# Patient Record
Sex: FEMALE | Race: WHITE | NOT HISPANIC OR LATINO | Employment: OTHER | URBAN - METROPOLITAN AREA
[De-identification: names, ages, dates, MRNs, and addresses within clinical notes are randomized per-mention and may not be internally consistent; named-entity substitution may affect disease eponyms.]

---

## 2017-01-06 ENCOUNTER — OFFICE VISIT (OUTPATIENT)
Dept: OTOLARYNGOLOGY | Facility: CLINIC | Age: 82
End: 2017-01-06
Payer: MEDICARE

## 2017-01-06 VITALS
HEART RATE: 65 BPM | TEMPERATURE: 96 F | WEIGHT: 128.75 LBS | DIASTOLIC BLOOD PRESSURE: 55 MMHG | BODY MASS INDEX: 21.42 KG/M2 | SYSTOLIC BLOOD PRESSURE: 136 MMHG

## 2017-01-06 DIAGNOSIS — C77.0 SECONDARY MALIGNANT NEOPLASM OF LYMPH NODES OF HEAD, FACE, OR NECK: ICD-10-CM

## 2017-01-06 DIAGNOSIS — E03.9 HYPOTHYROIDISM (ACQUIRED): ICD-10-CM

## 2017-01-06 DIAGNOSIS — H61.21 IMPACTED CERUMEN OF RIGHT EAR: ICD-10-CM

## 2017-01-06 DIAGNOSIS — I89.0 LYMPHEDEMA: ICD-10-CM

## 2017-01-06 DIAGNOSIS — I13.0 HYPERTENSIVE HEART AND KIDNEY DISEASE WITH HF AND WITH CKD STAGE I: ICD-10-CM

## 2017-01-06 DIAGNOSIS — C44.42 SQUAMOUS CELL CARCINOMA OF SCALP: Primary | ICD-10-CM

## 2017-01-06 DIAGNOSIS — G63 POLYNEUROPATHY IN OTHER DISEASES CLASSIFIED ELSEWHERE: ICD-10-CM

## 2017-01-06 DIAGNOSIS — N18.1 HYPERTENSIVE HEART AND KIDNEY DISEASE WITH HF AND WITH CKD STAGE I: ICD-10-CM

## 2017-01-06 PROCEDURE — 99499 UNLISTED E&M SERVICE: CPT | Mod: S$GLB,,, | Performed by: OTOLARYNGOLOGY

## 2017-01-06 PROCEDURE — 99213 OFFICE O/P EST LOW 20 MIN: CPT | Mod: S$GLB,,, | Performed by: OTOLARYNGOLOGY

## 2017-01-06 PROCEDURE — 1157F ADVNC CARE PLAN IN RCRD: CPT | Mod: S$GLB,,, | Performed by: OTOLARYNGOLOGY

## 2017-01-06 PROCEDURE — 1126F AMNT PAIN NOTED NONE PRSNT: CPT | Mod: S$GLB,,, | Performed by: OTOLARYNGOLOGY

## 2017-01-06 PROCEDURE — 99999 PR PBB SHADOW E&M-EST. PATIENT-LVL III: CPT | Mod: PBBFAC,,, | Performed by: OTOLARYNGOLOGY

## 2017-01-06 PROCEDURE — 1159F MED LIST DOCD IN RCRD: CPT | Mod: S$GLB,,, | Performed by: OTOLARYNGOLOGY

## 2017-01-06 PROCEDURE — 1160F RVW MEDS BY RX/DR IN RCRD: CPT | Mod: S$GLB,,, | Performed by: OTOLARYNGOLOGY

## 2017-01-06 NOTE — MR AVS SNAPSHOT
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      [facility] On Call     [facility] On Call Nurse Care Line -  Assistance  Registered nurses in the [facility] On Call Center provide clinical advisement, health education, appointment booking, and other advisory services.  Call for this free service at [phone].             Medications           Message regarding Medications     Verify the changes and/or additions to your medication regime listed below are the same as discussed with your clinician today.  If any of these changes or additions are incorrect, please notify your healthcare provider.             Verify that the below list of medications is an accurate representation of the medications you are currently taking.  If none reported, the list may be blank. If incorrect, please contact your healthcare provider. Carry this list with you in case of emergency.           Current Medications     acetaminophen (TYLENOL ARTHRITIS) 650 MG TbSR Take 650 mg by mouth once daily. 1-2 tablet once a day.    amlodipine (NORVASC) 10 MG tablet TAKE 1 TABLET ONE TIME DAILY    aspirin (ECOTRIN) 81 MG  EC tablet Take 81 mg by mouth every other day. Hold for 4 weeks    atorvastatin (LIPITOR) 20 MG tablet TAKE 1 TABLET ONE TIME DAILY    chlorpheniramine (CHLOR-TRIMETON) 2 mg/5 mL syrup Take 2 mg by mouth every 4 (four) hours as needed for Allergies.    dextromethorphan (DELSYM) 30 mg/5 mL liquid Take 60 mg by mouth 2 (two) times daily.    FLAXSEED-OMEGA3,6,9-FATTY ACID ORAL Take 1 capsule by mouth once daily.      labetalol (NORMODYNE) 200 MG tablet TAKE 2 TABLETS TWICE DAILY    levothyroxine (SYNTHROID) 75 MCG tablet Take 1 tablet (75 mcg total) by mouth once daily.    losartan-hydrochlorothiazide 100-25 mg (HYZAAR) 100-25 mg per tablet TAKE 1 TABLET EVERY DAY    magnesium 250 mg Tab Take 1 tablet by mouth once daily.      metronidazole 0.75% (METROCREAM) 0.75 % Crea AAA face bid    multivitamin capsule Take 1 capsule by mouth once daily.    omeprazole (PRILOSEC) 20 MG capsule Take 1 capsule (20 mg total) by mouth once daily.    vitamin D 1000 units Tab Take 1,000 Units by mouth once daily.            Clinical Reference Information           Vital Signs - Last Recorded  Most recent update: 1/6/2017 11:04 AM by Catalina Hackett MA    BP Pulse Temp Wt LMP BMI    (!) 136/55 65 (!) 95.6 °F (35.3 °C) 58.4 kg (128 lb 12 oz) (LMP Unknown) 21.42 kg/m2      Blood Pressure          Most Recent Value    BP  (!)  136/55      Allergies as of 1/6/2017     Codeine    Darvocet A500 [Propoxyphene N-acetaminophen]    Sulfa (Sulfonamide Antibiotics)      Immunizations Administered on Date of Encounter - 1/6/2017     None

## 2017-01-06 NOTE — PROGRESS NOTES
Subjective:   HEAD AND NECK SURGICAL ONCOLOGY CLINIC     Patient ID: Kristin Quintanilla is a 86 y.o. female.    Chief Complaint:   Chief Complaint   Patient presents with    Follow-up     cutaneous SCC with regional, delayed metastases     HPI     TREATMENT HISTORY:  1. Mohs excision of scalp SCC, including pericranium; 5/13/2014  2. Scalp rotation flap, 5/14/2014  3. Excisional biopsy of right neck, 10/27/2014  4. IMRT to right posterior neck and primary site, (70gy), 1/23/2015    HISTORY OF PRESENT ILLNESS:    Kristin Quintanilla returns for follow-up of a suspicious lesion on the scalp that proved to be SCC with perineural invasion. She then manifest a posterior cervical lymph node that was actually metastatic cutaneous SCC. She declined a neck dissection, opting instead for RT, which she completed in January of 2015. She had an incidental pelvic mass that ultimately proved to be benign, but that was detected on a PET-CT. She has no axillary or inguinal adenopathy and denies fevers, chills, nightsweats, fatigue, and weight loss.     She is breathing comfortably and eating a regular diet. She denies dysphagia, odynophagia, throat pain, and otalgia. Her voice is normal. There is no hemoptysis or hematemesis. Today, she complains of dry mouth and alopecia in the treatment field. She denies paresthesias and formication of the neck. She has not noted any new lumps, bumps, or masses. She continues to follow with Dr. Mohamud.    She returns today complaining of a dry mouth, but notes that her taste and swallowing are better. She notes some central neck swelling, which she thinks is her thyroid. This comes and goes and was consistent on prior exams with lymphedema. She had a URI last week, and she is still a little hoarse. She has not noted any new masses or lesions.     There have been no changes in her health in the interim.    Past Medical History   Diagnosis Date    Allergy      Seasonal    Aortic regurgitation      Aortic valve disorders     Arthritis     Back pain     Basal cell carcinoma 10/7/2013     Right nasal columellar, right lower eyelid, left medial eyelid    Basal cell carcinoma 2/2015     mid back    Coronary artery disease     Heart murmur     Hyperlipidemia     Hypertension     Hypothyroidism (acquired) 9/27/2016    Joint pain     Lymphedema of Neck 3/17/2016    Mass of appendix 8/14/2015    Occlusion and stenosis of carotid artery without mention of cerebral infarction     Pelvic mass in female 7/21/2015    Radiation     Secondary and unspecified malignant neoplasm of lymph nodes of head, face, and neck 11/6/2014    Squamous cell carcinoma of scalp 5/2014     scalp vertex    Thyroid disease     Ulcer        Past Surgical History   Procedure Laterality Date    Skin cancer excision      Cataract extraction       ou dr morales    Thyroidectomy  1960's     hyperthyroidism    Mohs excision of scalp scc N/A 5/13/2014    Scalp flap N/A 5/14/204     posterior advancement-rotation    Lymph node biopsy Right 10/27/2014     posterior triangle    Hysterectomy  1970      NIC/BSO. took HRT immediatiely after wards.     Appendectomy  8.14.2015     mucinous cystadenoma with low grade dysplasia.          Current Outpatient Prescriptions:     acetaminophen (TYLENOL ARTHRITIS) 650 MG TbSR, Take 650 mg by mouth once daily. 1-2 tablet once a day., Disp: , Rfl:     amlodipine (NORVASC) 10 MG tablet, TAKE 1 TABLET ONE TIME DAILY, Disp: 90 tablet, Rfl: 3    aspirin (ECOTRIN) 81 MG EC tablet, Take 81 mg by mouth every other day. Hold for 4 weeks, Disp: , Rfl:     atorvastatin (LIPITOR) 20 MG tablet, TAKE 1 TABLET ONE TIME DAILY, Disp: 90 tablet, Rfl: 3    chlorpheniramine (CHLOR-TRIMETON) 2 mg/5 mL syrup, Take 2 mg by mouth every 4 (four) hours as needed for Allergies., Disp: , Rfl:     dextromethorphan (DELSYM) 30 mg/5 mL liquid, Take 60 mg by mouth 2 (two) times daily., Disp: , Rfl:      FLAXSEED-OMEGA3,6,9-FATTY ACID ORAL, Take 1 capsule by mouth once daily.  , Disp: , Rfl:     labetalol (NORMODYNE) 200 MG tablet, TAKE 2 TABLETS TWICE DAILY (Patient taking differently: TAKE 1 TABLEt TWICE DAILY), Disp: 360 tablet, Rfl: 3    levothyroxine (SYNTHROID) 75 MCG tablet, Take 1 tablet (75 mcg total) by mouth once daily., Disp: 90 tablet, Rfl: 3    losartan-hydrochlorothiazide 100-25 mg (HYZAAR) 100-25 mg per tablet, TAKE 1 TABLET EVERY DAY, Disp: 90 tablet, Rfl: 3    magnesium 250 mg Tab, Take 1 tablet by mouth once daily.  , Disp: , Rfl:     metronidazole 0.75% (METROCREAM) 0.75 % Crea, AAA face bid (Patient taking differently: AAA face bid. Pt using prn.), Disp: 60 g, Rfl: 3    multivitamin capsule, Take 1 capsule by mouth once daily., Disp: , Rfl:     omeprazole (PRILOSEC) 20 MG capsule, Take 1 capsule (20 mg total) by mouth once daily., Disp: 90 capsule, Rfl: 3    vitamin D 1000 units Tab, Take 1,000 Units by mouth once daily. , Disp: , Rfl:     Her synthroid dose was increased to 75mcg recently.    Review of patient's allergies indicates:   Allergen Reactions    Codeine     Darvocet a500 [propoxyphene n-acetaminophen] Nausea Only    Sulfa (sulfonamide antibiotics) Rash       Social History     Social History    Marital status:      Spouse name: N/A    Number of children: N/A    Years of education: N/A     Occupational History    Homemaker      Social History Main Topics    Smoking status: Former Smoker    Smokeless tobacco: Never Used      Comment: Quit 35 years ago    Alcohol use No    Drug use: No    Sexual activity: Not on file     Other Topics Concern    Are You Pregnant Or Think You May Be? No    Breast-Feeding No     Social History Narrative    She is active and independent.        Family History   Problem Relation Age of Onset    Heart attack Mother     Heart disease Mother     Hypertension Mother     Eczema Mother     Heart failure Mother     Clotting  disorder Father     Hypertension Father     Heart failure Father     Thyroid disease Sister     Cancer Sister     No Known Problems Brother     No Known Problems Maternal Aunt     No Known Problems Maternal Uncle     No Known Problems Paternal Aunt     No Known Problems Paternal Uncle     No Known Problems Maternal Grandmother     No Known Problems Maternal Grandfather     No Known Problems Paternal Grandmother     No Known Problems Paternal Grandfather     Amblyopia Neg Hx     Blindness Neg Hx     Cataracts Neg Hx     Glaucoma Neg Hx     Macular degeneration Neg Hx     Retinal detachment Neg Hx     Strabismus Neg Hx     Stroke Neg Hx     Melanoma Neg Hx     Psoriasis Neg Hx     Lupus Neg Hx     Acne Neg Hx     Ovarian cancer Neg Hx     Uterine cancer Neg Hx     Breast cancer Neg Hx     Colon cancer Neg Hx     Diabetes Neg Hx     Hyperlipidemia Neg Hx      Review of Systems   Constitutional: Negative for fatigue, fever and unexpected weight change.   HENT: Positive for congestion and rhinorrhea. Negative for hearing loss, mouth sores, nosebleeds, postnasal drip, sore throat, tinnitus, trouble swallowing and voice change.    Eyes: Negative for visual disturbance.   Respiratory: Negative for cough, choking and shortness of breath.    Cardiovascular: Negative for chest pain and palpitations.   Gastrointestinal: Negative for abdominal pain, constipation and diarrhea.   Genitourinary: Negative for difficulty urinating and dysuria.   Musculoskeletal: Negative for arthralgias, back pain and neck pain.   Skin: Negative for rash and wound.        No issues with reconstructed scalp. There is a 3mm nodular fullness that she notes is occasionally tender located at the extreme posterior reaches of the incision     Neurological: Positive for numbness (in arms and hands when she wakes up in the morning - it goes away). Negative for syncope and headaches.   Hematological: Positive for adenopathy.  Bruises/bleeds easily (on ASA 81).   Psychiatric/Behavioral: Negative for dysphoric mood. The patient is not nervous/anxious.        Objective:      Physical Exam   Constitutional: She is oriented to person, place, and time. She appears well-developed and well-nourished. She is cooperative.   HENT:   Head: Normocephalic and atraumatic.       Right Ear: Hearing, tympanic membrane, external ear and ear canal normal.   Left Ear: Hearing, tympanic membrane, external ear and ear canal normal.   Ears:    Nose: No rhinorrhea. Right sinus exhibits no maxillary sinus tenderness and no frontal sinus tenderness. Left sinus exhibits no maxillary sinus tenderness and no frontal sinus tenderness.              Eyes: Pupils are equal, round, and reactive to light. Right eye exhibits no discharge. Left eye exhibits no discharge. No scleral icterus.   Neck: No thyroid mass and no thyromegaly present.       Cardiovascular: Normal rate.    Pulmonary/Chest: Effort normal. No respiratory distress.   Lymphadenopathy:        Head (right side): No submental, no submandibular, no tonsillar, no preauricular and no posterior auricular adenopathy present.        Head (left side): No submental, no submandibular, no tonsillar, no preauricular and no posterior auricular adenopathy present.     She has no cervical adenopathy.        Right cervical: No deep cervical and no posterior cervical adenopathy present.       Left cervical: No deep cervical and no posterior cervical adenopathy present.   Neurological: She is alert and oriented to person, place, and time. No cranial nerve deficit.   Skin: Skin is warm and dry. No rash noted. No erythema.   Psychiatric: She has a normal mood and affect. Her behavior is normal.   Vitals reviewed.        PET- CT, 9/9/2015     Impression     No evidence of recurrent or residual malignancy.       Assessment:       1. Squamous cell carcinoma of scalp     2. 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, face,  or neck     3. Polyneuropathy in other diseases classified elsewhere     4. Lymphedema of Neck     5. Hypothyroidism (acquired)     6. 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 She has no clinical evidence of recurrent disease - Dr. Mohamud continues to monitor her closely as well. She has a right cerumen impaction but did not tolerate binocular microscopy with removal - it is a hard ball of wax that may benefit from softening with OTC agents. She will do that until she returns to see me in 3 months.     I will see her back in about 3 months, sooner if something changes.     Distress Screening Results: Psychosocial Distress screening score of 2 noted and reviewed. No intervention indicated.

## 2017-03-02 ENCOUNTER — OFFICE VISIT (OUTPATIENT)
Dept: OPTOMETRY | Facility: CLINIC | Age: 82
End: 2017-03-02
Payer: MEDICARE

## 2017-03-02 DIAGNOSIS — H52.203 MYOPIA WITH ASTIGMATISM AND PRESBYOPIA, BILATERAL: ICD-10-CM

## 2017-03-02 DIAGNOSIS — H10.503 BLEPHAROCONJUNCTIVITIS OF BOTH EYES, UNSPECIFIED BLEPHAROCONJUNCTIVITIS TYPE: Primary | ICD-10-CM

## 2017-03-02 DIAGNOSIS — H26.493 POSTERIOR CAPSULAR OPACIFICATION, BILATERAL: ICD-10-CM

## 2017-03-02 DIAGNOSIS — H52.13 MYOPIA WITH ASTIGMATISM AND PRESBYOPIA, BILATERAL: ICD-10-CM

## 2017-03-02 DIAGNOSIS — H52.4 MYOPIA WITH ASTIGMATISM AND PRESBYOPIA, BILATERAL: ICD-10-CM

## 2017-03-02 PROCEDURE — 92014 COMPRE OPH EXAM EST PT 1/>: CPT | Mod: S$GLB,,, | Performed by: OPTOMETRIST

## 2017-03-02 PROCEDURE — 92015 DETERMINE REFRACTIVE STATE: CPT | Mod: S$GLB,,, | Performed by: OPTOMETRIST

## 2017-03-02 PROCEDURE — 99999 PR PBB SHADOW E&M-EST. PATIENT-LVL I: CPT | Mod: PBBFAC,,, | Performed by: OPTOMETRIST

## 2017-03-02 NOTE — PROGRESS NOTES
HPI     Dls; 3/30/16 Dr. Naqvi     Pt here for hypertensive eye exam.   Pt c/o blurry vision at near ou. Pt wears pal's. Pt c/o dry eyes ou no   itching no burning no pain no ha's  Floaters ou off/on.     Eye meds:   systane balance ou   Lid scrubs ou       Family OHx  (--) glaucoma  (--) AMD    S/p bilateral mohs with Dr. Gonzalez       Last edited by Dorcas Monsivais, OD on 3/2/2017 11:55 AM.     ROS     Positive for: Eyes    Negative for: Constitutional, Gastrointestinal, Neurological, Skin,   Genitourinary, Musculoskeletal, HENT, Endocrine, Cardiovascular,   Respiratory, Psychiatric, Allergic/Imm, Heme/Lymph    Last edited by Dorcas Monsivais, OD on 3/2/2017 11:13 AM. (History)        Assessment /Plan     For exam results, see Encounter Report.    Blepharoconjunctivitis of both eyes, unspecified blepharoconjunctivitis type  - continue lid scrubs and AT    Posterior capsular opacification, bilateral  - Not visually significant. Monitor    Myopia with astigmatism and presbyopia, bilateral  - Educated patient on refractive error and discussed lens options. Gave pt Rx for specs. RTC in 1 year.    Eyeglass Final Rx     Eyeglass Final Rx      Sphere Cylinder Axis Add   Right -1.00 +1.75 015 +2.50   Left -1.25 +1.50 005 +2.50       Type:  PAL    Expiration Date:  3/3/2018                rtc  1 yr

## 2017-03-11 ENCOUNTER — OFFICE VISIT (OUTPATIENT)
Dept: FAMILY MEDICINE | Facility: CLINIC | Age: 82
End: 2017-03-11
Payer: MEDICARE

## 2017-03-11 VITALS
HEIGHT: 64 IN | HEART RATE: 60 BPM | SYSTOLIC BLOOD PRESSURE: 126 MMHG | WEIGHT: 125.69 LBS | BODY MASS INDEX: 21.46 KG/M2 | RESPIRATION RATE: 16 BRPM | OXYGEN SATURATION: 95 % | TEMPERATURE: 98 F | DIASTOLIC BLOOD PRESSURE: 54 MMHG

## 2017-03-11 DIAGNOSIS — J40 BRONCHITIS: Primary | ICD-10-CM

## 2017-03-11 PROCEDURE — 99213 OFFICE O/P EST LOW 20 MIN: CPT | Mod: S$GLB,,, | Performed by: NURSE PRACTITIONER

## 2017-03-11 PROCEDURE — 1125F AMNT PAIN NOTED PAIN PRSNT: CPT | Mod: S$GLB,,, | Performed by: NURSE PRACTITIONER

## 2017-03-11 PROCEDURE — 99999 PR PBB SHADOW E&M-EST. PATIENT-LVL V: CPT | Mod: PBBFAC,,, | Performed by: NURSE PRACTITIONER

## 2017-03-11 PROCEDURE — 1160F RVW MEDS BY RX/DR IN RCRD: CPT | Mod: S$GLB,,, | Performed by: NURSE PRACTITIONER

## 2017-03-11 PROCEDURE — 1159F MED LIST DOCD IN RCRD: CPT | Mod: S$GLB,,, | Performed by: NURSE PRACTITIONER

## 2017-03-11 PROCEDURE — 1157F ADVNC CARE PLAN IN RCRD: CPT | Mod: S$GLB,,, | Performed by: NURSE PRACTITIONER

## 2017-03-11 RX ORDER — BENZONATATE 200 MG/1
200 CAPSULE ORAL 3 TIMES DAILY PRN
Qty: 30 CAPSULE | Refills: 0 | Status: SHIPPED | OUTPATIENT
Start: 2017-03-11 | End: 2017-03-21

## 2017-03-11 RX ORDER — LEVOCETIRIZINE DIHYDROCHLORIDE 5 MG/1
5 TABLET, FILM COATED ORAL NIGHTLY
Qty: 30 TABLET | Refills: 11 | Status: SHIPPED | OUTPATIENT
Start: 2017-03-11 | End: 2017-06-27 | Stop reason: ALTCHOICE

## 2017-03-11 RX ORDER — AZITHROMYCIN 250 MG/1
TABLET, FILM COATED ORAL
Qty: 6 TABLET | Refills: 0 | Status: SHIPPED | OUTPATIENT
Start: 2017-03-11 | End: 2017-03-20

## 2017-03-11 NOTE — PATIENT INSTRUCTIONS
Bronchitis, Antibiotics (Child)    Bronchitis is inflammation and swelling of the lining of the lungs. This is often caused by an infection. Symptoms include a dry, hacking cough that is worse at night. The cough may bring up yellow-green mucus. Your child may also breathe fast, seem short of breath, or wheeze. He or she may have a fever. Other symptoms may include tiredness, chest discomfort, and chills.  Your childs bronchitis is caused by a bacterial infection of the upper respiratory tract. Bronchitis that is caused by bacteria is treated with antibiotics. Medicines may also be given to help relieve symptoms. Symptoms can last up to 2 weeks, although the cough may last much longer.  Home care  Follow these guidelines when caring for your child at home:  · Your childs healthcare provider may prescribe medicine for cough, pain, or fever. You may be told to use saline nose drops to help with breathing. Use these before your child eats or sleeps. Your child may be prescribed bronchodilator medicine. This is to help with breathing. It may come as a spray, inhaler, or pill to take by mouth. Make sure your child uses the medicine exactly at the times advised. Follow all instructions for giving these medicines to your child.  · Your childs healthcare provider has prescribed an oral antibiotic for your child. This is to help stop the infection. Follow all instructions for giving this medicine to your child. Make sure your child takes the medicine every day until it is gone. You should not have any left over.  · You may use over-the-counter medication as directed based on age and weight for fever or discomfort. (Note: If your child has chronic liver or kidney disease or has ever had a stomach ulcer or gastrointestinal bleeding, talk with your healthcare provider before using these medicines.) Aspirin should never be given to anyone younger than 18 years of age who is ill with a viral infection or fever. It may cause  severe liver or brain damage. Dont give your child any other medicine without first asking the provider.  · Dont give a child under age 6 cough or cold medicine unless the provider tells you to do so. These have been shown to not help young children, and may cause serious side effects.  · Wash your hands well with soap and warm water before and after caring for your child. This is to help prevent spreading infection.  · Give your child plenty of time to rest. Trouble sleeping is common with this illness. Have your child sleep in a slightly upright position. This is to help make breathing easier. If possible, raise the head of the bed a few inches. Or prop your childs body up with pillows.  · Make sure your older child blows his or her nose effectively. Your childs healthcare provider may recommend saline nose drops to help thin and remove nasal secretions. Saline nose drops are available without a prescription. You can also use 1/4 teaspoon of table salt mixed well in 1 cup of water. You may put 2 to 3 drops of saline drops in each nostril before having your child blow his or her nose. Always wash your hands after touching used tissues.  · For younger children, suction mucus from the nose with saline nose drops and a small bulb syringe. Talk with your childs healthcare provider or pharmacist if you dont know how to use a bulb syringe. Always wash your hands after using a bulb syringe or touching used tissues.  · To prevent dehydration and help loosen lung secretions in toddlers and older children, make sure your child drinks plenty of liquids. Children may prefer cold drinks, frozen desserts, or ice pops. They may also like warm soup or drinks with lemon and honey. Dont give honey to a child younger than 1 year old.  · To prevent dehydration and help loosen lung secretions in infants under 1 year old, make sure your child drinks plenty of liquids. Use a medicine dropper, if needed, to give small amounts of  breast milk, formula, or oral rehydration solution to your baby. Give 1 to 2 teaspoons every 10 to 15 minutes. A baby may only be able to feed for short amounts of time. If you are breastfeeding, pump and store milk for later use. Give your child oral rehydration solution between feedings. This is available from grocery stores and drugstores without a prescription.  · To make breathing easier during sleep, use a cool-mist humidifier in your childs bedroom. Clean and dry the humidifier daily to prevent bacteria and mold growth. Dont use a hot water vaporizer. It can cause burns. Your child may also feel more comfortable sitting in a steamy bathroom for up to 10 minutes.  · Dont expose your child to cigarette smoke. Tobacco smoke can make your childs symptoms worse.  Follow-up care  Follow up with your childs healthcare provider, or as advised.  When to seek medical advice  For a usually healthy child, call your child's healthcare provider right away if any of these occur:  · Your child is 3 months old or younger and has a fever of 100.4°F (38°C) or higher. Get medical care right away. Fever in a young baby can be a sign of a dangerous infection.  · Your child is of any age and has repeated fevers above 104°F (40°C).  · Your child is younger than 2 years of age and a fever of 100.4°F (38°C) continues for more than 1 day.  · Your child is 2 years old or older and a fever of 100.4°F (38°C) continues for more than 3 days.  · Symptoms dont get better in 1 to 2 weeks, or get worse.  · Breathing difficulty doesnt get better in several days.  · Your child loses his or her appetite or feeds poorly.  · Your child shows signs of dehydration, such as dry mouth, crying with no tears, or urinating less than normal.  Call 911, or get immediate medical care  Contact emergency services if any of these occur:  · Increasing trouble breathing or increasing wheezing  · Extreme drowsiness or trouble  awakening  · Confusion  · Fainting or loss of consciousness  Date Last Reviewed: 9/13/2015  © 2713-2880 [employer] [address] [place] [address], [place] [address]. All rights reserved. This information is not intended as a substitute for professional medical care. Always follow your healthcare professional's instructions.

## 2017-03-11 NOTE — PROGRESS NOTES
Subjective:       Patient ID: Kristin Quintanilla is a 86 y.o. female who presents to urgent care with complaints of prod cough for several days, says she is bringing up sputum that is tan colored, no blood, denies fever but cough is persistent all day and worse at night she lies down.  Has been taking otc meds and kyara does help with the cough, very reluctant to take meds, afraid it will interact with what she is already taking, here with son and daughter in law, family also telling me she is not  Eating, normally eats toast for breakfast, soup for lunch and does not eat dinner, recently began to drink boost 2=3/week     Chief Complaint: Cough; Generalized Body Aches; and Headache    HPI  Review of Systems   Constitutional: Positive for activity change, appetite change and fatigue. Negative for chills, diaphoresis and fever.   HENT: Positive for congestion, postnasal drip and sore throat. Negative for dental problem, drooling, ear discharge, ear pain, facial swelling, hearing loss, mouth sores, nosebleeds, rhinorrhea, sinus pressure, sneezing, tinnitus, trouble swallowing and voice change.    Respiratory: Positive for cough. Negative for apnea, choking, chest tightness, shortness of breath, wheezing and stridor.    Cardiovascular: Negative for chest pain and leg swelling.   Gastrointestinal: Negative for abdominal distention, abdominal pain, anal bleeding, blood in stool, constipation, diarrhea, nausea, rectal pain and vomiting.   Neurological: Negative for dizziness, facial asymmetry, light-headedness and headaches.   Psychiatric/Behavioral: Negative for agitation, behavioral problems, confusion and decreased concentration.       Objective:      Physical Exam   Constitutional: She is oriented to person, place, and time. She appears well-developed. No distress.   Frail, thin   Neck: Normal range of motion. Neck supple.   Cardiovascular: Normal rate, regular rhythm and normal heart sounds.    Pulmonary/Chest: Effort  normal and breath sounds normal. No respiratory distress. She has no wheezes. She has no rales. She exhibits no tenderness.   Abdominal: Soft. Bowel sounds are normal. She exhibits no distension. There is no tenderness.   Lymphadenopathy:     She has no cervical adenopathy.   Neurological: She is alert and oriented to person, place, and time.   Skin: Skin is warm and dry. No rash noted.   Psychiatric: She has a normal mood and affect. Her behavior is normal. Judgment and thought content normal.   Nursing note and vitals reviewed.      Assessment:       1. Bronchitis        Plan:       Kristin was seen today for cough, generalized body aches and headache.    Diagnoses and all orders for this visit:    Bronchitis    Other orders  -     azithromycin (Z-BALTAZAR) 250 MG tablet; 2 tablets on day 1 then 1 tab on days 2-5  -     benzonatate (TESSALON) 200 MG capsule; Take 1 capsule (200 mg total) by mouth 3 (three) times daily as needed.  -     levocetirizine (XYZAL) 5 MG tablet; Take 1 tablet (5 mg total) by mouth every evening.      Importance of proper nutrition stressed, 6 small freq meals, ensure or boost 2-3/day not 2=3 a week,   Education  Pt has been given instructions populated from [employer] database and those entered into patient instructions field and has verbalized understanding of the after visit summary and information contained wherein.    Follow Up  If no better 2-3 days fu with  pcp.    In Case of Emergency   May go to ER for acute shortness of breath, lightheadedness, fever, or any other emergent complaints or changes in condition.

## 2017-03-11 NOTE — MR AVS SNAPSHOT
[facility]  [address] [place] [address]  Phone: [phone]  Fax: [phone]                  Kristin Quintanilla   3/11/2017 2:00 PM   Office Visit    Description:  Female : 3/12/1930   Provider:  JOSEPH DOUGLAS IN   Department:  [facility] Medicine           Reason for Visit     Cough     Generalized Body Aches     Headache           Diagnoses this Visit        Comments    Bronchitis    -  Primary            To Do List           Future Appointments        Provider Department Dept Phone    3/11/2017 2:00 PM JOSEPH DOUGLAS IN [facility] [phone]    3/20/2017 9:40 AM Jairo Schmidt MD [facility] [phone]    2017 11:30 AM Olvin Cevallos MD [facility] [phone]      Goals (5 Years of Data)     None       These Medications        Disp Refills Start End    azithromycin (Z-BALTAZAR) 250 MG tablet 6 tablet 0 3/11/2017     2 tablets on day 1 then 1 tab on days 2-5    Pharmacy: [facility]/pharmacy [address] [place] [address] [address] [facility] Ph #: [phone]       Notes to Pharmacy: Dispense as z-pack    benzonatate (TESSALON) 200 MG capsule 30 capsule 0 3/11/2017 3/21/2017    Take 1 capsule (200 mg total) by mouth 3 (three) times daily as needed. - Oral    Pharmacy: [facility]/pharmacy [address] [place] [address] [address] [facility] Ph #: [phone]       levocetirizine (XYZAL) 5 MG tablet 30 tablet 11 3/11/2017 3/11/2018    Take 1 tablet (5 mg total) by mouth every evening. - Oral    Pharmacy: [facility]/pharmacy [address] [place] [address] [address] [facility] Ph #: [phone]         [facility]     [facility] On Call Nurse Care Line -  Assistance  Registered nurses in the [facility] provide clinical advisement, health education, appointment booking, and other advisory services.  Call for this free service at [phone].             Medications           Message regarding Medications     Verify the  changes and/or additions to your medication regime listed below are the same as discussed with your clinician today.  If any of these changes or additions are incorrect, please notify your healthcare provider.        START taking these NEW medications        Refills    azithromycin (Z-BALTAZAR) 250 MG tablet 0    Si tablets on day 1 then 1 tab on days 2-5    Class: Normal    benzonatate (TESSALON) 200 MG capsule 0    Sig: Take 1 capsule (200 mg total) by mouth 3 (three) times daily as needed.    Class: Normal    Route: Oral    levocetirizine (XYZAL) 5 MG tablet 11    Sig: Take 1 tablet (5 mg total) by mouth every evening.    Class: Normal    Route: Oral           Verify that the below list of medications is an accurate representation of the medications you are currently taking.  If none reported, the list may be blank. If incorrect, please contact your healthcare provider. Carry this list with you in case of emergency.           Current Medications     acetaminophen (TYLENOL ARTHRITIS) 650 MG TbSR Take 650 mg by mouth once daily. 1-2 tablet once a day.    amlodipine (NORVASC) 10 MG tablet TAKE 1 TABLET ONE TIME DAILY    aspirin (ECOTRIN) 81 MG EC tablet Take 81 mg by mouth every other day. Hold for 4 weeks    atorvastatin (LIPITOR) 20 MG tablet TAKE 1 TABLET ONE TIME DAILY    chlorpheniramine (CHLOR-TRIMETON) 2 mg/5 mL syrup Take 2 mg by mouth every 4 (four) hours as needed for Allergies.    dextromethorphan (DELSYM) 30 mg/5 mL liquid Take 60 mg by mouth 2 (two) times daily.    FLAXSEED-OMEGA3,6,9-FATTY ACID ORAL Take 1 capsule by mouth once daily.      labetalol (NORMODYNE) 200 MG tablet TAKE 2 TABLETS TWICE DAILY    levothyroxine (SYNTHROID) 75 MCG tablet Take 1 tablet (75 mcg total) by mouth once daily.    losartan-hydrochlorothiazide 100-25 mg (HYZAAR) 100-25 mg per tablet TAKE 1 TABLET EVERY DAY    magnesium 250 mg Tab Take 1 tablet by mouth once daily.      metronidazole 0.75% (METROCREAM) 0.75 % Crea AAA face  "bid    multivitamin capsule Take 1 capsule by mouth once daily.    omeprazole (PRILOSEC) 20 MG capsule Take 1 capsule (20 mg total) by mouth once daily.    vitamin D 1000 units Tab Take 1,000 Units by mouth once daily.     azithromycin (Z-BALTAZAR) 250 MG tablet 2 tablets on day 1 then 1 tab on days 2-5    benzonatate (TESSALON) 200 MG capsule Take 1 capsule (200 mg total) by mouth 3 (three) times daily as needed.    levocetirizine (XYZAL) 5 MG tablet Take 1 tablet (5 mg total) by mouth every evening.           Clinical Reference Information           Your Vitals Were     BP Pulse Temp Resp Height Weight    126/54 (BP Location: Left arm, Patient Position: Sitting, BP Method: Manual) 60 97.8 °F (36.6 °C) (Oral) 16 5' 4" (1.626 m) 57 kg (125 lb 10.6 oz)    Last Period SpO2 BMI          (LMP Unknown) 95% 21.57 kg/m2        Blood Pressure          Most Recent Value    BP  (!)  126/54      Allergies as of 3/11/2017     Codeine    Darvocet A500 [Propoxyphene N-acetaminophen]    Sulfa (Sulfonamide Antibiotics)      Immunizations Administered on Date of Encounter - 3/11/2017     None      Instructions      Bronchitis, Antibiotics (Child)    Bronchitis is inflammation and swelling of the lining of the lungs. This is often caused by an infection. Symptoms include a dry, hacking cough that is worse at night. The cough may bring up yellow-green mucus. Your child may also breathe fast, seem short of breath, or wheeze. He or she may have a fever. Other symptoms may include tiredness, chest discomfort, and chills.  Your childs bronchitis is caused by a bacterial infection of the upper respiratory tract. Bronchitis that is caused by bacteria is treated with antibiotics. Medicines may also be given to help relieve symptoms. Symptoms can last up to 2 weeks, although the cough may last much longer.  Home care  Follow these guidelines when caring for your child at home:  · Your childs healthcare provider may prescribe medicine for cough, " pain, or fever. You may be told to use saline nose drops to help with breathing. Use these before your child eats or sleeps. Your child may be prescribed bronchodilator medicine. This is to help with breathing. It may come as a spray, inhaler, or pill to take by mouth. Make sure your child uses the medicine exactly at the times advised. Follow all instructions for giving these medicines to your child.  · Your childs healthcare provider has prescribed an oral antibiotic for your child. This is to help stop the infection. Follow all instructions for giving this medicine to your child. Make sure your child takes the medicine every day until it is gone. You should not have any left over.  · You may use over-the-counter medication as directed based on age and weight for fever or discomfort. (Note: If your child has chronic liver or kidney disease or has ever had a stomach ulcer or gastrointestinal bleeding, talk with your healthcare provider before using these medicines.) Aspirin should never be given to anyone younger than 18 years of age who is ill with a viral infection or fever. It may cause severe liver or brain damage. Dont give your child any other medicine without first asking the provider.  · Dont give a child under age 6 cough or cold medicine unless the provider tells you to do so. These have been shown to not help young children, and may cause serious side effects.  · Wash your hands well with soap and warm water before and after caring for your child. This is to help prevent spreading infection.  · Give your child plenty of time to rest. Trouble sleeping is common with this illness. Have your child sleep in a slightly upright position. This is to help make breathing easier. If possible, raise the head of the bed a few inches. Or prop your childs body up with pillows.  · Make sure your older child blows his or her nose effectively. Your childs healthcare provider may recommend saline nose drops to help  thin and remove nasal secretions. Saline nose drops are available without a prescription. You can also use 1/4 teaspoon of table salt mixed well in 1 cup of water. You may put 2 to 3 drops of saline drops in each nostril before having your child blow his or her nose. Always wash your hands after touching used tissues.  · For younger children, suction mucus from the nose with saline nose drops and a small bulb syringe. Talk with your childs healthcare provider or pharmacist if you dont know how to use a bulb syringe. Always wash your hands after using a bulb syringe or touching used tissues.  · To prevent dehydration and help loosen lung secretions in toddlers and older children, make sure your child drinks plenty of liquids. Children may prefer cold drinks, frozen desserts, or ice pops. They may also like warm soup or drinks with lemon and honey. Dont give honey to a child younger than 1 year old.  · To prevent dehydration and help loosen lung secretions in infants under 1 year old, make sure your child drinks plenty of liquids. Use a medicine dropper, if needed, to give small amounts of breast milk, formula, or oral rehydration solution to your baby. Give 1 to 2 teaspoons every 10 to 15 minutes. A baby may only be able to feed for short amounts of time. If you are breastfeeding, pump and store milk for later use. Give your child oral rehydration solution between feedings. This is available from grocery stores and drugstores without a prescription.  · To make breathing easier during sleep, use a cool-mist humidifier in your childs bedroom. Clean and dry the humidifier daily to prevent bacteria and mold growth. Dont use a hot water vaporizer. It can cause burns. Your child may also feel more comfortable sitting in a steamy bathroom for up to 10 minutes.  · Dont expose your child to cigarette smoke. Tobacco smoke can make your childs symptoms worse.  Follow-up care  Follow up with your childs healthcare  provider, or as advised.  When to seek medical advice  For a usually healthy child, call your child's healthcare provider right away if any of these occur:  · Your child is 3 months old or younger and has a fever of 100.4°F (38°C) or higher. Get medical care right away. Fever in a young baby can be a sign of a dangerous infection.  · Your child is of any age and has repeated fevers above 104°F (40°C).  · Your child is younger than 2 years of age and a fever of 100.4°F (38°C) continues for more than 1 day.  · Your child is 2 years old or older and a fever of 100.4°F (38°C) continues for more than 3 days.  · Symptoms dont get better in 1 to 2 weeks, or get worse.  · Breathing difficulty doesnt get better in several days.  · Your child loses his or her appetite or feeds poorly.  · Your child shows signs of dehydration, such as dry mouth, crying with no tears, or urinating less than normal.  Call 911, or get immediate medical care  Contact emergency services if any of these occur:  · Increasing trouble breathing or increasing wheezing  · Extreme drowsiness or trouble awakening  · Confusion  · Fainting or loss of consciousness  Date Last Reviewed: 9/13/2015  © 3610-8747 [employer]. [address] [place] [address], [place] [address]. All rights reserved. This information is not intended as a substitute for professional medical care. Always follow your healthcare professional's instructions.             Language Assistance Services     ATTENTION: Language assistance services are available, free of charge. Please call [phone].      ATENCIÓN: Si habla español, tiene a sanon disposición servicios gratuitos de asistencia lingüística. Llame al [phone].     CHÚ Ý: N?u b?n nói Ti?ng Vi?t, có các d?ch v? h? tr? ngôn ng? mi?n phí dành cho b?n. G?i s? [phone].         [facility] complies with applicable Federal civil rights laws and does not discriminate on the basis of race, color, national  origin, age, disability, or sex.

## 2017-03-20 ENCOUNTER — OFFICE VISIT (OUTPATIENT)
Dept: FAMILY MEDICINE | Facility: CLINIC | Age: 82
End: 2017-03-20
Payer: MEDICARE

## 2017-03-20 ENCOUNTER — LAB VISIT (OUTPATIENT)
Dept: LAB | Facility: HOSPITAL | Age: 82
End: 2017-03-20
Attending: INTERNAL MEDICINE
Payer: MEDICARE

## 2017-03-20 VITALS
HEIGHT: 64 IN | TEMPERATURE: 98 F | BODY MASS INDEX: 21.27 KG/M2 | SYSTOLIC BLOOD PRESSURE: 100 MMHG | OXYGEN SATURATION: 97 % | DIASTOLIC BLOOD PRESSURE: 50 MMHG | WEIGHT: 124.56 LBS | HEART RATE: 62 BPM

## 2017-03-20 DIAGNOSIS — D64.9 ANEMIA, UNSPECIFIED TYPE: ICD-10-CM

## 2017-03-20 DIAGNOSIS — E55.9 VITAMIN D DEFICIENCY DISEASE: ICD-10-CM

## 2017-03-20 DIAGNOSIS — I95.9 HYPOTENSION, UNSPECIFIED HYPOTENSION TYPE: Primary | ICD-10-CM

## 2017-03-20 DIAGNOSIS — H61.21 IMPACTED CERUMEN OF RIGHT EAR: ICD-10-CM

## 2017-03-20 DIAGNOSIS — R53.1 WEAKNESS: ICD-10-CM

## 2017-03-20 DIAGNOSIS — I95.9 HYPOTENSION, UNSPECIFIED HYPOTENSION TYPE: ICD-10-CM

## 2017-03-20 LAB
25(OH)D3+25(OH)D2 SERPL-MCNC: 34 NG/ML
ALBUMIN SERPL BCP-MCNC: 3.3 G/DL
ALP SERPL-CCNC: 58 U/L
ALT SERPL W/O P-5'-P-CCNC: 14 U/L
ANION GAP SERPL CALC-SCNC: 10 MMOL/L
AST SERPL-CCNC: 21 U/L
BASOPHILS # BLD AUTO: 0.03 K/UL
BASOPHILS NFR BLD: 0.5 %
BILIRUB SERPL-MCNC: 0.6 MG/DL
BUN SERPL-MCNC: 27 MG/DL
CALCIUM SERPL-MCNC: 9.9 MG/DL
CHLORIDE SERPL-SCNC: 102 MMOL/L
CO2 SERPL-SCNC: 24 MMOL/L
CREAT SERPL-MCNC: 0.9 MG/DL
DIFFERENTIAL METHOD: ABNORMAL
EOSINOPHIL # BLD AUTO: 0.1 K/UL
EOSINOPHIL NFR BLD: 1.4 %
ERYTHROCYTE [DISTWIDTH] IN BLOOD BY AUTOMATED COUNT: 14.2 %
EST. GFR  (AFRICAN AMERICAN): >60 ML/MIN/1.73 M^2
EST. GFR  (NON AFRICAN AMERICAN): 57.7 ML/MIN/1.73 M^2
FERRITIN SERPL-MCNC: 77 NG/ML
GLUCOSE SERPL-MCNC: 109 MG/DL
HCT VFR BLD AUTO: 34.1 %
HGB BLD-MCNC: 10.9 G/DL
IRON SERPL-MCNC: 65 UG/DL
LYMPHOCYTES # BLD AUTO: 0.8 K/UL
LYMPHOCYTES NFR BLD: 11.9 %
MCH RBC QN AUTO: 29.7 PG
MCHC RBC AUTO-ENTMCNC: 32 %
MCV RBC AUTO: 93 FL
MONOCYTES # BLD AUTO: 0.7 K/UL
MONOCYTES NFR BLD: 10.7 %
NEUTROPHILS # BLD AUTO: 5 K/UL
NEUTROPHILS NFR BLD: 75.3 %
PLATELET # BLD AUTO: 329 K/UL
PMV BLD AUTO: 10.4 FL
POTASSIUM SERPL-SCNC: 4.8 MMOL/L
PROT SERPL-MCNC: 6.3 G/DL
RBC # BLD AUTO: 3.67 M/UL
SATURATED IRON: 18 %
SODIUM SERPL-SCNC: 136 MMOL/L
TOTAL IRON BINDING CAPACITY: 352 UG/DL
TRANSFERRIN SERPL-MCNC: 238 MG/DL
WBC # BLD AUTO: 6.56 K/UL

## 2017-03-20 PROCEDURE — 69210 REMOVE IMPACTED EAR WAX UNI: CPT | Mod: RT,S$GLB,, | Performed by: INTERNAL MEDICINE

## 2017-03-20 PROCEDURE — 1157F ADVNC CARE PLAN IN RCRD: CPT | Mod: S$GLB,,, | Performed by: INTERNAL MEDICINE

## 2017-03-20 PROCEDURE — 1126F AMNT PAIN NOTED NONE PRSNT: CPT | Mod: S$GLB,,, | Performed by: INTERNAL MEDICINE

## 2017-03-20 PROCEDURE — 99999 PR PBB SHADOW E&M-EST. PATIENT-LVL III: CPT | Mod: PBBFAC,,, | Performed by: INTERNAL MEDICINE

## 2017-03-20 PROCEDURE — 82306 VITAMIN D 25 HYDROXY: CPT

## 2017-03-20 PROCEDURE — 1159F MED LIST DOCD IN RCRD: CPT | Mod: S$GLB,,, | Performed by: INTERNAL MEDICINE

## 2017-03-20 PROCEDURE — 85025 COMPLETE CBC W/AUTO DIFF WBC: CPT

## 2017-03-20 PROCEDURE — 36415 COLL VENOUS BLD VENIPUNCTURE: CPT | Mod: PO

## 2017-03-20 PROCEDURE — 99215 OFFICE O/P EST HI 40 MIN: CPT | Mod: 25,S$GLB,, | Performed by: INTERNAL MEDICINE

## 2017-03-20 PROCEDURE — 82728 ASSAY OF FERRITIN: CPT

## 2017-03-20 PROCEDURE — 83540 ASSAY OF IRON: CPT

## 2017-03-20 PROCEDURE — 1160F RVW MEDS BY RX/DR IN RCRD: CPT | Mod: S$GLB,,, | Performed by: INTERNAL MEDICINE

## 2017-03-20 PROCEDURE — 80053 COMPREHEN METABOLIC PANEL: CPT

## 2017-03-20 RX ORDER — LOSARTAN POTASSIUM 100 MG/1
100 TABLET ORAL DAILY
Qty: 30 TABLET | Refills: 3 | Status: SHIPPED | OUTPATIENT
Start: 2017-03-20 | End: 2017-03-20 | Stop reason: SDUPTHER

## 2017-03-20 RX ORDER — LABETALOL 200 MG/1
200 TABLET, FILM COATED ORAL 2 TIMES DAILY
Qty: 360 TABLET | Refills: 3 | Status: SHIPPED | OUTPATIENT
Start: 2017-03-20 | End: 2018-05-21 | Stop reason: SDUPTHER

## 2017-03-20 RX ORDER — LOSARTAN POTASSIUM 100 MG/1
100 TABLET ORAL DAILY
Qty: 90 TABLET | Refills: 3 | Status: SHIPPED | OUTPATIENT
Start: 2017-03-20 | End: 2018-01-02 | Stop reason: SDUPTHER

## 2017-03-20 NOTE — PROGRESS NOTES
"Chief complaint: Establish care    87-year-old female whose PCP retired.  Her physical was in June.here with her son who is a patient of mine.  About 18 days ago she was seen for cough.  Her chest was clear but she was treated with a Z-Amador.  She still never really has produced any coffee she feels the mucus is in her upper throat.  She does have rhinitis.  This is a bad time of year from an ALLERGY standpoint.  The antihistamine given did cause some slight sedation and lightheadedness so she has switched back to Claritin.  For the past month she has felt more drained with reduced energy.  She feels more unsteady and so has begun using a cane.  She is on hydrochlorothiazide and her blood pressure today is 100/50.  She says at home it typically runs 120.  We discussed eliminating the HCTZ and monitoring.  We will also check lab work today.  She also has a moderate amount of reduced hearing in the right.  She did have a cerumen impaction.  She has had ENT remove this wax in the past.  Physician himself partially removed it with an ear pick today and then the residual was irrigated and removed the patient did have significant improvement in her hearing      ROS:   CONST: weight stable. EYES: no vision change. ENT: no sore throat. CV: no chest pain w/ exertion. RESP: no shortness of breath. GI: no nausea, vomiting, diarrhea. No dysphagia. : no urinary issues. MUSCULOSKELETAL: no new myalgias or arthralgias. SKIN: no new changes. NEURO: no focal deficits. PSYCH: no new issues. ENDOCRINE: no polyuria. HEME: no lymph nodes. ALLERGY: no general pruritis.      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      Arthritis     ASCVD (arteriosclerotic cardiovascular disease) 7/8/2014    Atherosclerosis of aorta 1/7/2016    "There is atherosclerosis of the thoracic aorta" - Xray Chest " 7-     Back pain     Basal cell carcinoma 10/7/2013    Right nasal columellar, right lower eyelid, left medial eyelid    Basal cell carcinoma 2/2015    mid back    Chronic diastolic heart failure 11/11/2014    Coronary artery disease     Heart murmur     Hyperlipidemia     Hypertension     Hypothyroidism (acquired) 9/27/2016    Joint pain     Lymphedema of Neck 3/17/2016    Occlusion and stenosis of carotid artery without mention of cerebral infarction     Pelvic mass in female 7/21/2015    Polyneuropathy in other diseases classified elsewhere 1/7/2016 6-     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     CATARACT EXTRACTION      ou dr morales    HYSTERECTOMY  1970     NIC/BSO. took HRT immediatiely after wards.     LYMPH NODE BIOPSY Right 10/27/2014    posterior triangle    Mohs excision of scalp SCC N/A 5/13/2014    Scalp flap N/A 5/14/204    posterior advancement-rotation    SKIN CANCER EXCISION      THYROIDECTOMY  1960's    hyperthyroidism     Social History     Social History    Marital status: ---lives alone and still drives     Spouse name: N/A    Number of children: N/A    Years of education: N/A     Occupational History    Homemaker      Social History Main Topics    Smoking status: Former Smoker    Smokeless tobacco: Never Used      Comment: Quit 35 years ago    Alcohol use No    Drug use: No    Sexual activity: No     Other Topics Concern    Are You Pregnant Or Think You May Be? No    Breast-Feeding No     Social History Narrative    She is active and independent.      family history includes Cancer in her sister; Clotting disorder in her father; Eczema in her mother; Heart attack in her mother; Heart disease in her mother; Heart failure in her father and mother; Hypertension in her father  and mother; No Known Problems in her brother, maternal aunt, maternal grandfather, maternal grandmother, maternal uncle, paternal aunt, paternal grandfather, paternal grandmother, and paternal uncle; Thyroid disease in her sister. There is no history of Amblyopia, Blindness, Cataracts, Glaucoma, Macular degeneration, Retinal detachment, Strabismus, Stroke, Melanoma, Psoriasis, Lupus, Acne, Ovarian cancer, Uterine cancer, Breast cancer, Colon cancer, Diabetes, or Hyperlipidemia.       Gen: no distress  EYES: conjunctiva clear, non-icteric, PERRL  ENT: nose clear, nasal mucosa normal, oropharynx clear and moist, teeth good  NECK:supple, thyroid non-palpable  RESP: effort is good, lungs clear  CV: heart RRR w/o murmur, gallops or rubs; no carotid bruits, no edema  GI: abdomen soft, non-distended, non-tender, no hepatosplenomegaly  MS: gait normal, no clubbing or cyanosis of the digits  SKIN: no rashes, warm to touch  Cerumen impaction procedure note: after verbal informed consent obtained, warm water and peroxide was used to irrigate the ear with removal of wax and debris.physician removed the earwax with and ear pick    Kristin was seen today for establish care, cough, dizziness and ear fullness.    Diagnoses and all orders for this visit:    Hypotension, unspecified hypotension type, Patient does have 1 month ofgeneral malaise and decreased energy which all could be secondary to the diuretic at her age. We will eliminate this,and assess response.  She will monitor blood pressure at home.  All these infections were also given to the son who reiterated them. Patienthad all of her medical history, problem list and so forth reviewed with her and myself and updates were made.  Clarification as to her medications were made. She was counseled regarding all these issues in the assessment and plan.Total time over 45 minutes with over 50% counseling.  -     Comprehensive metabolic panel; Future    Anemia, unspecified  "type,reassess for worsening but does appear to be somewhat chronic  -     Iron and TIBC; Future  -     Ferritin; Future  -     CBC auto differential; Future    Weakness    Vitamin D deficiency disease, patient concerned about her long-term PPI use but I encouraged her to stay on the PPI since she has a history of bleeding ulcersand continues on aspirin.  We can manage any associated vitamin D deficiency  -     Vitamin D; Future    Impacted cerumen of right ear, symptomatic, earwax removed with improvement    Other orders  -     Pneumococcal Polysaccharide Vaccine (23 Valent) (SQ/IM)  -     labetalol (NORMODYNE) 200 MG tablet; Take 1 tablet (200 mg total) by mouth 2 (two) times daily.  -     Discontinue: losartan (COZAAR) 100 MG tablet; Take 1 tablet (100 mg total) by mouth once daily.  -     losartan (COZAAR) 100 MG tablet; Take 1 tablet (100 mg total) by mouth once daily.     clinical note will be sensitive since patient herself it is not the one with direct access.  Other family members not present may take things out of context"This note will not be shared with the patient."    "

## 2017-04-05 DIAGNOSIS — K21.9 GASTROESOPHAGEAL REFLUX DISEASE WITHOUT ESOPHAGITIS: ICD-10-CM

## 2017-04-05 DIAGNOSIS — E03.9 ACQUIRED HYPOTHYROIDISM: ICD-10-CM

## 2017-04-05 RX ORDER — LEVOTHYROXINE SODIUM 75 UG/1
75 TABLET ORAL DAILY
Qty: 90 TABLET | Refills: 3 | Status: SHIPPED | OUTPATIENT
Start: 2017-04-05 | End: 2018-01-15 | Stop reason: SDUPTHER

## 2017-04-05 RX ORDER — OMEPRAZOLE 20 MG/1
20 CAPSULE, DELAYED RELEASE ORAL DAILY
Qty: 90 CAPSULE | Refills: 3 | Status: SHIPPED | OUTPATIENT
Start: 2017-04-05 | End: 2018-01-15 | Stop reason: SDUPTHER

## 2017-04-06 ENCOUNTER — OFFICE VISIT (OUTPATIENT)
Dept: OTOLARYNGOLOGY | Facility: CLINIC | Age: 82
End: 2017-04-06
Payer: MEDICARE

## 2017-04-06 VITALS
BODY MASS INDEX: 21.34 KG/M2 | SYSTOLIC BLOOD PRESSURE: 170 MMHG | TEMPERATURE: 96 F | HEART RATE: 67 BPM | DIASTOLIC BLOOD PRESSURE: 61 MMHG | WEIGHT: 124.31 LBS

## 2017-04-06 DIAGNOSIS — I89.0 LYMPHEDEMA: ICD-10-CM

## 2017-04-06 DIAGNOSIS — C77.0 SECONDARY MALIGNANT NEOPLASM OF LYMPH NODES OF HEAD, FACE, OR NECK: Primary | ICD-10-CM

## 2017-04-06 PROBLEM — H61.21 IMPACTED CERUMEN OF RIGHT EAR: Status: RESOLVED | Noted: 2017-01-06 | Resolved: 2017-04-06

## 2017-04-06 PROCEDURE — 1160F RVW MEDS BY RX/DR IN RCRD: CPT | Mod: S$GLB,,, | Performed by: OTOLARYNGOLOGY

## 2017-04-06 PROCEDURE — 1159F MED LIST DOCD IN RCRD: CPT | Mod: S$GLB,,, | Performed by: OTOLARYNGOLOGY

## 2017-04-06 PROCEDURE — 99999 PR PBB SHADOW E&M-EST. PATIENT-LVL III: CPT | Mod: PBBFAC,,, | Performed by: OTOLARYNGOLOGY

## 2017-04-06 PROCEDURE — 1157F ADVNC CARE PLAN IN RCRD: CPT | Mod: S$GLB,,, | Performed by: OTOLARYNGOLOGY

## 2017-04-06 PROCEDURE — 99213 OFFICE O/P EST LOW 20 MIN: CPT | Mod: S$GLB,,, | Performed by: OTOLARYNGOLOGY

## 2017-04-06 PROCEDURE — 1126F AMNT PAIN NOTED NONE PRSNT: CPT | Mod: S$GLB,,, | Performed by: OTOLARYNGOLOGY

## 2017-04-06 NOTE — MR AVS SNAPSHOT
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, face, or neck    -  Primary     Lymphedema                To Do List           Future Appointments        Provider Department Dept Phone    2017 7:45 AM NUCLEAR [place], [facility] - Nuclear Camera [phone]    2017 1:00 PM VASCULAR, CARDIOLOGY [facility] - Vascular Cardiology [phone]    7/3/2017 9:00 AM LAB, APPOINTMENT [place] [facility] [phone]    2017 1:30 PM Lenka Triana MD [place] - Cardiology [phone]      Goals (5 Years of Data)     None      Follow-Up and Disposition     Return in about 4 months (around 2017).      [facility]     [facility] Care [facility] -  Assistance  Unless otherwise directed by your provider, please contact [facility], our nurse care line that is available for  assistance.     Registered nurses in the [facility] provide: appointment scheduling, clinical advisement, health education, and other advisory services.  Call: [phone] (toll free)               Medications           Message regarding Medications     Verify the changes and/or additions to your medication regime listed below are the same as discussed with your clinician today.  If any of these changes or additions are incorrect, please notify your healthcare provider.             Verify that the below list of medications is an accurate representation of the medications you are currently taking.  If none reported, the list may be blank. If incorrect, please contact your  healthcare provider. Carry this list with you in case of emergency.           Current Medications     acetaminophen (TYLENOL ARTHRITIS) 650 MG TbSR Take 650 mg by mouth once daily. 1-2 tablet once a day.    amlodipine (NORVASC) 10 MG tablet TAKE 1 TABLET ONE TIME DAILY    aspirin (ECOTRIN) 81 MG EC tablet Take 81 mg by mouth every other day. Hold for 4 weeks    atorvastatin (LIPITOR) 20 MG tablet TAKE 1 TABLET ONE TIME DAILY    chlorpheniramine (CHLOR-TRIMETON) 2 mg/5 mL syrup Take 2 mg by mouth every 4 (four) hours as needed for Allergies.    dextromethorphan (DELSYM) 30 mg/5 mL liquid Take 60 mg by mouth 2 (two) times daily.    FLAXSEED-OMEGA3,6,9-FATTY ACID ORAL Take 1 capsule by mouth once daily.      labetalol (NORMODYNE) 200 MG tablet Take 1 tablet (200 mg total) by mouth 2 (two) times daily.    levocetirizine (XYZAL) 5 MG tablet Take 1 tablet (5 mg total) by mouth every evening.    levothyroxine (SYNTHROID) 75 MCG tablet Take 1 tablet (75 mcg total) by mouth once daily.    losartan (COZAAR) 100 MG tablet Take 1 tablet (100 mg total) by mouth once daily.    magnesium 250 mg Tab Take 1 tablet by mouth once daily.      metronidazole 0.75% (METROCREAM) 0.75 % Crea AAA face bid    multivitamin capsule Take 1 capsule by mouth once daily.    omeprazole (PRILOSEC) 20 MG capsule Take 1 capsule (20 mg total) by mouth once daily.    vitamin D 1000 units Tab Take 1,000 Units by mouth once daily.            Clinical Reference Information           Your Vitals Were     BP Pulse Temp Weight Last Period BMI    170/61 67 96.4 °F (35.8 °C) 56.4 kg (124 lb 5.4 oz) (LMP Unknown) 21.34 kg/m2      Blood Pressure          Most Recent Value    BP  (!)  170/61      Allergies as of 4/6/2017     Codeine    Darvocet A500 [Propoxyphene N-acetaminophen]    Sulfa (Sulfonamide Antibiotics)      Immunizations Administered on Date of Encounter - 4/6/2017     None      Instructions    Please be aware of the symptoms of head and neck cancer -   including, but not limited to, swelling in the neck, changes in voice and swallowing, and pain -- contact me if any of these symptoms appear and persist for more than 3-4 weeks.          Language Assistance Services     ATTENTION: Language assistance services are available, free of charge. Please call [phone].      ATENCIÓN: Si chase trevino, tiene a sanon disposición servicios gratuitos de asistencia lingüística. Llame al [phone].     CHÚ Ý: N?u b?n nói Ti?ng Vi?t, có các d?ch v? h? tr? ngôn ng? mi?n phí dành cho b?n. G?i s? [phone].         [facility] complies with applicable Federal civil rights laws and does not discriminate on the basis of race, color, national origin, age, disability, or sex.

## 2017-04-06 NOTE — PROGRESS NOTES
Subjective:   HEAD AND NECK SURGICAL ONCOLOGY CLINIC     Patient ID: Kristin Quintanilla is a 87 y.o. female.    Chief Complaint:   Chief Complaint   Patient presents with    Follow-up     Squamous cell carcinoma of scalp     HPI     TREATMENT HISTORY:  1. Mohs excision of scalp SCC, including pericranium; 5/13/2014  2. Scalp rotation flap, 5/14/2014  3. Excisional biopsy of right neck, 10/27/2014  4. IMRT to right posterior neck and primary site, (70gy), 1/23/2015    HISTORY OF PRESENT ILLNESS:    Kristin Quintanilla returns for follow-up of a suspicious lesion on the scalp that proved to be SCC with perineural invasion. She then manifest a posterior cervical lymph node that was actually metastatic cutaneous SCC. She declined a neck dissection, opting instead for RT, which she completed in January of 2015. She had an incidental pelvic mass that ultimately proved to be benign, but that was detected on a PET-CT. She has no axillary or inguinal adenopathy and denies fevers, chills, nightsweats, fatigue, and weight loss.     She is breathing comfortably and eating a regular diet. She denies dysphagia, odynophagia, throat pain, and otalgia. Her voice is normal. There is no hemoptysis or hematemesis. Today, she complains of dry mouth and alopecia in the treatment field. She denies paresthesias and formication of the neck. She has not noted any new lumps, bumps, or masses. She continues to follow with Dr. Mohamud, and she sees her again in May.    She returns today complaining of a dry mouth, but notes that her taste and swallowing are better. She has not noted any new masses or lesions.     There have been no changes in her health in the interim. She suffered a bad URI earlier this year, but she has recovered.    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      Arthritis      "ASCVD (arteriosclerotic cardiovascular disease) 7/8/2014    Atherosclerosis of aorta 1/7/2016    "There is atherosclerosis of the thoracic aorta" - Xray Chest 7-     Back pain     Basal cell carcinoma 10/7/2013    Right nasal columellar, right lower eyelid, left medial eyelid    Basal cell carcinoma 2/2015    mid back    Chronic diastolic heart failure 11/11/2014    Coronary artery disease     Heart murmur     Hyperlipidemia     Hypertension     Hypothyroidism (acquired) 9/27/2016    Joint pain     Lymphedema of Neck 3/17/2016    Occlusion and stenosis of carotid artery without mention of cerebral infarction     Pelvic mass in female 7/21/2015    Polyneuropathy in other diseases classified elsewhere 1/7/2016 6-     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     CATARACT EXTRACTION      ou dr morales    HYSTERECTOMY  1970     NIC/BSO. took HRT immediatiely after wards.     LYMPH NODE BIOPSY Right 10/27/2014    posterior triangle    Mohs excision of scalp SCC N/A 5/13/2014    Scalp flap N/A 5/14/204    posterior advancement-rotation    SKIN CANCER EXCISION      THYROIDECTOMY  1960's    hyperthyroidism         Current Outpatient Prescriptions:     acetaminophen (TYLENOL ARTHRITIS) 650 MG TbSR, Take 650 mg by mouth once daily. 1-2 tablet once a day., Disp: , Rfl:     amlodipine (NORVASC) 10 MG tablet, TAKE 1 TABLET ONE TIME DAILY, Disp: 90 tablet, Rfl: 3    aspirin (ECOTRIN) 81 MG EC tablet, Take 81 mg by mouth every other day. Hold for 4 weeks, Disp: , Rfl:     atorvastatin (LIPITOR) 20 MG tablet, TAKE 1 TABLET ONE TIME DAILY, Disp: 90 tablet, Rfl: 3    chlorpheniramine (CHLOR-TRIMETON) 2 mg/5 mL syrup, Take 2 mg by mouth every 4 (four) hours as needed for Allergies., Disp: , Rfl:     dextromethorphan (DELSYM) 30 " mg/5 mL liquid, Take 60 mg by mouth 2 (two) times daily., Disp: , Rfl:     FLAXSEED-OMEGA3,6,9-FATTY ACID ORAL, Take 1 capsule by mouth once daily.  , Disp: , Rfl:     labetalol (NORMODYNE) 200 MG tablet, Take 1 tablet (200 mg total) by mouth 2 (two) times daily., Disp: 360 tablet, Rfl: 3    levocetirizine (XYZAL) 5 MG tablet, Take 1 tablet (5 mg total) by mouth every evening., Disp: 30 tablet, Rfl: 11    levothyroxine (SYNTHROID) 75 MCG tablet, Take 1 tablet (75 mcg total) by mouth once daily., Disp: 90 tablet, Rfl: 3    losartan (COZAAR) 100 MG tablet, Take 1 tablet (100 mg total) by mouth once daily., Disp: 90 tablet, Rfl: 3    magnesium 250 mg Tab, Take 1 tablet by mouth once daily.  , Disp: , Rfl:     metronidazole 0.75% (METROCREAM) 0.75 % Crea, AAA face bid (Patient taking differently: AAA face bid. Pt using prn.), Disp: 60 g, Rfl: 3    multivitamin capsule, Take 1 capsule by mouth once daily., Disp: , Rfl:     omeprazole (PRILOSEC) 20 MG capsule, Take 1 capsule (20 mg total) by mouth once daily., Disp: 90 capsule, Rfl: 3    vitamin D 1000 units Tab, Take 1,000 Units by mouth once daily. , Disp: , Rfl:     Her synthroid dose was increased to 75mcg recently.    Review of patient's allergies indicates:   Allergen Reactions    Codeine     Darvocet a500 [propoxyphene n-acetaminophen] Nausea Only    Sulfa (sulfonamide antibiotics) Rash       Social History     Social History    Marital status:      Spouse name: N/A    Number of children: N/A    Years of education: N/A     Occupational History    Homemaker      Social History Main Topics    Smoking status: Former Smoker    Smokeless tobacco: Never Used      Comment: Quit 35 years ago    Alcohol use No    Drug use: No    Sexual activity: No     Other Topics Concern    Are You Pregnant Or Think You May Be? No    Breast-Feeding No     Social History Narrative    She is active and independent.        Family History   Problem Relation Age  of Onset    Heart attack Mother     Heart disease Mother     Hypertension Mother     Eczema Mother     Heart failure Mother     Clotting disorder Father     Hypertension Father     Heart failure Father     Thyroid disease Sister     Cancer Sister     No Known Problems Brother     No Known Problems Maternal Aunt     No Known Problems Maternal Uncle     No Known Problems Paternal Aunt     No Known Problems Paternal Uncle     No Known Problems Maternal Grandmother     No Known Problems Maternal Grandfather     No Known Problems Paternal Grandmother     No Known Problems Paternal Grandfather     Amblyopia Neg Hx     Blindness Neg Hx     Cataracts Neg Hx     Glaucoma Neg Hx     Macular degeneration Neg Hx     Retinal detachment Neg Hx     Strabismus Neg Hx     Stroke Neg Hx     Melanoma Neg Hx     Psoriasis Neg Hx     Lupus Neg Hx     Acne Neg Hx     Ovarian cancer Neg Hx     Uterine cancer Neg Hx     Breast cancer Neg Hx     Colon cancer Neg Hx     Diabetes Neg Hx     Hyperlipidemia Neg Hx      Review of Systems   Constitutional: Negative for fatigue, fever and unexpected weight change.   HENT: Positive for congestion and rhinorrhea. Negative for hearing loss, mouth sores, nosebleeds, postnasal drip, sore throat, tinnitus, trouble swallowing and voice change.    Eyes: Negative for visual disturbance.   Respiratory: Negative for cough, choking and shortness of breath.    Cardiovascular: Negative for chest pain and palpitations.   Gastrointestinal: Negative for abdominal pain, constipation and diarrhea.   Genitourinary: Negative for difficulty urinating and dysuria.   Musculoskeletal: Negative for arthralgias, back pain and neck pain.   Skin: Negative for rash and wound.        No issues with reconstructed scalp. There is a 3mm nodular fullness that she notes is occasionally tender located at the extreme posterior reaches of the incision     Neurological: Positive for numbness (in arms  and hands when she wakes up in the morning - it goes away). Negative for syncope and headaches.   Hematological: Positive for adenopathy. Bruises/bleeds easily (on ASA 81).   Psychiatric/Behavioral: Negative for dysphoric mood. The patient is not nervous/anxious.        Objective:      Physical Exam   Constitutional: She is oriented to person, place, and time. She appears well-developed and well-nourished. She is cooperative.   HENT:   Head: Normocephalic and atraumatic.       Right Ear: Hearing, tympanic membrane, external ear and ear canal normal.   Left Ear: Hearing, tympanic membrane, external ear and ear canal normal.   Ears:    Nose: No rhinorrhea. Right sinus exhibits no maxillary sinus tenderness and no frontal sinus tenderness. Left sinus exhibits no maxillary sinus tenderness and no frontal sinus tenderness.              Eyes: Pupils are equal, round, and reactive to light. Right eye exhibits no discharge. Left eye exhibits no discharge. No scleral icterus.   Neck: No thyroid mass and no thyromegaly present.       Cardiovascular: Normal rate.    Pulmonary/Chest: Effort normal. No respiratory distress.   Lymphadenopathy:        Head (right side): No submental, no submandibular, no tonsillar, no preauricular and no posterior auricular adenopathy present.        Head (left side): No submental, no submandibular, no tonsillar, no preauricular and no posterior auricular adenopathy present.     She has no cervical adenopathy.        Right cervical: No deep cervical and no posterior cervical adenopathy present.       Left cervical: No deep cervical and no posterior cervical adenopathy present.   Neurological: She is alert and oriented to person, place, and time. No cranial nerve deficit.   Skin: Skin is warm and dry. No rash noted. No erythema.   Psychiatric: She has a normal mood and affect. Her behavior is normal.   Vitals reviewed.        Assessment:       1. 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,  face, or neck     2. Lymphedema of Neck         Plan:       She is doing well and has had a CR to radiation for metastatic SCC of scalp. She has no clinical evidence of recurrent disease - Dr. Mohamud continues to monitor her closely as well.      I will see her back in about 4 months, sooner if something changes.     Distress Screening Results: Psychosocial Distress screening score of 2 noted and reviewed. No intervention indicated.

## 2017-04-07 ENCOUNTER — TELEPHONE (OUTPATIENT)
Dept: CARDIOLOGY | Facility: CLINIC | Age: 82
End: 2017-04-07

## 2017-04-07 NOTE — TELEPHONE ENCOUNTER
Pt notified, pt verbalized understanding. Pt stated that she feels comfortable cancelling the nuclear test and discussing having the test w/Dr. Triana when she comes in for her appointment. Carotid and labs changed to different locations per pt's request and appointment reminders mailed to pt.

## 2017-04-07 NOTE — TELEPHONE ENCOUNTER
----- Message from Ely Casey sent at 4/7/2017 10:36 AM CDT -----  Contact: pt  Pls call pt at [phone].  She wants to speak with you about the test that were scheduled for her.    Thanks

## 2017-04-07 NOTE — TELEPHONE ENCOUNTER
Pt called stating that she would like to come in and talk to you regarding the Nuclear test you ordered. I advised pt that you don't have any appointments open. Pt stated that if she can't come in to see you before her scheduled appointment in June she would like for you to call her. Pt stated that she is 87yrs old and that she does not understand why she should do this test. Please advise.

## 2017-05-30 ENCOUNTER — OFFICE VISIT (OUTPATIENT)
Dept: DERMATOLOGY | Facility: CLINIC | Age: 82
End: 2017-05-30
Payer: MEDICARE

## 2017-05-30 DIAGNOSIS — L30.8 ASTEATOTIC ECZEMA: Primary | ICD-10-CM

## 2017-05-30 DIAGNOSIS — L73.8 SEBACEOUS GLAND HYPERPLASIA: ICD-10-CM

## 2017-05-30 DIAGNOSIS — Z85.828 PERSONAL HISTORY OF OTHER MALIGNANT NEOPLASM OF SKIN: ICD-10-CM

## 2017-05-30 DIAGNOSIS — D18.00 ANGIOMA: ICD-10-CM

## 2017-05-30 DIAGNOSIS — L82.1 SK (SEBORRHEIC KERATOSIS): ICD-10-CM

## 2017-05-30 PROCEDURE — 99213 OFFICE O/P EST LOW 20 MIN: CPT | Mod: S$GLB,,, | Performed by: DERMATOLOGY

## 2017-05-30 PROCEDURE — 99999 PR PBB SHADOW E&M-EST. PATIENT-LVL II: CPT | Mod: PBBFAC,,, | Performed by: DERMATOLOGY

## 2017-05-30 PROCEDURE — 1159F MED LIST DOCD IN RCRD: CPT | Mod: S$GLB,,, | Performed by: DERMATOLOGY

## 2017-05-30 PROCEDURE — 1157F ADVNC CARE PLAN IN RCRD: CPT | Mod: S$GLB,,, | Performed by: DERMATOLOGY

## 2017-05-30 RX ORDER — TRIAMCINOLONE ACETONIDE 1 MG/G
CREAM TOPICAL
Qty: 454 G | Refills: 3 | Status: SHIPPED | OUTPATIENT
Start: 2017-05-30 | End: 2017-06-27 | Stop reason: ALTCHOICE

## 2017-05-30 NOTE — PROGRESS NOTES
Subjective:       Patient ID:  Kristin Quintanilla is a 87 y.o. female who presents for   Chief Complaint   Patient presents with    Skin Check     UBSE     Pt here for UBSE  H/o NMSC - s/p excision by BENNETT and closure by dr. Cevallos and radiation (metastatic SCC) and h/o bcc's  10/14: FINAL PATHOLOGIC DIAGNOSIS  Right level V lymph node, excisional biopsy:  Lymph node, extensively replaced by metastatic squamous cell carcinoma with extranodal extension.  This is a high risk patient here to check for the development of new lesions.    Last seen by me 10/24/16 for shave of sk from nose        Review of Systems   Constitutional: Negative for fever, chills, weight loss, fatigue and night sweats.   Skin: Positive for daily sunscreen use and activity-related sunscreen use. Negative for recent sunburn.   Hematologic/Lymphatic: Bruises/bleeds easily.        Objective:    Physical Exam   Constitutional: She appears well-developed and well-nourished. No distress.   Neurological: She is alert and oriented to person, place, and time. She is not disoriented.   Psychiatric: She has a normal mood and affect.   Skin:   Areas Examined (abnormalities noted in diagram):   Head / Face Inspection Performed  Neck Inspection Performed  Chest / Axilla Inspection Performed  Back Inspection Performed  RUE Inspected  LUE Inspection Performed  RLE Inspected  LLE Inspection Performed                   Diagram Legend     Erythematous scaling macule/papule c/w actinic keratosis       Vascular papule c/w angioma      Pigmented verrucoid papule/plaque c/w seborrheic keratosis      Yellow umbilicated papule c/w sebaceous hyperplasia      Irregularly shaped tan macule c/w lentigo     1-2 mm smooth white papules consistent with Milia      Movable subcutaneous cyst with punctum c/w epidermal inclusion cyst      Subcutaneous movable cyst c/w pilar cyst      Firm pink to brown papule c/w dermatofibroma      Pedunculated fleshy papule(s) c/w skin tag(s)       Evenly pigmented macule c/w junctional nevus     Mildly variegated pigmented, slightly irregular-bordered macule c/w mildly atypical nevus      Flesh colored to evenly pigmented papule c/w intradermal nevus       Pink pearly papule/plaque c/w basal cell carcinoma      Erythematous hyperkeratotic cursted plaque c/w SCC      Surgical scar with no sign of skin cancer recurrence      Open and closed comedones      Inflammatory papules and pustules      Verrucoid papule consistent consistent with wart     Erythematous eczematous patches and plaques     Dystrophic onycholytic nail with subungual debris c/w onychomycosis     Umbilicated papule    Erythematous-base heme-crusted tan verrucoid plaque consistent with inflamed seborrheic keratosis     Erythematous Silvery Scaling Plaque c/w Psoriasis     See annotation      Assessment / Plan:        Asteatotic eczema - lower legs  -     triamcinolone acetonide 0.1% (KENALOG) 0.1 % cream; AAA lower legs bid  Dispense: 454 g; Refill: 3    Sebaceous gland hyperplasia  This is a common condition representing benign enlargement of the sebaceous lobule. It typically occurs in adulthood. Reassurance given to patient.       Angiomas   - stable and chronic      SK (seborrheic keratosis)   - stable and chronic      Personal history of other malignant neoplasm of skin  Area(s) of previous NMSC evaluated with no signs of recurrence.    Upper body skin examination performed today including at least 6 points as noted in physical examination. No lesions suspicious for malignancy noted.               Return if symptoms worsen or fail to improve.

## 2017-05-30 NOTE — PATIENT INSTRUCTIONS
XEROSIS (DRY SKIN)        1. Definition    Xerosis is the term for dry skin.  We all have a natural oil coating over our skin produced by the skin oil glands.  If this oil is removed, the skin becomes dry which can lead to cracking, which can lead to inflammation.  Xerosis is usually a long-term problem that recurs often, especially in the winter.    2. Cause     Long hot baths or showers can remove our natural oil and lead to xerosis.  One should never take more than one bath or shower a day and for no longer than ten minutes.   Use of harsh soaps such as Zest, Dial, and Ivory can worsen and cause xerosis.   Cold winter weather worsens xerosis because the amount of moisture contained in cold air is much less than the amount of moisture in warm air.    3. Treatment     Treatment is intended to restore the natural oil to your skin.  Keep the skin lubricated.     Do not take more than one bath or shower a day.  Use lukewarm water, not hot.  Hot water dries out the skin.     Use a gentle moisturizing soap such as Cetaphil soap, Oil of Olay, Dove, Basis, Ivory moisture care, Restoraderm cleanser.     When toweling dry, dont rub.  Blot the skin so there is still some water left on the skin.  You should apply a moisturizing cream to all of the skin such as Cerave cream, Cetaphil cream, Restoraderm or Eucerin Original Formula cream.   Alpha hydroxyacid lotions, i.e., AmLactin, also work very well for preventing dry skin, but may burn when used on inflamed or reddened skin.     If you like to swim during the winter months, you should not use soap when getting out of the pool.  When you have finished swimming, rinse off the chlorine with cool to warm water.  If this will be the only shower of the day, then you may use Cetaphil or another mild soap to cleanse your skin.  After the shower, apply a moisturizing cream to all of the skin as above.        [address] [place] [address]/ [phone]  [phone] FAX/ [email or web address]

## 2017-06-09 ENCOUNTER — LAB VISIT (OUTPATIENT)
Dept: LAB | Facility: HOSPITAL | Age: 82
End: 2017-06-09
Payer: MEDICARE

## 2017-06-09 ENCOUNTER — OFFICE VISIT (OUTPATIENT)
Dept: FAMILY MEDICINE | Facility: CLINIC | Age: 82
End: 2017-06-09
Payer: MEDICARE

## 2017-06-09 VITALS
WEIGHT: 125.69 LBS | HEIGHT: 64 IN | OXYGEN SATURATION: 96 % | TEMPERATURE: 97 F | DIASTOLIC BLOOD PRESSURE: 58 MMHG | SYSTOLIC BLOOD PRESSURE: 160 MMHG | HEART RATE: 75 BPM | BODY MASS INDEX: 21.46 KG/M2

## 2017-06-09 DIAGNOSIS — M54.9 BACK PAIN, UNSPECIFIED BACK LOCATION, UNSPECIFIED BACK PAIN LATERALITY, UNSPECIFIED CHRONICITY: Primary | ICD-10-CM

## 2017-06-09 DIAGNOSIS — R10.9 FLANK PAIN: ICD-10-CM

## 2017-06-09 LAB
BACTERIA #/AREA URNS AUTO: ABNORMAL /HPF
BILIRUB UR QL STRIP: NEGATIVE
CLARITY UR REFRACT.AUTO: CLEAR
COLOR UR AUTO: YELLOW
GLUCOSE UR QL STRIP: NEGATIVE
HGB UR QL STRIP: NEGATIVE
HYALINE CASTS UR QL AUTO: 1 /LPF
KETONES UR QL STRIP: NEGATIVE
LEUKOCYTE ESTERASE UR QL STRIP: NEGATIVE
MICROSCOPIC COMMENT: ABNORMAL
NITRITE UR QL STRIP: NEGATIVE
NON-SQ EPI CELLS #/AREA URNS AUTO: 1 /HPF
PH UR STRIP: 6 [PH] (ref 5–8)
PROT UR QL STRIP: ABNORMAL
RBC #/AREA URNS AUTO: 0 /HPF (ref 0–4)
SP GR UR STRIP: 1.01 (ref 1–1.03)
SQUAMOUS #/AREA URNS AUTO: 1 /HPF
URN SPEC COLLECT METH UR: ABNORMAL
UROBILINOGEN UR STRIP-ACNC: NEGATIVE EU/DL
WBC #/AREA URNS AUTO: 1 /HPF (ref 0–5)

## 2017-06-09 PROCEDURE — 1159F MED LIST DOCD IN RCRD: CPT | Mod: S$GLB,,, | Performed by: NURSE PRACTITIONER

## 2017-06-09 PROCEDURE — 99999 PR PBB SHADOW E&M-EST. PATIENT-LVL IV: CPT | Mod: PBBFAC,,, | Performed by: NURSE PRACTITIONER

## 2017-06-09 PROCEDURE — 99214 OFFICE O/P EST MOD 30 MIN: CPT | Mod: S$GLB,,, | Performed by: NURSE PRACTITIONER

## 2017-06-09 PROCEDURE — 1157F ADVNC CARE PLAN IN RCRD: CPT | Mod: S$GLB,,, | Performed by: NURSE PRACTITIONER

## 2017-06-09 PROCEDURE — 81001 URINALYSIS AUTO W/SCOPE: CPT

## 2017-06-09 RX ORDER — TIZANIDINE 2 MG/1
2 TABLET ORAL EVERY 6 HOURS PRN
Qty: 20 TABLET | Refills: 0 | Status: SHIPPED | OUTPATIENT
Start: 2017-06-09 | End: 2017-06-27 | Stop reason: ALTCHOICE

## 2017-06-09 NOTE — PROGRESS NOTES
This dictation has been generated using Dragon Dictation some phonetic errors may occur.     Kristin was seen today for back pain.    Diagnoses and all orders for this visit:    Back pain, unspecified back location, unspecified back pain laterality, unspecified chronicity    Flank pain  -     Urinalysis; Future    Other orders  -     tizanidine (ZANAFLEX) 2 MG tablet; Take 1 tablet (2 mg total) by mouth every 6 (six) hours as needed.      Back pain suspect musculoskeletal etiology given positional aspect however with pain overlaying the right kidney check urinalysis to rule out any kidney involvement.  No history of kidney stones.  I will review results and address accordingly.  We can release results to her when available.    Return if symptoms worsen or fail to improve.      ________________________________________________________________  ________________________________________________________________        Chief Complaint   Patient presents with    Back Pain     History of present illness  This 87 y.o. presents today for complaint of right sided back pain.  Patient notes pain at the lower rib on the right.  She has chronic back pain but this is different.  Patient describes a sharp stabbing quality to the pain.  Symptom has been present for about a week.  Laying down and movement exacerbates it.  She indicates that she awoke last night after moving and experiencing back pain.  She hasn't taken any medication for her symptoms.  Review of systems  No fever or chills  Patient denies any urinary urgency frequency dysuria or hematuria  Patient denies lumbar radiculopathy.  Denies rash    Past medical and social history reviewed.  Patient is new to me.  Follows with one of my partners.    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      Arthritis     ASCVD  "(arteriosclerotic cardiovascular disease) 7/8/2014    Atherosclerosis of aorta 1/7/2016    "There is atherosclerosis of the thoracic aorta" - Xray Chest 7-     Back pain     Basal cell carcinoma 10/7/2013    Right nasal columellar, right lower eyelid, left medial eyelid    Basal cell carcinoma 2/2015    mid back    Chronic diastolic heart failure 11/11/2014    Coronary artery disease     Heart murmur     Hyperlipidemia     Hypertension     Hypothyroidism (acquired) 9/27/2016    Joint pain     Lymphedema of Neck 3/17/2016    Occlusion and stenosis of carotid artery without mention of cerebral infarction     Pelvic mass in female 7/21/2015    Polyneuropathy in other diseases classified elsewhere 1/7/2016 6-     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     CATARACT EXTRACTION      ou dr morales    HYSTERECTOMY  1970     NIC/BSO. took HRT immediatiely after wards.     LYMPH NODE BIOPSY Right 10/27/2014    posterior triangle    Mohs excision of scalp SCC N/A 5/13/2014    Scalp flap N/A 5/14/204    posterior advancement-rotation    SKIN CANCER EXCISION      THYROIDECTOMY  1960's    hyperthyroidism       Family History   Problem Relation Age of Onset    Heart attack Mother     Heart disease Mother     Hypertension Mother     Eczema Mother     Heart failure Mother     Clotting disorder Father     Hypertension Father     Heart failure Father     Thyroid disease Sister     Cancer Sister     No Known Problems Brother     No Known Problems Maternal Aunt     No Known Problems Maternal Uncle     No Known Problems Paternal Aunt     No Known Problems Paternal Uncle     No Known Problems Maternal Grandmother     No Known Problems Maternal Grandfather     No Known Problems Paternal Grandmother     No Known Problems " Paternal Grandfather     Amblyopia Neg Hx     Blindness Neg Hx     Cataracts Neg Hx     Glaucoma Neg Hx     Macular degeneration Neg Hx     Retinal detachment Neg Hx     Strabismus Neg Hx     Stroke Neg Hx     Melanoma Neg Hx     Psoriasis Neg Hx     Lupus Neg Hx     Acne Neg Hx     Ovarian cancer Neg Hx     Uterine cancer Neg Hx     Breast cancer Neg Hx     Colon cancer Neg Hx     Diabetes Neg Hx     Hyperlipidemia Neg Hx        Social History     Social History    Marital status:      Spouse name: N/A    Number of children: N/A    Years of education: N/A     Occupational History    Homemaker      Social History Main Topics    Smoking status: Former Smoker    Smokeless tobacco: Never Used      Comment: Quit 35 years ago    Alcohol use No    Drug use: No    Sexual activity: No     Other Topics Concern    Are You Pregnant Or Think You May Be? No    Breast-Feeding No     Social History Narrative    She is active and independent.        Current Outpatient Prescriptions   Medication Sig Dispense Refill    acetaminophen (TYLENOL ARTHRITIS) 650 MG TbSR Take 650 mg by mouth once daily. 1-2 tablet once a day.      amlodipine (NORVASC) 10 MG tablet TAKE 1 TABLET ONE TIME DAILY 90 tablet 3    aspirin (ECOTRIN) 81 MG EC tablet Take 81 mg by mouth every other day. Hold for 4 weeks      atorvastatin (LIPITOR) 20 MG tablet TAKE 1 TABLET ONE TIME DAILY 90 tablet 3    chlorpheniramine (CHLOR-TRIMETON) 2 mg/5 mL syrup Take 2 mg by mouth every 4 (four) hours as needed for Allergies.      dextromethorphan (DELSYM) 30 mg/5 mL liquid Take 60 mg by mouth 2 (two) times daily.      FLAXSEED-OMEGA3,6,9-FATTY ACID ORAL Take 1 capsule by mouth once daily.        labetalol (NORMODYNE) 200 MG tablet Take 1 tablet (200 mg total) by mouth 2 (two) times daily. 360 tablet 3    levocetirizine (XYZAL) 5 MG tablet Take 1 tablet (5 mg total) by mouth every evening. 30 tablet 11    levothyroxine (SYNTHROID)  75 MCG tablet Take 1 tablet (75 mcg total) by mouth once daily. 90 tablet 3    losartan (COZAAR) 100 MG tablet Take 1 tablet (100 mg total) by mouth once daily. 90 tablet 3    magnesium 250 mg Tab Take 1 tablet by mouth once daily.        metronidazole 0.75% (METROCREAM) 0.75 % Crea AAA face bid (Patient taking differently: AAA face bid. Pt using prn.) 60 g 3    multivitamin capsule Take 1 capsule by mouth once daily.      omeprazole (PRILOSEC) 20 MG capsule Take 1 capsule (20 mg total) by mouth once daily. 90 capsule 3    triamcinolone acetonide 0.1% (KENALOG) 0.1 % cream AAA lower legs bid 454 g 3    vitamin D 1000 units Tab Take 1,000 Units by mouth once daily.       tizanidine (ZANAFLEX) 2 MG tablet Take 1 tablet (2 mg total) by mouth every 6 (six) hours as needed. 20 tablet 0     No current facility-administered medications for this visit.        Review of patient's allergies indicates:   Allergen Reactions    Codeine     Darvocet a500 [propoxyphene n-acetaminophen] Nausea Only    Sulfa (sulfonamide antibiotics) Rash       Physical examination  Vitals Reviewed  Gen. Well-dressed well-nourished   Skin warm dry and intact.  No rashes noted.  Neck is supple without adenopathy  Chest.  Respirations are even unlabored.  Lungs are clear to auscultation.  Cardiac regular rate and rhythm.  No chest wall adenopathy noted.  Neuro. Awake alert oriented x4.  Normal judgment and cognition noted.  Extremities no clubbing cyanosis or edema noted.  Palpable spasms paraspinal muscles lumbar spine.  Abdomen is soft and nondistended.  No CVA tenderness to percussion.    Call or return to clinic prn if these symptoms worsen or fail to improve as anticipated.

## 2017-06-15 ENCOUNTER — PATIENT MESSAGE (OUTPATIENT)
Dept: FAMILY MEDICINE | Facility: CLINIC | Age: 82
End: 2017-06-15

## 2017-06-27 ENCOUNTER — OFFICE VISIT (OUTPATIENT)
Dept: FAMILY MEDICINE | Facility: CLINIC | Age: 82
End: 2017-06-27
Payer: MEDICARE

## 2017-06-27 VITALS
DIASTOLIC BLOOD PRESSURE: 50 MMHG | SYSTOLIC BLOOD PRESSURE: 146 MMHG | BODY MASS INDEX: 21.76 KG/M2 | WEIGHT: 127.44 LBS | HEIGHT: 64 IN | OXYGEN SATURATION: 98 % | HEART RATE: 72 BPM | TEMPERATURE: 98 F

## 2017-06-27 VITALS
DIASTOLIC BLOOD PRESSURE: 78 MMHG | SYSTOLIC BLOOD PRESSURE: 126 MMHG | TEMPERATURE: 98 F | HEIGHT: 64 IN | WEIGHT: 125.25 LBS | OXYGEN SATURATION: 97 % | BODY MASS INDEX: 21.38 KG/M2 | HEART RATE: 76 BPM

## 2017-06-27 DIAGNOSIS — I70.0 ATHEROSCLEROSIS OF AORTA: Chronic | ICD-10-CM

## 2017-06-27 DIAGNOSIS — I95.9 HYPOTENSION, UNSPECIFIED HYPOTENSION TYPE: ICD-10-CM

## 2017-06-27 DIAGNOSIS — I77.9 CAROTID DISEASE, BILATERAL: ICD-10-CM

## 2017-06-27 DIAGNOSIS — M89.9 DISORDER OF BONE AND CARTILAGE: ICD-10-CM

## 2017-06-27 DIAGNOSIS — I70.0 ATHEROSCLEROSIS OF AORTA: ICD-10-CM

## 2017-06-27 DIAGNOSIS — N18.1 HYPERTENSIVE HEART AND KIDNEY DISEASE WITH HF AND WITH CKD STAGE I: ICD-10-CM

## 2017-06-27 DIAGNOSIS — M48.061 SPINAL STENOSIS OF LUMBAR REGION: ICD-10-CM

## 2017-06-27 DIAGNOSIS — M47.816 LUMBAR ARTHROPATHY: ICD-10-CM

## 2017-06-27 DIAGNOSIS — R09.89 BILATERAL CAROTID BRUITS: ICD-10-CM

## 2017-06-27 DIAGNOSIS — Z00.00 ROUTINE MEDICAL EXAM: Primary | ICD-10-CM

## 2017-06-27 DIAGNOSIS — C77.0 SECONDARY MALIGNANT NEOPLASM OF LYMPH NODES OF HEAD, FACE, OR NECK: ICD-10-CM

## 2017-06-27 DIAGNOSIS — I13.0 HYPERTENSIVE HEART AND KIDNEY DISEASE WITH HF AND WITH CKD STAGE I: ICD-10-CM

## 2017-06-27 DIAGNOSIS — Z00.00 ENCOUNTER FOR PREVENTIVE HEALTH EXAMINATION: Primary | ICD-10-CM

## 2017-06-27 DIAGNOSIS — E03.9 HYPOTHYROIDISM (ACQUIRED): ICD-10-CM

## 2017-06-27 DIAGNOSIS — G56.03 BILATERAL CARPAL TUNNEL SYNDROME: ICD-10-CM

## 2017-06-27 DIAGNOSIS — M85.80 OSTEOPENIA, UNSPECIFIED LOCATION: ICD-10-CM

## 2017-06-27 DIAGNOSIS — I50.32 CHRONIC DIASTOLIC HEART FAILURE: ICD-10-CM

## 2017-06-27 DIAGNOSIS — M94.9 DISORDER OF BONE AND CARTILAGE: ICD-10-CM

## 2017-06-27 DIAGNOSIS — G63 POLYNEUROPATHY IN OTHER DISEASES CLASSIFIED ELSEWHERE: ICD-10-CM

## 2017-06-27 PROCEDURE — G0439 PPPS, SUBSEQ VISIT: HCPCS | Mod: S$GLB,,, | Performed by: NURSE PRACTITIONER

## 2017-06-27 PROCEDURE — 99397 PER PM REEVAL EST PAT 65+ YR: CPT | Mod: S$GLB,,, | Performed by: INTERNAL MEDICINE

## 2017-06-27 PROCEDURE — 1157F ADVNC CARE PLAN IN RCRD: CPT | Mod: S$GLB,,, | Performed by: INTERNAL MEDICINE

## 2017-06-27 PROCEDURE — 99499 UNLISTED E&M SERVICE: CPT | Mod: S$GLB,,, | Performed by: INTERNAL MEDICINE

## 2017-06-27 PROCEDURE — 1159F MED LIST DOCD IN RCRD: CPT | Mod: S$GLB,,, | Performed by: INTERNAL MEDICINE

## 2017-06-27 PROCEDURE — 1126F AMNT PAIN NOTED NONE PRSNT: CPT | Mod: S$GLB,,, | Performed by: INTERNAL MEDICINE

## 2017-06-27 PROCEDURE — 99999 PR PBB SHADOW E&M-EST. PATIENT-LVL III: CPT | Mod: PBBFAC,,, | Performed by: INTERNAL MEDICINE

## 2017-06-27 PROCEDURE — 99999 PR PBB SHADOW E&M-EST. PATIENT-LVL V: CPT | Mod: PBBFAC,,, | Performed by: NURSE PRACTITIONER

## 2017-06-27 PROCEDURE — 99499 UNLISTED E&M SERVICE: CPT | Mod: S$GLB,,, | Performed by: NURSE PRACTITIONER

## 2017-06-27 PROCEDURE — 99214 OFFICE O/P EST MOD 30 MIN: CPT | Mod: 25,S$GLB,, | Performed by: INTERNAL MEDICINE

## 2017-06-27 NOTE — PATIENT INSTRUCTIONS
Counseling and Referral of Other Preventative  (Italic type indicates deductible and co-insurance are waived)    Patient Name: Kristin Quintanilla  Today's Date: 6/27/2017      SERVICE LIMITATIONS RECOMMENDATION    Vaccines    · Pneumococcal (once after 65)    · Influenza (annually)    · Hepatitis B (if medium/high risk)    · Prevnar 13      Hepatitis B medium/high risk factors:       - End-stage renal disease       - Hemophiliacs who received Factor VII or         IX concentrates       - Clients of institutions for the mentally             retarded       - Persons who live in the same house as          a HepB carrier       - Homosexual men       - Illicit injectable drug abusers     Pneumococcal: Done, no repeat necessary     Influenza: Done, repeat in one year     Hepatitis B: N/A     Prevnar 13: N/A    Mammogram (biennial age 50-74)  Annually (age 40 or over)  Not recommended based on age guidelines    Pap (up to age 70 and after 70 if unknown history or abnormal study last 10 years)    N/A     The USPSTF recommends against screening for cervical cancer in women older than age 65 years who have had adequate prior screening and are not otherwise at high risk for cervical cancer.      Colorectal cancer screening (to age 75)    · Fecal occult blood test (annual)  · Flexible sigmoidoscopy (5y)  · Screening colonoscopy (10y)  · Barium enema   Not recommended based on age guidelines    Diabetes self-management training (no USPSTF recommendations)  Requires referral by treating physician for patient with diabetes or renal disease. 10 hours of initial DSMT sessions of no less than 30 minutes each in a continuous 12-month period. 2 hours of follow-up DSMT in subsequent years.  N/A    Bone mass measurements (age 65 & older, biennial)  Requires diagnosis related to osteoporosis or estrogen deficiency. Biennial benefit unless patient has history of long-term glucocorticoid  Ordered    Glaucoma screening (no USPSTF  recommendation)  Diabetes mellitus, family history   , age 50 or over    American, age 65 or over  Annual eye exams    Medical nutrition therapy for diabetes or renal disease (no recommended schedule)  Requires referral by treating physician for patient with diabetes or renal disease or kidney transplant within the past 3 years.  Can be provided in same year as diabetes self-management training (DSMT), and CMS recommends medical nutrition therapy take place after DSMT. Up to 3 hours for initial year and 2 hours in subsequent years.  N/A    Cardiovascular screening blood tests (every 5 years)  · Fasting lipid panel  Order as a panel if possible  Last done 11/22/2016, recommend to repeat every 5  years    Diabetes screening tests (at least every 3 years, Medicare covers annually or at 6-month intervals for prediabetic patients)  · Fasting blood sugar (FBS) or glucose tolerance test (GTT)  Patient must be diagnosed with one of the following:       - Hypertension       - Dyslipidemia       - Obesity (BMI 30kg/m2)       - Previous elevated impaired FBS or GTT       ... or any two of the following:       - Overweight (BMI 25 but <30)       - Family history of diabetes       - Age 65 or older       - History of gestational diabetes or birth of baby weighing more than 9 pounds  Last done 03/20/2017, recommend to repeat every 1  years    Abdominal aortic aneurysm screening (once)  · Sonogram   Limited to patients who meet one of the following criteria:       - Men who are 65-75 years old and have smoked more than 100 cigarette in their lifetime       - Anyone with a family history of abdominal aortic aneurysm       - Anyone recommended for screening by the USPSTF  N/A    HIV screening (annually for increased risk patients)  · HIV-1 and HIV-2 by EIA, or ROMAN, rapid antibody test or oral mucosa transudate  Patients must be at increased risk for HIV infection per USPSTF guidelines or pregnant. Tests  covered annually for patient at increased risk or as requested by the patient. Pregnant patients may receive up to 3 tests during pregnancy.  Risks discussed, screening is not recommended    Smoking cessation counseling (up to 8 sessions per year)  Patients must be asymptomatic of tobacco-related conditions to receive as a preventative service.  Non-smoker    Subsequent annual wellness visit  At least 12 months since last AWV  Return in one year     The following information is provided to all patients.  This information is to help you find resources for any of the problems found today that may be affecting your health:                Living healthy guide: www.[facility].[place].[facility]      Understanding Diabetes: www.diabetes.org      Eating healthy: www.cdc.gov/healthyweight      CDC home safety checklist: www.cdc.gov/steadi/patient.html      Agency on Aging: www.goea.[place].gov      Alcoholics anonymous (AA): www.aa.org      Physical Activity: www.vishnu.nih.gov/bw3msgb      Tobacco use: www.quitwithusla.org

## 2017-06-27 NOTE — PROGRESS NOTES
"Kristin Quintanilla presented for a  Medicare AWV and comprehensive Health Risk Assessment today. The following components were reviewed and updated:    · Medical history  · Family History  · Social history  · Allergies and Current Medications  · Health Risk Assessment  · Health Maintenance  · Care Team     ** See Completed Assessments for Annual Wellness Visit within the encounter summary.**       The following assessments were completed:  · Living Situation  · CAGE  · Depression Screening  · Timed Get Up and Go  · Whisper Test  · Cognitive Function Screening  · Nutrition Screening  · ADL Screening  · PAQ Screening    Vitals:    06/27/17 1105   BP: (!) 146/50   BP Location: Right arm   Patient Position: Sitting   BP Method: Manual   Pulse: 72   Temp: 98.1 °F (36.7 °C)   TempSrc: Oral   SpO2: 98%   Weight: 57.8 kg (127 lb 6.8 oz)   Height: 5' 4" (1.626 m)     Body mass index is 21.87 kg/m².  Physical Exam   Constitutional: She is oriented to person, place, and time. She appears well-developed and well-nourished.   Cardiovascular: Normal rate, regular rhythm and normal heart sounds.    Pulmonary/Chest: Effort normal and breath sounds normal. No respiratory distress. She has no decreased breath sounds.   Neurological: She is alert and oriented to person, place, and time.   Skin: She is not diaphoretic. No pallor.   Psychiatric: She has a normal mood and affect. Her speech is normal and behavior is normal.         Diagnoses and health risks identified today and associated recommendations/orders:    1. Encounter for preventive health examination    2. Carotid disease, bilateral    3. Polyneuropathy in other diseases classified elsewhere    4. Chronic diastolic heart failure    5. Atherosclerosis of aorta    6. Lumbar arthropathy    7. 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    Current Assessment & Plan     Stable  Followed by Cardiology           Chronic diastolic heart failure    Current Assessment & Plan     Followed by Cardiology         Carotid disease, bilateral    Current Assessment & Plan     Stable           Atherosclerosis of aorta (Chronic)    Overview     "There is atherosclerosis of the thoracic aorta" - Xray Chest 7-         Current Assessment & Plan     Stable             Other Visit Diagnoses     Encounter for preventive health examination    -  Primary            Provided Kristin with a 5-10 year written screening schedule and personal prevention plan. Recommendations were developed using the USPSTF age appropriate recommendations. Education, counseling, and referrals were provided as needed. After Visit Summary printed and given to patient which includes a list of additional screenings\tests needed.    Return in about 1 year (around 6/27/2018).    Carito Mendez, SALMAP-C  "

## 2017-06-27 NOTE — PROGRESS NOTES
"Chief complaint: Physical exam, checking in 6 minutes late,     87-year-old female whose PCP retired.  Her physical was in June 2016. here with her son who is a patient of mine.    She feels more unsteady and so has begun using a cane.  Regarding health maintenance, looks like her last mammogram was 2014 colonoscopy not indicated any further based upon her age.  We discussed whether or not she wishes to pursue continued mammograms and she has chosen not to.    ROS:   CONST: weight stable. EYES: no vision change. ENT: no sore throat. CV: no chest pain w/ exertion. RESP: no shortness of breath. GI: no nausea, vomiting, diarrhea. No dysphagia. : no urinary issues. MUSCULOSKELETAL: no new myalgias or arthralgias. SKIN: no new changes. NEURO: no focal deficits. PSYCH: no new issues. ENDOCRINE: no polyuria. HEME: no lymph nodes. ALLERGY: no general pruritis.      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      Arthritis     ASCVD (arteriosclerotic cardiovascular disease) 7/8/2014    Atherosclerosis of aorta 1/7/2016    "There is atherosclerosis of the thoracic aorta" - Xray Chest 7-     Back pain     Basal cell carcinoma 10/7/2013    Right nasal columellar, right lower eyelid, left medial eyelid    Basal cell carcinoma 2/2015    mid back    Chronic diastolic heart failure 11/11/2014    Coronary artery disease     Heart murmur     Hyperlipidemia     Hypertension     Hypothyroidism (acquired) 9/27/2016    Joint pain     Lymphedema of Neck 3/17/2016    Occlusion and stenosis of carotid artery without mention of cerebral infarction     Pelvic mass in female 7/21/2015    Polyneuropathy in other diseases classified elsewhere 1/7/2016 6-     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     CATARACT EXTRACTION      ou dr morales    HYSTERECTOMY  1970     NIC/BSO. took HRT immediatiely after wards.     LYMPH NODE BIOPSY Right 10/27/2014    posterior triangle    Mohs excision of scalp SCC N/A 5/13/2014    Scalp flap N/A 5/14/204    posterior advancement-rotation    SKIN CANCER EXCISION      THYROIDECTOMY  1960's    hyperthyroidism     Social History     Social History    Marital status: ---lives alone and still drives     Spouse name: N/A    Number of children: N/A    Years of education: N/A     Occupational History    Homemaker      Social History Main Topics    Smoking status: Former Smoker    Smokeless tobacco: Never Used      Comment: Quit 35 years ago    Alcohol use No    Drug use: No    Sexual activity: No     Other Topics Concern    Are You Pregnant Or Think You May Be? No    Breast-Feeding No     Social History Narrative    She is active and independent.      family history includes thyroid Cancer in her sister; Clotting disorder in her father; Eczema in her mother; Heart attack in her mother; Heart disease in her mother; Heart failure in her father and mother; Hypertension in her father and mother; No Known Problems in her brother, maternal aunt, maternal grandfather, maternal grandmother, maternal uncle, paternal aunt, paternal grandfather, paternal grandmother, and paternal uncle; Thyroid disease in her sister.       Gen: no distress  EYES: conjunctiva clear, non-icteric, PERRL  ENT: nose clear, nasal mucosa normal, oropharynx clear and moist, teeth good  NECK:supple, thyroid non-palpable  RESP: effort is good, lungs clear  CV: heart RRR w/o murmur, gallops or rubs; bilateral carotid bruits, no edema  GI: abdomen soft, non-distended, non-tender, no hepatosplenomegaly  MS: gait normal, no clubbing or cyanosis of the digits  SKIN: no rashes, warm to touch    Kristin david  seen today for annual exam.    Diagnoses and all orders for this visit:    Routine medical exam, declines mammogram and colonoscopies are not appropriate, she has blood work scheduled              Additional evaluation and management issues:     additional evaluation and management issues: By review of her last bone density 2 years ago, she had osteopenia.  We discussed that even though she has advanced age, it is important to assess worsening osteopenia and treat accordingly to prevent and reduce risk of fracture.  Also at last visit her blood pressure systolic was around 100 and we discontinue the HCTZ.  She has hypothyroidism which was euthyroid in November 2016.  We reviewed her recent labs from a few months ago which were unremarkable.  Atherosclerosis of the aorta is noted in the system.  Patient does have carotid bruits.  She has lab set for her cardiologist as well as an updating on the carotid ultrasound which showed minimal disease prior.  She awakes with numbness in the right hand.  She's had carpal tunnel syndrome and injections in the past.  She would like to defer surgery.  We discussed using a brace and assessing if that works and otherwise we can refer to orthopedics.  She has neuropathy in the feet which is slightly progressive over the years.  She does have spinal stenosis and has had injections in her back.  The numbness is not bad enough to warrant any therapy which we discussed such medications as gabapentin and I think it might increase her fall risk to much.  She does have a constant low back pain with standing.  I encouraged her to use a cane and a rolling walker her grocery cart when she goes to the store.  I canceled her pneumonia vaccine since she did receive a Pneumovax in the past and is likely up-to-date.  All these issues reviewed and patient counseled and her evaluation and management of all the separate issues we based upon time counseling.Total time over 25 minutes with over 50%  "counseling.            Assessment and plan:    Osteopenia, unspecified location, reassess and discuss treatment    Hypotension, unspecified hypotension type, appears to have improved    Hypothyroidism (acquired) TSH is upcoming    Disorder of bone and cartilage    Atherosclerosis of aorta, noted in the x-ray reports    Lumbar arthropathy, neuropathy likely secondary to spinal stenosis, fairly stable at this point    Spinal stenosis of lumbar region    Bilateral carpal tunnel syndrome, discussed using bracing    Carotid disease, agree with updating ultrasound    Other orders  -     DXA Bone Density Spine And Hip; Future  -     Pneumococcal Polysaccharide Vaccine (23 Valent) (SQ/IM)           clinical note will be sensitive since patient herself it is not the one with direct access.Also need to document late arrival for future purposes"This note will not be shared with the patient."    "

## 2017-06-29 ENCOUNTER — CLINICAL SUPPORT (OUTPATIENT)
Dept: CARDIOLOGY | Facility: CLINIC | Age: 82
End: 2017-06-29
Payer: MEDICARE

## 2017-06-29 DIAGNOSIS — I25.10 ASCVD (ARTERIOSCLEROTIC CARDIOVASCULAR DISEASE): Chronic | ICD-10-CM

## 2017-06-29 DIAGNOSIS — N18.1 HYPERTENSIVE HEART AND KIDNEY DISEASE WITH HF AND WITH CKD STAGE I: ICD-10-CM

## 2017-06-29 DIAGNOSIS — I13.0 HYPERTENSIVE HEART AND KIDNEY DISEASE WITH HF AND WITH CKD STAGE I: ICD-10-CM

## 2017-06-29 DIAGNOSIS — E78.2 MIXED HYPERLIPIDEMIA: Chronic | ICD-10-CM

## 2017-06-29 DIAGNOSIS — E03.9 HYPOTHYROIDISM (ACQUIRED): ICD-10-CM

## 2017-06-29 DIAGNOSIS — I70.0 ATHEROSCLEROSIS OF AORTA: ICD-10-CM

## 2017-06-29 LAB — INTERNAL CAROTID STENOSIS: NORMAL

## 2017-06-29 PROCEDURE — 93880 EXTRACRANIAL BILAT STUDY: CPT | Mod: S$GLB,,, | Performed by: INTERNAL MEDICINE

## 2017-06-30 ENCOUNTER — TELEPHONE (OUTPATIENT)
Dept: CARDIOLOGY | Facility: CLINIC | Age: 82
End: 2017-06-30

## 2017-06-30 NOTE — TELEPHONE ENCOUNTER
----- Message from Lenka Triana MD sent at 6/30/2017  9:39 AM CDT -----  Mrs. Quintanilla,  Please review results of your carotid duplex. It shows mild blockages. We will review it in detail during your visit with me.

## 2017-07-03 ENCOUNTER — HOSPITAL ENCOUNTER (OUTPATIENT)
Dept: RADIOLOGY | Facility: CLINIC | Age: 82
Discharge: HOME | End: 2017-07-03
Attending: INTERNAL MEDICINE
Payer: MEDICARE

## 2017-07-03 DIAGNOSIS — M89.9 DISORDER OF BONE AND CARTILAGE: ICD-10-CM

## 2017-07-03 DIAGNOSIS — M85.80 OSTEOPENIA, UNSPECIFIED LOCATION: ICD-10-CM

## 2017-07-03 DIAGNOSIS — M94.9 DISORDER OF BONE AND CARTILAGE: ICD-10-CM

## 2017-07-03 PROCEDURE — 77080 DXA BONE DENSITY AXIAL: CPT | Mod: TC,PO

## 2017-07-03 PROCEDURE — 77080 DXA BONE DENSITY AXIAL: CPT | Mod: 26,,, | Performed by: INTERNAL MEDICINE

## 2017-07-05 ENCOUNTER — TELEPHONE (OUTPATIENT)
Dept: CARDIOLOGY | Facility: CLINIC | Age: 82
End: 2017-07-05

## 2017-07-05 NOTE — TELEPHONE ENCOUNTER
----- Message from Lenka Triana MD sent at 7/5/2017  9:26 AM CDT -----  Please review.  We will discuss the results during your upcoming visit with me. I think, already reviewed this

## 2017-07-06 ENCOUNTER — OFFICE VISIT (OUTPATIENT)
Dept: CARDIOLOGY | Facility: CLINIC | Age: 82
End: 2017-07-06
Payer: MEDICARE

## 2017-07-06 VITALS
DIASTOLIC BLOOD PRESSURE: 54 MMHG | WEIGHT: 121.56 LBS | BODY MASS INDEX: 20.75 KG/M2 | HEIGHT: 64 IN | SYSTOLIC BLOOD PRESSURE: 180 MMHG | HEART RATE: 64 BPM

## 2017-07-06 DIAGNOSIS — I50.32 CHRONIC DIASTOLIC HEART FAILURE: ICD-10-CM

## 2017-07-06 DIAGNOSIS — I13.0 HYPERTENSIVE HEART AND KIDNEY DISEASE WITH HF AND WITH CKD STAGE I: Primary | ICD-10-CM

## 2017-07-06 DIAGNOSIS — I65.23 BILATERAL CAROTID ARTERY STENOSIS: Chronic | ICD-10-CM

## 2017-07-06 DIAGNOSIS — I31.39 PERICARDIAL EFFUSION: Chronic | ICD-10-CM

## 2017-07-06 DIAGNOSIS — G63 POLYNEUROPATHY IN OTHER DISEASES CLASSIFIED ELSEWHERE: ICD-10-CM

## 2017-07-06 DIAGNOSIS — N18.1 HYPERTENSIVE HEART AND KIDNEY DISEASE WITH HF AND WITH CKD STAGE I: Primary | ICD-10-CM

## 2017-07-06 DIAGNOSIS — I35.1 NONRHEUMATIC AORTIC VALVE INSUFFICIENCY: Chronic | ICD-10-CM

## 2017-07-06 DIAGNOSIS — E78.2 MIXED HYPERLIPIDEMIA: Chronic | ICD-10-CM

## 2017-07-06 DIAGNOSIS — E03.9 HYPOTHYROIDISM (ACQUIRED): ICD-10-CM

## 2017-07-06 PROCEDURE — 99214 OFFICE O/P EST MOD 30 MIN: CPT | Mod: S$GLB,,, | Performed by: INTERNAL MEDICINE

## 2017-07-06 PROCEDURE — 1159F MED LIST DOCD IN RCRD: CPT | Mod: S$GLB,,, | Performed by: INTERNAL MEDICINE

## 2017-07-06 PROCEDURE — 1125F AMNT PAIN NOTED PAIN PRSNT: CPT | Mod: S$GLB,,, | Performed by: INTERNAL MEDICINE

## 2017-07-06 PROCEDURE — 1157F ADVNC CARE PLAN IN RCRD: CPT | Mod: S$GLB,,, | Performed by: INTERNAL MEDICINE

## 2017-07-06 PROCEDURE — 99999 PR PBB SHADOW E&M-EST. PATIENT-LVL III: CPT | Mod: PBBFAC,,, | Performed by: INTERNAL MEDICINE

## 2017-07-06 RX ORDER — HYDROCHLOROTHIAZIDE 12.5 MG/1
12.5 TABLET ORAL DAILY
COMMUNITY
End: 2017-07-06 | Stop reason: SDUPTHER

## 2017-07-06 RX ORDER — HYDROCHLOROTHIAZIDE 12.5 MG/1
12.5 TABLET ORAL DAILY
Qty: 90 TABLET | Refills: 3 | Status: SHIPPED | OUTPATIENT
Start: 2017-07-06 | End: 2018-04-18 | Stop reason: SDUPTHER

## 2017-07-06 NOTE — PROGRESS NOTES
Subjective:   Patient ID:  Kristin Quintanilla is a 87 y.o. female who presents for follow-up of Hypertension      HPI: Following last visit and complaints of exertional chest heaviness we have scheduled patient for a nuclear stress test. Patient cancelled and is questioning reasons for it.  We had a long discussion with patient and her son regarding reasons and evaluation for CAD.  She states she will not have any invasive procedure anyway, so doing the test is not going to change anything. SHe has infrequent episodes of chest heaviness while going to the mail box.  They resolve with rest.  The do not radiate and are not associated with any other symptoms.    Today and on two other occasions her blood pressure was elevated.  In the past she was on thiazide diuretics.    Lab Results   Component Value Date     07/03/2017    K 4.3 07/03/2017     07/03/2017    CO2 24 07/03/2017    BUN 25 (H) 07/03/2017    CREATININE 0.9 07/03/2017    GLU 91 07/03/2017    HGBA1C 6.2 07/16/2013    MG 2.4 08/20/2009    AST 43 (H) 07/03/2017    ALT 28 07/03/2017    ALBUMIN 3.1 (L) 07/03/2017    PROT 5.7 (L) 07/03/2017    BILITOT 0.4 07/03/2017    WBC 6.56 03/20/2017    HGB 10.9 (L) 03/20/2017    HCT 34.1 (L) 03/20/2017    MCV 93 03/20/2017     03/20/2017    INR 1.0 04/25/2011    TSH 1.032 07/03/2017    CHOL 133 07/03/2017    HDL 45 07/03/2017    LDLCALC 74.4 07/03/2017    TRIG 68 07/03/2017       Review of Systems   Constitution: Positive for weakness.   HENT: Negative.    Eyes: Negative.    Cardiovascular: Positive for chest pain and dyspnea on exertion. Negative for claudication, irregular heartbeat, leg swelling, near-syncope, palpitations and syncope.   Respiratory: Negative.  Negative for cough, shortness of breath, snoring and wheezing.    Endocrine: Negative.  Negative for cold intolerance, heat intolerance, polydipsia, polyphagia and polyuria.   Skin: Negative.    Musculoskeletal: Positive for arthritis, back pain,  "muscle weakness and myalgias.   Gastrointestinal: Negative.    Genitourinary: Negative.    Psychiatric/Behavioral: Negative.        Objective:   Physical Exam   Constitutional: She is oriented to person, place, and time. She appears well-developed and well-nourished.   BP (!) 180/54   Pulse 64   Ht 5' 4" (1.626 m)   Wt 55.2 kg (121 lb 9.3 oz)   LMP  (LMP Unknown)   BMI 20.87 kg/m²      HENT:   Head: Normocephalic.   Eyes: Pupils are equal, round, and reactive to light.   Neck: Normal range of motion. Neck supple. No thyromegaly present.   Cardiovascular: Normal rate, regular rhythm and intact distal pulses.  Exam reveals no gallop and no friction rub.    Murmur heard.   Early systolic murmur is present with a grade of 2/6  at the upper right sternal border radiating to the neck   Blowing decrescendo early diastolic murmur is present with a grade of 1/6  at the upper right sternal border  Pulses:       Carotid pulses are 2+ on the right side, and 2+ on the left side.       Radial pulses are 2+ on the right side, and 2+ on the left side.        Femoral pulses are 2+ on the right side, and 2+ on the left side.       Popliteal pulses are 2+ on the right side, and 2+ on the left side.        Dorsalis pedis pulses are 2+ on the right side, and 2+ on the left side.        Posterior tibial pulses are 2+ on the right side, and 2+ on the left side.   Pulmonary/Chest: Effort normal and breath sounds normal. No respiratory distress. She has no wheezes. She has no rales. She exhibits no tenderness.   Abdominal: Soft. Bowel sounds are normal.   Musculoskeletal: Normal range of motion. She exhibits no edema.   Neurological: She is alert and oriented to person, place, and time.   Skin: Skin is warm and dry.   Psychiatric: She has a normal mood and affect.   Nursing note and vitals reviewed.        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         Patient's Medications   New Prescriptions    No medications on file   Previous Medications    ACETAMINOPHEN (TYLENOL ARTHRITIS) 650 MG TBSR    Take 650 mg by mouth once daily. 1-2 tablet once a day.    AMLODIPINE (NORVASC) 10 MG TABLET    TAKE 1 TABLET ONE TIME DAILY    ASPIRIN (ECOTRIN) 81 MG EC TABLET    Take 81 mg by mouth every other day. Hold for 4 weeks    ATORVASTATIN (LIPITOR) 20 MG TABLET    TAKE 1 TABLET ONE TIME DAILY    CHLORPHENIRAMINE (CHLOR-TRIMETON) 2 MG/5 ML SYRUP    Take 2 mg by mouth every 4 (four) hours as needed for Allergies.    FLAXSEED-OMEGA3,6,9-FATTY ACID ORAL    Take 1 capsule by mouth once daily.      LABETALOL (NORMODYNE) 200 MG TABLET    Take 1 tablet (200 mg total) by mouth 2 (two) times daily.    LEVOTHYROXINE (SYNTHROID) 75 MCG TABLET    Take 1 tablet (75 mcg total) by mouth once daily.    LOSARTAN (COZAAR) 100 MG TABLET    Take 1 tablet (100 mg total) by mouth once daily.    MAGNESIUM 250 MG TAB    Take 1 tablet by mouth once daily.      MULTIVITAMIN CAPSULE    Take 1 capsule by mouth once daily.    OMEPRAZOLE (PRILOSEC) 20 MG CAPSULE    Take 1 capsule (20 mg total) by mouth once daily.    VITAMIN D 1000 UNITS TAB    Take 1,000 Units by mouth once daily.    Modified Medications    Modified Medication Previous Medication    HYDROCHLOROTHIAZIDE (HYDRODIURIL) 12.5 MG TAB hydrochlorothiazide (HYDRODIURIL) 12.5 MG Tab       Take 1 tablet (12.5 mg total) by mouth once daily.    Take 12.5 mg by mouth once daily.   Discontinued Medications    No medications on file     Add HCTZ 12.5mg M-W-F and monitor BP.  May need to adjust  further.  Blood work is abnormal and shows low Albumin, protein and slightly elevated ALT.    I suggested that Dr. Schmidt repeat blood work in 1-2 months and in the meantime patient takes less Tylenol for her DJD and possibly substitute or alternate with Tramadol.  Defer to PCP.  Return in about 6 months (around 1/6/2018).

## 2017-07-11 ENCOUNTER — TELEPHONE (OUTPATIENT)
Dept: CARDIOLOGY | Facility: CLINIC | Age: 82
End: 2017-07-11

## 2017-07-11 ENCOUNTER — PATIENT MESSAGE (OUTPATIENT)
Dept: FAMILY MEDICINE | Facility: CLINIC | Age: 82
End: 2017-07-11

## 2017-07-11 NOTE — TELEPHONE ENCOUNTER
Pt stated that she decided that she does not want to do the Nuclear Stress Test and that she spoke w/Dr. Allen about it at her last appointment.

## 2017-07-12 ENCOUNTER — PATIENT MESSAGE (OUTPATIENT)
Dept: FAMILY MEDICINE | Facility: CLINIC | Age: 82
End: 2017-07-12

## 2017-08-10 ENCOUNTER — OFFICE VISIT (OUTPATIENT)
Dept: OTOLARYNGOLOGY | Facility: CLINIC | Age: 82
End: 2017-08-10
Payer: MEDICARE

## 2017-08-10 VITALS
BODY MASS INDEX: 21.04 KG/M2 | SYSTOLIC BLOOD PRESSURE: 140 MMHG | HEART RATE: 65 BPM | WEIGHT: 122.56 LBS | TEMPERATURE: 97 F | DIASTOLIC BLOOD PRESSURE: 56 MMHG

## 2017-08-10 DIAGNOSIS — C44.42 SQUAMOUS CELL CARCINOMA OF SCALP: ICD-10-CM

## 2017-08-10 DIAGNOSIS — C77.0 SECONDARY MALIGNANT NEOPLASM OF LYMPH NODES OF HEAD, FACE, OR NECK: Primary | ICD-10-CM

## 2017-08-10 DIAGNOSIS — I89.0 LYMPHEDEMA: ICD-10-CM

## 2017-08-10 PROCEDURE — 1157F ADVNC CARE PLAN IN RCRD: CPT | Mod: S$GLB,,, | Performed by: OTOLARYNGOLOGY

## 2017-08-10 PROCEDURE — 1126F AMNT PAIN NOTED NONE PRSNT: CPT | Mod: S$GLB,,, | Performed by: OTOLARYNGOLOGY

## 2017-08-10 PROCEDURE — 99999 PR PBB SHADOW E&M-EST. PATIENT-LVL III: CPT | Mod: PBBFAC,,, | Performed by: OTOLARYNGOLOGY

## 2017-08-10 PROCEDURE — 3008F BODY MASS INDEX DOCD: CPT | Mod: S$GLB,,, | Performed by: OTOLARYNGOLOGY

## 2017-08-10 PROCEDURE — 1159F MED LIST DOCD IN RCRD: CPT | Mod: S$GLB,,, | Performed by: OTOLARYNGOLOGY

## 2017-08-10 PROCEDURE — 99213 OFFICE O/P EST LOW 20 MIN: CPT | Mod: S$GLB,,, | Performed by: OTOLARYNGOLOGY

## 2017-08-10 NOTE — PROGRESS NOTES
[facility]    Subjective:      Patient ID: Kristin Quintanilla is a 87 y.o. female.    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 Mohs excision of scalp SCC, including pericranium; 5/13/2014  2. Scalp rotation flap, 5/14/2014  3. Excisional biopsy of right neck, 10/27/2014  4. IMRT to right posterior neck and primary site, (70gy), 1/23/2015    HISTORY OF PRESENT ILLNESS:    Kristin Quintanilla returns for follow-up of a suspicious lesion on the scalp that proved to be SCC with perineural invasion. She then manifest a posterior cervical lymph node that was actually metastatic cutaneous SCC. She declined a neck dissection, opting instead for RT, which she completed in January of 2015. She had an incidental pelvic mass that ultimately proved to be benign, but that was detected on a PET-CT. She has no axillary or inguinal adenopathy and denies fevers, chills, nightsweats, fatigue, and weight loss. She has no new complaints today, as the back pain and dry mouth are constant, as is point tenderness in her surgical field. She has not noted any new masses or lesions.     She is breathing comfortably and eating a regular diet - although she has to chew her food for a long time. She denies dysphagia, odynophagia, throat pain, and otalgia. Her voice is normal. There is no hemoptysis or hematemesis. She denies paresthesias and formication of the neck. She has not noted any new lumps, bumps, or masses. She continues to follow with Dr. Mohamud, and she sees her again in November.    There have been no changes in her health in the interim.     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      Arthritis     ASCVD (arteriosclerotic  "cardiovascular disease) 7/8/2014    Atherosclerosis of aorta 1/7/2016    "There is atherosclerosis of the thoracic aorta" - Xray Chest 7-     Back pain     Basal cell carcinoma 10/7/2013    Right nasal columellar, right lower eyelid, left medial eyelid    Basal cell carcinoma 2/2015    mid back    Chronic diastolic heart failure 11/11/2014    Coronary artery disease     Gastric ulcer     Fosamax related.    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     CATARACT EXTRACTION      ou dr morales    HYSTERECTOMY  1970     NIC/BSO. took HRT immediatiely after wards.     LYMPH NODE BIOPSY Right 10/27/2014    posterior triangle    Mohs excision of scalp SCC N/A 5/13/2014    Scalp flap N/A 5/14/204    posterior advancement-rotation    SKIN CANCER EXCISION      THYROIDECTOMY  1960's    hyperthyroidism         Current Outpatient Prescriptions:     acetaminophen (TYLENOL ARTHRITIS) 650 MG TbSR, Take 650 mg by mouth once daily. 1-2 tablet once a day., Disp: , Rfl:     amlodipine (NORVASC) 10 MG tablet, TAKE 1 TABLET ONE TIME DAILY, Disp: 90 tablet, Rfl: 3    aspirin (ECOTRIN) 81 MG EC tablet, Take 81 mg by mouth every other day. Hold for 4 weeks, Disp: , Rfl:     atorvastatin (LIPITOR) 20 MG tablet, TAKE 1 TABLET ONE TIME DAILY, Disp: 90 tablet, Rfl: 3    chlorpheniramine (CHLOR-TRIMETON) 2 mg/5 mL syrup, Take 2 mg by mouth every 4 (four) hours as needed for Allergies., Disp: , Rfl:     " FLAXSEED-OMEGA3,6,9-FATTY ACID ORAL, Take 1 capsule by mouth once daily.  , Disp: , Rfl:     hydrochlorothiazide (HYDRODIURIL) 12.5 MG Tab, Take 1 tablet (12.5 mg total) by mouth once daily., Disp: 90 tablet, Rfl: 3    labetalol (NORMODYNE) 200 MG tablet, Take 1 tablet (200 mg total) by mouth 2 (two) times daily., Disp: 360 tablet, Rfl: 3    levothyroxine (SYNTHROID) 75 MCG tablet, Take 1 tablet (75 mcg total) by mouth once daily., Disp: 90 tablet, Rfl: 3    losartan (COZAAR) 100 MG tablet, Take 1 tablet (100 mg total) by mouth once daily., Disp: 90 tablet, Rfl: 3    magnesium 250 mg Tab, Take 1 tablet by mouth once daily.  , Disp: , Rfl:     multivitamin capsule, Take 1 capsule by mouth once daily., Disp: , Rfl:     omeprazole (PRILOSEC) 20 MG capsule, Take 1 capsule (20 mg total) by mouth once daily., Disp: 90 capsule, Rfl: 3    vitamin D 1000 units Tab, Take 1,000 Units by mouth once daily. , Disp: , Rfl:     Her synthroid dose was increased to 75mcg recently.    Review of patient's allergies indicates:   Allergen Reactions    Codeine     Darvocet a500 [propoxyphene n-acetaminophen] Nausea Only    Sulfa (sulfonamide antibiotics) Rash       Social History     Social History    Marital status:      Spouse name: N/A    Number of children: N/A    Years of education: N/A     Occupational History    Homemaker      Social History Main Topics    Smoking status: Former Smoker    Smokeless tobacco: Never Used      Comment: Quit 35 years ago    Alcohol use No    Drug use: No    Sexual activity: No     Other Topics Concern    Are You Pregnant Or Think You May Be? No    Breast-Feeding No     Social History Narrative    She is active and independent.        Family History   Problem Relation Age of Onset    Heart attack Mother     Heart disease Mother     Hypertension Mother     Eczema Mother     Heart failure Mother     Clotting disorder Father     Hypertension Father     Heart failure  Father     Thyroid disease Sister     Cancer Sister     No Known Problems Brother     No Known Problems Maternal Aunt     No Known Problems Maternal Uncle     No Known Problems Paternal Aunt     No Known Problems Paternal Uncle     No Known Problems Maternal Grandmother     No Known Problems Maternal Grandfather     No Known Problems Paternal Grandmother     No Known Problems Paternal Grandfather     Amblyopia Neg Hx     Blindness Neg Hx     Cataracts Neg Hx     Glaucoma Neg Hx     Macular degeneration Neg Hx     Retinal detachment Neg Hx     Strabismus Neg Hx     Stroke Neg Hx     Melanoma Neg Hx     Psoriasis Neg Hx     Lupus Neg Hx     Acne Neg Hx     Ovarian cancer Neg Hx     Uterine cancer Neg Hx     Breast cancer Neg Hx     Colon cancer Neg Hx     Diabetes Neg Hx     Hyperlipidemia Neg Hx      Review of Systems   Constitutional: Negative for fatigue, fever and unexpected weight change.   HENT: Negative for congestion, hearing loss, mouth sores, nosebleeds, postnasal drip, rhinorrhea (improved), sore throat, tinnitus, trouble swallowing and voice change.    Eyes: Negative for visual disturbance.   Respiratory: Negative for cough, choking and shortness of breath.    Cardiovascular: Negative for chest pain and palpitations.   Gastrointestinal: Negative for abdominal pain, constipation and diarrhea.   Genitourinary: Negative for difficulty urinating and dysuria.   Musculoskeletal: Negative for arthralgias, back pain and neck pain.   Skin: Negative for rash and wound.        No issues with reconstructed scalp. There is a 3mm nodular fullness that she notes is occasionally tender located at the extreme posterior reaches of the incision     Neurological: Positive for numbness (in arms and hands when she wakes up in the morning - it goes away). Negative for syncope and headaches.   Hematological: Negative for adenopathy. Bruises/bleeds easily (on ASA 81).   Psychiatric/Behavioral: Negative  for dysphoric mood. The patient is not nervous/anxious.        Objective:      Physical Exam   Constitutional: She is oriented to person, place, and time. She appears well-developed and well-nourished. She is cooperative.   HENT:   Head: Normocephalic and atraumatic.       Right Ear: Hearing, tympanic membrane, external ear and ear canal normal.   Left Ear: Hearing, tympanic membrane, external ear and ear canal normal.   Nose: No rhinorrhea. Right sinus exhibits no maxillary sinus tenderness and no frontal sinus tenderness. Left sinus exhibits no maxillary sinus tenderness and no frontal sinus tenderness.              Eyes: Pupils are equal, round, and reactive to light. Right eye exhibits no discharge. Left eye exhibits no discharge. No scleral icterus.   Neck: No thyroid mass and no thyromegaly present.       Cardiovascular: Normal rate.    Pulmonary/Chest: Effort normal. No respiratory distress.   Lymphadenopathy:        Head (right side): No submental, no submandibular, no tonsillar, no preauricular and no posterior auricular adenopathy present.        Head (left side): No submental, no submandibular, no tonsillar, no preauricular and no posterior auricular adenopathy present.     She has no cervical adenopathy.        Right cervical: No deep cervical and no posterior cervical adenopathy present.       Left cervical: No deep cervical and no posterior cervical adenopathy present.   Neurological: She is alert and oriented to person, place, and time. No cranial nerve deficit.   Skin: Skin is warm and dry. No rash noted. No erythema.   Psychiatric: She has a normal mood and affect. Her behavior is normal.   Vitals reviewed.      Assessment:       1. 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 Squamous cell carcinoma of scalp     3. Lymphedema of Neck       Plan:       She is doing well and has had a CR to radiation for metastatic SCC of scalp. She has no clinical evidence of recurrent  disease - Dr. Mohamud continues to monitor her closely as well.      I will see her back in about 4 months, sooner if something changes.     Distress Screening Results: Psychosocial Distress screening score of 2 noted and reviewed. No intervention indicated.

## 2017-08-18 ENCOUNTER — TELEPHONE (OUTPATIENT)
Dept: FAMILY MEDICINE | Facility: CLINIC | Age: 82
End: 2017-08-18

## 2017-08-18 DIAGNOSIS — R79.89 ABNORMAL LIVER FUNCTION TESTS: Primary | ICD-10-CM

## 2017-08-18 NOTE — TELEPHONE ENCOUNTER
----- Message from Kaden Chun sent at 8/18/2017 10:31 AM CDT -----  Contact: Pt  X_ 1st Request  _ 2nd Request  _ 3rd Request    Who: DANILO PEARL [[id number]]    Why: Patient would like to speak with staff in regards to rechecking blood work for her liver function    What Number to Call Back: [phone]    When to Expect a call back: (Before the end of the day)  -- if call after 3:00 call back will be tomorrow.

## 2017-08-21 ENCOUNTER — LAB VISIT (OUTPATIENT)
Dept: LAB | Facility: HOSPITAL | Age: 82
End: 2017-08-21
Attending: INTERNAL MEDICINE
Payer: MEDICARE

## 2017-08-21 DIAGNOSIS — R79.89 ABNORMAL LIVER FUNCTION TESTS: ICD-10-CM

## 2017-08-21 LAB
ALBUMIN SERPL BCP-MCNC: 3.1 G/DL
ALP SERPL-CCNC: 60 U/L
ALT SERPL W/O P-5'-P-CCNC: 24 U/L
ANION GAP SERPL CALC-SCNC: 4 MMOL/L
AST SERPL-CCNC: 27 U/L
BILIRUB SERPL-MCNC: 0.5 MG/DL
BUN SERPL-MCNC: 30 MG/DL
CALCIUM SERPL-MCNC: 9.7 MG/DL
CHLORIDE SERPL-SCNC: 106 MMOL/L
CO2 SERPL-SCNC: 27 MMOL/L
CREAT SERPL-MCNC: 0.9 MG/DL
EST. GFR  (AFRICAN AMERICAN): >60 ML/MIN/1.73 M^2
EST. GFR  (NON AFRICAN AMERICAN): 57.7 ML/MIN/1.73 M^2
GLUCOSE SERPL-MCNC: 79 MG/DL
POTASSIUM SERPL-SCNC: 4.7 MMOL/L
PROT SERPL-MCNC: 5.9 G/DL
SODIUM SERPL-SCNC: 137 MMOL/L

## 2017-08-21 PROCEDURE — 36415 COLL VENOUS BLD VENIPUNCTURE: CPT | Mod: PO

## 2017-08-21 PROCEDURE — 80053 COMPREHEN METABOLIC PANEL: CPT

## 2017-08-23 ENCOUNTER — PATIENT MESSAGE (OUTPATIENT)
Dept: FAMILY MEDICINE | Facility: CLINIC | Age: 82
End: 2017-08-23

## 2017-09-06 DIAGNOSIS — I10 ESSENTIAL HYPERTENSION: ICD-10-CM

## 2017-09-06 DIAGNOSIS — E78.5 OTHER AND UNSPECIFIED HYPERLIPIDEMIA: ICD-10-CM

## 2017-09-07 RX ORDER — ATORVASTATIN CALCIUM 20 MG/1
TABLET, FILM COATED ORAL
Qty: 90 TABLET | Refills: 3 | Status: SHIPPED | OUTPATIENT
Start: 2017-09-07 | End: 2018-06-04 | Stop reason: SDUPTHER

## 2017-09-07 RX ORDER — AMLODIPINE BESYLATE 10 MG/1
TABLET ORAL
Qty: 90 TABLET | Refills: 3 | Status: SHIPPED | OUTPATIENT
Start: 2017-09-07 | End: 2018-06-04 | Stop reason: SDUPTHER

## 2017-10-02 ENCOUNTER — CLINICAL SUPPORT (OUTPATIENT)
Dept: FAMILY MEDICINE | Facility: CLINIC | Age: 82
End: 2017-10-02
Payer: MEDICARE

## 2017-10-02 DIAGNOSIS — Z23 NEED FOR INFLUENZA VACCINATION: Primary | ICD-10-CM

## 2017-10-02 PROCEDURE — G0008 ADMIN INFLUENZA VIRUS VAC: HCPCS | Mod: S$GLB,,, | Performed by: INTERNAL MEDICINE

## 2017-10-02 PROCEDURE — 90662 IIV NO PRSV INCREASED AG IM: CPT | Mod: S$GLB,,, | Performed by: INTERNAL MEDICINE

## 2017-10-02 PROCEDURE — 99499 UNLISTED E&M SERVICE: CPT | Mod: S$GLB,,, | Performed by: INTERNAL MEDICINE

## 2017-11-02 ENCOUNTER — TELEPHONE (OUTPATIENT)
Dept: DERMATOLOGY | Facility: CLINIC | Age: 82
End: 2017-11-02

## 2017-11-02 NOTE — TELEPHONE ENCOUNTER
----- Message from Albina Coles sent at 11/2/2017  1:12 PM CDT -----  Contact: patient  Above patient said she need a follow up for November I did not have waiting on a call from the nurse

## 2017-11-28 ENCOUNTER — OFFICE VISIT (OUTPATIENT)
Dept: DERMATOLOGY | Facility: CLINIC | Age: 82
End: 2017-11-28
Payer: MEDICARE

## 2017-11-28 DIAGNOSIS — L73.8 SEBACEOUS GLAND HYPERPLASIA: ICD-10-CM

## 2017-11-28 DIAGNOSIS — D18.00 ANGIOMA: ICD-10-CM

## 2017-11-28 DIAGNOSIS — Z85.828 HISTORY OF NONMELANOMA SKIN CANCER: ICD-10-CM

## 2017-11-28 DIAGNOSIS — L30.0 NUMMULAR ECZEMA: ICD-10-CM

## 2017-11-28 DIAGNOSIS — L82.1 SK (SEBORRHEIC KERATOSIS): Primary | ICD-10-CM

## 2017-11-28 PROCEDURE — 99999 PR PBB SHADOW E&M-EST. PATIENT-LVL II: CPT | Mod: PBBFAC,,, | Performed by: DERMATOLOGY

## 2017-11-28 PROCEDURE — 99213 OFFICE O/P EST LOW 20 MIN: CPT | Mod: S$GLB,,, | Performed by: DERMATOLOGY

## 2017-11-28 NOTE — PATIENT INSTRUCTIONS
XEROSIS (DRY SKIN)        1. Definition    Xerosis is the term for dry skin.  We all have a natural oil coating over our skin produced by the skin oil glands.  If this oil is removed, the skin becomes dry which can lead to cracking, which can lead to inflammation.  Xerosis is usually a long-term problem that recurs often, especially in the winter.    2. Cause     Long hot baths or showers can remove our natural oil and lead to xerosis.  One should never take more than one bath or shower a day and for no longer than ten minutes.   Use of harsh soaps such as Zest, Dial, and Ivory can worsen and cause xerosis.   Cold winter weather worsens xerosis because the amount of moisture contained in cold air is much less than the amount of moisture in warm air.    3. Treatment     Treatment is intended to restore the natural oil to your skin.  Keep the skin lubricated.     Do not take more than one bath or shower a day.  Use lukewarm water, not hot.  Hot water dries out the skin.     Use a gentle moisturizing soap such as Cetaphil soap, Oil of Olay, Dove, Basis, Ivory moisture care, Restoraderm cleanser.     When toweling dry, dont rub.  Blot the skin so there is still some water left on the skin.  You should apply a moisturizing cream to all of the skin such as Cerave cream, Cetaphil cream, Restoraderm or Eucerin Original Formula cream.   Alpha hydroxyacid lotions, i.e., AmLactin, also work very well for preventing dry skin, but may burn when used on inflamed or reddened skin.     If you like to swim during the winter months, you should not use soap when getting out of the pool.  When you have finished swimming, rinse off the chlorine with cool to warm water.  If this will be the only shower of the day, then you may use Cetaphil or another mild soap to cleanse your skin.  After the shower, apply a moisturizing cream to all of the skin as above.        [address] [facility] [place] [address]/ [phone]  [phone] FAX/ [email or web address]

## 2017-11-28 NOTE — PROGRESS NOTES
Subjective:       Patient ID:  Kristin Quintanilla is a 87 y.o. female who presents for   Chief Complaint   Patient presents with    Skin Check     UBSE     Pt here for UBSE  This is a high risk patient here to check for the development of new lesions.    H/o NMSC - s/p excision by BENNETT and closure by dr. Cevallos and radiation (metastatic SCC from scalp) and h/o bcc's  10/14: FINAL PATHOLOGIC DIAGNOSIS  Right level V lymph node, excisional biopsy:  Lymph node, extensively replaced by metastatic squamous cell carcinoma with extranodal extension.  This is a high risk patient here to check for the development of new lesions.    Last seen by me 5/2017 for skin check        Review of Systems   Constitutional: Negative for fever, chills, weight loss, fatigue and night sweats.   Skin: Positive for daily sunscreen use and activity-related sunscreen use. Negative for recent sunburn.   Hematologic/Lymphatic: Negative for adenopathy. Bruises/bleeds easily.        Objective:    Physical Exam   Constitutional: She appears well-developed and well-nourished. No distress.   Neurological: She is alert and oriented to person, place, and time. She is not disoriented.   Psychiatric: She has a normal mood and affect.   Skin:   Areas Examined (abnormalities noted in diagram):   Scalp / Hair Palpated and Inspected  Head / Face Inspection Performed  Neck Inspection Performed  Chest / Axilla Inspection Performed  Back Inspection Performed  RUE Inspected  LUE Inspection Performed  LLE Inspection Performed                   Diagram Legend     Erythematous scaling macule/papule c/w actinic keratosis       Vascular papule c/w angioma      Pigmented verrucoid papule/plaque c/w seborrheic keratosis      Yellow umbilicated papule c/w sebaceous hyperplasia      Irregularly shaped tan macule c/w lentigo     1-2 mm smooth white papules consistent with Milia      Movable subcutaneous cyst with punctum c/w epidermal inclusion cyst      Subcutaneous  movable cyst c/w pilar cyst      Firm pink to brown papule c/w dermatofibroma      Pedunculated fleshy papule(s) c/w skin tag(s)      Evenly pigmented macule c/w junctional nevus     Mildly variegated pigmented, slightly irregular-bordered macule c/w mildly atypical nevus      Flesh colored to evenly pigmented papule c/w intradermal nevus       Pink pearly papule/plaque c/w basal cell carcinoma      Erythematous hyperkeratotic cursted plaque c/w SCC      Surgical scar with no sign of skin cancer recurrence      Open and closed comedones      Inflammatory papules and pustules      Verrucoid papule consistent consistent with wart     Erythematous eczematous patches and plaques     Dystrophic onycholytic nail with subungual debris c/w onychomycosis     Umbilicated papule    Erythematous-base heme-crusted tan verrucoid plaque consistent with inflamed seborrheic keratosis     Erythematous Silvery Scaling Plaque c/w Psoriasis     See annotation      Assessment / Plan:        SK (seborrheic keratosis)   - stable and chronic      Sebaceous gland hyperplasia   - stable and chronic      Nummular eczema - left thigh - x years ++ itchy. No prev treatment  Ok to use TAC cream bid     History of nonmelanoma skin cancer  Area(s) of previous NMSC evaluated with no signs of recurrence.    Upper body skin examination performed today including at least 6 points as noted in physical examination. No lesions suspicious for malignancy noted.      Angiomas  This is a benign vascular lesion. Reassurance given. No treatment required.                Return in about 1 year (around 11/28/2018).

## 2017-12-12 ENCOUNTER — OFFICE VISIT (OUTPATIENT)
Dept: OTOLARYNGOLOGY | Facility: CLINIC | Age: 82
End: 2017-12-12
Payer: MEDICARE

## 2017-12-12 VITALS
BODY MASS INDEX: 21.99 KG/M2 | DIASTOLIC BLOOD PRESSURE: 51 MMHG | TEMPERATURE: 98 F | SYSTOLIC BLOOD PRESSURE: 116 MMHG | WEIGHT: 128.06 LBS | HEART RATE: 51 BPM

## 2017-12-12 DIAGNOSIS — C44.42 SQUAMOUS CELL CARCINOMA OF SCALP: Primary | ICD-10-CM

## 2017-12-12 DIAGNOSIS — I89.0 LYMPHEDEMA: ICD-10-CM

## 2017-12-12 DIAGNOSIS — C77.0 SECONDARY MALIGNANT NEOPLASM OF LYMPH NODES OF HEAD, FACE, OR NECK: ICD-10-CM

## 2017-12-12 PROCEDURE — 99213 OFFICE O/P EST LOW 20 MIN: CPT | Mod: S$GLB,,, | Performed by: OTOLARYNGOLOGY

## 2017-12-12 PROCEDURE — 99999 PR PBB SHADOW E&M-EST. PATIENT-LVL III: CPT | Mod: PBBFAC,,, | Performed by: OTOLARYNGOLOGY

## 2017-12-12 NOTE — PROGRESS NOTES
[facility]    Subjective:      Patient ID: Kristin Quintanilla is a 87 y.o. female.    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 Mohs excision of scalp SCC, including pericranium; 5/13/2014  2. Scalp rotation flap, 5/14/2014  3. Excisional biopsy of right neck, 10/27/2014  4. IMRT to right posterior neck and primary site, (70gy), 1/23/2015    HISTORY OF PRESENT ILLNESS:    Kristin Quintanilla returns for follow-up of a suspicious lesion on the scalp that proved to be SCC with perineural invasion. She then manifest a posterior cervical lymph node that was actually metastatic cutaneous SCC. She declined a neck dissection, opting instead for RT, which she completed in January of 2015. She had an incidental pelvic mass that ultimately proved to be benign, but that was detected on a PET-CT. She has no axillary or inguinal adenopathy and denies fevers, chills, nightsweats, fatigue, and weight loss. She has no new complaints today, as the back pain and dry mouth are constant, as is point tenderness in her surgical field. She has not noted any new masses or lesions.     She is breathing comfortably and eating a regular diet - although she has to chew her food for a long time. She denies dysphagia, odynophagia, throat pain, and otalgia. Her voice is normal. There is no hemoptysis or hematemesis. She denies paresthesias and formication of the neck. She has not noted any new lumps, bumps, or masses. She continues to follow with Dr. Mohamud, and she saw her in November with a good report.    There have been no changes in her health in the interim. She has more arthritis pain that makes it harder to move around. She continues to live independently, drive, and basically do her thing.    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      Arthritis     ASCVD (arteriosclerotic cardiovascular disease) 7/8/2014    Atherosclerosis of aorta 1/7/2016    "There is atherosclerosis of the thoracic aorta" - Xray Chest 7-     Back pain     Basal cell carcinoma 10/7/2013    Right nasal columellar, right lower eyelid, left medial eyelid    Basal cell carcinoma 2/2015    mid back    Chronic diastolic heart failure 11/11/2014    Coronary artery disease     Gastric ulcer     Fosamax related.     Heart murmur     Hyperlipidemia     Hypertension     Hypothyroidism (acquired) 9/27/2016    Joint pain     Lymphedema of Neck 3/17/2016    Occlusion and stenosis of carotid artery without mention of cerebral infarction     Pelvic mass in female 7/21/2015    Polyneuropathy in other diseases classified elsewhere 1/7/2016 6-     Squamous cell carcinoma of scalp 5/2014    scalp vertex with + LN met 10/14 s/p radiation    Ulcer        Past Surgical History:   Procedure Laterality Date    APPENDECTOMY  8.14.2015    mucinous cystadenoma with low grade dysplasia.     CATARACT EXTRACTION      ou dr morales    HYSTERECTOMY  1970     NIC/BSO. took HRT immediatiely after wards.     LYMPH NODE BIOPSY Right 10/27/2014    posterior triangle    Mohs excision of scalp SCC N/A 5/13/2014    Scalp flap N/A 5/14/204    posterior advancement-rotation    SKIN CANCER EXCISION      THYROIDECTOMY  1960's    hyperthyroidism         Current Outpatient Prescriptions:     acetaminophen (TYLENOL ARTHRITIS) 650 MG TbSR, Take 650 mg by mouth once daily. 1-2 tablet once a day., Disp: , Rfl:     amlodipine (NORVASC) 10 MG tablet, TAKE 1 TABLET ONE TIME DAILY, Disp: 90 tablet, Rfl: 3    aspirin (ECOTRIN) 81 MG EC tablet, Take 81 mg by mouth every other day. Hold for 4 weeks, Disp: , Rfl:     atorvastatin (LIPITOR) 20 MG tablet, TAKE 1 TABLET " ONE TIME DAILY, Disp: 90 tablet, Rfl: 3    chlorpheniramine (CHLOR-TRIMETON) 2 mg/5 mL syrup, Take 2 mg by mouth every 4 (four) hours as needed for Allergies., Disp: , Rfl:     FLAXSEED-OMEGA3,6,9-FATTY ACID ORAL, Take 1 capsule by mouth once daily.  , Disp: , Rfl:     hydrochlorothiazide (HYDRODIURIL) 12.5 MG Tab, Take 1 tablet (12.5 mg total) by mouth once daily., Disp: 90 tablet, Rfl: 3    labetalol (NORMODYNE) 200 MG tablet, Take 1 tablet (200 mg total) by mouth 2 (two) times daily., Disp: 360 tablet, Rfl: 3    levothyroxine (SYNTHROID) 75 MCG tablet, Take 1 tablet (75 mcg total) by mouth once daily., Disp: 90 tablet, Rfl: 3    losartan (COZAAR) 100 MG tablet, Take 1 tablet (100 mg total) by mouth once daily., Disp: 90 tablet, Rfl: 3    magnesium 250 mg Tab, Take 1 tablet by mouth once daily.  , Disp: , Rfl:     multivitamin capsule, Take 1 capsule by mouth once daily., Disp: , Rfl:     omeprazole (PRILOSEC) 20 MG capsule, Take 1 capsule (20 mg total) by mouth once daily., Disp: 90 capsule, Rfl: 3    vitamin D 1000 units Tab, Take 1,000 Units by mouth once daily. , Disp: , Rfl:     Her synthroid dose was increased to 75mcg recently.    Review of patient's allergies indicates:   Allergen Reactions    Codeine     Darvocet a500 [propoxyphene n-acetaminophen] Nausea Only    Sulfa (sulfonamide antibiotics) Rash       Social History     Social History    Marital status:      Spouse name: N/A    Number of children: N/A    Years of education: N/A     Occupational History    Homemaker      Social History Main Topics    Smoking status: Former Smoker    Smokeless tobacco: Never Used      Comment: Quit 35 years ago    Alcohol use No    Drug use: No    Sexual activity: No     Other Topics Concern    Are You Pregnant Or Think You May Be? No    Breast-Feeding No     Social History Narrative    She is active and independent.        Family History   Problem Relation Age of Onset    Heart attack  Mother     Heart disease Mother     Hypertension Mother     Eczema Mother     Heart failure Mother     Clotting disorder Father     Hypertension Father     Heart failure Father     Thyroid disease Sister     Cancer Sister     No Known Problems Brother     No Known Problems Maternal Aunt     No Known Problems Maternal Uncle     No Known Problems Paternal Aunt     No Known Problems Paternal Uncle     No Known Problems Maternal Grandmother     No Known Problems Maternal Grandfather     No Known Problems Paternal Grandmother     No Known Problems Paternal Grandfather     Amblyopia Neg Hx     Blindness Neg Hx     Cataracts Neg Hx     Glaucoma Neg Hx     Macular degeneration Neg Hx     Retinal detachment Neg Hx     Strabismus Neg Hx     Stroke Neg Hx     Melanoma Neg Hx     Psoriasis Neg Hx     Lupus Neg Hx     Acne Neg Hx     Ovarian cancer Neg Hx     Uterine cancer Neg Hx     Breast cancer Neg Hx     Colon cancer Neg Hx     Diabetes Neg Hx     Hyperlipidemia Neg Hx      Review of Systems   Constitutional: Negative for fatigue, fever and unexpected weight change.   HENT: Negative for congestion, hearing loss, mouth sores, nosebleeds, postnasal drip, rhinorrhea (improved), sore throat, tinnitus, trouble swallowing and voice change.    Eyes: Negative for visual disturbance.   Respiratory: Negative for cough, choking and shortness of breath.    Cardiovascular: Negative for chest pain and palpitations.   Gastrointestinal: Negative for abdominal pain, constipation and diarrhea.   Genitourinary: Negative for difficulty urinating and dysuria.   Musculoskeletal: Negative for arthralgias, back pain and neck pain.   Skin: Negative for rash and wound.        No issues with reconstructed scalp. There is a 3mm nodular fullness that she notes is occasionally tender located at the extreme posterior reaches of the incision     Neurological: Positive for numbness (in arms and hands when she wakes up in  the morning - it goes away). Negative for syncope and headaches.   Hematological: Negative for adenopathy. Bruises/bleeds easily (on ASA 81).   Psychiatric/Behavioral: Negative for dysphoric mood. The patient is not nervous/anxious.        Objective:      Physical Exam   Constitutional: She is oriented to person, place, and time. She appears well-developed and well-nourished. She is cooperative.   HENT:   Head: Normocephalic and atraumatic.       Right Ear: Hearing, tympanic membrane, external ear and ear canal normal.   Left Ear: Hearing, tympanic membrane, external ear and ear canal normal.   Nose: No rhinorrhea. Right sinus exhibits no maxillary sinus tenderness and no frontal sinus tenderness. Left sinus exhibits no maxillary sinus tenderness and no frontal sinus tenderness.              Eyes: Pupils are equal, round, and reactive to light. Right eye exhibits no discharge. Left eye exhibits no discharge. No scleral icterus.   Neck: No thyroid mass and no thyromegaly present.       Cardiovascular: Normal rate.    Pulmonary/Chest: Effort normal. No respiratory distress.   Lymphadenopathy:        Head (right side): No submental, no submandibular, no tonsillar, no preauricular and no posterior auricular adenopathy present.        Head (left side): No submental, no submandibular, no tonsillar, no preauricular and no posterior auricular adenopathy present.     She has no cervical adenopathy.        Right cervical: No deep cervical and no posterior cervical adenopathy present.       Left cervical: No deep cervical and no posterior cervical adenopathy present.   Neurological: She is alert and oriented to person, place, and time. No cranial nerve deficit.   Skin: Skin is warm and dry. No rash noted. No erythema.   Psychiatric: She has a normal mood and affect. Her behavior is normal.   Vitals reviewed.      Assessment & Plan:       Problem List Items Addressed This Visit     Squamous cell carcinoma of scalp - Primary      She is doing well with no evidence of recurrent disease. She will continue to see Dr. Mohamud and me.         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 We are now basically 3 years out from her regional recurrence, so it is reasonable to increase her follow-up interval to 6 months.          Lymphedema of Neck

## 2017-12-12 NOTE — ASSESSMENT & PLAN NOTE
She is doing well with no evidence of recurrent disease. She will continue to see Dr. Mohamud and me.

## 2017-12-12 NOTE — ASSESSMENT & PLAN NOTE
She has no clinical evidence of recurrent disease and is doing well overall. We are now basically 3 years out from her regional recurrence, so it is reasonable to increase her follow-up interval to 6 months.

## 2018-01-02 RX ORDER — LOSARTAN POTASSIUM 100 MG/1
TABLET ORAL
Qty: 90 TABLET | Refills: 1 | Status: SHIPPED | OUTPATIENT
Start: 2018-01-02 | End: 2018-05-21 | Stop reason: SDUPTHER

## 2018-01-15 DIAGNOSIS — E03.9 ACQUIRED HYPOTHYROIDISM: ICD-10-CM

## 2018-01-15 DIAGNOSIS — K21.9 GASTROESOPHAGEAL REFLUX DISEASE WITHOUT ESOPHAGITIS: ICD-10-CM

## 2018-01-16 RX ORDER — LEVOTHYROXINE SODIUM 75 UG/1
TABLET ORAL
Qty: 90 TABLET | Refills: 3 | Status: SHIPPED | OUTPATIENT
Start: 2018-01-16 | End: 2019-03-19 | Stop reason: SDUPTHER

## 2018-01-16 RX ORDER — OMEPRAZOLE 20 MG/1
CAPSULE, DELAYED RELEASE ORAL
Qty: 90 CAPSULE | Refills: 3 | Status: SHIPPED | OUTPATIENT
Start: 2018-01-16 | End: 2018-09-10

## 2018-01-31 ENCOUNTER — TELEPHONE (OUTPATIENT)
Dept: CARDIOLOGY | Facility: CLINIC | Age: 83
End: 2018-01-31

## 2018-01-31 ENCOUNTER — LAB VISIT (OUTPATIENT)
Dept: LAB | Facility: HOSPITAL | Age: 83
End: 2018-01-31
Attending: INTERNAL MEDICINE
Payer: MEDICARE

## 2018-01-31 DIAGNOSIS — N18.1 HYPERTENSIVE HEART AND KIDNEY DISEASE WITH HF AND WITH CKD STAGE I: ICD-10-CM

## 2018-01-31 DIAGNOSIS — I13.0 HYPERTENSIVE HEART AND KIDNEY DISEASE WITH HF AND WITH CKD STAGE I: ICD-10-CM

## 2018-01-31 DIAGNOSIS — E78.2 MIXED HYPERLIPIDEMIA: Primary | ICD-10-CM

## 2018-01-31 DIAGNOSIS — E78.2 MIXED HYPERLIPIDEMIA: ICD-10-CM

## 2018-01-31 LAB
ALBUMIN SERPL BCP-MCNC: 3.3 G/DL
ALP SERPL-CCNC: 55 U/L
ALT SERPL W/O P-5'-P-CCNC: 25 U/L
ANION GAP SERPL CALC-SCNC: 6 MMOL/L
AST SERPL-CCNC: 28 U/L
BILIRUB SERPL-MCNC: 0.6 MG/DL
BUN SERPL-MCNC: 32 MG/DL
CALCIUM SERPL-MCNC: 10.2 MG/DL
CHLORIDE SERPL-SCNC: 105 MMOL/L
CHOLEST SERPL-MCNC: 159 MG/DL
CHOLEST/HDLC SERPL: 2.5 {RATIO}
CO2 SERPL-SCNC: 27 MMOL/L
CREAT SERPL-MCNC: 1 MG/DL
EST. GFR  (AFRICAN AMERICAN): 58.5 ML/MIN/1.73 M^2
EST. GFR  (NON AFRICAN AMERICAN): 50.8 ML/MIN/1.73 M^2
GLUCOSE SERPL-MCNC: 86 MG/DL
HDLC SERPL-MCNC: 63 MG/DL
HDLC SERPL: 39.6 %
LDLC SERPL CALC-MCNC: 83 MG/DL
NONHDLC SERPL-MCNC: 96 MG/DL
POTASSIUM SERPL-SCNC: 4.5 MMOL/L
PROT SERPL-MCNC: 6 G/DL
SODIUM SERPL-SCNC: 138 MMOL/L
TRIGL SERPL-MCNC: 65 MG/DL
TSH SERPL DL<=0.005 MIU/L-ACNC: 0.82 UIU/ML

## 2018-01-31 PROCEDURE — 36415 COLL VENOUS BLD VENIPUNCTURE: CPT | Mod: PO

## 2018-01-31 PROCEDURE — 80061 LIPID PANEL: CPT

## 2018-01-31 PROCEDURE — 84443 ASSAY THYROID STIM HORMONE: CPT

## 2018-01-31 PROCEDURE — 80053 COMPREHEN METABOLIC PANEL: CPT

## 2018-01-31 NOTE — TELEPHONE ENCOUNTER
----- Message from Lenka Triana MD sent at 1/31/2018  3:11 PM CST -----  Please review.  We will discuss the results during your upcoming visit with me. It looks good.

## 2018-02-07 ENCOUNTER — TELEPHONE (OUTPATIENT)
Dept: CARDIOLOGY | Facility: CLINIC | Age: 83
End: 2018-02-07

## 2018-02-07 ENCOUNTER — OFFICE VISIT (OUTPATIENT)
Dept: CARDIOLOGY | Facility: CLINIC | Age: 83
End: 2018-02-07
Payer: MEDICARE

## 2018-02-07 VITALS
WEIGHT: 120.94 LBS | DIASTOLIC BLOOD PRESSURE: 81 MMHG | SYSTOLIC BLOOD PRESSURE: 193 MMHG | BODY MASS INDEX: 20.65 KG/M2 | HEIGHT: 64 IN | HEART RATE: 63 BPM

## 2018-02-07 DIAGNOSIS — I31.39 PERICARDIAL EFFUSION: Chronic | ICD-10-CM

## 2018-02-07 DIAGNOSIS — E78.2 MIXED HYPERLIPIDEMIA: Chronic | ICD-10-CM

## 2018-02-07 DIAGNOSIS — I13.0 HYPERTENSIVE HEART AND KIDNEY DISEASE WITH HF AND WITH CKD STAGE I: Primary | ICD-10-CM

## 2018-02-07 DIAGNOSIS — I50.32 CHRONIC DIASTOLIC HEART FAILURE: ICD-10-CM

## 2018-02-07 DIAGNOSIS — E03.9 HYPOTHYROIDISM (ACQUIRED): ICD-10-CM

## 2018-02-07 DIAGNOSIS — I65.23 BILATERAL CAROTID ARTERY STENOSIS: Chronic | ICD-10-CM

## 2018-02-07 DIAGNOSIS — I70.0 ATHEROSCLEROSIS OF AORTA: Chronic | ICD-10-CM

## 2018-02-07 DIAGNOSIS — N18.1 HYPERTENSIVE HEART AND KIDNEY DISEASE WITH HF AND WITH CKD STAGE I: Primary | ICD-10-CM

## 2018-02-07 DIAGNOSIS — I25.10 ASCVD (ARTERIOSCLEROTIC CARDIOVASCULAR DISEASE): Chronic | ICD-10-CM

## 2018-02-07 DIAGNOSIS — I35.1 NONRHEUMATIC AORTIC VALVE INSUFFICIENCY: Chronic | ICD-10-CM

## 2018-02-07 PROCEDURE — 99999 PR PBB SHADOW E&M-EST. PATIENT-LVL III: CPT | Mod: PBBFAC,,, | Performed by: INTERNAL MEDICINE

## 2018-02-07 PROCEDURE — 3008F BODY MASS INDEX DOCD: CPT | Mod: S$GLB,,, | Performed by: INTERNAL MEDICINE

## 2018-02-07 PROCEDURE — 99214 OFFICE O/P EST MOD 30 MIN: CPT | Mod: S$GLB,,, | Performed by: INTERNAL MEDICINE

## 2018-02-07 PROCEDURE — 99499 UNLISTED E&M SERVICE: CPT | Mod: S$GLB,,, | Performed by: INTERNAL MEDICINE

## 2018-02-07 PROCEDURE — 1125F AMNT PAIN NOTED PAIN PRSNT: CPT | Mod: S$GLB,,, | Performed by: INTERNAL MEDICINE

## 2018-02-07 PROCEDURE — 1159F MED LIST DOCD IN RCRD: CPT | Mod: S$GLB,,, | Performed by: INTERNAL MEDICINE

## 2018-02-07 RX ORDER — AMOXICILLIN 500 MG/1
500 CAPSULE ORAL
COMMUNITY
End: 2018-02-24

## 2018-02-07 NOTE — TELEPHONE ENCOUNTER
----- Message from Ivon Rodriguez sent at 2/7/2018  3:07 PM CST -----  Contact: patient  Please call pt at [phone]. BP was 150/58 (right) and 145/57 (left)     Thank you

## 2018-02-07 NOTE — PROGRESS NOTES
Subjective:   Patient ID:  Kristin Quintanilla is a 87 y.o. female who presents for follow-up of Hypertension      HPI: Patient has no symptoms, except for chronic fatigue.  She drove herself to the clinic today. She took her meds about an hour ago and upon arrival BP was elevated to 190-200 systolic.  After a while it was 160 mmHg.  She states she stays off salt and at home BP is usually within normal range.    Lab Results   Component Value Date     01/31/2018    K 4.5 01/31/2018     01/31/2018    CO2 27 01/31/2018    BUN 32 (H) 01/31/2018    CREATININE 1.0 01/31/2018    GLU 86 01/31/2018    HGBA1C 6.2 07/16/2013    MG 2.4 08/20/2009    AST 28 01/31/2018    ALT 25 01/31/2018    ALBUMIN 3.3 (L) 01/31/2018    PROT 6.0 01/31/2018    BILITOT 0.6 01/31/2018    WBC 6.56 03/20/2017    HGB 10.9 (L) 03/20/2017    HCT 34.1 (L) 03/20/2017    MCV 93 03/20/2017     03/20/2017    INR 1.0 04/25/2011    TSH 0.821 01/31/2018    CHOL 159 01/31/2018    HDL 63 01/31/2018    LDLCALC 83.0 01/31/2018    TRIG 65 01/31/2018       Review of Systems   Constitution: Positive for malaise/fatigue and weight loss.   HENT: Negative.    Eyes: Negative.    Cardiovascular: Positive for dyspnea on exertion. Negative for chest pain, claudication, irregular heartbeat, leg swelling, near-syncope, palpitations and syncope.   Respiratory: Negative.  Negative for cough, shortness of breath, snoring and wheezing.    Endocrine: Negative.  Negative for cold intolerance, heat intolerance, polydipsia, polyphagia and polyuria.   Skin: Negative.    Musculoskeletal: Positive for arthritis, back pain, muscle cramps and muscle weakness.   Gastrointestinal: Negative.    Genitourinary: Negative.    Neurological: Positive for loss of balance and paresthesias.   Psychiatric/Behavioral: Negative.        Objective:   Physical Exam   Constitutional: She is oriented to person, place, and time. She appears well-developed and well-nourished.   BP (!) 193/81    "Pulse 63   Ht 5' 4" (1.626 m)   Wt 54.9 kg (120 lb 14.8 oz)   LMP  (LMP Unknown)   BMI 20.76 kg/m²      HENT:   Head: Normocephalic.   Eyes: Pupils are equal, round, and reactive to light.   Neck: Normal range of motion. Neck supple. No thyromegaly present.   Cardiovascular: Normal rate, regular rhythm and intact distal pulses.  Exam reveals no gallop and no friction rub.    Murmur heard.  High-pitched blowing decrescendo early diastolic murmur is present with a grade of 2/6  at the upper right sternal border radiating to the apex  Pulses:       Carotid pulses are on the right side with bruit, and 2+ on the left side with bruit.       Radial pulses are 2+ on the right side, and 2+ on the left side.        Femoral pulses are 2+ on the right side, and 2+ on the left side.       Popliteal pulses are 2+ on the right side, and 2+ on the left side.        Dorsalis pedis pulses are 2+ on the right side, and 2+ on the left side.        Posterior tibial pulses are 2+ on the right side, and 2+ on the left side.   Pulmonary/Chest: Effort normal and breath sounds normal. No respiratory distress. She has no wheezes. She has no rales. She exhibits no tenderness.   Abdominal: Soft. Bowel sounds are normal.   Musculoskeletal: Normal range of motion. She exhibits no edema.   Neurological: She is alert and oriented to person, place, and time.   Skin: Skin is warm and dry.   Psychiatric: She has a normal mood and affect.   Nursing note and vitals reviewed.        Assessment and Plan:     Problem List Items Addressed This Visit        Cardiology Problems    Hyperlipidemia (Chronic)    Relevant Orders    2D echo with color flow doppler    CAR Ultrasound doppler carotid bliateral    Comprehensive metabolic panel    Lipid panel    TSH    Aortic valve insufficiency (Chronic)    Relevant Orders    2D echo with color flow doppler    CAR Ultrasound doppler carotid bliateral    Comprehensive metabolic panel    Lipid panel    Pericardial " effusion (Chronic)    Relevant Orders    2D echo with color flow doppler    CAR Ultrasound doppler carotid bliateral    Comprehensive metabolic panel    Lipid panel    Bilateral carotid artery stenosis (Chronic)    Relevant Orders    2D echo with color flow doppler    CAR Ultrasound doppler carotid bliateral    Comprehensive metabolic panel    Lipid panel    ASCVD (arteriosclerotic cardiovascular disease) (Chronic)    Relevant Orders    2D echo with color flow doppler    CAR Ultrasound doppler carotid bliateral    Comprehensive metabolic panel    Lipid panel    Atherosclerosis of aorta (Chronic)    Relevant Orders    2D echo with color flow doppler    CAR Ultrasound doppler carotid bliateral    Comprehensive metabolic panel    Lipid panel    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    Relevant Orders    2D echo with color flow doppler    CAR Ultrasound doppler carotid bliateral    Comprehensive metabolic panel    Lipid panel    Chronic diastolic heart failure    Relevant Orders    2D echo with color flow doppler    CAR Ultrasound doppler carotid bliateral    Comprehensive metabolic panel    Lipid panel       Other    Hypothyroidism (acquired)    Relevant Orders    2D echo with color flow doppler    CAR Ultrasound doppler carotid bliateral    Comprehensive metabolic panel    Lipid panel    TSH          Patient's Medications   New Prescriptions    No medications on file   Previous Medications    ACETAMINOPHEN (TYLENOL ARTHRITIS) 650 MG TBSR    Take 650 mg by mouth once daily. 1-2 tablet once a day.    AMLODIPINE (NORVASC) 10 MG TABLET    TAKE 1 TABLET ONE TIME DAILY    AMOXICILLIN (AMOXIL) 500 MG CAPSULE    Take 500 mg by mouth. Pt taking 4 pills one hour before dentist appointment.    ASPIRIN (ECOTRIN) 81 MG EC TABLET    Take 81 mg by mouth every other day. Pt taking one pill every other day.    ATORVASTATIN (LIPITOR) 20 MG TABLET    TAKE 1 TABLET ONE TIME DAILY     FLAXSEED-OMEGA3,6,9-FATTY ACID ORAL    Take 1 capsule by mouth once daily.      HYDROCHLOROTHIAZIDE (HYDRODIURIL) 12.5 MG TAB    Take 1 tablet (12.5 mg total) by mouth once daily.    LABETALOL (NORMODYNE) 200 MG TABLET    Take 1 tablet (200 mg total) by mouth 2 (two) times daily.    LEVOTHYROXINE (SYNTHROID) 75 MCG TABLET    TAKE 1 TABLET ONE TIME DAILY    LOSARTAN (COZAAR) 100 MG TABLET    TAKE 1 TABLET EVERY DAY    MAGNESIUM 250 MG TAB    Take 1 tablet by mouth once daily.      MULTIVITAMIN CAPSULE    Take 1 capsule by mouth once daily.    OMEPRAZOLE (PRILOSEC) 20 MG CAPSULE    TAKE 1 CAPSULE ONE TIME DAILY    VITAMIN D 1000 UNITS TAB    Take 1,000 Units by mouth once daily.    Modified Medications    No medications on file   Discontinued Medications    CHLORPHENIRAMINE (CHLOR-TRIMETON) 2 MG/5 ML SYRUP    Take 2 mg by mouth every 4 (four) hours as needed for Allergies.   Increase HCTZ to daily until after BP records normalized.  Review results of the ordered tests and advise.  Low salt, but better balance diet with more proteins advised.  Full physical exam as per PCP.  Follow-up in about 6 months (around 8/7/2018).

## 2018-02-21 ENCOUNTER — TELEPHONE (OUTPATIENT)
Dept: CARDIOLOGY | Facility: CLINIC | Age: 83
End: 2018-02-21

## 2018-02-21 NOTE — TELEPHONE ENCOUNTER
Pt stated that she does not have a smart phone so she can't do the digital blood pressure program. Please advise.

## 2018-02-21 NOTE — TELEPHONE ENCOUNTER
Pt notified, verbalized understanding. I spoke w/Joni Dale and he stated that pt qualifies for the digital b/p program. He stated that pt needs to have a smart phone to be enrolled in the program. I tried to call pt to ask her what type of phone she has and she didn't answer the phone. I will try to call pt back later today.

## 2018-02-21 NOTE — TELEPHONE ENCOUNTER
Pt stated that all of her b/p readings are in the mornings before taking medications. Pt stated that she has been taking HCTZ everyday as advised. Please review b/p readings and advise.     2/8/18 10:30AM  162/64 P59 LEFT ARM  164/67 P58 RIGHT ARM    2/9/18 10:00AM  155/64 P61 LEFT ARM  161/65 P59 RIGHT ARM    2/10/18 9:00AM  149/55 P57 LEFT ARM  159/62 P57 RIGHT ARM    2/11/18 6:30AM  155/63 P59 LEFT ARM  163/69 P58 RIGHT ARM    2/12/18 10:30AM  159/72 P59 LEFT ARM  159/55 P55 RIGHT ARM    2/13/18 8:55AM  148/51 P63 LEFT ARM  154/57 P62 RIGHT ARM    2/14/18 10:00AM  150/64 P61 LEFT ARM  152/56 P59 RIGHT ARM    2/15/18 10:15AM  146/58 P65 LEFT ARM  143/84 P63 RIGHT ARM    2/16/18 10:00AM  152/64 P57 LEFT ARM  162/51 P57 RIGHT ARM    2/17/18 9:55AM  146/62 P65 LEFT ARM  147/57 P63 RIGHT ARM    2/18/18 7:50AM  152/61 P61 LEFT ARM  155/56 P60 RIGHT ARM    2/19/18 10:15AM  139/49 LEFT ARM  146/51 P56 RIGHT ARM    2/20/18 10:20AM  146/62 P58 LEFT ARM  151/51 P58 RIGHT ARM    2/21/18 8:50AM  148/56 P60 LEFT ARM  168/57 P59 RIGHT ARM(pt stated that the phone was ringing when she was taking the b/p on her right arm and she thinks that it made her b/p high.)

## 2018-02-21 NOTE — TELEPHONE ENCOUNTER
----- Message from Victoria Stover MA sent at 2/7/2018  3:52 PM CST -----  Did pt call back w/b/p readings?

## 2018-02-23 ENCOUNTER — NURSE TRIAGE (OUTPATIENT)
Dept: ADMINISTRATIVE | Facility: CLINIC | Age: 83
End: 2018-02-23

## 2018-02-24 ENCOUNTER — HOSPITAL ENCOUNTER (EMERGENCY)
Facility: HOSPITAL | Age: 83
Discharge: HOME | End: 2018-02-24
Attending: EMERGENCY MEDICINE
Payer: MEDICARE

## 2018-02-24 VITALS
BODY MASS INDEX: 20.33 KG/M2 | SYSTOLIC BLOOD PRESSURE: 173 MMHG | HEART RATE: 63 BPM | WEIGHT: 122 LBS | TEMPERATURE: 98 F | RESPIRATION RATE: 18 BRPM | OXYGEN SATURATION: 96 % | HEIGHT: 65 IN | DIASTOLIC BLOOD PRESSURE: 78 MMHG

## 2018-02-24 DIAGNOSIS — M54.30 SCIATICA, UNSPECIFIED LATERALITY: Primary | ICD-10-CM

## 2018-02-24 PROCEDURE — 63600175 PHARM REV CODE 636 W HCPCS: Performed by: ANESTHESIOLOGY

## 2018-02-24 PROCEDURE — 99283 EMERGENCY DEPT VISIT LOW MDM: CPT | Mod: 25

## 2018-02-24 PROCEDURE — 96374 THER/PROPH/DIAG INJ IV PUSH: CPT

## 2018-02-24 PROCEDURE — 25000003 PHARM REV CODE 250: Performed by: EMERGENCY MEDICINE

## 2018-02-24 RX ORDER — ACETAMINOPHEN 500 MG
1000 TABLET ORAL
Status: COMPLETED | OUTPATIENT
Start: 2018-02-24 | End: 2018-02-24

## 2018-02-24 RX ORDER — DEXAMETHASONE SODIUM PHOSPHATE 4 MG/ML
4 INJECTION, SOLUTION INTRA-ARTICULAR; INTRALESIONAL; INTRAMUSCULAR; INTRAVENOUS; SOFT TISSUE
Status: COMPLETED | OUTPATIENT
Start: 2018-02-24 | End: 2018-02-24

## 2018-02-24 RX ORDER — PREDNISONE 20 MG/1
20 TABLET ORAL DAILY
Qty: 5 TABLET | Refills: 0 | Status: SHIPPED | OUTPATIENT
Start: 2018-02-24 | End: 2018-03-01

## 2018-02-24 RX ADMIN — ACETAMINOPHEN 1000 MG: 500 TABLET ORAL at 11:02

## 2018-02-24 RX ADMIN — DEXAMETHASONE SODIUM PHOSPHATE 4 MG: 4 INJECTION, SOLUTION INTRAMUSCULAR; INTRAVENOUS at 09:02

## 2018-02-24 NOTE — ED NOTES
GENERAL: Pt appears well developed and nourished, in NAD, AAOX4    HEENT:   Head normocephalic and atraumatic. Extraocular muscles intact.  Pupil ER to                  light and accomadations. Nares normal. Mouth hydrated and without lesions.                   MMM. Posterior pharynx clear of exudate and lesions.    NECK:    Supple. No carotid bruits. No lymphodenopathy or thyromegaly..    LUNGS:  Clear to auscultation    HEART:  Regular rate and rhythm without murmur.    ABDOMEN:  SRNt. Non distended. Positive BS throughout. No hepatoslenomegaly                      Noted.    EXTREMITIES: Without cyanosis, clubbing, rash, lesions or edema.    NEURO:  Cranial nerves II through XII grossly intact.    PSYCHIATRIC: Denies SI/HI    SKIN:  No ulcerations or induration present.

## 2018-02-24 NOTE — TELEPHONE ENCOUNTER
"    Reason for Disposition   [1] MODERATE pain (e.g., interferes with normal activities, limping) AND [2] present > 3 days     Pt having to walk with a cane for a few days due to the hip/leg pain    Additional Information   Negative: Back pain radiating (shooting) into leg(s)     Back always hurts, but doesn't feel like it originates in the back   Negative: Numbness in a leg or foot (i.e., loss of sensation)     Always has numbness in hands and feet    Answer Assessment - Initial Assessment Questions  1. ONSET: "When did the pain start?"       Started wednesday  2. LOCATION: "Where is the pain located?"       Right side, starting in hip and running down into the ankle area, like an electric shock, intermittent  3. PAIN: "How bad is the pain?"    (Scale 1-10; or mild, moderate, severe)    -  MILD (1-3): doesn't interfere with normal activities     -  MODERATE (4-7): interferes with normal activities (e.g., work or school) or awakens from sleep, limping     -  SEVERE (8-10): excruciating pain, unable to do any normal activities, unable to walk      8/10 when it happens, only last a few seconds, and increasing frequency  4. WORK OR EXERCISE: "Has there been any recent work or exercise that involved this part of the body?"       Nothing that she can associate it with  5. CAUSE: "What do you think is causing the leg pain?"      Maybe sciatica    6. OTHER SYMPTOMS: "Do you have any other symptoms?" (e.g., chest pain, back pain, breathing difficulty, swelling, rash, fever, numbness, weakness)      nothing  7. PREGNANCY: "Is there any chance you are pregnant?" "When was your last menstrual period?"      no    Protocols used: ST LEG PAIN-A-      "

## 2018-02-24 NOTE — ED NOTES
Patient identifiers verified and correct for Ms Quintanilla  C/C: Right buttock pain radiating down back of leg to ankle.  APPEARANCE: awake and alert in NAD.  SKIN: warm, dry and intact. No breakdown or bruising.  MUSCULOSKELETAL: Patient moving all extremities spontaneously, no obvious swelling or deformities noted. Ambulates independently.  RESPIRATORY: Denies shortness of breath.Respirations unlabored.   CARDIAC: Denies CP, 2+ distal pulses; no peripheral edema  ABDOMEN: S/ND/NT, Denies nausea  : voids spontaneously, denies difficulty  Neurologic: AAO x 4; follows commands equal strength in all extremities; denies numbness/tingling. Denies dizziness Denies weakness, H/O neuropathy in both feet. Right buttock pain radiating back of leg to ankle. No shortening or rotation.

## 2018-02-24 NOTE — ED PROVIDER NOTES
"Encounter Date: 2/24/2018       SCRIBE #1 NOTE: I, Angie Sears, am scribing for, and in the presence of,  Dr. Huang. I have scribed the following portions of the note - the Resident attestation.       History     Chief Complaint   Patient presents with    Hip Pain     r hip pain and going down to ankle, denies injury     HPI     87yr female with PMHx Aortic regurg (follows with cards), HLD, hypothyroidism, SCC s/p radiation and scalp ecision with Dr Cevallos 2014 and lumbar spinal stenosis who presents to the ED complaining of a 2 day history of pain shooting down her back and posterior leg to mid calf. She denies any inciting events; denies a fall or any trauma. She denies bladder or bowel incontinence. She rates the pain as an 8-10/10, states it comes and goes, and cannot identify any relieving or aggravating factors.    She is normally independently ambulatory but has been using a cane for the last 2 days with the pain.    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      Arthritis     ASCVD (arteriosclerotic cardiovascular disease) 7/8/2014    Atherosclerosis of aorta 1/7/2016    "There is atherosclerosis of the thoracic aorta" - Xray Chest 7-     Back pain     Basal cell carcinoma 10/7/2013    Right nasal columellar, right lower eyelid, left medial eyelid    Basal cell carcinoma 2/2015    mid back    Chronic diastolic heart failure 11/11/2014    Coronary artery disease     Gastric ulcer     Fosamax related.    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     CATARACT EXTRACTION      ou dr morales    HYSTERECTOMY  1970     NIC/BSO. took HRT immediatiely after wards.     LYMPH NODE BIOPSY Right 10/27/2014    posterior triangle    Mohs excision of scalp SCC N/A 5/13/2014    Scalp flap N/A 5/14/204    posterior advancement-rotation    SKIN CANCER EXCISION      THYROIDECTOMY  1960's    hyperthyroidism     Family History   Problem Relation Age of Onset    Heart attack Mother     Heart disease Mother     Hypertension Mother     Eczema Mother     Heart failure Mother     Clotting disorder Father     Hypertension Father     Heart failure Father     Thyroid disease Sister     Cancer Sister     No Known Problems Brother     No Known Problems Maternal Aunt     No Known Problems Maternal Uncle     No Known Problems Paternal Aunt     No Known Problems Paternal Uncle     No Known Problems Maternal Grandmother     No Known Problems Maternal Grandfather     No Known Problems Paternal Grandmother     No Known Problems Paternal Grandfather     Amblyopia Neg Hx     Blindness Neg Hx     Cataracts Neg Hx     Glaucoma Neg Hx     Macular degeneration Neg Hx     Retinal detachment Neg Hx     Strabismus Neg Hx     Stroke Neg Hx     Melanoma Neg Hx     Psoriasis Neg Hx     Lupus Neg Hx     Acne Neg Hx     Ovarian cancer Neg Hx     Uterine cancer Neg Hx     Breast cancer Neg Hx     Colon cancer Neg Hx     Diabetes Neg Hx     Hyperlipidemia Neg Hx      Social History   Substance Use  Topics    Smoking status: Former Smoker    Smokeless tobacco: Never Used      Comment: Quit 35 years ago    Alcohol use No     Review of Systems   Constitutional: Denies weight loss, chills, or weakness.  Eyes: denies vision changes, loss or blurry vision.   ENT: Denies dysphagia, nasal discharge, ear pain or discharge.  Cardiovascular: Denies chest pain, palpitations, orthopnea, or claudication.  Respiratory: Denies cough, hemoptysis and wheezing.  GI: Denies nausea/vomiting, hematochezia, melena, abd pain, or changes in appetite.  : Denies dysuria, incontinence, or hematuria.  Musculoskeletal: per HPI  Skin/breast: Denies rashes, lumps, lesions, or discharge.  Neurologic: Denies headache, dizziness, vertigo, or paresthesias.  Psychiatric: denies suicidal ideation and changes in mood. Denies depression   Endocrine: Denies polyuria, polydipsia, heat/cold intolerance.  Hematologic/Lymph: Denies lymphadenopathy, easy bruising or easy bleeding.  Extremities: denies claudication; denies edema   Allergic/Immunologic: Denies rash, rhinitis.      Physical Exam     Initial Vitals [02/24/18 0848]   BP Pulse Resp Temp SpO2   (!) 178/74 60 18 97.7 °F (36.5 °C) 98 %      MAP       108.67         Physical Exam     Physical Exam:  General: alert, oriented and appears stated age  HENT: NC/AT.Conjunctivae/corneas clear. PERRL, EOMI. Nares normal. Septum midline. Mucosa normal. No drainage or sinus tenderness.  Neck: no adenopathy, no carotid bruit, no JVD, supple, symmetrical, trachea midline and thyroid not enlarged, symmetric, no tenderness/mass/nodules  Back: symmetric, no curvature. ROM normal. No CVA tenderness.  Lungs: clear to auscultation bilaterally, respiratory effort normal  CV: RRR, S1 and S2 Normal. No chest wall tenderness  Abdomen: S, NT, ND. Bowel sounds normal   Extremities: WWP, intact distal pulses. no cyanosis or edema  5+ strength equally BLLE, Sensation equal bilaterally, proprioception intact. + pain  with RLE raising   Skin: Skin color, texture, turgor normal. No rashes or lesions  Lymph nodes: Cervical, supraclavicular, and axillary nodes normal.  Neurologic: Grossly normal      ED Course   Procedures  Labs Reviewed - No data to display     Imaging Results    None         X-Rays:   Independently Interpreted Readings:   Other Readings:  Lumbar Films   There is a mild thoracolumbar dextroscoliosis.  There is no significant katia-or retrolisthesis identified on the lateral image. Lumbar vertebral body heights are satisfactorily maintained.  There is moderate to severe disc space narrowing at every lumbar level.  Moderate compression deformity of T11 is probably unchanged when compared to the chest radiograph of 9/27/16.  There are atherosclerotic calcifications of the aorta.  There are calcified phleboliths overlying the pelvis.    Medical Decision Making:   History:   Old Medical Records: I decided to obtain old medical records.  Initial Assessment:   87 yr old woman with lower back pain with 2d radiation of pain down RLE to mid calf  Differential Diagnosis:   Sciatica   Disc herniation   Clinical Tests:   Radiological Study: Ordered and Reviewed  ED Management:  X rays ordered  Will give decadron IV x 1  Dispo pending Xrays but likely dc home with neurosx follow up       APC / Resident Notes:   Xrays notable for degenerative disease  Will discharge with prednisone course and neurosx follow up  Patient instructed to take tylenol for pain control and return for worsening symptoms        Scribe Attestation:   Scribe #1: I performed the above scribed service and the documentation accurately describes the services I performed. I attest to the accuracy of the note.    Attending Attestation:   Physician Attestation Statement for Resident:  As the supervising MD   Physician Attestation Statement: I have personally seen and examined this patient.   I agree with the above history. -: Available for consultation, discussed  the case and participated in the care of the patient, and disposition     As the supervising MD I agree with the above PE.    As the supervising MD I agree with the above treatment, course, plan, and disposition.                       Clinical Impression:   The encounter diagnosis was Sciatica, unspecified laterality.    Disposition:   Disposition: Discharged                        Milton Huang MD  02/24/18 1958

## 2018-02-25 ENCOUNTER — PATIENT MESSAGE (OUTPATIENT)
Dept: FAMILY MEDICINE | Facility: CLINIC | Age: 83
End: 2018-02-25

## 2018-02-26 ENCOUNTER — OFFICE VISIT (OUTPATIENT)
Dept: FAMILY MEDICINE | Facility: CLINIC | Age: 83
End: 2018-02-26
Payer: MEDICARE

## 2018-02-26 VITALS
HEIGHT: 64 IN | SYSTOLIC BLOOD PRESSURE: 140 MMHG | WEIGHT: 126.13 LBS | DIASTOLIC BLOOD PRESSURE: 50 MMHG | HEART RATE: 64 BPM | OXYGEN SATURATION: 97 % | BODY MASS INDEX: 21.53 KG/M2 | TEMPERATURE: 98 F

## 2018-02-26 DIAGNOSIS — M54.31 SCIATICA OF RIGHT SIDE: Primary | ICD-10-CM

## 2018-02-26 PROCEDURE — 1159F MED LIST DOCD IN RCRD: CPT | Mod: S$GLB,,, | Performed by: NURSE PRACTITIONER

## 2018-02-26 PROCEDURE — 99214 OFFICE O/P EST MOD 30 MIN: CPT | Mod: S$GLB,,, | Performed by: NURSE PRACTITIONER

## 2018-02-26 PROCEDURE — 3008F BODY MASS INDEX DOCD: CPT | Mod: S$GLB,,, | Performed by: NURSE PRACTITIONER

## 2018-02-26 PROCEDURE — 99999 PR PBB SHADOW E&M-EST. PATIENT-LVL IV: CPT | Mod: PBBFAC,,, | Performed by: NURSE PRACTITIONER

## 2018-02-26 RX ORDER — TRAMADOL HYDROCHLORIDE 50 MG/1
50 TABLET ORAL EVERY 6 HOURS PRN
Qty: 30 TABLET | Refills: 2 | Status: SHIPPED | OUTPATIENT
Start: 2018-02-26 | End: 2018-03-08

## 2018-02-26 RX ORDER — GABAPENTIN 100 MG/1
100 CAPSULE ORAL NIGHTLY
Qty: 30 CAPSULE | Refills: 11 | Status: SHIPPED | OUTPATIENT
Start: 2018-02-26 | End: 2018-04-03 | Stop reason: SDUPTHER

## 2018-02-26 NOTE — PATIENT INSTRUCTIONS
Careful with position transfers.  Tylenol 1 take 3 times a day  Take the pain med with tylenol  Gabapentin 100 mg at night. Start Thursday night.

## 2018-02-26 NOTE — PROGRESS NOTES
"This dictation has been generated using Dragon Dictation some phonetic errors may occur.     Kristin was seen today for hospital f/u.    Diagnoses and all orders for this visit:    Sciatica of right side    Other orders  -     traMADol (ULTRAM) 50 mg tablet; Take 1 tablet (50 mg total) by mouth every 6 (six) hours as needed for Pain.  -     gabapentin (NEURONTIN) 100 MG capsule; Take 1 capsule (100 mg total) by mouth every evening.      Sciatica on the right side.  Discussed meds with primary care physician.  Agreeable with gabapentin 100 mg at night.  Discussed risk of dizziness, somnolence, and off balance.  Patient will take care with position transfers and son indicates they will spend the night with her as she starts the medicine.  Discussed side effects of tramadol which she has tolerated in the past.  I've asked that she decrease her Tylenol use to one tablet 3 or 4 times a day.  In excessive of 25 minutes spent with patient with greater than 50% of time dedicated to education on symptoms, diagnosis, treatments, and coordination of care.  Side effects of medicine discussed at length.  Patient will set on the side of the bed before going to the bathroom at night.  "This note will not be shared with the patient."  I do not want to increase the patient's anxieties.  Follow-up in about 2 weeks (around 3/12/2018).    Patient Instructions   Careful with position transfers.  Tylenol 1 take 3 times a day  Take the pain med with tylenol  Gabapentin 100 mg at night. Start Thursday night.      ________________________________________________________________  ________________________________________________________________        Chief Complaint   Patient presents with    hospital f/u     History of present illness  This 87 y.o. presents today for complaint of sciatica right-sided.  Patient went to the emergency department recently.  I reviewed their note.  I reviewed the x-ray images with the patient's son.  She has " "loss of disc spaces.  She's not interested in a surgical referral.  I discussed with her that she should continue the prednisone and might see some improvement.  If at the last day prednisone has not been helpful in the past that she start gabapentin and discussed side effects.  No masses she decrease her Tylenol use and try the tramadol which she has tolerated in the past.  Patient describes pain in right leg as electrical shocks.  She had a steroid shot at the emergency room in 5 days of prednisone.  She states she was advised to take Tylenol 1000 mg 3 times a day for 3 days.  She is somewhat anxious about taking the medicine gabapentin.  Review of systems  Review of systems  No fever or chills  No chest pain or shortness of breath  No dizziness.  No balance issues.  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      Arthritis     ASCVD (arteriosclerotic cardiovascular disease) 7/8/2014    Atherosclerosis of aorta 1/7/2016    "There is atherosclerosis of the thoracic aorta" - Xray Chest 7-     Back pain     Basal cell carcinoma 10/7/2013    Right nasal columellar, right lower eyelid, left medial eyelid    Basal cell carcinoma 2/2015    mid back    Chronic diastolic heart failure 11/11/2014    Coronary artery disease     Gastric ulcer     Fosamax related.    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     CATARACT EXTRACTION      ou dr morales    HYSTERECTOMY  1970     NIC/BSO. took HRT immediatiely after wards.     LYMPH NODE BIOPSY Right 10/27/2014    posterior triangle    Mohs excision of scalp SCC N/A 5/13/2014    Scalp flap N/A 5/14/204    posterior advancement-rotation    SKIN CANCER EXCISION      THYROIDECTOMY  1960's    hyperthyroidism       Family History   Problem Relation Age of Onset    Heart attack Mother     Heart disease Mother     Hypertension Mother     Eczema Mother     Heart failure Mother     Clotting disorder Father     Hypertension Father     Heart failure Father     Thyroid disease Sister     Cancer Sister     No Known Problems Brother     No Known Problems Maternal Aunt     No Known Problems Maternal Uncle     No Known Problems Paternal Aunt     No Known Problems Paternal Uncle     No Known Problems Maternal Grandmother     No Known Problems Maternal Grandfather     No Known Problems Paternal Grandmother     No Known Problems Paternal Grandfather     Amblyopia Neg Hx     Blindness Neg Hx     Cataracts Neg Hx     Glaucoma Neg Hx     Macular degeneration Neg Hx     Retinal detachment Neg Hx     Strabismus Neg Hx     Stroke Neg Hx     Melanoma Neg Hx     Psoriasis Neg Hx     Lupus Neg Hx     Acne Neg Hx     Ovarian cancer Neg Hx     Uterine cancer Neg Hx     Breast cancer Neg Hx     Colon cancer Neg Hx     Diabetes Neg Hx     Hyperlipidemia Neg Hx        Social History     Social History    Marital status:      Spouse name: N/A    Number of children: N/A    Years of education: N/A     Occupational History    Homemaker      Social History Main Topics    Smoking status: Former Smoker    Smokeless tobacco: Never Used      Comment: Quit 35 years ago    Alcohol use No    Drug use: No    Sexual activity:  No     Other Topics Concern    Are You Pregnant Or Think You May Be? No    Breast-Feeding No     Social History Narrative    She is active and independent.        Current Outpatient Prescriptions   Medication Sig Dispense Refill    acetaminophen (TYLENOL ARTHRITIS) 650 MG TbSR Take 650 mg by mouth once daily. 1-2 tablet once a day.      amlodipine (NORVASC) 10 MG tablet TAKE 1 TABLET ONE TIME DAILY 90 tablet 3    aspirin (ECOTRIN) 81 MG EC tablet Take 81 mg by mouth every other day. Pt taking one pill every other day.      atorvastatin (LIPITOR) 20 MG tablet TAKE 1 TABLET ONE TIME DAILY 90 tablet 3    FLAXSEED-OMEGA3,6,9-FATTY ACID ORAL Take 1 capsule by mouth once daily.        hydrochlorothiazide (HYDRODIURIL) 12.5 MG Tab Take 1 tablet (12.5 mg total) by mouth once daily. (Patient taking differently: Take 12.5 mg by mouth once daily. Pt to take one pill once a day.) 90 tablet 3    labetalol (NORMODYNE) 200 MG tablet Take 1 tablet (200 mg total) by mouth 2 (two) times daily. 360 tablet 3    levothyroxine (SYNTHROID) 75 MCG tablet TAKE 1 TABLET ONE TIME DAILY 90 tablet 3    losartan (COZAAR) 100 MG tablet TAKE 1 TABLET EVERY DAY 90 tablet 1    magnesium 250 mg Tab Take 1 tablet by mouth once daily.        multivitamin capsule Take 1 capsule by mouth once daily.      omeprazole (PRILOSEC) 20 MG capsule TAKE 1 CAPSULE ONE TIME DAILY 90 capsule 3    predniSONE (DELTASONE) 20 MG tablet Take 1 tablet (20 mg total) by mouth once daily. 5 tablet 0    vitamin D 1000 units Tab Take 1,000 Units by mouth once daily.       gabapentin (NEURONTIN) 100 MG capsule Take 1 capsule (100 mg total) by mouth every evening. 30 capsule 11    traMADol (ULTRAM) 50 mg tablet Take 1 tablet (50 mg total) by mouth every 6 (six) hours as needed for Pain. 30 tablet 2     No current facility-administered medications for this visit.        Review of patient's allergies indicates:   Allergen Reactions    Codeine     Darvocet a500  [propoxyphene n-acetaminophen] Nausea Only    Sulfa (sulfonamide antibiotics) Rash    Fosamax [alendronate]      Gastric ulcer       Physical examination  Vitals Reviewed  Gen. Well-dressed well-nourished no apparent distress  Skin warm dry and intact.  No rashes noted.  Neuro. Awake alert oriented x4.  Normal judgment and cognition noted.  Extremities no clubbing cyanosis or edema noted.     Call or return to clinic prn if these symptoms worsen or fail to improve as anticipated.

## 2018-03-07 ENCOUNTER — PATIENT MESSAGE (OUTPATIENT)
Dept: FAMILY MEDICINE | Facility: CLINIC | Age: 83
End: 2018-03-07

## 2018-03-26 ENCOUNTER — PES CALL (OUTPATIENT)
Dept: ADMINISTRATIVE | Facility: CLINIC | Age: 83
End: 2018-03-26

## 2018-03-27 ENCOUNTER — PATIENT MESSAGE (OUTPATIENT)
Dept: FAMILY MEDICINE | Facility: CLINIC | Age: 83
End: 2018-03-27

## 2018-04-03 RX ORDER — GABAPENTIN 100 MG/1
CAPSULE ORAL
Qty: 180 CAPSULE | Refills: 3 | Status: SHIPPED | OUTPATIENT
Start: 2018-04-03 | End: 2018-09-10

## 2018-04-03 NOTE — TELEPHONE ENCOUNTER
----- Message from Andrea Looney sent at 4/2/2018  2:10 PM CDT -----  Contact: [phone]/PT  Calling TO speak with doc regarding a medication change that's needed

## 2018-04-18 RX ORDER — HYDROCHLOROTHIAZIDE 12.5 MG/1
TABLET ORAL
Qty: 90 TABLET | Refills: 3 | Status: SHIPPED | OUTPATIENT
Start: 2018-04-18 | End: 2018-11-08 | Stop reason: DRUGHIGH

## 2018-04-23 ENCOUNTER — CLINICAL SUPPORT (OUTPATIENT)
Dept: CARDIOLOGY | Facility: CLINIC | Age: 83
End: 2018-04-23
Attending: INTERNAL MEDICINE
Payer: MEDICARE

## 2018-04-23 ENCOUNTER — TELEPHONE (OUTPATIENT)
Dept: CARDIOLOGY | Facility: CLINIC | Age: 83
End: 2018-04-23

## 2018-04-23 DIAGNOSIS — E78.2 MIXED HYPERLIPIDEMIA: Chronic | ICD-10-CM

## 2018-04-23 DIAGNOSIS — I50.32 CHRONIC DIASTOLIC HEART FAILURE: ICD-10-CM

## 2018-04-23 DIAGNOSIS — E03.9 HYPOTHYROIDISM (ACQUIRED): ICD-10-CM

## 2018-04-23 DIAGNOSIS — I31.39 PERICARDIAL EFFUSION: Chronic | ICD-10-CM

## 2018-04-23 DIAGNOSIS — I35.1 NONRHEUMATIC AORTIC VALVE INSUFFICIENCY: Chronic | ICD-10-CM

## 2018-04-23 DIAGNOSIS — N18.1 HYPERTENSIVE HEART AND KIDNEY DISEASE WITH HF AND WITH CKD STAGE I: ICD-10-CM

## 2018-04-23 DIAGNOSIS — I25.10 ASCVD (ARTERIOSCLEROTIC CARDIOVASCULAR DISEASE): Chronic | ICD-10-CM

## 2018-04-23 DIAGNOSIS — I65.23 BILATERAL CAROTID ARTERY STENOSIS: Chronic | ICD-10-CM

## 2018-04-23 DIAGNOSIS — I70.0 ATHEROSCLEROSIS OF AORTA: Chronic | ICD-10-CM

## 2018-04-23 DIAGNOSIS — I13.0 HYPERTENSIVE HEART AND KIDNEY DISEASE WITH HF AND WITH CKD STAGE I: ICD-10-CM

## 2018-04-23 LAB
AORTIC VALVE REGURGITATION: ABNORMAL
DIASTOLIC DYSFUNCTION: YES
GLOBAL PERICARDIAL EFFUSION: ABNORMAL
INTERNAL CAROTID STENOSIS: ABNORMAL
RETIRED EF AND QEF - SEE NOTES: 63 (ref 55–65)

## 2018-04-23 PROCEDURE — 93880 EXTRACRANIAL BILAT STUDY: CPT | Mod: S$GLB,,, | Performed by: INTERNAL MEDICINE

## 2018-04-23 PROCEDURE — 93306 TTE W/DOPPLER COMPLETE: CPT | Mod: S$GLB,,, | Performed by: INTERNAL MEDICINE

## 2018-04-23 NOTE — TELEPHONE ENCOUNTER
----- Message from Lenka Triana MD sent at 4/23/2018  2:45 PM CDT -----  ..And your heart echo is essentially unchanged.  The changes described there are due to high blood pressure.  It should be better with increased fluid pill.

## 2018-04-23 NOTE — TELEPHONE ENCOUNTER
----- Message from Lenka Triana MD sent at 4/23/2018  2:43 PM CDT -----  Ms. Quintanilla,  Please review results of your carotid duplex.  It showed mild bilateral blockages.  You are on appropriate medical therapy. We will repeat it in 6-12 months.

## 2018-04-23 NOTE — TELEPHONE ENCOUNTER
Pt notified, verbalized understanding. Pt stated that she has been taking Gabapentin for sciatic pain and wonders if it can elevate her b/p. Pt stated that she has not been keeping a good record of her b/p. Please review b/p readings below and advise.     4/15/18 AT 8:02AM  159/57 P62 LEFT ARM BEFORE MEDS  170/64 P59 RIGHT ARM BEFORE MEDS    4/16/18 AT 11AM BEFORE MEDS  159/57 P59 LEFT ARM  163/63 P59 RIGHT ARM    4/16/18 AT 5PM  131/48 P61 LEFT ARM  149/53 P62 RIGHT ARM    4/17/18 AT 11AM BEFORE MEDS  148/61 P61 LEFT ARM  166/65 P62 RIGHT ARM    4/18/18 AT 11:30AM BEFORE MEDS  154/63 P56 LEFT ARM  169/64 P58 RIGHT ARM    4/18/18 AT 3:30PM  142/53 P56 LEFT ARM  149/50 P60 RIGHT ARM

## 2018-05-01 ENCOUNTER — TELEPHONE (OUTPATIENT)
Dept: CARDIOLOGY | Facility: CLINIC | Age: 83
End: 2018-05-01

## 2018-05-01 NOTE — TELEPHONE ENCOUNTER
----- Message from Victoria Stover MA sent at 4/23/2018  4:03 PM CDT -----  Did pt call w/b/p readings?

## 2018-05-01 NOTE — TELEPHONE ENCOUNTER
I called pt for b/p readings. Pt stated that she has not been checking her b/p like she should because she has a lot going on at her house. Pt stated that she does not rest before checking her b/p and that she tries to use salt sparingly. Pt stated that she has been taking HCTZ 25mg since 4/23/18 as advised. Please advise.    4/24/18 8:40am Before Meds  158/56 P63 LEFT ARM  170/69 P69 RIGHT ARM    4/25/18 7:50AM BEFORE MEDS  149/60 P61 LFET ARM  154/62 P64 RIGHT ARM    4/26/18 10:45AM BEFORE MEDS  155/63 P53 LEFT ARM  162/65 P53 RIGHT ARM    4/28/18 10:15AM BEFORE MEDS  164/61 P57 LEFT ARM  173/70 P59 RIGHT ARM    4/28/18 7:30PM AFTER MEDS  157/62 P60 LEFT ARM  173/67 P61 RIGHT ARM    4/29/18 2:30PM AFTER MEDS  158/64 P57 LEFT ARM  151/59 P60 RIGHT ARM    4/30/18 10PM AFTER MEDS  138/50 P61 LEFT ARM  157/58 P63 RIGHT ARM

## 2018-05-21 RX ORDER — LOSARTAN POTASSIUM 100 MG/1
TABLET ORAL
Qty: 90 TABLET | Refills: 1 | Status: SHIPPED | OUTPATIENT
Start: 2018-05-21 | End: 2018-10-03 | Stop reason: SDUPTHER

## 2018-05-22 RX ORDER — LABETALOL 200 MG/1
TABLET, FILM COATED ORAL
Qty: 180 TABLET | Refills: 3 | Status: ON HOLD | OUTPATIENT
Start: 2018-05-22 | End: 2019-01-14 | Stop reason: SDUPTHER

## 2018-06-04 DIAGNOSIS — I10 ESSENTIAL HYPERTENSION: ICD-10-CM

## 2018-06-04 RX ORDER — ATORVASTATIN CALCIUM 20 MG/1
TABLET, FILM COATED ORAL
Qty: 90 TABLET | Refills: 3 | Status: SHIPPED | OUTPATIENT
Start: 2018-06-04 | End: 2019-03-25 | Stop reason: SDUPTHER

## 2018-06-04 RX ORDER — AMLODIPINE BESYLATE 10 MG/1
TABLET ORAL
Qty: 90 TABLET | Refills: 3 | Status: SHIPPED | OUTPATIENT
Start: 2018-06-04 | End: 2019-03-25 | Stop reason: SDUPTHER

## 2018-06-06 ENCOUNTER — OFFICE VISIT (OUTPATIENT)
Dept: FAMILY MEDICINE | Facility: CLINIC | Age: 83
End: 2018-06-06
Payer: MEDICARE

## 2018-06-06 VITALS
DIASTOLIC BLOOD PRESSURE: 72 MMHG | OXYGEN SATURATION: 97 % | BODY MASS INDEX: 21.39 KG/M2 | HEART RATE: 62 BPM | WEIGHT: 125.31 LBS | HEIGHT: 64 IN | SYSTOLIC BLOOD PRESSURE: 148 MMHG

## 2018-06-06 DIAGNOSIS — C77.0 SECONDARY MALIGNANT NEOPLASM OF LYMPH NODES OF HEAD, FACE, OR NECK: ICD-10-CM

## 2018-06-06 DIAGNOSIS — E78.2 MIXED HYPERLIPIDEMIA: Chronic | ICD-10-CM

## 2018-06-06 DIAGNOSIS — G63 POLYNEUROPATHY IN OTHER DISEASES CLASSIFIED ELSEWHERE: ICD-10-CM

## 2018-06-06 DIAGNOSIS — I50.32 CHRONIC DIASTOLIC HEART FAILURE: ICD-10-CM

## 2018-06-06 DIAGNOSIS — I70.0 ATHEROSCLEROSIS OF AORTA: Chronic | ICD-10-CM

## 2018-06-06 DIAGNOSIS — M47.816 LUMBAR ARTHROPATHY: ICD-10-CM

## 2018-06-06 DIAGNOSIS — N18.1 HYPERTENSIVE HEART AND KIDNEY DISEASE WITH HF AND WITH CKD STAGE I: ICD-10-CM

## 2018-06-06 DIAGNOSIS — Z00.00 ENCOUNTER FOR PREVENTIVE HEALTH EXAMINATION: Primary | ICD-10-CM

## 2018-06-06 DIAGNOSIS — I13.0 HYPERTENSIVE HEART AND KIDNEY DISEASE WITH HF AND WITH CKD STAGE I: ICD-10-CM

## 2018-06-06 DIAGNOSIS — Z23 NEED FOR VACCINATION: ICD-10-CM

## 2018-06-06 DIAGNOSIS — E03.9 HYPOTHYROIDISM (ACQUIRED): ICD-10-CM

## 2018-06-06 PROCEDURE — G0439 PPPS, SUBSEQ VISIT: HCPCS | Mod: S$GLB,,, | Performed by: NURSE PRACTITIONER

## 2018-06-06 PROCEDURE — 99999 PR PBB SHADOW E&M-EST. PATIENT-LVL IV: CPT | Mod: PBBFAC,,, | Performed by: NURSE PRACTITIONER

## 2018-06-06 PROCEDURE — 99499 UNLISTED E&M SERVICE: CPT | Mod: S$PBB,,, | Performed by: NURSE PRACTITIONER

## 2018-06-06 NOTE — PATIENT INSTRUCTIONS
Counseling and Referral of Other Preventative  (Italic type indicates deductible and co-insurance are waived)    Patient Name: Kristin Quintanilla  Today's Date: 6/6/2018    Health Maintenance       Date Due Completion Date    TETANUS VACCINE 03/12/1948 Patient aware of recommendation for tetanus vaccine.     Influenza Vaccine 08/01/2018 10/2/2017    Lipid Panel 01/31/2023 1/31/2018        No orders of the defined types were placed in this encounter.    The following information is provided to all patients.  This information is to help you find resources for any of the problems found today that may be affecting your health:                Living healthy guide: www.[facility].[place].[facility]      Understanding Diabetes: www.diabetes.org      Eating healthy: www.cdc.gov/healthyweight      CDC home safety checklist: www.cdc.gov/steadi/patient.html      Agency on Aging: www.goea.[place].[facility]      Alcoholics anonymous (AA): www.aa.org      Physical Activity: www.vishnu.nih.gov/zm1rbaa      Tobacco use: www.quitwithusla.org

## 2018-06-06 NOTE — PROGRESS NOTES
"Kristin Quintanilla presented for a  Medicare AWV and comprehensive Health Risk Assessment today. The following components were reviewed and updated:    · Medical history  · Family History  · Social history  · Allergies and Current Medications  · Health Risk Assessment  · Health Maintenance  · Care Team     ** See Completed Assessments for Annual Wellness Visit within the encounter summary.**       The following assessments were completed:  · Living Situation  · CAGE  · Depression Screening  · Timed Get Up and Go  · Whisper Test  · Cognitive Function Screening  · Nutrition Screening  · ADL Screening  · PAQ Screening    Vitals:    06/06/18 0911   BP: (!) 148/72   BP Location: Left arm   Patient Position: Sitting   BP Method: Medium (Manual)   Pulse: 62   SpO2: 97%   Weight: 56.8 kg (125 lb 5.3 oz)   Height: 5' 4" (1.626 m)     Body mass index is 21.51 kg/m².  Physical Exam   Constitutional: She is oriented to person, place, and time. She appears well-developed and well-nourished.   Cardiovascular: Normal rate, regular rhythm and normal heart sounds.    Pulmonary/Chest: Effort normal and breath sounds normal.   Neurological: She is alert and oriented to person, place, and time.   Skin: Skin is warm. Capillary refill takes less than 2 seconds.   Psychiatric: She has a normal mood and affect. Her behavior is normal.   Vitals reviewed.        Diagnoses and health risks identified today and associated recommendations/orders:    1. Encounter for preventive health examination  Education provided about preventive health examinations and procedures; addressed and discussed patient's health concerns. Additionally, reviewed medical record for risk factors and documented the results during this encounter.    2. Lumbar arthropathy  Reminded patient of [employer]'s Silver Sneakers benefits with access to fitness centers and classes.   We discussed aquatics, low impact exercising for stability, stamina, and strength.     3. Polyneuropathy " in other diseases classified elsewhere  This is a chronic medical condition that is stable under the current regimen.    4. Atherosclerosis of aorta  Stable, asymptomatic; monitor.     5. 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    6. 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     7. Chronic diastolic heart failure  Stable, patient evaluated/monitored by [facility] cardiology dept; continue as advised.     8. Mixed hyperlipidemia  Stable, taking atorvastatin as directed.     9. Hypothyroidism (acquired)  Clinically euthyroid TSH 0.821 (January 2018).     10.  Need for vaccination  Patient aware of recommendation for tetanus vaccine.     Provided Kristin with a 5-10 year written screening schedule and personal prevention plan. Recommendations were developed using the USPSTF age appropriate recommendations. Education, counseling, and referrals were provided as needed. After Visit Summary printed and given to patient which includes a list of additional screenings\tests needed.    Follow-up in about 1 year (around 6/6/2019) for assessment .    Michael Waldron Jr, NP

## 2018-06-07 ENCOUNTER — TELEPHONE (OUTPATIENT)
Dept: OTOLARYNGOLOGY | Facility: CLINIC | Age: 83
End: 2018-06-07

## 2018-06-11 ENCOUNTER — TELEPHONE (OUTPATIENT)
Dept: CARDIOLOGY | Facility: CLINIC | Age: 83
End: 2018-06-11

## 2018-06-11 NOTE — TELEPHONE ENCOUNTER
Pt called stating that her b/p has been high. Pt stated that she takes 1 1/2 Labetalol, Losartan and 2 HCTZ's every morning, 1 Amlodipine at 3pm and 1 1/2 Labetalol around 7-8 pm. Pt admits that she does eat salt and does not always watch how much salt she eats a day. Please advise.   B/P readings:  6/11/18 Before Meds AT 10:30AM  153/53 P57 LEFT ARM  159/57 P56 RIGHT ARM    6/9/18  11AM  151/55 P56 LEFT ARM BEFORE MEDS  166/60 P57 BEFORE MEDS  6/9/18   6PM AFTER MEDS  117/71 P61 LEFT ARM  125/52 P57 RIGHT ARM

## 2018-06-21 ENCOUNTER — OFFICE VISIT (OUTPATIENT)
Dept: OTOLARYNGOLOGY | Facility: CLINIC | Age: 83
End: 2018-06-21
Payer: MEDICARE

## 2018-06-21 VITALS
HEART RATE: 54 BPM | DIASTOLIC BLOOD PRESSURE: 45 MMHG | WEIGHT: 124.31 LBS | SYSTOLIC BLOOD PRESSURE: 151 MMHG | BODY MASS INDEX: 21.34 KG/M2 | TEMPERATURE: 97 F

## 2018-06-21 DIAGNOSIS — C77.0 SECONDARY MALIGNANT NEOPLASM OF LYMPH NODES OF HEAD, FACE, OR NECK: ICD-10-CM

## 2018-06-21 DIAGNOSIS — C44.42 SQUAMOUS CELL CARCINOMA OF SCALP: ICD-10-CM

## 2018-06-21 DIAGNOSIS — I89.0 LYMPHEDEMA: Primary | ICD-10-CM

## 2018-06-21 PROCEDURE — 99213 OFFICE O/P EST LOW 20 MIN: CPT | Mod: S$GLB,,, | Performed by: OTOLARYNGOLOGY

## 2018-06-21 PROCEDURE — 99999 PR PBB SHADOW E&M-EST. PATIENT-LVL IV: CPT | Mod: PBBFAC,,, | Performed by: OTOLARYNGOLOGY

## 2018-06-21 NOTE — PROGRESS NOTES
[facility]    Subjective:      Patient ID: Kristin Quintanilla is a 88 y.o. female.    Chief Complaint:   Chief Complaint   Patient presents with    Follow-up     Squamous cell carcinoma of scalp     HPI     TREATMENT HISTORY:  1. Mohs excision of scalp SCC, including pericranium; 5/13/2014  2. Scalp rotation flap, 5/14/2014  3. Excisional biopsy of right neck, 10/27/2014  4. IMRT to right posterior neck and primary site, (70gy), 1/23/2015    HISTORY OF PRESENT ILLNESS:    Kristin Quintanilla returns for follow-up of a suspicious lesion on the scalp that proved to be SCC with perineural invasion. She then manifest a posterior cervical lymph node that was actually metastatic cutaneous SCC. She declined a neck dissection, opting instead for RT, which she completed in January of 2015. She had an incidental pelvic mass that ultimately proved to be benign, but that was detected on a PET-CT. She has no axillary or inguinal adenopathy and denies fevers, chills, nightsweats, fatigue, and weight loss. She has no new complaints today, as the back pain and dry mouth are constant, as is point tenderness in her surgical field. She has not noted any new masses or lesions.     She is breathing comfortably and eating a regular diet - although she has to chew her food for a long time. She denies dysphagia, odynophagia, throat pain, and otalgia. Her voice is normal. There is no hemoptysis or hematemesis. She denies paresthesias and formication of the neck. She has not noted any new lumps, bumps, or masses. She continues to follow with Dr. Mohamud.    She reports that she has had some issues with her BP being a little high, and she is working with her cardiologist on this. She has her annual exam on July 12. She now uses a cane when she walks long distances, but there are no falls to report. She has more arthritis pain that makes it harder to move around. She continues to live independently, drive, and  "basically do her thing.    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      Arthritis     ASCVD (arteriosclerotic cardiovascular disease) 7/8/2014    Atherosclerosis of aorta 1/7/2016    "There is atherosclerosis of the thoracic aorta" - Xray Chest 7-     Back pain     Basal cell carcinoma 10/7/2013    Right nasal columellar, right lower eyelid, left medial eyelid    Basal cell carcinoma 2/2015    mid back    Chronic diastolic heart failure 11/11/2014    Coronary artery disease     Gastric ulcer     Fosamax related.     Heart murmur     Hyperlipidemia     Hypertension     Hypothyroidism (acquired) 9/27/2016    Joint pain     Lymphedema of Neck 3/17/2016    Occlusion and stenosis of carotid artery without mention of cerebral infarction     Pelvic mass in female 7/21/2015    Polyneuropathy in other diseases classified elsewhere 1/7/2016 6-     Squamous cell carcinoma of scalp 5/2014    scalp vertex with + LN met 10/14 s/p radiation    Ulcer        Past Surgical History:   Procedure Laterality Date    APPENDECTOMY  8.14.2015    mucinous cystadenoma with low grade dysplasia.     CATARACT EXTRACTION      ou dr morales    EYE SURGERY      HYSTERECTOMY  1970     NIC/BSO. took HRT immediatiely after wards.     LYMPH NODE BIOPSY Right 10/27/2014    posterior triangle    Mohs excision of scalp SCC N/A 5/13/2014    Scalp flap N/A 5/14/204    posterior advancement-rotation    SKIN CANCER EXCISION      THYROIDECTOMY  1960's    hyperthyroidism         Current Outpatient Prescriptions:     acetaminophen (TYLENOL ARTHRITIS) 650 MG TbSR, Take 650 mg by mouth once daily. 1-2 tablet once a day., Disp: , Rfl:     amLODIPine (NORVASC) 10 MG tablet, TAKE 1 TABLET EVERY DAY, Disp: 90 tablet, Rfl: 3    aspirin (ECOTRIN) 81 MG EC tablet, Take 81 mg " by mouth every other day. Pt taking one pill every other day., Disp: , Rfl:     atorvastatin (LIPITOR) 20 MG tablet, TAKE 1 TABLET EVERY DAY, Disp: 90 tablet, Rfl: 3    FLAXSEED-OMEGA3,6,9-FATTY ACID ORAL, Take 1 capsule by mouth once daily.  , Disp: , Rfl:     gabapentin (NEURONTIN) 100 MG capsule, Take 2 capsule (200 mg total) daily, Disp: 180 capsule, Rfl: 3    hydroCHLOROthiazide (HYDRODIURIL) 12.5 MG Tab, TAKE 1 TABLET ONE TIME DAILY (Patient taking differently: Pt to take 2 pills once a day per Dr. Triana on 4/23/18), Disp: 90 tablet, Rfl: 3    labetalol (NORMODYNE) 200 MG tablet, TAKE 1 TABLET TWICE DAILY, Disp: 180 tablet, Rfl: 3    levothyroxine (SYNTHROID) 75 MCG tablet, TAKE 1 TABLET ONE TIME DAILY, Disp: 90 tablet, Rfl: 3    losartan (COZAAR) 100 MG tablet, TAKE 1 TABLET EVERY DAY, Disp: 90 tablet, Rfl: 1    magnesium 250 mg Tab, Take 1 tablet by mouth once daily.  , Disp: , Rfl:     multivitamin capsule, Take 1 capsule by mouth once daily., Disp: , Rfl:     omeprazole (PRILOSEC) 20 MG capsule, TAKE 1 CAPSULE ONE TIME DAILY, Disp: 90 capsule, Rfl: 3    vitamin D 1000 units Tab, Take 1,000 Units by mouth once daily. , Disp: , Rfl:     Her synthroid dose was increased to 75mcg recently.    Review of patient's allergies indicates:   Allergen Reactions    Codeine     Darvocet a500 [propoxyphene n-acetaminophen] Nausea Only    Sulfa (sulfonamide antibiotics) Rash       Social History     Social History    Marital status:      Spouse name: N/A    Number of children: N/A    Years of education: N/A     Occupational History    Homemaker      Social History Main Topics    Smoking status: Former Smoker    Smokeless tobacco: Never Used      Comment: Quit 35 years ago    Alcohol use No    Drug use: No    Sexual activity: No     Other Topics Concern    Are You Pregnant Or Think You May Be? No    Breast-Feeding No     Social History Narrative    She is active and independent.         Family History   Problem Relation Age of Onset    Heart attack Mother     Heart disease Mother     Hypertension Mother     Eczema Mother     Heart failure Mother     Clotting disorder Father     Hypertension Father     Heart failure Father     Thyroid disease Sister     Cancer Sister         thyroid CA     No Known Problems Brother     No Known Problems Maternal Aunt     No Known Problems Maternal Uncle     No Known Problems Paternal Aunt     No Known Problems Paternal Uncle     No Known Problems Maternal Grandmother     No Known Problems Maternal Grandfather     No Known Problems Paternal Grandmother     No Known Problems Paternal Grandfather     Amblyopia Neg Hx     Blindness Neg Hx     Cataracts Neg Hx     Glaucoma Neg Hx     Macular degeneration Neg Hx     Retinal detachment Neg Hx     Strabismus Neg Hx     Stroke Neg Hx     Melanoma Neg Hx     Psoriasis Neg Hx     Lupus Neg Hx     Acne Neg Hx     Ovarian cancer Neg Hx     Uterine cancer Neg Hx     Breast cancer Neg Hx     Colon cancer Neg Hx     Diabetes Neg Hx     Hyperlipidemia Neg Hx      Review of Systems   Constitutional: Negative for fatigue, fever and unexpected weight change.   HENT: Negative for congestion, hearing loss, mouth sores, nosebleeds, postnasal drip, rhinorrhea (improved), sore throat, tinnitus, trouble swallowing and voice change.    Eyes: Negative for visual disturbance.   Respiratory: Negative for cough, choking and shortness of breath.    Cardiovascular: Negative for chest pain and palpitations.   Gastrointestinal: Negative for abdominal pain, constipation and diarrhea.   Genitourinary: Negative for difficulty urinating and dysuria.   Musculoskeletal: Negative for arthralgias, back pain and neck pain.   Skin: Negative for rash and wound.        No issues with reconstructed scalp. There is a 3mm nodular fullness that she notes is occasionally tender located at the extreme posterior reaches of the  incision     Neurological: Positive for numbness (in arms and hands when she wakes up in the morning - it goes away). Negative for syncope and headaches.        She reports that her balance is not as great as it used to be. She walked with a cane today from the parking lot.   Hematological: Negative for adenopathy. Bruises/bleeds easily (on ASA 81).   Psychiatric/Behavioral: Negative for dysphoric mood. The patient is not nervous/anxious.        Objective:      Physical Exam   Constitutional: She is oriented to person, place, and time. She appears well-developed and well-nourished. She is cooperative.   HENT:   Head: Normocephalic and atraumatic.       Right Ear: Hearing, tympanic membrane, external ear and ear canal normal.   Left Ear: Hearing, tympanic membrane, external ear and ear canal normal.   Nose: No rhinorrhea. Right sinus exhibits no maxillary sinus tenderness and no frontal sinus tenderness. Left sinus exhibits no maxillary sinus tenderness and no frontal sinus tenderness.              Eyes: Pupils are equal, round, and reactive to light. Right eye exhibits no discharge. Left eye exhibits no discharge. No scleral icterus.   Neck: No thyroid mass and no thyromegaly present.       Cardiovascular: Normal rate.    Pulmonary/Chest: Effort normal. No respiratory distress.   Lymphadenopathy:        Head (right side): No submental, no submandibular, no tonsillar, no preauricular and no posterior auricular adenopathy present.        Head (left side): No submental, no submandibular, no tonsillar, no preauricular and no posterior auricular adenopathy present.     She has no cervical adenopathy.        Right cervical: No deep cervical and no posterior cervical adenopathy present.       Left cervical: No deep cervical and no posterior cervical adenopathy present.   Neurological: She is alert and oriented to person, place, and time. No cranial nerve deficit.   Skin: Skin is warm and dry. No rash noted. No erythema.    Psychiatric: She has a normal mood and affect. Her behavior is normal.   Vitals reviewed.      Assessment & Plan:       Problem List Items Addressed This Visit     Squamous cell carcinoma of scalp     She is doing well with no evidence of recurrent disease. She will continue to see Dr. Mohamud and will contact us if she notices any masses in her neck or significant change. We discussed her follow-up options, now that she is over 3 years out from her radiation. Our biggest concern would be regional failure (more lymph nodes), outside of other skin cancers. Continued derm follow-up is imperative, and she is welcome to return every 6 months to see the head and neck team, have video follow-up, or even just call us if she thinks there is a change. That is what she prefers, and we will see her if needed.         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

## 2018-06-21 NOTE — ASSESSMENT & PLAN NOTE
She is doing well with no evidence of recurrent disease. She will continue to see Dr. Mohamud and will contact us if she notices any masses in her neck or significant change. We discussed her follow-up options, now that she is over 3 years out from her radiation. Our biggest concern would be regional failure (more lymph nodes), outside of other skin cancers. Continued derm follow-up is imperative, and she is welcome to return every 6 months to see the head and neck team, have video follow-up, or even just call us if she thinks there is a change. That is what she prefers, and we will see her if needed.

## 2018-06-27 ENCOUNTER — PATIENT MESSAGE (OUTPATIENT)
Dept: FAMILY MEDICINE | Facility: CLINIC | Age: 83
End: 2018-06-27

## 2018-07-03 ENCOUNTER — OFFICE VISIT (OUTPATIENT)
Dept: OPTOMETRY | Facility: CLINIC | Age: 83
End: 2018-07-03
Payer: MEDICARE

## 2018-07-03 DIAGNOSIS — Z96.1 PSEUDOPHAKIA OF BOTH EYES: ICD-10-CM

## 2018-07-03 DIAGNOSIS — H52.13 MYOPIA WITH ASTIGMATISM AND PRESBYOPIA, BILATERAL: ICD-10-CM

## 2018-07-03 DIAGNOSIS — H52.4 MYOPIA WITH ASTIGMATISM AND PRESBYOPIA, BILATERAL: ICD-10-CM

## 2018-07-03 DIAGNOSIS — H52.203 MYOPIA WITH ASTIGMATISM AND PRESBYOPIA, BILATERAL: ICD-10-CM

## 2018-07-03 DIAGNOSIS — H02.88A MEIBOMIAN GLAND DYSFUNCTION (MGD), BILATERAL, BOTH UPPER AND LOWER LIDS: Primary | ICD-10-CM

## 2018-07-03 DIAGNOSIS — H02.88B MEIBOMIAN GLAND DYSFUNCTION (MGD), BILATERAL, BOTH UPPER AND LOWER LIDS: Primary | ICD-10-CM

## 2018-07-03 PROCEDURE — 92014 COMPRE OPH EXAM EST PT 1/>: CPT | Mod: S$GLB,,, | Performed by: OPTOMETRIST

## 2018-07-03 PROCEDURE — 99999 PR PBB SHADOW E&M-EST. PATIENT-LVL I: CPT | Mod: PBBFAC,,, | Performed by: OPTOMETRIST

## 2018-07-03 PROCEDURE — 92015 DETERMINE REFRACTIVE STATE: CPT | Mod: S$GLB,,, | Performed by: OPTOMETRIST

## 2018-07-03 RX ORDER — AMOXICILLIN 500 MG/1
CAPSULE ORAL
COMMUNITY
Start: 2018-04-18 | End: 2018-07-12

## 2018-07-03 NOTE — PROGRESS NOTES
HPI     DSL- 3/2/17     Pt states she has trouble seeing with her glasses. Pt occas has dry and   eye allergies. Pt occas has flashes.     Eyemeds  At's OU PRN    Last edited by Gracia Noland on 7/3/2018 10:54 AM. (History)            Assessment /Plan     For exam results, see Encounter Report.    Meibomian gland dysfunction (MGD), bilateral, both upper and lower lids  -Nightly lid scrubs using Sterilid foam or Occusoft foam. Systane balance as needed.  -reviewed tears impact on VA    Pseudophakia of both eyes  -Clear, no visually significant PCO    Myopia with astigmatism and presbyopia, bilateral  Eyeglass Final Rx     Eyeglass Final Rx       Sphere Cylinder Axis Dist VA Add    Right -1.00 +1.75 015 20/30 +2.50    Left -1.50 +1.75 005 20/60 +2.50    Type:  PAL    Expiration Date:  7/4/2019                OCT of left retina flat w/o pathology.    RTC 1 yr

## 2018-07-12 ENCOUNTER — OFFICE VISIT (OUTPATIENT)
Dept: FAMILY MEDICINE | Facility: CLINIC | Age: 83
End: 2018-07-12
Payer: MEDICARE

## 2018-07-12 ENCOUNTER — TELEPHONE (OUTPATIENT)
Dept: PHARMACY | Facility: CLINIC | Age: 83
End: 2018-07-12

## 2018-07-12 VITALS
DIASTOLIC BLOOD PRESSURE: 50 MMHG | HEART RATE: 55 BPM | HEIGHT: 64 IN | TEMPERATURE: 98 F | BODY MASS INDEX: 20.78 KG/M2 | OXYGEN SATURATION: 97 % | WEIGHT: 121.69 LBS | SYSTOLIC BLOOD PRESSURE: 112 MMHG

## 2018-07-12 DIAGNOSIS — I77.9 CAROTID DISEASE, BILATERAL: ICD-10-CM

## 2018-07-12 DIAGNOSIS — I50.32 CHRONIC DIASTOLIC HEART FAILURE: ICD-10-CM

## 2018-07-12 DIAGNOSIS — I70.0 ATHEROSCLEROSIS OF AORTA: ICD-10-CM

## 2018-07-12 DIAGNOSIS — E03.9 HYPOTHYROIDISM (ACQUIRED): ICD-10-CM

## 2018-07-12 DIAGNOSIS — C77.0 SECONDARY MALIGNANT NEOPLASM OF LYMPH NODES OF HEAD, FACE, OR NECK: ICD-10-CM

## 2018-07-12 DIAGNOSIS — Z00.00 ROUTINE MEDICAL EXAM: Primary | ICD-10-CM

## 2018-07-12 DIAGNOSIS — G56.03 BILATERAL CARPAL TUNNEL SYNDROME: ICD-10-CM

## 2018-07-12 DIAGNOSIS — I13.0 HYPERTENSIVE HEART AND KIDNEY DISEASE WITH HF AND WITH CKD STAGE I: ICD-10-CM

## 2018-07-12 DIAGNOSIS — G63 POLYNEUROPATHY IN OTHER DISEASES CLASSIFIED ELSEWHERE: ICD-10-CM

## 2018-07-12 DIAGNOSIS — D64.9 ANEMIA, UNSPECIFIED TYPE: ICD-10-CM

## 2018-07-12 DIAGNOSIS — M81.0 OSTEOPOROSIS, POSTMENOPAUSAL: ICD-10-CM

## 2018-07-12 DIAGNOSIS — M47.816 LUMBAR ARTHROPATHY: ICD-10-CM

## 2018-07-12 DIAGNOSIS — N18.1 HYPERTENSIVE HEART AND KIDNEY DISEASE WITH HF AND WITH CKD STAGE I: ICD-10-CM

## 2018-07-12 DIAGNOSIS — I35.1 AORTIC VALVE INSUFFICIENCY, ETIOLOGY OF CARDIAC VALVE DISEASE UNSPECIFIED: ICD-10-CM

## 2018-07-12 PROCEDURE — 99999 PR PBB SHADOW E&M-EST. PATIENT-LVL III: CPT | Mod: PBBFAC,,, | Performed by: INTERNAL MEDICINE

## 2018-07-12 PROCEDURE — 99499 UNLISTED E&M SERVICE: CPT | Mod: S$GLB,,, | Performed by: INTERNAL MEDICINE

## 2018-07-12 PROCEDURE — 99214 OFFICE O/P EST MOD 30 MIN: CPT | Mod: 25,S$GLB,, | Performed by: INTERNAL MEDICINE

## 2018-07-12 PROCEDURE — 99397 PER PM REEVAL EST PAT 65+ YR: CPT | Mod: S$GLB,,, | Performed by: INTERNAL MEDICINE

## 2018-07-12 NOTE — PROGRESS NOTES
"Chief complaint: Physical exam    88-year-old female whose PCP retired.  . here with her son who is a patient of mine.    She feels more unsteady and so has begun using a cane.  Regarding health maintenance, looks like her last mammogram was 2014 colonoscopy not indicated any further based upon her age.  We discussed whether or not she wishes to pursue continued mammograms and she has chosen not to.    ROS:   CONST: weight stable. EYES: no vision change. ENT: no sore throat. CV: no chest pain w/ exertion. RESP: no shortness of breath. GI: no nausea, vomiting, diarrhea. No dysphagia. : no urinary issues. MUSCULOSKELETAL: no new myalgias or arthralgias. SKIN: no new changes. NEURO: no focal deficits. PSYCH: no new issues. ENDOCRINE: no polyuria. HEME: no lymph nodes. ALLERGY: no general pruritis.      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      Arthritis     ASCVD (arteriosclerotic cardiovascular disease) 7/8/2014    Atherosclerosis of aorta 1/7/2016    "There is atherosclerosis of the thoracic aorta" - Xray Chest 7-     Back pain     Basal cell carcinoma 10/7/2013    Right nasal columellar, right lower eyelid, left medial eyelid    Basal cell carcinoma 2/2015    mid back    Chronic diastolic heart failure 11/11/2014    Coronary artery disease     Gastric ulcer     Fosamax related.    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 suspicious lesion on the scalp " that proved to be SCC with perineural invasion. She then manifest a posterior cervical lymph node that was actually metastatic cutaneous SCC. She declined a neck dissection, opting instead for RT, which she completed in January of 2015    Ulcer        Past Surgical History:   Procedure Laterality Date    APPENDECTOMY  8.14.2015    mucinous cystadenoma with low grade dysplasia.     CATARACT EXTRACTION      ou dr morales    EYE SURGERY      HYSTERECTOMY  1970     NIC/BSO. took HRT immediatiely after wards.     LYMPH NODE BIOPSY Right 10/27/2014    posterior triangle    Mohs excision of scalp SCC N/A 5/13/2014    Scalp flap N/A 5/14/204    posterior advancement-rotation    SKIN CANCER EXCISION      THYROIDECTOMY  1960's    hyperthyroidism     Social History     Social History    Marital status: ---lives alone and still drives     Spouse name: N/A    Number of children: N/A    Years of education: N/A     Occupational History    Homemaker      Social History Main Topics    Smoking status: Former Smoker    Smokeless tobacco: Never Used      Comment: Quit 35 years ago    Alcohol use No    Drug use: No    Sexual activity: No     Other Topics Concern    Are You Pregnant Or Think You May Be? No    Breast-Feeding No     Social History Narrative    She is active and independent.      family history includes thyroid Cancer in her sister; Clotting disorder in her father; Eczema in her mother; Heart attack in her mother; Heart disease in her mother; Heart failure in her father and mother; Hypertension in her father and mother; No Known Problems in her brother, maternal aunt, maternal grandfather, maternal grandmother, maternal uncle, paternal aunt, paternal grandfather, paternal grandmother, and paternal uncle; Thyroid disease in her sister.       Gen: no distress  EYES: conjunctiva clear, non-icteric, PERRL  ENT: nose clear, nasal mucosa normal, oropharynx clear and moist, teeth good  NECK:supple,  thyroid non-palpable  RESP: effort is good, lungs clear  CV: heart RRR w/o murmur, gallops or rubs; bilateral carotid bruits, no edema  GI: abdomen soft, non-distended, non-tender, no hepatosplenomegaly  MS: gait normal, no clubbing or cyanosis of the digits  SKIN: no rashes, warm to touch    Kristin was seen today for annual exam.    Diagnoses and all orders for this visit:    Routine medical exam, declines mammogram and colonoscopies are not appropriate, she has blood work scheduled for September with cardiology                                    Additional evaluation and management issues:     additional evaluation and management issues: By review of her last bone density 2 years ago, she had osteopenia.  We discussed that even though she has advanced age, it is important to assess worsening osteopenia and treat accordingly to prevent and reduce risk of fracture. BMD 7/17 was worse- Osteoporosis of the femoral neck. Total hip BMD stable since 2014 and lumbar spine declined 4%. Had ulcer related to fosfamax apparently.we discussed other medication on a referral to endocrinology but she would like to keep her doctor visits to a minimum with the other specialists.  I will try to get prolia approved by her insurance and we can administer it appears the clinic.    When last seen her blood pressure was low and we actually held the HCTZ.  Since that time her blood pressures up and about elevated and cardiology has increased her HCTZ from 12.5 up to 25 mg and increase her labetalol to a pill and a half twice a day.  She's been on this new regimen for about 1 month.  I reviewed readings from home and in the left arm is typically 153/58 and in the right arm somewhat higher at 160/59 with a pulse of 56.  Initially her blood pressure was 112/50 but on my repeat is 140/40. She does have mild aortic insufficiency by review of the last echo.  We discussed monitoring blood pressure for another month until follow-up with  cardiology and we can decide at that point the full effects of the HCTZ and so forth.  She may need additional medications.  We want to avoid orthostasis obviously.     She has hypothyroidism which was euthyroid 1/18.  We reviewed her recent labs from a few months ago ..  Atherosclerosis of the aorta is noted in the system.  Patient does have carotid bruits.      She follows w her cardiologist related to the carotid ultrasound which showed minimal disease prior. Notes recorded by Lenka Triana MD on 4/23/2018 at 2:43 PM CDT  Ms. Quintanilla, Please review results of your carotid duplex.  It showed mild bilateral blockages.  You are on appropriate medical therapy. We will repeat it in 6-12 months.         She awakes with numbness in the right hand.  She's had carpal tunnel syndrome and injections in the past.  She would like to defer surgery.  We discussed using a brace and assessing if that works and otherwise we can refer to orthopedics. She is agreeable to seeing orthopedics and she is having numbness in the hands and dropping things and has some obvious atrophy of the hand also is.  We discussed that this could worsen quality-of-life is to carpal tunnel syndrome worsens and may be open to a cortisone injection     She has neuropathy in the feet which is slightly progressive over the years.  She does have spinal stenosis and has had injections in her back.  The numbness is not bad enough to warrant any therapy ..  She does have a constant low back pain with standing.  I encouraged her to use a cane and a rolling walker her grocery cart when she goes to the store. she did receive a Pneumovax in the past and is likely up-to-date.  All these issues reviewed and patient counseled and her evaluation and management of all the separate issues we based upon time counseling.Total time over 25 minutes with over 50% counseling.            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, monitoring blood pressure, will need repeat kidney function on higher diuretic and so forth    Hypothyroidism (acquired)euthyroid    Lumbar arthropathy chronic and stable    Atherosclerosis of aorta    Polyneuropathy in other diseases classified elsewhere, chronic and stable    Chronic diastolic heart failure, blood pressure control    Carotid disease, bilateral followed by cardiology    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  -     denosumab (PROLIA) 60 mg/mL Syrg; Inject 1 mL (60 mg total) into the skin every 6 (six) months.         clinical note will be sensitive since patient herself it is not the one with direct access.Also need to document late arrival for future purposes"//"This note will not be shared with the patient."  "

## 2018-07-13 NOTE — TELEPHONE ENCOUNTER
DOCUMENTATION ONLY  FYI  Prolia does not require a prior authorization through the patient's insurance.    Copay: $399.00    Patient Assistance IS required and is being researched.    -ARR

## 2018-07-19 DIAGNOSIS — L30.8 ASTEATOTIC ECZEMA: ICD-10-CM

## 2018-07-19 RX ORDER — TRIAMCINOLONE ACETONIDE 1 MG/G
CREAM TOPICAL
Qty: 453.6 G | Refills: 1 | Status: SHIPPED | OUTPATIENT
Start: 2018-07-19

## 2018-07-31 ENCOUNTER — OFFICE VISIT (OUTPATIENT)
Dept: FAMILY MEDICINE | Facility: CLINIC | Age: 83
End: 2018-07-31
Payer: MEDICARE

## 2018-07-31 ENCOUNTER — HOSPITAL ENCOUNTER (OUTPATIENT)
Dept: RADIOLOGY | Facility: HOSPITAL | Age: 83
Discharge: HOME | End: 2018-07-31
Attending: NURSE PRACTITIONER
Payer: MEDICARE

## 2018-07-31 ENCOUNTER — TELEPHONE (OUTPATIENT)
Dept: FAMILY MEDICINE | Facility: CLINIC | Age: 83
End: 2018-07-31

## 2018-07-31 VITALS
HEART RATE: 67 BPM | DIASTOLIC BLOOD PRESSURE: 58 MMHG | SYSTOLIC BLOOD PRESSURE: 160 MMHG | OXYGEN SATURATION: 96 % | BODY MASS INDEX: 20.21 KG/M2 | HEIGHT: 64 IN | TEMPERATURE: 98 F | WEIGHT: 118.38 LBS

## 2018-07-31 DIAGNOSIS — J20.9 ACUTE BRONCHITIS, UNSPECIFIED ORGANISM: Primary | ICD-10-CM

## 2018-07-31 DIAGNOSIS — J20.9 ACUTE BRONCHITIS, UNSPECIFIED ORGANISM: ICD-10-CM

## 2018-07-31 DIAGNOSIS — R05.9 COUGH: ICD-10-CM

## 2018-07-31 PROCEDURE — 99999 PR PBB SHADOW E&M-EST. PATIENT-LVL V: CPT | Mod: PBBFAC,,, | Performed by: NURSE PRACTITIONER

## 2018-07-31 PROCEDURE — 99214 OFFICE O/P EST MOD 30 MIN: CPT | Mod: S$GLB,,, | Performed by: NURSE PRACTITIONER

## 2018-07-31 PROCEDURE — 71046 X-RAY EXAM CHEST 2 VIEWS: CPT | Mod: TC,FY,PO

## 2018-07-31 PROCEDURE — 71046 X-RAY EXAM CHEST 2 VIEWS: CPT | Mod: 26,,, | Performed by: RADIOLOGY

## 2018-07-31 RX ORDER — ALBUTEROL SULFATE 90 UG/1
2 AEROSOL, METERED RESPIRATORY (INHALATION) EVERY 6 HOURS PRN
Qty: 6 G | Refills: 0 | Status: SHIPPED | OUTPATIENT
Start: 2018-07-31 | End: 2019-01-05 | Stop reason: SDUPTHER

## 2018-07-31 NOTE — PROGRESS NOTES
This dictation has been generated using Dragon Dictation some phonetic errors may occur.     Problem List Items Addressed This Visit     None      Visit Diagnoses     Acute bronchitis, unspecified organism    -  Primary    Relevant Medications    albuterol 90 mcg/actuation inhaler    Other Relevant Orders    X-Ray Chest PA And Lateral (Completed)    Cough        Relevant Orders    X-Ray Chest PA And Lateral (Completed)          Orders Placed This Encounter    X-Ray Chest PA And Lateral    albuterol 90 mcg/actuation inhaler     Acute bronchitis check chest x-ray as above and rule out pneumonia.  I reviewed the x-ray images no evidence of consolidation.  Albuterol as above for wheezing.     Follow-up if symptoms worsen or fail to improve.    ________________________________________________________________  ________________________________________________________________      Chief Complaint   Patient presents with    Cough     chest congestion     History of present illness  This 88 y.o. presents today for complaint of cough and chest congestion.  Symptoms have been present for 2 weeks.  Occasionally coughs up green and red sputum.  She feels a wheeze in her upper chest.  Her son says it sounds more like a rattle.  Cough had been improving but recently worsened.  She denies coughing at night keeping her awake.  Patient indicates that she took an expectorant over-the-counter without improvement.  Blood pressure variations noted at home and at visits.  Recent med adjustment by physician noted. Currently taking meds as directed.  Continue to monitor blood pressure at home.  Review of systems  No fever or chills  Patient notes runny nose.  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   "    Arthritis     ASCVD (arteriosclerotic cardiovascular disease) 7/8/2014    Atherosclerosis of aorta 1/7/2016    "There is atherosclerosis of the thoracic aorta" - Xray Chest 7-     Back pain     Basal cell carcinoma 10/7/2013    Right nasal columellar, right lower eyelid, left medial eyelid    Basal cell carcinoma 2/2015    mid back    Chronic diastolic heart failure 11/11/2014    Coronary artery disease     Gastric ulcer     Fosamax related.     Heart murmur     Hyperlipidemia     Hypertension     Hypothyroidism (acquired) 9/27/2016    Joint pain     Lymphedema of Neck 3/17/2016    Occlusion and stenosis of carotid artery without mention of cerebral infarction     Pelvic mass in female 7/21/2015    Polyneuropathy in other diseases classified elsewhere 1/7/2016 6-     Squamous cell carcinoma of scalp 5/2014    scalp vertex with + LN met 10/14 s/p radiation    Ulcer        Past Surgical History:   Procedure Laterality Date    APPENDECTOMY  8.14.2015    mucinous cystadenoma with low grade dysplasia.     CATARACT EXTRACTION      ou dr morales    EYE SURGERY      HYSTERECTOMY  1970     NIC/BSO. took HRT immediatiely after wards.     LYMPH NODE BIOPSY Right 10/27/2014    posterior triangle    Mohs excision of scalp SCC N/A 5/13/2014    Scalp flap N/A 5/14/204    posterior advancement-rotation    SKIN CANCER EXCISION      THYROIDECTOMY  1960's    hyperthyroidism       Family History   Problem Relation Age of Onset    Heart attack Mother     Heart disease Mother     Hypertension Mother     Eczema Mother     Heart failure Mother     Clotting disorder Father     Hypertension Father     Heart failure Father     Thyroid disease Sister     Cancer Sister         thyroid CA     No Known Problems Brother     No Known Problems Maternal Aunt     No Known Problems Maternal Uncle     No Known Problems Paternal Aunt     No Known Problems Paternal Uncle     No Known " Problems Maternal Grandmother     No Known Problems Maternal Grandfather     No Known Problems Paternal Grandmother     No Known Problems Paternal Grandfather     Amblyopia Neg Hx     Blindness Neg Hx     Cataracts Neg Hx     Glaucoma Neg Hx     Macular degeneration Neg Hx     Retinal detachment Neg Hx     Strabismus Neg Hx     Stroke Neg Hx     Melanoma Neg Hx     Psoriasis Neg Hx     Lupus Neg Hx     Acne Neg Hx     Ovarian cancer Neg Hx     Uterine cancer Neg Hx     Breast cancer Neg Hx     Colon cancer Neg Hx     Diabetes Neg Hx     Hyperlipidemia Neg Hx        Social History     Social History    Marital status:      Spouse name: N/A    Number of children: N/A    Years of education: N/A     Occupational History    Homemaker      Social History Main Topics    Smoking status: Former Smoker    Smokeless tobacco: Never Used      Comment: Quit 35 years ago    Alcohol use No    Drug use: No    Sexual activity: No     Other Topics Concern    Are You Pregnant Or Think You May Be? No    Breast-Feeding No     Social History Narrative    She is active and independent.        Current Outpatient Prescriptions   Medication Sig Dispense Refill    acetaminophen (TYLENOL ARTHRITIS) 650 MG TbSR Take 650 mg by mouth once daily. 1-2 tablet once a day.      albuterol 90 mcg/actuation inhaler Inhale 2 puffs into the lungs every 6 (six) hours as needed for Wheezing. Rescue 6 g 0    amLODIPine (NORVASC) 10 MG tablet TAKE 1 TABLET EVERY DAY 90 tablet 3    aspirin (ECOTRIN) 81 MG EC tablet Take 81 mg by mouth every other day. Pt taking one pill every other day.      atorvastatin (LIPITOR) 20 MG tablet TAKE 1 TABLET EVERY DAY 90 tablet 3    denosumab (PROLIA) 60 mg/mL Syrg Inject 1 mL (60 mg total) into the skin every 6 (six) months. 2 mL 12    FLAXSEED-OMEGA3,6,9-FATTY ACID ORAL Take 1 capsule by mouth once daily.        gabapentin (NEURONTIN) 100 MG capsule Take 2 capsule (200 mg total)  daily 180 capsule 3    hydroCHLOROthiazide (HYDRODIURIL) 12.5 MG Tab TAKE 1 TABLET ONE TIME DAILY (Patient taking differently: Pt to take 2 pills once a day per Dr. Triana on 4/23/18) 90 tablet 3    labetalol (NORMODYNE) 200 MG tablet TAKE 1 TABLET TWICE DAILY (Patient taking differently: TAKE 1 1/2 TABLET TWICE DAILY) 180 tablet 3    levothyroxine (SYNTHROID) 75 MCG tablet TAKE 1 TABLET ONE TIME DAILY 90 tablet 3    losartan (COZAAR) 100 MG tablet TAKE 1 TABLET EVERY DAY 90 tablet 1    magnesium 250 mg Tab Take 1 tablet by mouth once daily.        multivitamin capsule Take 1 capsule by mouth once daily.      omeprazole (PRILOSEC) 20 MG capsule TAKE 1 CAPSULE ONE TIME DAILY 90 capsule 3    triamcinolone acetonide 0.1% (KENALOG) 0.1 % cream APPLY TO THE AFFECTED AREA OF lower legs TWICE DAILY 453.6 g 1    vitamin D 1000 units Tab Take 1,000 Units by mouth once daily.        No current facility-administered medications for this visit.        Review of patient's allergies indicates:   Allergen Reactions    Codeine     Darvocet a500 [propoxyphene n-acetaminophen] Nausea Only    Sulfa (sulfonamide antibiotics) Rash    Fosamax [alendronate]      Gastric ulcer       Physical examination  Vitals Reviewed  Gen. Well-dressed well-nourished   Skin warm dry and intact.  No rashes noted.  HEENT.  TM intact bilateral with normal light reflex.  No mastoid tenderness during percussion.  Nares patent bilateral.  Pharynx is unremarkable.  No maxillary or frontal sinus tenderness when percussed.    Neck is supple without adenopathy  Chest.  Respirations are even unlabored.  Rhonchi wheeze noted right base.  Cardiac regular rate and rhythm.  No chest wall adenopathy noted.  Neuro. Awake alert oriented x4.  Normal judgment and cognition noted.  Extremities no clubbing cyanosis or edema noted.     Call or return to clinic prn if these symptoms worsen or fail to improve as anticipated.

## 2018-08-16 ENCOUNTER — PATIENT MESSAGE (OUTPATIENT)
Dept: FAMILY MEDICINE | Facility: CLINIC | Age: 83
End: 2018-08-16

## 2018-09-04 ENCOUNTER — LAB VISIT (OUTPATIENT)
Dept: LAB | Facility: HOSPITAL | Age: 83
End: 2018-09-04
Attending: INTERNAL MEDICINE
Payer: MEDICARE

## 2018-09-04 DIAGNOSIS — I13.0 HYPERTENSIVE HEART AND KIDNEY DISEASE WITH HF AND WITH CKD STAGE I: ICD-10-CM

## 2018-09-04 DIAGNOSIS — E03.9 HYPOTHYROIDISM (ACQUIRED): ICD-10-CM

## 2018-09-04 DIAGNOSIS — I31.39 PERICARDIAL EFFUSION: Chronic | ICD-10-CM

## 2018-09-04 DIAGNOSIS — E78.2 MIXED HYPERLIPIDEMIA: Chronic | ICD-10-CM

## 2018-09-04 DIAGNOSIS — I65.23 BILATERAL CAROTID ARTERY STENOSIS: Chronic | ICD-10-CM

## 2018-09-04 DIAGNOSIS — I50.32 CHRONIC DIASTOLIC HEART FAILURE: ICD-10-CM

## 2018-09-04 DIAGNOSIS — N18.1 HYPERTENSIVE HEART AND KIDNEY DISEASE WITH HF AND WITH CKD STAGE I: ICD-10-CM

## 2018-09-04 DIAGNOSIS — I25.10 ASCVD (ARTERIOSCLEROTIC CARDIOVASCULAR DISEASE): Chronic | ICD-10-CM

## 2018-09-04 DIAGNOSIS — I70.0 ATHEROSCLEROSIS OF AORTA: Chronic | ICD-10-CM

## 2018-09-04 DIAGNOSIS — I35.1 NONRHEUMATIC AORTIC VALVE INSUFFICIENCY: Chronic | ICD-10-CM

## 2018-09-04 LAB
ALBUMIN SERPL BCP-MCNC: 3.5 G/DL
ALP SERPL-CCNC: 53 U/L
ALT SERPL W/O P-5'-P-CCNC: 22 U/L
ANION GAP SERPL CALC-SCNC: 6 MMOL/L
AST SERPL-CCNC: 27 U/L
BILIRUB SERPL-MCNC: 0.6 MG/DL
BUN SERPL-MCNC: 36 MG/DL
CALCIUM SERPL-MCNC: 10.3 MG/DL
CHLORIDE SERPL-SCNC: 103 MMOL/L
CHOLEST SERPL-MCNC: 150 MG/DL
CHOLEST/HDLC SERPL: 2.5 {RATIO}
CO2 SERPL-SCNC: 28 MMOL/L
CREAT SERPL-MCNC: 1 MG/DL
EST. GFR  (AFRICAN AMERICAN): 58.1 ML/MIN/1.73 M^2
EST. GFR  (NON AFRICAN AMERICAN): 50.4 ML/MIN/1.73 M^2
GLUCOSE SERPL-MCNC: 98 MG/DL
HDLC SERPL-MCNC: 61 MG/DL
HDLC SERPL: 40.7 %
LDLC SERPL CALC-MCNC: 77 MG/DL
NONHDLC SERPL-MCNC: 89 MG/DL
POTASSIUM SERPL-SCNC: 4 MMOL/L
PROT SERPL-MCNC: 5.9 G/DL
SODIUM SERPL-SCNC: 137 MMOL/L
TRIGL SERPL-MCNC: 60 MG/DL
TSH SERPL DL<=0.005 MIU/L-ACNC: 0.94 UIU/ML

## 2018-09-04 PROCEDURE — 36415 COLL VENOUS BLD VENIPUNCTURE: CPT | Mod: PO

## 2018-09-04 PROCEDURE — 80053 COMPREHEN METABOLIC PANEL: CPT

## 2018-09-04 PROCEDURE — 80061 LIPID PANEL: CPT

## 2018-09-04 PROCEDURE — 84443 ASSAY THYROID STIM HORMONE: CPT

## 2018-09-05 ENCOUNTER — TELEPHONE (OUTPATIENT)
Dept: CARDIOLOGY | Facility: CLINIC | Age: 83
End: 2018-09-05

## 2018-09-05 NOTE — TELEPHONE ENCOUNTER
----- Message from Lenka Triana MD sent at 9/5/2018  8:14 AM CDT -----  Please review.  We will discuss the results during your upcoming visit with me. It looks good.

## 2018-09-10 ENCOUNTER — OFFICE VISIT (OUTPATIENT)
Dept: CARDIOLOGY | Facility: CLINIC | Age: 83
End: 2018-09-10
Payer: MEDICARE

## 2018-09-10 VITALS
SYSTOLIC BLOOD PRESSURE: 158 MMHG | BODY MASS INDEX: 20.58 KG/M2 | HEART RATE: 62 BPM | WEIGHT: 120.56 LBS | DIASTOLIC BLOOD PRESSURE: 40 MMHG | HEIGHT: 64 IN

## 2018-09-10 DIAGNOSIS — I70.0 ATHEROSCLEROSIS OF AORTA: Chronic | ICD-10-CM

## 2018-09-10 DIAGNOSIS — I65.23 BILATERAL CAROTID ARTERY STENOSIS: Chronic | ICD-10-CM

## 2018-09-10 DIAGNOSIS — E03.9 HYPOTHYROIDISM (ACQUIRED): ICD-10-CM

## 2018-09-10 DIAGNOSIS — I50.32 CHRONIC DIASTOLIC HEART FAILURE: ICD-10-CM

## 2018-09-10 DIAGNOSIS — I25.10 ASCVD (ARTERIOSCLEROTIC CARDIOVASCULAR DISEASE): Primary | Chronic | ICD-10-CM

## 2018-09-10 DIAGNOSIS — I31.39 PERICARDIAL EFFUSION: Chronic | ICD-10-CM

## 2018-09-10 DIAGNOSIS — I35.1 NONRHEUMATIC AORTIC VALVE INSUFFICIENCY: Chronic | ICD-10-CM

## 2018-09-10 DIAGNOSIS — E78.2 MIXED HYPERLIPIDEMIA: Chronic | ICD-10-CM

## 2018-09-10 DIAGNOSIS — G63 POLYNEUROPATHY IN OTHER DISEASES CLASSIFIED ELSEWHERE: ICD-10-CM

## 2018-09-10 PROCEDURE — 99213 OFFICE O/P EST LOW 20 MIN: CPT | Mod: PBBFAC,PO | Performed by: INTERNAL MEDICINE

## 2018-09-10 PROCEDURE — 99214 OFFICE O/P EST MOD 30 MIN: CPT | Mod: S$PBB,,, | Performed by: INTERNAL MEDICINE

## 2018-09-10 PROCEDURE — 99999 PR PBB SHADOW E&M-EST. PATIENT-LVL III: CPT | Mod: PBBFAC,,, | Performed by: INTERNAL MEDICINE

## 2018-09-10 PROCEDURE — 1101F PT FALLS ASSESS-DOCD LE1/YR: CPT | Mod: CPTII,,, | Performed by: INTERNAL MEDICINE

## 2018-09-10 NOTE — PROGRESS NOTES
"Subjective:   Patient ID:  Kristin Quintanilla is a 88 y.o. female who presents for follow-up of Hypertension      HPI: Patient is concerned about BP swings.  Today it is slightly elevated, but patient did not take noon Amlodipine yet.  She is otherwise doing well.    Lab Results   Component Value Date     09/04/2018    K 4.0 09/04/2018     09/04/2018    CO2 28 09/04/2018    BUN 36 (H) 09/04/2018    CREATININE 1.0 09/04/2018    GLU 98 09/04/2018    HGBA1C 6.2 07/16/2013    MG 2.4 08/20/2009    AST 27 09/04/2018    ALT 22 09/04/2018    ALBUMIN 3.5 09/04/2018    PROT 5.9 (L) 09/04/2018    BILITOT 0.6 09/04/2018    WBC 6.56 03/20/2017    HGB 10.9 (L) 03/20/2017    HCT 34.1 (L) 03/20/2017    MCV 93 03/20/2017     03/20/2017    INR 1.0 04/25/2011    TSH 0.939 09/04/2018    CHOL 150 09/04/2018    HDL 61 09/04/2018    LDLCALC 77.0 09/04/2018    TRIG 60 09/04/2018       Review of Systems   Constitution: Positive for weakness and malaise/fatigue.   HENT: Negative.    Eyes: Negative.    Cardiovascular: Positive for dyspnea on exertion. Negative for chest pain, claudication, irregular heartbeat, leg swelling, near-syncope, palpitations and syncope.   Respiratory: Negative.  Negative for cough, shortness of breath, snoring and wheezing.    Endocrine: Negative.  Negative for cold intolerance, heat intolerance, polydipsia, polyphagia and polyuria.   Skin: Negative.    Musculoskeletal: Negative.    Gastrointestinal: Negative.    Genitourinary: Negative.    Neurological: Positive for dizziness, light-headedness, loss of balance, numbness and paresthesias.   Psychiatric/Behavioral: Negative.        Objective:   Physical Exam   Constitutional: She is oriented to person, place, and time. She appears well-developed and well-nourished.   BP (!) 158/40   Pulse 62   Ht 5' 4" (1.626 m)   Wt 54.7 kg (120 lb 9.5 oz)   LMP  (LMP Unknown)   BMI 20.70 kg/m²      HENT:   Head: Normocephalic.   Eyes: Pupils are equal, round, " and reactive to light.   Neck: Normal range of motion. Neck supple. Carotid bruit is not present. No thyromegaly present.   Cardiovascular: Normal rate, regular rhythm and intact distal pulses. Exam reveals no gallop and no friction rub.   Murmur heard.  High-pitched decrescendo early diastolic murmur is present with a grade of 1/6 at the upper right sternal border.  Pulses:       Carotid pulses are 2+ on the right side, and 2+ on the left side.       Radial pulses are 2+ on the right side, and 2+ on the left side.        Femoral pulses are 2+ on the right side, and 2+ on the left side.       Popliteal pulses are 2+ on the right side, and 2+ on the left side.        Dorsalis pedis pulses are 2+ on the right side, and 2+ on the left side.        Posterior tibial pulses are 2+ on the right side, and 2+ on the left side.   Pulmonary/Chest: Effort normal and breath sounds normal. No respiratory distress. She has no wheezes. She has no rales. She exhibits no tenderness.   Abdominal: Soft. Bowel sounds are normal.   Musculoskeletal: Normal range of motion. She exhibits no edema.   Neurological: She is alert and oriented to person, place, and time.   Skin: Skin is warm and dry.   Psychiatric: She has a normal mood and affect.   Nursing note and vitals reviewed.        Assessment and Plan:     Problem List Items Addressed This Visit        Cardiology Problems    Hyperlipidemia (Chronic)    Aortic valve insufficiency (Chronic)    Pericardial effusion (Chronic)    Bilateral carotid artery stenosis (Chronic)    ASCVD (arteriosclerotic cardiovascular disease) - Primary (Chronic)    Atherosclerosis of aorta (Chronic)    Chronic diastolic heart failure       Other    Polyneuropathy in other diseases classified elsewhere    Hypothyroidism (acquired)             Medication List           Accurate as of 9/10/18  1:32 PM. If you have any questions, ask your nurse or doctor.               CHANGE how you take these medications     hydroCHLOROthiazide 12.5 MG Tab  Commonly known as:  HYDRODIURIL  TAKE 1 TABLET ONE TIME DAILY  What changed:  See the new instructions.     labetalol 200 MG tablet  Commonly known as:  NORMODYNE  TAKE 1 TABLET TWICE DAILY  What changed:  See the new instructions.        CONTINUE taking these medications    albuterol 90 mcg/actuation inhaler  Inhale 2 puffs into the lungs every 6 (six) hours as needed for Wheezing. Rescue     amLODIPine 10 MG tablet  Commonly known as:  NORVASC  TAKE 1 TABLET EVERY DAY     aspirin 81 MG EC tablet  Commonly known as:  ECOTRIN     atorvastatin 20 MG tablet  Commonly known as:  LIPITOR  TAKE 1 TABLET EVERY DAY     CALTRATE 600 + D ORAL     FLAXSEED-OMEGA3,6,9-FATTY ACID ORAL     levothyroxine 75 MCG tablet  Commonly known as:  SYNTHROID  TAKE 1 TABLET ONE TIME DAILY     losartan 100 MG tablet  Commonly known as:  COZAAR  TAKE 1 TABLET EVERY DAY     magnesium 250 mg Tab     triamcinolone acetonide 0.1% 0.1 % cream  Commonly known as:  KENALOG  APPLY TO THE AFFECTED AREA OF lower legs TWICE DAILY     TYLENOL ARTHRITIS 650 MG Tbsr  Generic drug:  acetaminophen     vitamin D 1000 units Tab        STOP taking these medications    denosumab 60 mg/mL Syrg  Commonly known as:  PROLIA  Stopped by:  Lenka Triana MD     gabapentin 100 MG capsule  Commonly known as:  NEURONTIN  Stopped by:  Lenka Triana MD     multivitamin capsule  Stopped by:  Lenka Triana MD     omeprazole 20 MG capsule  Commonly known as:  PRILOSEC  Stopped by:  Lenka Triana MD        Avoid salt,  Space medications and keep BP log.  Before next visit will check carotids    Follow-up in about 6 months (around 3/10/2019).

## 2018-10-03 RX ORDER — LOSARTAN POTASSIUM 100 MG/1
TABLET ORAL
Qty: 90 TABLET | Refills: 0 | Status: SHIPPED | OUTPATIENT
Start: 2018-10-03 | End: 2018-12-05 | Stop reason: SDUPTHER

## 2018-10-04 ENCOUNTER — CLINICAL SUPPORT (OUTPATIENT)
Dept: FAMILY MEDICINE | Facility: CLINIC | Age: 83
End: 2018-10-04
Payer: MEDICARE

## 2018-10-04 DIAGNOSIS — Z23 NEED FOR PROPHYLACTIC VACCINATION AND INOCULATION AGAINST INFLUENZA: Primary | ICD-10-CM

## 2018-10-04 PROCEDURE — 99499 UNLISTED E&M SERVICE: CPT | Mod: S$PBB,,, | Performed by: INTERNAL MEDICINE

## 2018-10-04 PROCEDURE — G0008 ADMIN INFLUENZA VIRUS VAC: HCPCS | Mod: PBBFAC

## 2018-10-04 PROCEDURE — 90662 IIV NO PRSV INCREASED AG IM: CPT | Mod: PBBFAC,PO

## 2018-10-05 ENCOUNTER — TELEPHONE (OUTPATIENT)
Dept: DERMATOLOGY | Facility: CLINIC | Age: 83
End: 2018-10-05

## 2018-10-05 NOTE — TELEPHONE ENCOUNTER
----- Message from Vandana Patterson sent at 10/5/2018 11:22 AM CDT -----  Contact: Pt  Patient Requesting Sooner Appointment.     Reason for sooner appt.: N/A   When is the first available appointment? January   Communication Preference: [phone]  Additional Information: Pt saw Rick in the past, coming in for 6 month f/u.

## 2018-11-08 ENCOUNTER — TELEPHONE (OUTPATIENT)
Dept: CARDIOLOGY | Facility: CLINIC | Age: 83
End: 2018-11-08

## 2018-11-08 DIAGNOSIS — I70.0 ATHEROSCLEROSIS OF AORTA: ICD-10-CM

## 2018-11-08 DIAGNOSIS — I50.42 CHRONIC COMBINED SYSTOLIC AND DIASTOLIC HEART FAILURE: ICD-10-CM

## 2018-11-08 DIAGNOSIS — I25.10 ASCVD (ARTERIOSCLEROTIC CARDIOVASCULAR DISEASE): Primary | ICD-10-CM

## 2018-11-08 DIAGNOSIS — I65.23 BILATERAL CAROTID ARTERY STENOSIS: ICD-10-CM

## 2018-11-08 RX ORDER — HYDROCHLOROTHIAZIDE 25 MG/1
25 TABLET ORAL DAILY
COMMUNITY
End: 2018-11-08 | Stop reason: SDUPTHER

## 2018-11-08 NOTE — TELEPHONE ENCOUNTER
Pt called asking what the dosage of her HCTZ should be. I advised pt that Dr. Triana advised her 4/2018 to increase her HCTZ 12.5mg to 25mg daily. Pt stated that she has been taking 2, HCTZ 12.5mg pills once a day.

## 2018-11-08 NOTE — TELEPHONE ENCOUNTER
----- Message from Christi Mccloud sent at 11/8/2018  2:38 PM CST -----  Contact: Pt called   Pt has a question regarding medication hydroCHLOROthiazide (HYDRODIURIL) 12.5 MG Tab. Please call pt @ [phone]. Thank you.

## 2018-11-09 RX ORDER — HYDROCHLOROTHIAZIDE 25 MG/1
25 TABLET ORAL DAILY
Qty: 90 TABLET | Refills: 3 | Status: ON HOLD | OUTPATIENT
Start: 2018-11-09 | End: 2019-01-14 | Stop reason: HOSPADM

## 2018-11-26 ENCOUNTER — OFFICE VISIT (OUTPATIENT)
Dept: DERMATOLOGY | Facility: CLINIC | Age: 83
End: 2018-11-26
Payer: MEDICARE

## 2018-11-26 DIAGNOSIS — L73.8 SEBACEOUS GLAND HYPERPLASIA: ICD-10-CM

## 2018-11-26 DIAGNOSIS — D48.5 NEOPLASM OF UNCERTAIN BEHAVIOR OF SKIN: Primary | ICD-10-CM

## 2018-11-26 DIAGNOSIS — D18.00 ANGIOMA: ICD-10-CM

## 2018-11-26 DIAGNOSIS — L82.1 SK (SEBORRHEIC KERATOSIS): ICD-10-CM

## 2018-11-26 DIAGNOSIS — Z85.828 PERSONAL HISTORY OF OTHER MALIGNANT NEOPLASM OF SKIN: ICD-10-CM

## 2018-11-26 PROCEDURE — 1101F PT FALLS ASSESS-DOCD LE1/YR: CPT | Mod: CPTII,S$GLB,, | Performed by: DERMATOLOGY

## 2018-11-26 PROCEDURE — 88305 TISSUE EXAM BY PATHOLOGIST: CPT | Mod: 26,,, | Performed by: PATHOLOGY

## 2018-11-26 PROCEDURE — 99213 OFFICE O/P EST LOW 20 MIN: CPT | Mod: 25,S$GLB,, | Performed by: DERMATOLOGY

## 2018-11-26 PROCEDURE — 99999 PR PBB SHADOW E&M-EST. PATIENT-LVL II: CPT | Mod: PBBFAC,,, | Performed by: DERMATOLOGY

## 2018-11-26 PROCEDURE — 11100 PR BIOPSY OF SKIN LESION: CPT | Mod: S$GLB,,, | Performed by: DERMATOLOGY

## 2018-11-26 PROCEDURE — 88305 TISSUE EXAM BY PATHOLOGIST: CPT

## 2018-11-26 NOTE — PROGRESS NOTES
Subjective:       Patient ID:  Kristin Quintanilla is a 88 y.o. female who presents for   Chief Complaint   Patient presents with    Skin Check     UBSE     History of Present Illness: The patient presents for follow up of skin check.    The patient was last seen on: 11/2017.   This is a high risk patient here to check for the development of new lesions.  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     Patient complains of lesion(s)  Location: left arm wound   Duration: 4 days ago  Symptoms: bruising  Relieving factors/Previous treatments: none              Review of Systems   Constitutional: Negative for fever, chills, weight loss, fatigue and night sweats.   Skin: Positive for daily sunscreen use and activity-related sunscreen use. Negative for recent sunburn.   Hematologic/Lymphatic: Negative for adenopathy. Bruises/bleeds easily.        Not on blood thinner        Objective:    Physical Exam   Constitutional: She appears well-developed and well-nourished. No distress.   Neurological: She is alert and oriented to person, place, and time. She is not disoriented.   Psychiatric: She has a normal mood and affect.   Skin:   Areas Examined (abnormalities noted in diagram):   Scalp / Hair Palpated and Inspected  Head / Face Inspection Performed  Neck Inspection Performed  Chest / Axilla Inspection Performed  Back Inspection Performed  RUE Inspected  LUE Inspection Performed                   Diagram Legend     Erythematous scaling macule/papule c/w actinic keratosis       Vascular papule c/w angioma      Pigmented verrucoid papule/plaque c/w seborrheic keratosis      Yellow umbilicated papule c/w sebaceous hyperplasia      Irregularly shaped tan macule c/w lentigo     1-2 mm smooth white papules consistent with Milia      Movable subcutaneous cyst with punctum c/w epidermal inclusion cyst      Subcutaneous movable cyst c/w pilar cyst      Firm pink to brown papule c/w  dermatofibroma      Pedunculated fleshy papule(s) c/w skin tag(s)      Evenly pigmented macule c/w junctional nevus     Mildly variegated pigmented, slightly irregular-bordered macule c/w mildly atypical nevus      Flesh colored to evenly pigmented papule c/w intradermal nevus       Pink pearly papule/plaque c/w basal cell carcinoma      Erythematous hyperkeratotic cursted plaque c/w SCC      Surgical scar with no sign of skin cancer recurrence      Open and closed comedones      Inflammatory papules and pustules      Verrucoid papule consistent consistent with wart     Erythematous eczematous patches and plaques     Dystrophic onycholytic nail with subungual debris c/w onychomycosis     Umbilicated papule    Erythematous-base heme-crusted tan verrucoid plaque consistent with inflamed seborrheic keratosis     Erythematous Silvery Scaling Plaque c/w Psoriasis     See annotation          Assessment / Plan:      Pathology Orders:     Normal Orders This Visit    Tissue Specimen To Pathology, Dermatology     Questions:    Directional Terms:  Other(comment)    Clinical information:  r/o isk vs scc    Specific Site:  left lower jaw        Neoplasm of uncertain behavior of skin  -     Tissue Specimen To Pathology, Dermatology  Shave biopsy procedure note:    Shave biopsy performed after verbal consent including risk of infection, scar, recurrence, need for additional treatment of site. Area prepped with alcohol, anesthetized with approximately 1.0cc of 1% lidocaine with epinephrine. Lesional tissue shaved with razor blade. Hemostasis achieved with application of aluminum chloride followed by hyfrecation. No complications. Dressing applied. Wound care explained.        SK (seborrheic keratosis)  These are benign inherited growths without a malignant potential. Reassurance given to patient. No treatment is necessary.     Angioma   - stable and chronic    Sebaceous gland hyperplasia  This is a common condition representing  benign enlargement of the sebaceous lobule. It typically occurs in adulthood. Reassurance given to patient.     Personal history of other malignant neoplasm of skin    Area(s) of previous NMSC evaluated with no signs of recurrence.    Upper body skin examination performed today including at least 6 points as noted in physical examination. Suspicious lesions noted.      Rec cerave cream for legs         Follow-up in about 6 months (around 5/26/2019).

## 2018-11-26 NOTE — PATIENT INSTRUCTIONS

## 2018-12-05 RX ORDER — LOSARTAN POTASSIUM 100 MG/1
TABLET ORAL
Qty: 90 TABLET | Refills: 0 | Status: SHIPPED | OUTPATIENT
Start: 2018-12-05 | End: 2019-02-06 | Stop reason: SDUPTHER

## 2018-12-29 ENCOUNTER — OFFICE VISIT (OUTPATIENT)
Dept: URGENT CARE | Facility: CLINIC | Age: 83
End: 2018-12-29
Payer: MEDICARE

## 2018-12-29 VITALS
OXYGEN SATURATION: 97 % | DIASTOLIC BLOOD PRESSURE: 40 MMHG | HEART RATE: 63 BPM | BODY MASS INDEX: 20.49 KG/M2 | RESPIRATION RATE: 20 BRPM | HEIGHT: 64 IN | WEIGHT: 120 LBS | TEMPERATURE: 99 F | SYSTOLIC BLOOD PRESSURE: 133 MMHG

## 2018-12-29 DIAGNOSIS — J06.9 UPPER RESPIRATORY TRACT INFECTION, UNSPECIFIED TYPE: Primary | ICD-10-CM

## 2018-12-29 PROCEDURE — 99214 OFFICE O/P EST MOD 30 MIN: CPT | Mod: 25,S$GLB,, | Performed by: FAMILY MEDICINE

## 2018-12-29 PROCEDURE — 96372 THER/PROPH/DIAG INJ SC/IM: CPT | Mod: S$GLB,,, | Performed by: FAMILY MEDICINE

## 2018-12-29 PROCEDURE — 1101F PT FALLS ASSESS-DOCD LE1/YR: CPT | Mod: CPTII,S$GLB,, | Performed by: FAMILY MEDICINE

## 2018-12-29 RX ORDER — BETAMETHASONE SODIUM PHOSPHATE AND BETAMETHASONE ACETATE 3; 3 MG/ML; MG/ML
6 INJECTION, SUSPENSION INTRA-ARTICULAR; INTRALESIONAL; INTRAMUSCULAR; SOFT TISSUE
Status: COMPLETED | OUTPATIENT
Start: 2018-12-29 | End: 2018-12-29

## 2018-12-29 RX ADMIN — BETAMETHASONE SODIUM PHOSPHATE AND BETAMETHASONE ACETATE 6 MG: 3; 3 INJECTION, SUSPENSION INTRA-ARTICULAR; INTRALESIONAL; INTRAMUSCULAR; SOFT TISSUE at 02:12

## 2018-12-29 NOTE — PROGRESS NOTES
"Subjective:       Patient ID: Kristin Quintanilla is a 88 y.o. female.    Vitals:  height is 5' 4" (1.626 m) and weight is 54.4 kg (120 lb). Her temperature is 98.7 °F (37.1 °C). Her blood pressure is 133/40 (abnormal) and her pulse is 63. Her respiration is 20 and oxygen saturation is 97%.     Chief Complaint: Sinus Problem    Pt has been having a horase voice but no sore throat , running nose and congestion for 3 days now. She has been taking otc chlor tabs that has helped.      Sinus Problem   This is a new problem. Episode onset: 3 days ago. The problem is unchanged. There has been no fever. She is experiencing no pain. Associated symptoms include congestion and a hoarse voice. Pertinent negatives include no chills, coughing, diaphoresis, ear pain, shortness of breath or sinus pressure.       Constitution: Negative for chills, sweating, fatigue and fever.   HENT: Positive for congestion. Negative for ear pain, sinus pain, sinus pressure and voice change.    Neck: Negative for painful lymph nodes.   Eyes: Negative for eye redness.   Respiratory: Negative for chest tightness, cough, sputum production, bloody sputum, COPD, shortness of breath, stridor, wheezing and asthma.    Gastrointestinal: Negative for nausea and vomiting.   Musculoskeletal: Negative for muscle ache.   Skin: Negative for rash.   Allergic/Immunologic: Negative for seasonal allergies and asthma.   Hematologic/Lymphatic: Negative for swollen lymph nodes.       Objective:      Physical Exam   Constitutional: She is oriented to person, place, and time. She appears well-developed and well-nourished. She is cooperative.  Non-toxic appearance. She does not appear ill. No distress.   HENT:   Head: Normocephalic and atraumatic.   Right Ear: Hearing, tympanic membrane, external ear and ear canal normal.   Left Ear: Hearing, tympanic membrane, external ear and ear canal normal.   Nose: Nose normal. No mucosal edema, rhinorrhea or nasal deformity. No " epistaxis. Right sinus exhibits no maxillary sinus tenderness and no frontal sinus tenderness. Left sinus exhibits no maxillary sinus tenderness and no frontal sinus tenderness.   Mouth/Throat: Uvula is midline and mucous membranes are normal. No trismus in the jaw. Normal dentition. No uvula swelling. Posterior oropharyngeal erythema present.   Eyes: Conjunctivae and lids are normal. No scleral icterus.   Sclera clear bilat   Neck: Trachea normal, full passive range of motion without pain and phonation normal. Neck supple.   Cardiovascular: Normal rate, regular rhythm, normal heart sounds, intact distal pulses and normal pulses.   Pulmonary/Chest: Effort normal and breath sounds normal. No respiratory distress.   Abdominal: Soft. Normal appearance and bowel sounds are normal. She exhibits no distension. There is no tenderness.   Musculoskeletal: Normal range of motion. She exhibits no edema or deformity.   Neurological: She is alert and oriented to person, place, and time. She exhibits normal muscle tone. Coordination normal.   Skin: Skin is warm, dry and intact. She is not diaphoretic. No pallor.   Psychiatric: She has a normal mood and affect. Her speech is normal and behavior is normal. Judgment and thought content normal. Cognition and memory are normal.   Nursing note and vitals reviewed.      Assessment:       1. Upper respiratory tract infection, unspecified type        Plan:         Upper respiratory tract infection, unspecified type    Other orders  -     betamethasone acetate-betamethasone sodium phosphate injection 6 mg    advised to continue with her albuterol inhaler as needed as well as otc cough medication as needed.

## 2018-12-29 NOTE — PATIENT INSTRUCTIONS
Viral Upper Respiratory Illness (Adult)  You have a viral upper respiratory illness (URI), which is another term for the common cold. This illness is contagious during the first few days. It is spread through the air by coughing and sneezing. It may also be spread by direct contact (touching the sick person and then touching your own eyes, nose, or mouth). Frequent handwashing will decrease risk of spread. Most viral illnesses go away within 7 to 10 days with rest and simple home remedies. Sometimes the illness may last for several weeks. Antibiotics will not kill a virus, and they are generally not prescribed for this condition.    Home care  · If symptoms are severe, rest at home for the first 2 to 3 days. When you resume activity, don't let yourself get too tired.  · Avoid being exposed to cigarette smoke (yours or others).  · You may use acetaminophen or ibuprofen to control pain and fever, unless another medicine was prescribed. (Note: If you have chronic liver or kidney disease, have ever had a stomach ulcer or gastrointestinal bleeding, or are taking blood-thinning medicines, talk with your healthcare provider before using these medicines.) Aspirin should never be given to anyone under 18 years of age who is ill with a viral infection or fever. It may cause severe liver or brain damage.  · Your appetite may be poor, so a light diet is fine. Avoid dehydration by drinking 6 to 8 glasses of fluids per day (water, soft drinks, juices, tea, or soup). Extra fluids will help loosen secretions in the nose and lungs.  · Over-the-counter cold medicines will not shorten the length of time youre sick, but they may be helpful for the following symptoms: cough, sore throat, and nasal and sinus congestion. (Note: Do not use decongestants if you have high blood pressure.)  Follow-up care  Follow up with your healthcare provider, or as advised.  When to seek medical advice  Call your healthcare provider right away if any  of these occur:  · Cough with lots of colored sputum (mucus)  · Severe headache; face, neck, or ear pain  · Difficulty swallowing due to throat pain  · Fever of 100.4°F (38°C)  Call 911, or get immediate medical care  Call emergency services right away if any of these occur:  · Chest pain, shortness of breath, wheezing, or difficulty breathing  · Coughing up blood  · Inability to swallow due to throat pain  Date Last Reviewed: 9/13/2015  © 3121-5117 [employer]. [address] [place] [id number]. All rights reserved. This information is not intended as a substitute for professional medical care. Always follow your healthcare professional's instructions.

## 2019-01-05 ENCOUNTER — OFFICE VISIT (OUTPATIENT)
Dept: URGENT CARE | Facility: CLINIC | Age: 84
End: 2019-01-05
Payer: MEDICARE

## 2019-01-05 VITALS
OXYGEN SATURATION: 96 % | TEMPERATURE: 97 F | SYSTOLIC BLOOD PRESSURE: 125 MMHG | WEIGHT: 120 LBS | BODY MASS INDEX: 20.49 KG/M2 | HEIGHT: 64 IN | DIASTOLIC BLOOD PRESSURE: 55 MMHG | HEART RATE: 59 BPM

## 2019-01-05 DIAGNOSIS — R53.1 WEAKNESS: Primary | ICD-10-CM

## 2019-01-05 DIAGNOSIS — J20.9 ACUTE BRONCHITIS, UNSPECIFIED ORGANISM: ICD-10-CM

## 2019-01-05 DIAGNOSIS — J40 BRONCHITIS: ICD-10-CM

## 2019-01-05 PROCEDURE — 1101F PR PT FALLS ASSESS DOC 0-1 FALLS W/OUT INJ PAST YR: ICD-10-PCS | Mod: CPTII,S$GLB,, | Performed by: NURSE PRACTITIONER

## 2019-01-05 PROCEDURE — 99214 OFFICE O/P EST MOD 30 MIN: CPT | Mod: S$GLB,,, | Performed by: NURSE PRACTITIONER

## 2019-01-05 PROCEDURE — 1101F PT FALLS ASSESS-DOCD LE1/YR: CPT | Mod: CPTII,S$GLB,, | Performed by: NURSE PRACTITIONER

## 2019-01-05 PROCEDURE — 99214 PR OFFICE/OUTPT VISIT, EST, LEVL IV, 30-39 MIN: ICD-10-PCS | Mod: S$GLB,,, | Performed by: NURSE PRACTITIONER

## 2019-01-05 RX ORDER — ALBUTEROL SULFATE 90 UG/1
2 AEROSOL, METERED RESPIRATORY (INHALATION) EVERY 6 HOURS PRN
Qty: 6 G | Refills: 0 | Status: SHIPPED | OUTPATIENT
Start: 2019-01-05 | End: 2019-01-30

## 2019-01-05 RX ORDER — GUAIFENESIN 100 MG/5ML
200 SOLUTION ORAL 3 TIMES DAILY PRN
COMMUNITY
End: 2019-01-30

## 2019-01-05 RX ORDER — LEVOFLOXACIN 250 MG/1
250 TABLET ORAL DAILY
Qty: 7 TABLET | Refills: 0 | Status: ON HOLD | OUTPATIENT
Start: 2019-01-05 | End: 2019-01-14 | Stop reason: HOSPADM

## 2019-01-05 NOTE — PATIENT INSTRUCTIONS
Please return here or go to the Emergency Department for any concerns or worsening of condition.  If you were prescribed antibiotics, please take them to completion.  If you were prescribed a narcotic medication, do not drive or operate heavy equipment or machinery while taking these medications.  Please follow up with your primary care doctor or specialist as needed.    If you  smoke, please stop smoking.

## 2019-01-07 ENCOUNTER — HOSPITAL ENCOUNTER (INPATIENT)
Facility: HOSPITAL | Age: 84
LOS: 7 days | Discharge: HOME HEALTH | DRG: 292 | End: 2019-01-14
Attending: EMERGENCY MEDICINE | Admitting: INTERNAL MEDICINE
Payer: MEDICARE

## 2019-01-07 ENCOUNTER — OFFICE VISIT (OUTPATIENT)
Dept: FAMILY MEDICINE | Facility: CLINIC | Age: 84
End: 2019-01-07
Payer: MEDICARE

## 2019-01-07 ENCOUNTER — PATIENT MESSAGE (OUTPATIENT)
Dept: FAMILY MEDICINE | Facility: CLINIC | Age: 84
End: 2019-01-07

## 2019-01-07 VITALS
SYSTOLIC BLOOD PRESSURE: 106 MMHG | HEIGHT: 64 IN | DIASTOLIC BLOOD PRESSURE: 38 MMHG | WEIGHT: 132.38 LBS | BODY MASS INDEX: 22.6 KG/M2 | HEART RATE: 57 BPM | TEMPERATURE: 97 F | OXYGEN SATURATION: 97 %

## 2019-01-07 DIAGNOSIS — M79.89 LEG SWELLING: ICD-10-CM

## 2019-01-07 DIAGNOSIS — I95.9 HYPOTENSIVE EPISODE: Primary | ICD-10-CM

## 2019-01-07 DIAGNOSIS — E03.9 HYPOTHYROIDISM (ACQUIRED): ICD-10-CM

## 2019-01-07 DIAGNOSIS — R06.02 SOB (SHORTNESS OF BREATH): ICD-10-CM

## 2019-01-07 DIAGNOSIS — I50.32 CHRONIC DIASTOLIC HEART FAILURE: ICD-10-CM

## 2019-01-07 DIAGNOSIS — J20.9 ACUTE BRONCHITIS, UNSPECIFIED ORGANISM: ICD-10-CM

## 2019-01-07 DIAGNOSIS — E86.1 HYPOVOLEMIA: ICD-10-CM

## 2019-01-07 DIAGNOSIS — E87.1 HYPONATREMIA: Primary | ICD-10-CM

## 2019-01-07 DIAGNOSIS — I31.39 PERICARDIAL EFFUSION: ICD-10-CM

## 2019-01-07 DIAGNOSIS — R05.9 COUGH: ICD-10-CM

## 2019-01-07 DIAGNOSIS — I10 ESSENTIAL HYPERTENSION: ICD-10-CM

## 2019-01-07 DIAGNOSIS — M48.061 SPINAL STENOSIS OF LUMBAR REGION, UNSPECIFIED WHETHER NEUROGENIC CLAUDICATION PRESENT: ICD-10-CM

## 2019-01-07 DIAGNOSIS — R06.09 DOE (DYSPNEA ON EXERTION): ICD-10-CM

## 2019-01-07 LAB
ALBUMIN SERPL BCP-MCNC: 2.6 G/DL
ALP SERPL-CCNC: 82 U/L
ALT SERPL W/O P-5'-P-CCNC: 38 U/L
ANION GAP SERPL CALC-SCNC: 6 MMOL/L
ANION GAP SERPL CALC-SCNC: 7 MMOL/L
ANION GAP SERPL CALC-SCNC: 8 MMOL/L
AST SERPL-CCNC: 33 U/L
BACTERIA #/AREA URNS AUTO: NORMAL /HPF
BASOPHILS # BLD AUTO: 0.03 K/UL
BASOPHILS NFR BLD: 0.3 %
BILIRUB SERPL-MCNC: 0.8 MG/DL
BILIRUB UR QL STRIP: NEGATIVE
BNP SERPL-MCNC: 539 PG/ML
BUN SERPL-MCNC: 15 MG/DL
BUN SERPL-MCNC: 15 MG/DL
BUN SERPL-MCNC: 15 MG/DL (ref 6–30)
BUN SERPL-MCNC: 17 MG/DL
CALCIUM SERPL-MCNC: 9.4 MG/DL
CALCIUM SERPL-MCNC: 9.5 MG/DL
CALCIUM SERPL-MCNC: 9.8 MG/DL
CHLORIDE SERPL-SCNC: 83 MMOL/L
CHLORIDE SERPL-SCNC: 84 MMOL/L (ref 95–110)
CHLORIDE SERPL-SCNC: 86 MMOL/L
CHLORIDE SERPL-SCNC: 87 MMOL/L
CLARITY UR REFRACT.AUTO: CLEAR
CO2 SERPL-SCNC: 24 MMOL/L
CO2 SERPL-SCNC: 25 MMOL/L
CO2 SERPL-SCNC: 26 MMOL/L
COLOR UR AUTO: YELLOW
CREAT SERPL-MCNC: 0.8 MG/DL
CREAT SERPL-MCNC: 0.9 MG/DL (ref 0.5–1.4)
DIFFERENTIAL METHOD: ABNORMAL
EOSINOPHIL # BLD AUTO: 0.3 K/UL
EOSINOPHIL NFR BLD: 2.7 %
ERYTHROCYTE [DISTWIDTH] IN BLOOD BY AUTOMATED COUNT: 13.7 %
EST. GFR  (AFRICAN AMERICAN): >60 ML/MIN/1.73 M^2
EST. GFR  (NON AFRICAN AMERICAN): >60 ML/MIN/1.73 M^2
GLUCOSE SERPL-MCNC: 94 MG/DL
GLUCOSE SERPL-MCNC: 95 MG/DL
GLUCOSE SERPL-MCNC: 96 MG/DL (ref 70–110)
GLUCOSE SERPL-MCNC: 97 MG/DL
GLUCOSE UR QL STRIP: NEGATIVE
HCT VFR BLD AUTO: 27.3 %
HCT VFR BLD CALC: 26 %PCV (ref 36–54)
HGB BLD-MCNC: 9.1 G/DL
HGB UR QL STRIP: NEGATIVE
HYALINE CASTS UR QL AUTO: 0 /LPF
IMM GRANULOCYTES # BLD AUTO: 0.05 K/UL
IMM GRANULOCYTES NFR BLD AUTO: 0.5 %
KETONES UR QL STRIP: NEGATIVE
LACTATE SERPL-SCNC: 0.7 MMOL/L
LEUKOCYTE ESTERASE UR QL STRIP: NEGATIVE
LYMPHOCYTES # BLD AUTO: 0.5 K/UL
LYMPHOCYTES NFR BLD: 5 %
MCH RBC QN AUTO: 29.7 PG
MCHC RBC AUTO-ENTMCNC: 33.3 G/DL
MCV RBC AUTO: 89 FL
MICROSCOPIC COMMENT: NORMAL
MONOCYTES # BLD AUTO: 1.1 K/UL
MONOCYTES NFR BLD: 11.2 %
NEUTROPHILS # BLD AUTO: 7.7 K/UL
NEUTROPHILS NFR BLD: 80.3 %
NITRITE UR QL STRIP: NEGATIVE
NRBC BLD-RTO: 0 /100 WBC
OSMOLALITY SERPL: 251 MOSM/KG
OSMOLALITY UR: 239 MOSM/KG
PH UR STRIP: 7 [PH] (ref 5–8)
PLATELET # BLD AUTO: 239 K/UL
PMV BLD AUTO: 10.3 FL
POC IONIZED CALCIUM: 1.18 MMOL/L (ref 1.06–1.42)
POC TCO2 (MEASURED): 24 MMOL/L (ref 23–29)
POTASSIUM BLD-SCNC: 3.9 MMOL/L (ref 3.5–5.1)
POTASSIUM SERPL-SCNC: 3.7 MMOL/L
POTASSIUM SERPL-SCNC: 3.9 MMOL/L
POTASSIUM SERPL-SCNC: 4 MMOL/L
PROT SERPL-MCNC: 5.2 G/DL
PROT UR QL STRIP: ABNORMAL
RBC # BLD AUTO: 3.06 M/UL
RBC #/AREA URNS AUTO: 0 /HPF (ref 0–4)
SAMPLE: ABNORMAL
SODIUM BLD-SCNC: 117 MMOL/L (ref 136–145)
SODIUM SERPL-SCNC: 117 MMOL/L
SODIUM SERPL-SCNC: 117 MMOL/L
SODIUM SERPL-SCNC: 118 MMOL/L
SODIUM UR-SCNC: 36 MMOL/L
SP GR UR STRIP: 1.01 (ref 1–1.03)
SQUAMOUS #/AREA URNS AUTO: 0 /HPF
TROPONIN I SERPL DL<=0.01 NG/ML-MCNC: 0.01 NG/ML
URN SPEC COLLECT METH UR: ABNORMAL
WBC # BLD AUTO: 9.62 K/UL
WBC #/AREA URNS AUTO: 1 /HPF (ref 0–5)

## 2019-01-07 PROCEDURE — 96372 THER/PROPH/DIAG INJ SC/IM: CPT | Mod: 59

## 2019-01-07 PROCEDURE — 99999 PR PBB SHADOW E&M-EST. PATIENT-LVL III: ICD-10-PCS | Mod: PBBFAC,,, | Performed by: INTERNAL MEDICINE

## 2019-01-07 PROCEDURE — 99999 PR PBB SHADOW E&M-EST. PATIENT-LVL III: CPT | Mod: PBBFAC,,, | Performed by: INTERNAL MEDICINE

## 2019-01-07 PROCEDURE — 81001 URINALYSIS AUTO W/SCOPE: CPT

## 2019-01-07 PROCEDURE — 20000000 HC ICU ROOM

## 2019-01-07 PROCEDURE — 93010 ELECTROCARDIOGRAM REPORT: CPT | Mod: ,,, | Performed by: INTERNAL MEDICINE

## 2019-01-07 PROCEDURE — 63600175 PHARM REV CODE 636 W HCPCS: Performed by: STUDENT IN AN ORGANIZED HEALTH CARE EDUCATION/TRAINING PROGRAM

## 2019-01-07 PROCEDURE — 83935 ASSAY OF URINE OSMOLALITY: CPT

## 2019-01-07 PROCEDURE — 80048 BASIC METABOLIC PNL TOTAL CA: CPT | Mod: 91

## 2019-01-07 PROCEDURE — 99499 RISK ADDL DX/OHS AUDIT: ICD-10-PCS | Mod: HCNC,S$GLB,, | Performed by: INTERNAL MEDICINE

## 2019-01-07 PROCEDURE — 80053 COMPREHEN METABOLIC PANEL: CPT

## 2019-01-07 PROCEDURE — 99292 PR CRITICAL CARE, ADDL 30 MIN: ICD-10-PCS | Mod: ,,, | Performed by: EMERGENCY MEDICINE

## 2019-01-07 PROCEDURE — 83880 ASSAY OF NATRIURETIC PEPTIDE: CPT

## 2019-01-07 PROCEDURE — 96374 THER/PROPH/DIAG INJ IV PUSH: CPT

## 2019-01-07 PROCEDURE — 99499 UNLISTED E&M SERVICE: CPT | Mod: HCNC,S$GLB,, | Performed by: INTERNAL MEDICINE

## 2019-01-07 PROCEDURE — 25000003 PHARM REV CODE 250: Performed by: STUDENT IN AN ORGANIZED HEALTH CARE EDUCATION/TRAINING PROGRAM

## 2019-01-07 PROCEDURE — 84484 ASSAY OF TROPONIN QUANT: CPT

## 2019-01-07 PROCEDURE — 93005 ELECTROCARDIOGRAM TRACING: CPT

## 2019-01-07 PROCEDURE — 85025 COMPLETE CBC W/AUTO DIFF WBC: CPT

## 2019-01-07 PROCEDURE — 93010 EKG 12-LEAD: ICD-10-PCS | Mod: ,,, | Performed by: INTERNAL MEDICINE

## 2019-01-07 PROCEDURE — 99215 PR OFFICE/OUTPT VISIT, EST, LEVL V, 40-54 MIN: ICD-10-PCS | Mod: S$GLB,,, | Performed by: INTERNAL MEDICINE

## 2019-01-07 PROCEDURE — 1101F PT FALLS ASSESS-DOCD LE1/YR: CPT | Mod: CPTII,S$GLB,, | Performed by: INTERNAL MEDICINE

## 2019-01-07 PROCEDURE — 83930 ASSAY OF BLOOD OSMOLALITY: CPT

## 2019-01-07 PROCEDURE — 83605 ASSAY OF LACTIC ACID: CPT

## 2019-01-07 PROCEDURE — 99215 OFFICE O/P EST HI 40 MIN: CPT | Mod: S$GLB,,, | Performed by: INTERNAL MEDICINE

## 2019-01-07 PROCEDURE — 1101F PR PT FALLS ASSESS DOC 0-1 FALLS W/OUT INJ PAST YR: ICD-10-PCS | Mod: CPTII,S$GLB,, | Performed by: INTERNAL MEDICINE

## 2019-01-07 PROCEDURE — 99292 CRITICAL CARE ADDL 30 MIN: CPT | Mod: 25

## 2019-01-07 PROCEDURE — 99291 CRITICAL CARE FIRST HOUR: CPT | Mod: ,,, | Performed by: EMERGENCY MEDICINE

## 2019-01-07 PROCEDURE — 99291 CRITICAL CARE FIRST HOUR: CPT | Mod: 25

## 2019-01-07 PROCEDURE — 99291 PR CRITICAL CARE, E/M 30-74 MINUTES: ICD-10-PCS | Mod: ,,, | Performed by: EMERGENCY MEDICINE

## 2019-01-07 PROCEDURE — 84300 ASSAY OF URINE SODIUM: CPT

## 2019-01-07 PROCEDURE — 99292 CRITICAL CARE ADDL 30 MIN: CPT | Mod: ,,, | Performed by: EMERGENCY MEDICINE

## 2019-01-07 RX ORDER — CALCIUM CARBONATE/VITAMIN D3 250-3.125
1 TABLET ORAL 2 TIMES DAILY
Status: DISCONTINUED | OUTPATIENT
Start: 2019-01-07 | End: 2019-01-14 | Stop reason: HOSPADM

## 2019-01-07 RX ORDER — ENOXAPARIN SODIUM 100 MG/ML
40 INJECTION SUBCUTANEOUS EVERY 24 HOURS
Status: DISCONTINUED | OUTPATIENT
Start: 2019-01-07 | End: 2019-01-12

## 2019-01-07 RX ORDER — LEVOTHYROXINE SODIUM 75 UG/1
75 TABLET ORAL
Status: DISCONTINUED | OUTPATIENT
Start: 2019-01-08 | End: 2019-01-14 | Stop reason: HOSPADM

## 2019-01-07 RX ORDER — ALBUTEROL SULFATE 0.83 MG/ML
2.5 SOLUTION RESPIRATORY (INHALATION) EVERY 4 HOURS PRN
Qty: 1 BOX | Refills: 6 | Status: SHIPPED | OUTPATIENT
Start: 2019-01-07

## 2019-01-07 RX ORDER — INHALER,ASSIST DEVICE,LG MASK
SPACER (EA) MISCELLANEOUS
Refills: 0 | COMMUNITY
Start: 2019-01-05

## 2019-01-07 RX ORDER — LEVOFLOXACIN 500 MG/1
500 TABLET, FILM COATED ORAL DAILY
Status: DISCONTINUED | OUTPATIENT
Start: 2019-01-08 | End: 2019-01-08

## 2019-01-07 RX ORDER — IBUPROFEN 400 MG/1
400 TABLET ORAL EVERY 6 HOURS PRN
Status: DISCONTINUED | OUTPATIENT
Start: 2019-01-07 | End: 2019-01-09

## 2019-01-07 RX ORDER — LOSARTAN POTASSIUM 50 MG/1
100 TABLET ORAL DAILY
Status: DISCONTINUED | OUTPATIENT
Start: 2019-01-07 | End: 2019-01-14 | Stop reason: HOSPADM

## 2019-01-07 RX ORDER — SODIUM CHLORIDE 0.9 % (FLUSH) 0.9 %
3 SYRINGE (ML) INJECTION
Status: DISCONTINUED | OUTPATIENT
Start: 2019-01-07 | End: 2019-01-14 | Stop reason: HOSPADM

## 2019-01-07 RX ORDER — ATORVASTATIN CALCIUM 20 MG/1
20 TABLET, FILM COATED ORAL DAILY
Status: DISCONTINUED | OUTPATIENT
Start: 2019-01-07 | End: 2019-01-14 | Stop reason: HOSPADM

## 2019-01-07 RX ORDER — AMLODIPINE BESYLATE 10 MG/1
10 TABLET ORAL DAILY
Status: DISCONTINUED | OUTPATIENT
Start: 2019-01-07 | End: 2019-01-14 | Stop reason: HOSPADM

## 2019-01-07 RX ORDER — FUROSEMIDE 10 MG/ML
80 INJECTION INTRAMUSCULAR; INTRAVENOUS ONCE
Status: COMPLETED | OUTPATIENT
Start: 2019-01-07 | End: 2019-01-07

## 2019-01-07 RX ORDER — ASPIRIN 81 MG/1
81 TABLET ORAL EVERY OTHER DAY
Status: DISCONTINUED | OUTPATIENT
Start: 2019-01-08 | End: 2019-01-14 | Stop reason: HOSPADM

## 2019-01-07 RX ORDER — LABETALOL 100 MG/1
200 TABLET, FILM COATED ORAL 2 TIMES DAILY
Status: DISCONTINUED | OUTPATIENT
Start: 2019-01-07 | End: 2019-01-09

## 2019-01-07 RX ADMIN — LABETALOL HYDROCHLORIDE 200 MG: 100 TABLET, FILM COATED ORAL at 08:01

## 2019-01-07 RX ADMIN — CALCIUM CARBONATE-CHOLECALCIFEROL TAB 250 MG-125 UNIT 1 TABLET: 250-125 TAB at 08:01

## 2019-01-07 RX ADMIN — ENOXAPARIN SODIUM 40 MG: 100 INJECTION SUBCUTANEOUS at 05:01

## 2019-01-07 RX ADMIN — AMLODIPINE BESYLATE 10 MG: 10 TABLET ORAL at 04:01

## 2019-01-07 RX ADMIN — FUROSEMIDE 80 MG: 10 INJECTION, SOLUTION INTRAVENOUS at 04:01

## 2019-01-07 RX ADMIN — IBUPROFEN 400 MG: 400 TABLET, FILM COATED ORAL at 04:01

## 2019-01-07 RX ADMIN — ATORVASTATIN CALCIUM 20 MG: 10 TABLET, FILM COATED ORAL at 04:01

## 2019-01-07 NOTE — ED NOTES
Patient reports worsening SOB and weakness for 2 weeks. Patient reports hx of CHF and chronic SOB, but worse since having a cold. Productive cough. Denies fevers. Reports decreased apetite. Denies chest pain. Reports swelling in BLE for the past 2 weeks as well.

## 2019-01-07 NOTE — ASSESSMENT & PLAN NOTE
She has cough with productive sputum yellowish associated with SOB. She is afebrile and WBC count is wnl.     - She was started on Levaquin 250 mg x 7 days, will complete the course

## 2019-01-07 NOTE — ED NOTES
Patient identifiers verified and correct for Kristin Quintanilla.    LOC: The patient is awake, alert and aware of environment with an appropriate affect, the patient is oriented x 3 and speaking appropriately.  APPEARANCE: Patient resting comfortably and in no acute distress, patient is clean and well groomed, patient's clothing is properly fastened.  SKIN: The skin is warm and dry, color consistent with ethnicity, patient has normal skin turgor and moist mucus membranes, skin intact, no breakdown or bruising noted.  MUSCULOSKELETAL: Patient moving all extremities spontaneously, no obvious swelling or deformities noted.  RESPIRATORY: Airway is open and patent, respirations are spontaneous, patient has a normal effort and rate, no accessory muscle use noted, bilateral breath sounds clear, infrequent cough  CARDIAC: Patient has a normal rate, 3+ pitting edema to BLE noted, capillary refill < 3 seconds.  ABDOMEN: Soft and non tender to palpation, no distention noted, hyperactive bowel sounds present in all four quadrants.  NEUROLOGIC: 3mm bilaterally, eyes open spontaneously, behavior appropriate to situation, follows commands, facial expression symmetrical, bilateral hand grasp equal and even, purposeful motor response noted, normal sensation in all extremities when touched with a finger.

## 2019-01-07 NOTE — ED PROVIDER NOTES
"Encounter Date: 1/7/2019    SCRIBE #1 NOTE: I, Koki Cordero, am scribing for, and in the presence of,  Dr. Morocho. I have scribed the following portions of the note - the EKG reading.       History     Chief Complaint   Patient presents with    Shortness of Breath     89 y/o WF with history of CAD, HTN, hyperlipidemia, aortic regurgitation, CHF (EF 60-65%), hypothyroidism presents to the ED from IM clinic for worsening SOB. She has been seen in urgent care twice c/o cough and SOB - started on levaquin 2 days ago. She reports no improvement of her symptoms despite abx therapy. She reports chills, cough productive of yellow sputum, clear rhinorrhea, GUERIN, fatigue, new onset LE edema. She had a flu vaccine this year, + sick contacts. She denies fever, chest pain, abdominal pain, nausea, diarrhea, dysuria.       The history is provided by the patient.     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      Arthritis     ASCVD (arteriosclerotic cardiovascular disease) 7/8/2014    Atherosclerosis of aorta 1/7/2016    "There is atherosclerosis of the thoracic aorta" - Xray Chest 7-     Back pain     Basal cell carcinoma 10/7/2013    Right nasal columellar, right lower eyelid, left medial eyelid    Basal cell carcinoma 2/2015    mid back    Chronic diastolic heart failure 11/11/2014    Coronary artery disease     Gastric ulcer     Fosamax related.    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     Hypothyroidism (acquired) 9/27/2016    Joint pain     Lymphedema of " Neck 3/17/2016    Occlusion and stenosis of carotid artery without mention of cerebral infarction     Pelvic mass in female 7/21/2015    Polyneuropathy in other diseases classified elsewhere 1/7/2016 6-     Squamous cell carcinoma of scalp 5/2014    scalp vertex with + LN met 10/14 s/p radiation    Ulcer      Past Surgical History:   Procedure Laterality Date    ADJACENT TISSUE TRANSFER/REARRANGEMENT N/A 5/14/2014    Performed by Olvin Cevallos MD at [facility] OR 2ND FLR    APPENDECTOMY  8.14.2015    mucinous cystadenoma with low grade dysplasia.     APPENDECTOMY-LAPAROSCOPIC N/A 8/14/2015    Performed by Feroz Corado MD at [facility] OR 2ND FLR    BIOPSY-LYMPH NODE Right 10/27/2014    Performed by [facility]os [facility] at [facility] OR 2ND FLR    CATARACT EXTRACTION      ou dr morales    EYE SURGERY      HYSTERECTOMY  1970     NIC/BSO. took HRT immediatiely after wards.     LAPAROSCOPY-DIAGNOSTIC  8/14/2015    Performed by Gianfranco Crenshaw MD at [facility] OR 2ND FLR    LYMPH NODE BIOPSY Right 10/27/2014    posterior triangle    Mohs excision of scalp SCC N/A 5/13/2014    Scalp flap N/A 5/14/204    posterior advancement-rotation    SKIN CANCER EXCISION      THYROIDECTOMY  1960's    hyperthyroidism     Family History   Problem Relation Age of Onset    Heart attack Mother     Heart disease Mother     Hypertension Mother     Eczema Mother     Heart failure Mother     Clotting disorder Father     Hypertension Father     Heart failure Father     Thyroid disease Sister     Cancer Sister         thyroid CA     No Known Problems Brother     No Known Problems Maternal Aunt     No Known Problems Maternal Uncle     No Known Problems Paternal Aunt     No Known Problems Paternal Uncle     No Known Problems Maternal Grandmother     No Known Problems Maternal Grandfather     No Known Problems Paternal Grandmother     No Known Problems Paternal Grandfather     Amblyopia Neg Hx     Blindness Neg Hx      Cataracts Neg Hx     Glaucoma Neg Hx     Macular degeneration Neg Hx     Retinal detachment Neg Hx     Strabismus Neg Hx     Stroke Neg Hx     Melanoma Neg Hx     Psoriasis Neg Hx     Lupus Neg Hx     Acne Neg Hx     Ovarian cancer Neg Hx     Uterine cancer Neg Hx     Breast cancer Neg Hx     Colon cancer Neg Hx     Diabetes Neg Hx     Hyperlipidemia Neg Hx      Social History     Tobacco Use    Smoking status: Former Smoker    Smokeless tobacco: Never Used    Tobacco comment: Quit 35 years ago   Substance Use Topics    Alcohol use: No    Drug use: No     Review of Systems   Constitutional: Positive for chills and fatigue. Negative for fever.   HENT: Positive for congestion and rhinorrhea. Negative for sore throat.    Eyes: Negative for photophobia and visual disturbance.   Respiratory: Positive for cough and shortness of breath.    Cardiovascular: Positive for leg swelling. Negative for chest pain and palpitations.   Gastrointestinal: Negative for abdominal pain, constipation, diarrhea, nausea and vomiting.   Genitourinary: Negative for dysuria and hematuria.   Musculoskeletal: Negative for neck pain and neck stiffness.   Skin: Negative for rash and wound.   Neurological: Positive for headaches. Negative for light-headedness and numbness.   Psychiatric/Behavioral: Negative for confusion.       Physical Exam     Initial Vitals [01/07/19 1141]   BP Pulse Resp Temp SpO2   138/60 (!) 55 20 98 °F (36.7 °C) 96 %      MAP       --         Physical Exam    Nursing note and vitals reviewed.  Constitutional: She appears well-developed and well-nourished. She is not diaphoretic. No distress.   HENT:   Head: Normocephalic and atraumatic.   Neck: Normal range of motion. Neck supple.   Cardiovascular: Normal rate, regular rhythm and normal heart sounds. Exam reveals no gallop and no friction rub.    No murmur heard.  +bilateral LE edema   Pulmonary/Chest: Breath sounds normal. She exhibits no tenderness.    Decreased breath sounds to L lower lung   Abdominal: Soft. Bowel sounds are normal. There is no tenderness. There is no rebound and no guarding.   Musculoskeletal: Normal range of motion.   Neurological: She is alert and oriented to person, place, and time.   Skin: Skin is warm and dry. No rash noted. No erythema.   Psychiatric: She has a normal mood and affect.         ED Course   Critical Care  Date/Time: 1/7/2019 1:15 PM  Performed by: Connor Morocho DO  Authorized by: Connor Morocho DO   Direct patient critical care time: 15 minutes  Additional history critical care time: 15 minutes  Ordering / reviewing critical care time: 15 minutes  Documentation critical care time: 10 minutes  Consulting other physicians critical care time: 15 minutes  Consult with family critical care time: 15 minutes  Total critical care time (exclusive of procedural time) : 85 minutes  Critical care was necessary to treat or prevent imminent or life-threatening deterioration of the following conditions: metabolic crisis and cardiac failure.  Critical care was time spent personally by me on the following activities: discussions with consultants, evaluation of patient's response to treatment, examination of patient, obtaining history from patient or surrogate, ordering and performing treatments and interventions, ordering and review of laboratory studies, ordering and review of radiographic studies, pulse oximetry, re-evaluation of patient's condition and review of old charts.        Labs Reviewed   CBC W/ AUTO DIFFERENTIAL - Abnormal; Notable for the following components:       Result Value    RBC 3.06 (*)     Hemoglobin 9.1 (*)     Hematocrit 27.3 (*)     Immature Grans (Abs) 0.05 (*)     Lymph # 0.5 (*)     Mono # 1.1 (*)     Gran% 80.3 (*)     Lymph% 5.0 (*)     All other components within normal limits   COMPREHENSIVE METABOLIC PANEL - Abnormal; Notable for the following components:    Sodium 117 (*)     Chloride 87 (*)     Total  Protein 5.2 (*)     Albumin 2.6 (*)     Anion Gap 6 (*)     All other components within normal limits   B-TYPE NATRIURETIC PEPTIDE - Abnormal; Notable for the following components:     (*)     All other components within normal limits   URINALYSIS, REFLEX TO URINE CULTURE - Abnormal; Notable for the following components:    Protein, UA 2+ (*)     All other components within normal limits    Narrative:     Preferred Collection Type->Urine, Clean Catch   BASIC METABOLIC PANEL - Abnormal; Notable for the following components:    Sodium 118 (*)     Chloride 86 (*)     Anion Gap 7 (*)     All other components within normal limits   OSMOLALITY - Abnormal; Notable for the following components:    Osmolality 251 (*)     All other components within normal limits    Narrative:     ADD ON OSMO PER: RAH ZUNIGA DO  01/07/2019  15:54     Serum OSMO critical result(s) called and verbal readback obtained   from Erica Lopez RN., 01/07/2019 16:51   ISTAT PROCEDURE - Abnormal; Notable for the following components:    POC Sodium 117 (*)     POC Chloride 84 (*)     POC Hematocrit 26 (*)     All other components within normal limits   LACTIC ACID, PLASMA   TROPONIN I   URINALYSIS MICROSCOPIC    Narrative:     Preferred Collection Type->Urine, Clean Catch   OSMOLALITY   OSMOLALITY, URINE RANDOM   SODIUM, URINE, RANDOM   OSMOLALITY, URINE RANDOM    Narrative:     Preferred Collection Type->Urine, Clean Catch  ADD ON UNAR AND UROSM PER: RAH HOBBS DO  01/07/2019  15:49    SODIUM, URINE, RANDOM    Narrative:     Preferred Collection Type->Urine, Clean Catch  ADD ON UNAR AND UROSM PER: RAH HOBBS DO  01/07/2019  15:49    BASIC METABOLIC PANEL   ISTAT CHEM8     EKG Readings: (Independently Interpreted)   Sinus bradycardia at a rate of 57, No STEMI       Imaging Results          US Lower Extremity Veins Bilateral (Final result)  Result time 01/07/19 15:43:27    Final result by Nayan Nelson MD (01/07/19 15:43:27)                  Impression:      No evidence of deep venous thrombosis in either lower extremity.    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    Electronically signed by resident: Neal Francisco  Date:    01/07/2019  Time:    15:37    Electronically signed by: Nayan Nelson MD  Date:    01/07/2019  Time:    15:43             Narrative:    EXAMINATION:  US LOWER EXTREMITY VEINS BILATERAL    CLINICAL HISTORY:  Other specified soft tissue disorders    TECHNIQUE:  Duplex and color flow Doppler and dynamic compression was performed of the bilateral lower extremity veins was performed.    COMPARISON:  None    FINDINGS:  Right thigh veins: The common femoral, femoral, popliteal, upper greater saphenous, and deep femoral veins are patent and free of thrombus. The veins are normally compressible and have normal phasic flow and augmentation response.    Right calf veins: The visualized calf veins are patent.    Left thigh veins: The common femoral, femoral, popliteal, upper greater saphenous, and deep femoral veins are patent and free of thrombus. The veins are normally compressible and have normal phasic flow and augmentation response.    Left calf veins: The visualized calf veins are patent.    Miscellaneous: 3.4 x 1.3 x 2.8 cm anechoic focus in the right popliteal fossa and 4.9 x 2.2 x 3.5 cm anechoic focus in the left popliteal fossa.                               X-Ray Chest PA And Lateral (Final result)  Result time 01/07/19 13:25:30    Final result by Olvin Jorge MD (01/07/19 13:25:30)                 Impression:      Findings suggesting pulmonary edema/CHF pattern with left greater than right pleural effusions and probable left basilar atelectasis/infiltrate.      Electronically signed by: Olvin Jorge MD  Date:    01/07/2019  Time:    13:25             Narrative:    EXAMINATION:  XR CHEST PA AND LATERAL    CLINICAL HISTORY:  shortness of breath;    TECHNIQUE:  PA and  lateral views of the chest were performed.    COMPARISON:  Chest radiograph 07/31/2018    FINDINGS:  Monitoring leads overlie the chest.  Patient is slightly rotated.  Cardiomediastinal silhouette is prominent with interval increased prominence of the central pulmonary vasculature and bilateral diffuse interstitial coarsening suggesting pulmonary edema/CHF pattern.  Small bilateral pleural effusions, left greater than right with suspected left basilar atelectasis/infiltrate.  No large pneumothorax.  Calcific atherosclerosis of the thoracic aorta.  Osseous structures appear grossly stable without acute process seen..                                 Medical Decision Making:   History:   Old Medical Records: I decided to obtain old medical records.  Old Records Summarized: records from clinic visits and records from previous admission(s).       <> Summary of Records: 12/29 - Urgent Care, diagnosed with URI, given IM celestone injection.  1/5 - Urgent Care, diagnosed with bronchitis, started on po levaquin  Today - IM clinic, c/o worsening SOB and LE edema, /38, sent to the ED.   Independently Interpreted Test(s):   I have ordered and independently interpreted EKG Reading(s) - see prior notes  Clinical Tests:   Lab Tests: Ordered and Reviewed  Radiological Study: Ordered and Reviewed  Medical Tests: Ordered and Reviewed  Other:   I have discussed this case with another health care provider.       APC / Resident Notes:   87 y/o WF with history of CAD, HTN, hyperlipidemia, aortic regurgitation, CHF (EF 60-65%), hypothyroidism presents to the ED from IM clinic for worsening SOB. VSS. RRR. Decreased breath sounds to the L lower lung. Abdomen soft. LE edema noted. DDx includes but is not limited to pneumonia, bronchitis, HF exacerbation, MARIAM, electrolyte abnormality, sepsis. Will get labs, CXR.    UA with no infection. No leukocytosis. BUN/Cr normal. Hyponatremia noted at 117. Repeated on iSTAT - Na 117. BNP elevated  at 539. Troponin normal. Lactic acid normal.    CXR shows pulmonary edema/CHF with probable L basilar atelectasis/infiltrate.    Due to severe hyponatremia, ICU was consulted and they evaluated in the ED. They will admit for further management.     See above for critical care note.        Scribe Attestation:   Scribe #1: I performed the above scribed service and the documentation accurately describes the services I performed. I attest to the accuracy of the note.    Attending Attestation:     Physician Attestation Statement for NP/PA:   I have conducted a face to face encounter with this patient in addition to the NP/PA, due to Medical Complexity          I have reviewed and concur with the advanced practice clinician's history, physical, assessment, and plan.  I have personally interviewed and examined the patient at bedside.  See below addendum for my evaluation and additional findings.    Briefly,  this is a 88 y.o. female presenting with worsening shortness of breath, fatigue and weakness. Exam negative for any TIA, CVA.  No recent seizures or infectious etiology.  Bilateral lower extremity edema noted with chest x-ray showing pulmonary edema/CHF.  Likely some component of heart failure given elevated BNP.  Normal troponin.  Remainder of laboratory evaluation remarkable for severe hyponatremia, ICU consulted discussed case with him at length, and will be admitted to ICU level of care for further management.  See above for critical care note.    Complexity:  Critical Care, high      Connor Morocho, DO    Dept of Emergency Medicine   [facility]  Spectralink: [id number]      I, Dr. Connor Morocho, personally performed the services described in this documentation. All medical record entries made by the scribe were at my direction and in my presence.  I have reviewed the chart and agree that the record reflects my personal performance and is accurate and complete.              Clinical  Impression:   The primary encounter diagnosis was Hyponatremia. Diagnoses of SOB (shortness of breath), Leg swelling, and Cough were also pertinent to this visit.      Disposition:   Disposition: Admitted  Condition: Critical                        Karin Leonardo PA-C  01/07/19 1858       Connor Morocho DO  01/07/19 1920

## 2019-01-07 NOTE — ED NOTES
Patient updated on POC.  Repositioned with wedge. Toileting offered.  Awaiting verification of PRN ibuprofen; patient and family aware.

## 2019-01-07 NOTE — HPI
Kristin Quintanilla is a 88 y.o. lady with CAD with diastolic HF and mild AR, HTN, hyperlipidemia, hypothyroidism was sent here by her PCP  for SOB and generalized weaknesses. She developed progressive SOB and leg swelling 2 weeks ago. She believed she has a cold, along with productive cough ( yellowish) associated with SOB for which she was initially managed with a steroid injection and albuterol inhaler, in spite of it was getting worse, so she was started on Levaquin 2 days ago for acute bronchitis. She felt she was more SOB even at rest, especially while lying flat.  At baseline she is able to do her normal activities including cooking for herself. As per her son and grand daughter, she also is more confused at times compared to her baseline. She denies any chest pain, palpitations or fever.     She was sent by her PCP as she had was consistently hypotensive 110/40 mm HG. In the ED her Na was 117 and BNP was 539. CXR suggestive of pulmonary edema and left sided pleural effusion.  She started developing pain along the medial part of left thigh like cramps.

## 2019-01-07 NOTE — ASSESSMENT & PLAN NOTE
BP in /76 mm Hg, although at clinic was hypotensive.  Home meds - Amlodipine 10 mg OD, HCTZ 25 mg OD, Labetalol 200mg BID, Losartan 100 mg OD    - Continuing Amlodipine, Labetalol and losartan  - Stop HCTZ due to hyponatremia

## 2019-01-07 NOTE — PROGRESS NOTES
Assessment & Plan (all problems are new to me)  Problem List Items Addressed This Visit        Cardiac/Vascular    Chronic diastolic heart failure    Essential hypertension    Current Assessment & Plan     Hypotensive on exam today            Endocrine    Hypothyroidism (acquired)    Current Assessment & Plan     TSH normal in Sept.  Would benefit from repeat TSH           Other Visit Diagnoses     Hypotensive episode    -  Primary  -    Suspect patient to be volume down despite home attempts at oral rehydration.  Given her recent treatment for acute bronchitis and decrease in urine production, I have asked that her son take her to the ED.  They prefer [facility].  I instructed them to proceed directly there.  I feel that she needs CXR, labs, UA.  I have called and given report to the ER of her impending arrival.  I do feel that sepsis is a reasonable consideration on her differential given her hypotension.  She has no compensatory tachycardia but is on labetalol.    Hypovolemia    -  As above    GUERIN (dyspnea on exertion)  -  Worsening.  As above regarding ED evaluation      Leg swelling    -  As above    Acute bronchitis, unspecified organism    -  I have sent in a nebulizer with solution at the request of the patient.     Relevant Medications    albuterol (PROVENTIL) 2.5 mg /3 mL (0.083 %) nebulizer solution    Other Relevant Orders    NEBULIZER FOR HOME USE            Health Maintenance reviewed, deferred.    Follow-up: Follow-up for pending ER evaluation.  ______________________________________________________________________    Chief Complaint  Chief Complaint   Patient presents with    Shortness of Breath    Leg Swelling       HPI  Kristin KIYA Quintanilla is a 88 y.o. female with multiple medical diagnoses as listed in the medical history and problem list that presents for SOB and leg swelling for a several of weeks.  Pt is new to me but is known to this clinic with her last appointment being 10/4/2018.  Seen in  " 12/29/2018 and 01/05/2019.      She reports that she has had worsening SOB over the past several weeks.  Also having leg swelling.  She was seen in the  and given steroids and breathing treatments.  Then returned to  and was put on levaquin.  SOB has been a long standing issue but it is acutely worsening.  Worsening GUERIN as well.  Leg swelling has been present for months but it is worse recently.  She is also c/o left hip pain that is in the groin.  Having trouble lifting her leg to get in the car or put her shoe on.  Pain more than weakness.        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      Arthritis     ASCVD (arteriosclerotic cardiovascular disease) 7/8/2014    Atherosclerosis of aorta 1/7/2016    "There is atherosclerosis of the thoracic aorta" - Xray Chest 7-     Back pain     Basal cell carcinoma 10/7/2013    Right nasal columellar, right lower eyelid, left medial eyelid    Basal cell carcinoma 2/2015    mid back    Chronic diastolic heart failure 11/11/2014    Coronary artery disease     Gastric ulcer     Fosamax related.    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 OR " 2ND FLR    APPENDECTOMY  8.14.2015    mucinous cystadenoma with low grade dysplasia.     APPENDECTOMY-LAPAROSCOPIC N/A 8/14/2015    Performed [facility] OR 2ND FLR    BIOPSY-LYMPH NODE Right 10/27/2014    Performed [facility] at [facility] OR 2ND FLR    CATARACT EXTRACTION      ou dr morales    EYE SURGERY      HYSTERECTOMY  1970     NIC/BSO. took HRT immediatiely after wards.     LAPAROSCOPY-DIAGNOSTIC  8/14/2015    Performed by Gianfranco Crenshaw [facility] at [facility] OR 2ND FLR    LYMPH NODE BIOPSY Right 10/27/2014    posterior triangle    Mohs excision of scalp SCC N/A 5/13/2014    Scalp flap N/A 5/14/204    posterior advancement-rotation    SKIN CANCER EXCISION      THYROIDECTOMY  1960's    hyperthyroidism       SOCIAL HISTORY:  Social History     Socioeconomic History    Marital status:      Spouse name: Not on file    Number of children: Not on file    Years of education: Not on file    Highest education level: Not on file   Social Needs    Financial resource strain: Not on file    Food insecurity - worry: Not on file    Food insecurity - inability: Not on file    Transportation needs - medical: Not on file    Transportation needs - non-medical: Not on file   Occupational History    Occupation: Homemaker   Tobacco Use    Smoking status: Former Smoker    Smokeless tobacco: Never Used    Tobacco comment: Quit 35 years ago   Substance and Sexual Activity    Alcohol use: No    Drug use: No    Sexual activity: No   Other Topics Concern    Are you pregnant or think you may be? No    Breast-feeding No   Social History Narrative    She is active and independent.        FAMILY HISTORY:  Family History   Problem Relation Age of Onset    Heart attack Mother     Heart disease Mother     Hypertension Mother     Eczema Mother     Heart failure Mother     Clotting disorder Father     Hypertension Father     Heart failure Father     Thyroid disease Sister      Cancer Sister         thyroid CA     No Known Problems Brother     No Known Problems Maternal Aunt     No Known Problems Maternal Uncle     No Known Problems Paternal Aunt     No Known Problems Paternal Uncle     No Known Problems Maternal Grandmother     No Known Problems Maternal Grandfather     No Known Problems Paternal Grandmother     No Known Problems Paternal Grandfather     Amblyopia Neg Hx     Blindness Neg Hx     Cataracts Neg Hx     Glaucoma Neg Hx     Macular degeneration Neg Hx     Retinal detachment Neg Hx     Strabismus Neg Hx     Stroke Neg Hx     Melanoma Neg Hx     Psoriasis Neg Hx     Lupus Neg Hx     Acne Neg Hx     Ovarian cancer Neg Hx     Uterine cancer Neg Hx     Breast cancer Neg Hx     Colon cancer Neg Hx     Diabetes Neg Hx     Hyperlipidemia Neg Hx        ALLERGIES AND MEDICATIONS: updated and reviewed.  Review of patient's allergies indicates:   Allergen Reactions    Codeine     Darvocet a500 [propoxyphene n-acetaminophen] Nausea Only    Sulfa (sulfonamide antibiotics) Rash    Fosamax [alendronate]      Gastric ulcer     Current Outpatient Medications   Medication Sig Dispense Refill    acetaminophen (TYLENOL ARTHRITIS) 650 MG TbSR Take 650 mg by mouth once daily. 1-2 tablet once a day.      albuterol (PROVENTIL/VENTOLIN HFA) 90 mcg/actuation inhaler Inhale 2 puffs into the lungs every 6 (six) hours as needed for Wheezing. Rescue 6 g 0    amLODIPine (NORVASC) 10 MG tablet TAKE 1 TABLET EVERY DAY 90 tablet 3    aspirin (ECOTRIN) 81 MG EC tablet Take 81 mg by mouth every other day. Pt taking one pill every other day.      atorvastatin (LIPITOR) 20 MG tablet TAKE 1 TABLET EVERY DAY 90 tablet 3    calcium carbonate/vitamin D3 (CALTRATE 600 + D ORAL) Take by mouth once daily.      chlorpheniramine maleate (CHLOR-TRIMETON REPETABS ORAL) Take by mouth.      FLAXSEED-OMEGA3,6,9-FATTY ACID ORAL Take 1 capsule by mouth once daily.        hydroCHLOROthiazide  (HYDRODIURIL) 25 MG tablet Take 1 tablet (25 mg total) by mouth once daily. 90 tablet 3    labetalol (NORMODYNE) 200 MG tablet TAKE 1 TABLET TWICE DAILY (Patient taking differently: TAKE 1 1/2 TABLET TWICE DAILY) 180 tablet 3    levoFLOXacin (LEVAQUIN) 250 MG tablet Take 1 tablet (250 mg total) by mouth once daily. for 7 days 7 tablet 0    levothyroxine (SYNTHROID) 75 MCG tablet TAKE 1 TABLET ONE TIME DAILY 90 tablet 3    losartan (COZAAR) 100 MG tablet TAKE 1 TABLET EVERY DAY 90 tablet 0    magnesium 250 mg Tab Take 1 tablet by mouth once daily.        OPTICHAMBER DARREN LG MASK Spcr U UTD  0    triamcinolone acetonide 0.1% (KENALOG) 0.1 % cream APPLY TO THE AFFECTED AREA OF lower legs TWICE DAILY 453.6 g 1    vitamin D 1000 units Tab Take 1,000 Units by mouth once daily.       albuterol (PROVENTIL) 2.5 mg /3 mL (0.083 %) nebulizer solution Take 3 mLs (2.5 mg total) by nebulization every 4 (four) hours as needed for Wheezing or Shortness of Breath (chest tightness). 1 Box 6    guaifenesin 100 mg/5 ml (ROBITUSSIN) 100 mg/5 mL syrup Take 200 mg by mouth 3 (three) times daily as needed for Cough.       No current facility-administered medications for this visit.          ROS  Review of Systems   Constitutional: Positive for fatigue. Negative for chills, fever and unexpected weight change.   HENT: Negative for congestion, ear pain, hearing loss, rhinorrhea, sore throat and trouble swallowing.    Eyes: Negative for discharge, redness and visual disturbance.   Respiratory: Positive for shortness of breath. Negative for cough, chest tightness and wheezing.    Cardiovascular: Positive for leg swelling. Negative for chest pain and palpitations.   Gastrointestinal: Negative for abdominal pain, constipation, diarrhea, nausea and vomiting.   Endocrine: Negative for polydipsia, polyphagia and polyuria.   Genitourinary: Negative for decreased urine volume, dysuria and hematuria.   Musculoskeletal: Positive for  "arthralgias. Negative for joint swelling and myalgias.   Skin: Positive for color change (pale in the face to the son). Negative for rash.   Neurological: Negative for dizziness, weakness, light-headedness, numbness and headaches.   Psychiatric/Behavioral: Negative for decreased concentration, dysphoric mood, sleep disturbance and suicidal ideas.         Physical Exam  Vitals:    01/07/19 1019 01/07/19 1049   BP: (!) 110/40 (!) 106/38   Pulse: (!) 57    Temp: 97 °F (36.1 °C)    SpO2: 97%    Weight: 60.1 kg (132 lb 6.2 oz)    Height: 5' 4" (1.626 m)     Body mass index is 22.72 kg/m².  Weight: 60.1 kg (132 lb 6.2 oz)   Height: 5' 4" (162.6 cm)   Physical Exam   Constitutional: She is oriented to person, place, and time. She appears well-developed and well-nourished. No distress.   HENT:   Head: Normocephalic and atraumatic.   Eyes: Conjunctivae, EOM and lids are normal. Pupils are equal, round, and reactive to light. No scleral icterus.   Neck: Full passive range of motion without pain. Neck supple. No JVD present. Carotid bruit is not present. No thyromegaly present.   Cardiovascular: Normal rate, regular rhythm, intact distal pulses and normal pulses. Exam reveals no S3, no S4 and no friction rub.   Murmur heard.  Pulmonary/Chest: Effort normal and breath sounds normal. She has no wheezes. She has no rhonchi. She has no rales.   Abdominal: Soft. Bowel sounds are normal. There is no tenderness.   Musculoskeletal: She exhibits edema (BLE).        Left hip: She exhibits tenderness (with internal rotation of the hip). She exhibits no bony tenderness and no swelling.   Lymphadenopathy:        Head (right side): No submental and no submandibular adenopathy present.        Head (left side): No submental and no submandibular adenopathy present.     She has no cervical adenopathy.   Neurological: She is alert and oriented to person, place, and time.   Motor grossly intact.  Sensory grossly intact.  Symmetric facial " movements palate elevated symmetrically tongue midline   Skin: Skin is warm and dry. No rash noted.   Psychiatric: She has a normal mood and affect. Her speech is normal and behavior is normal. Thought content normal.           Health Maintenance       Date Due Completion Date    Pneumococcal Vaccine (65+ High/Highest Risk) (2 of 2 - PPSV23) 12/15/2015 10/20/2015    Lipid Panel 09/04/2023 9/4/2018    TETANUS VACCINE 06/06/2028 6/6/2018 (Declined)    Override on 6/6/2018: Declined

## 2019-01-07 NOTE — SUBJECTIVE & OBJECTIVE
Review of Systems   Constitutional: Positive for activity change and fatigue. Negative for chills and fever.   HENT: Positive for rhinorrhea. Negative for sore throat and trouble swallowing.    Respiratory: Positive for cough (Productive (yellowish)) and shortness of breath. Negative for chest tightness.    Cardiovascular: Positive for leg swelling. Negative for chest pain and palpitations.   Gastrointestinal: Negative for abdominal distention, abdominal pain, diarrhea, nausea and vomiting.   Genitourinary: Negative for dysuria and hematuria.   Musculoskeletal: Positive for myalgias (Muscle cramps). Negative for arthralgias.   Skin: Negative for rash and wound.   Neurological: Negative for dizziness and headaches.   Psychiatric/Behavioral: Positive for confusion. Negative for agitation.     Objective:     Vital Signs (Most Recent):  Temp: 97.8 °F (36.6 °C) (01/07/19 1400)  Pulse: 69 (01/07/19 1503)  Resp: 18 (01/07/19 1503)  BP: (!) 171/73 (01/07/19 1447)  SpO2: 95 % (01/07/19 1447) Vital Signs (24h Range):  Temp:  [97 °F (36.1 °C)-98 °F (36.7 °C)] 97.8 °F (36.6 °C)  Pulse:  [55-79] 69  Resp:  [16-20] 18  SpO2:  [95 %-97 %] 95 %  BP: (106-205)/(38-83) 171/73   Weight: 54.4 kg (120 lb)  Body mass index is 20.6 kg/m².    No intake or output data in the 24 hours ending 01/07/19 1525    Physical Exam   Constitutional: She is oriented to person, place, and time. She appears well-developed.   HENT:   Head: Normocephalic and atraumatic.   Eyes: EOM are normal. Pupils are equal, round, and reactive to light.   Neck: Normal range of motion. No JVD present. No tracheal deviation present.   Cardiovascular: Normal rate and regular rhythm.   Murmur (Left sternal border) heard.  Pulmonary/Chest: Effort normal and breath sounds normal. She has no wheezes. She has no rales.   Abdominal: Soft. Bowel sounds are normal. She exhibits no distension. There is no tenderness.   Musculoskeletal: Normal range of motion. She exhibits edema  (B/l pitting).   Neurological: She is alert and oriented to person, place, and time. No cranial nerve deficit. She exhibits normal muscle tone.   Psychiatric: She has a normal mood and affect. Judgment normal.       Vents:     Lines/Drains/Airways     Peripheral Intravenous Line                 Peripheral IV - Single Lumen 01/07/19 1200 Left Antecubital less than 1 day              Significant Labs:    CBC/Anemia Profile:  Recent Labs   Lab 01/07/19  1201 01/07/19  1327   WBC 9.62  --    HGB 9.1*  --    HCT 27.3* 26*     --    MCV 89  --    RDW 13.7  --         Chemistries:  Recent Labs   Lab 01/07/19  1201   *   K 4.0   CL 87*   CO2 24   BUN 17   CREATININE 0.8   CALCIUM 9.5   ALBUMIN 2.6*   PROT 5.2*   BILITOT 0.8   ALKPHOS 82   ALT 38   AST 33       All pertinent labs within the past 24 hours have been reviewed.    Significant Imaging:  I have reviewed and interpreted all pertinent imaging results/findings within the past 24 hours.

## 2019-01-07 NOTE — ASSESSMENT & PLAN NOTE
Patient NA - 117, she has muscle cramps and as per family slightly more confused. She has not no headache, seizures or loss of consciousness. She is hyponatremic most likely from Hypervolemia secondary to CHF, other differential include drug induced, unlikely from her current medications except thiazide. Her glucose in wnl. If she is refractory to correct management than will consider other causes. 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    - Will stop Hydrochlorothiazide  - Will Fluid restrict her to 1000 ml /day  - Treat CHF with Lasix  - Goal to correct 6 mEq/l in 24 hr period  - BNP q4hrs

## 2019-01-07 NOTE — ED NOTES
Patient and family updated on POC. Patient reporting intermittent spasm/cramps to left leg. Wedge provided with repositioning. PA notified of patient's request for pain medication.

## 2019-01-07 NOTE — ASSESSMENT & PLAN NOTE
88 y.o. lady with progressive SOB at rest and leg swelling, , CXR suggestive of pulmonary edema. She has been drinking more water than usual, though is compliant with her medications.    ECHO 4/18 - Diastolic HF ( grade 2), EF 60-65%, mild AR with pericardial effusion, no wall motion abnormalities.    - Start with Lasix 80 mg iv, check her response and dose appropriately after that.  - Monitor BMP   - Control BP  - Strict I/O  - Daily weights  - Cardiac diet with Fluid restriction

## 2019-01-08 LAB
ANION GAP SERPL CALC-SCNC: 10 MMOL/L
ANION GAP SERPL CALC-SCNC: 4 MMOL/L
ANION GAP SERPL CALC-SCNC: 6 MMOL/L
ANION GAP SERPL CALC-SCNC: 6 MMOL/L
ANION GAP SERPL CALC-SCNC: 7 MMOL/L
BASOPHILS # BLD AUTO: 0.04 K/UL
BASOPHILS NFR BLD: 0.5 %
BUN SERPL-MCNC: 14 MG/DL
BUN SERPL-MCNC: 14 MG/DL
BUN SERPL-MCNC: 15 MG/DL
BUN SERPL-MCNC: 19 MG/DL
BUN SERPL-MCNC: 23 MG/DL
CALCIUM SERPL-MCNC: 9.1 MG/DL
CALCIUM SERPL-MCNC: 9.2 MG/DL
CALCIUM SERPL-MCNC: 9.2 MG/DL
CALCIUM SERPL-MCNC: 9.7 MG/DL
CALCIUM SERPL-MCNC: 9.8 MG/DL
CHLORIDE SERPL-SCNC: 80 MMOL/L
CHLORIDE SERPL-SCNC: 83 MMOL/L
CHLORIDE SERPL-SCNC: 83 MMOL/L
CHLORIDE SERPL-SCNC: 84 MMOL/L
CHLORIDE SERPL-SCNC: 85 MMOL/L
CO2 SERPL-SCNC: 27 MMOL/L
CO2 SERPL-SCNC: 27 MMOL/L
CO2 SERPL-SCNC: 29 MMOL/L
CO2 SERPL-SCNC: 30 MMOL/L
CO2 SERPL-SCNC: 30 MMOL/L
CORTIS SERPL-MCNC: 11.5 UG/DL
CREAT SERPL-MCNC: 0.8 MG/DL
CREAT SERPL-MCNC: 1 MG/DL
CREAT SERPL-MCNC: 1.4 MG/DL
DIFFERENTIAL METHOD: ABNORMAL
EOSINOPHIL # BLD AUTO: 0.3 K/UL
EOSINOPHIL NFR BLD: 3.6 %
ERYTHROCYTE [DISTWIDTH] IN BLOOD BY AUTOMATED COUNT: 13.2 %
EST. GFR  (AFRICAN AMERICAN): 38.7 ML/MIN/1.73 M^2
EST. GFR  (AFRICAN AMERICAN): 58.1 ML/MIN/1.73 M^2
EST. GFR  (AFRICAN AMERICAN): >60 ML/MIN/1.73 M^2
EST. GFR  (NON AFRICAN AMERICAN): 33.6 ML/MIN/1.73 M^2
EST. GFR  (NON AFRICAN AMERICAN): 50.4 ML/MIN/1.73 M^2
EST. GFR  (NON AFRICAN AMERICAN): >60 ML/MIN/1.73 M^2
GLUCOSE SERPL-MCNC: 144 MG/DL
GLUCOSE SERPL-MCNC: 163 MG/DL
GLUCOSE SERPL-MCNC: 81 MG/DL
GLUCOSE SERPL-MCNC: 90 MG/DL
GLUCOSE SERPL-MCNC: 92 MG/DL
HCT VFR BLD AUTO: 28.2 %
HGB BLD-MCNC: 9.8 G/DL
IMM GRANULOCYTES # BLD AUTO: 0.03 K/UL
IMM GRANULOCYTES NFR BLD AUTO: 0.4 %
LYMPHOCYTES # BLD AUTO: 0.7 K/UL
LYMPHOCYTES NFR BLD: 7.6 %
MAGNESIUM SERPL-MCNC: 1.3 MG/DL
MCH RBC QN AUTO: 30.8 PG
MCHC RBC AUTO-ENTMCNC: 34.8 G/DL
MCV RBC AUTO: 89 FL
MONOCYTES # BLD AUTO: 0.9 K/UL
MONOCYTES NFR BLD: 10.6 %
NEUTROPHILS # BLD AUTO: 6.6 K/UL
NEUTROPHILS NFR BLD: 77.3 %
NRBC BLD-RTO: 0 /100 WBC
PHOSPHATE SERPL-MCNC: 3 MG/DL
PLATELET # BLD AUTO: 236 K/UL
PMV BLD AUTO: 10 FL
POTASSIUM SERPL-SCNC: 3.4 MMOL/L
POTASSIUM SERPL-SCNC: 3.5 MMOL/L
POTASSIUM SERPL-SCNC: 4 MMOL/L
POTASSIUM SERPL-SCNC: 4.5 MMOL/L
POTASSIUM SERPL-SCNC: 4.6 MMOL/L
RBC # BLD AUTO: 3.18 M/UL
SODIUM SERPL-SCNC: 116 MMOL/L
SODIUM SERPL-SCNC: 118 MMOL/L
SODIUM SERPL-SCNC: 118 MMOL/L
SODIUM SERPL-SCNC: 119 MMOL/L
SODIUM SERPL-SCNC: 120 MMOL/L
TSH SERPL DL<=0.005 MIU/L-ACNC: 1.94 UIU/ML
WBC # BLD AUTO: 8.53 K/UL

## 2019-01-08 PROCEDURE — 99223 1ST HOSP IP/OBS HIGH 75: CPT | Mod: AI,,, | Performed by: INTERNAL MEDICINE

## 2019-01-08 PROCEDURE — 84100 ASSAY OF PHOSPHORUS: CPT

## 2019-01-08 PROCEDURE — 20000000 HC ICU ROOM

## 2019-01-08 PROCEDURE — 97161 PT EVAL LOW COMPLEX 20 MIN: CPT

## 2019-01-08 PROCEDURE — 25000003 PHARM REV CODE 250

## 2019-01-08 PROCEDURE — 84443 ASSAY THYROID STIM HORMONE: CPT

## 2019-01-08 PROCEDURE — 80048 BASIC METABOLIC PNL TOTAL CA: CPT | Mod: 91

## 2019-01-08 PROCEDURE — 83735 ASSAY OF MAGNESIUM: CPT

## 2019-01-08 PROCEDURE — 25000003 PHARM REV CODE 250: Performed by: STUDENT IN AN ORGANIZED HEALTH CARE EDUCATION/TRAINING PROGRAM

## 2019-01-08 PROCEDURE — 85025 COMPLETE CBC W/AUTO DIFF WBC: CPT

## 2019-01-08 PROCEDURE — 82533 TOTAL CORTISOL: CPT

## 2019-01-08 PROCEDURE — 63600175 PHARM REV CODE 636 W HCPCS: Performed by: STUDENT IN AN ORGANIZED HEALTH CARE EDUCATION/TRAINING PROGRAM

## 2019-01-08 PROCEDURE — 99223 PR INITIAL HOSPITAL CARE,LEVL III: ICD-10-PCS | Mod: AI,,, | Performed by: INTERNAL MEDICINE

## 2019-01-08 PROCEDURE — 97165 OT EVAL LOW COMPLEX 30 MIN: CPT

## 2019-01-08 PROCEDURE — 94761 N-INVAS EAR/PLS OXIMETRY MLT: CPT

## 2019-01-08 PROCEDURE — 97116 GAIT TRAINING THERAPY: CPT

## 2019-01-08 RX ORDER — MAGNESIUM SULFATE HEPTAHYDRATE 40 MG/ML
2 INJECTION, SOLUTION INTRAVENOUS ONCE
Status: COMPLETED | OUTPATIENT
Start: 2019-01-08 | End: 2019-01-08

## 2019-01-08 RX ORDER — ISOSORBIDE DINITRATE AND HYDRALAZINE HYDROCHLORIDE 37.5; 2 MG/1; MG/1
1 TABLET ORAL 3 TIMES DAILY
Status: DISCONTINUED | OUTPATIENT
Start: 2019-01-08 | End: 2019-01-09

## 2019-01-08 RX ORDER — LEVOFLOXACIN 250 MG/1
250 TABLET ORAL DAILY
Status: DISCONTINUED | OUTPATIENT
Start: 2019-01-08 | End: 2019-01-09

## 2019-01-08 RX ORDER — LABETALOL HCL 20 MG/4 ML
10 SYRINGE (ML) INTRAVENOUS EVERY 6 HOURS PRN
Status: DISCONTINUED | OUTPATIENT
Start: 2019-01-08 | End: 2019-01-09

## 2019-01-08 RX ORDER — BENZONATATE 100 MG/1
100 CAPSULE ORAL 3 TIMES DAILY PRN
Status: DISCONTINUED | OUTPATIENT
Start: 2019-01-08 | End: 2019-01-14 | Stop reason: HOSPADM

## 2019-01-08 RX ORDER — GABAPENTIN 100 MG/1
200 CAPSULE ORAL ONCE
Status: COMPLETED | OUTPATIENT
Start: 2019-01-08 | End: 2019-01-08

## 2019-01-08 RX ORDER — ACETAMINOPHEN 325 MG/1
650 TABLET ORAL EVERY 6 HOURS PRN
Status: DISCONTINUED | OUTPATIENT
Start: 2019-01-08 | End: 2019-01-14 | Stop reason: HOSPADM

## 2019-01-08 RX ORDER — POTASSIUM CHLORIDE 750 MG/1
30 CAPSULE, EXTENDED RELEASE ORAL EVERY 4 HOURS
Status: COMPLETED | OUTPATIENT
Start: 2019-01-08 | End: 2019-01-08

## 2019-01-08 RX ADMIN — SODIUM CHLORIDE TAB 1 GM 2 G: 1 TAB at 02:01

## 2019-01-08 RX ADMIN — CALCIUM CARBONATE-CHOLECALCIFEROL TAB 250 MG-125 UNIT 1 TABLET: 250-125 TAB at 08:01

## 2019-01-08 RX ADMIN — ASPIRIN 81 MG: 81 TABLET, COATED ORAL at 08:01

## 2019-01-08 RX ADMIN — HYDRALAZINE HYDROCHLORIDE AND ISOSORBIDE DINITRATE 1 TABLET: 37.5; 2 TABLET, FILM COATED ORAL at 04:01

## 2019-01-08 RX ADMIN — AMLODIPINE BESYLATE 10 MG: 10 TABLET ORAL at 08:01

## 2019-01-08 RX ADMIN — POTASSIUM CHLORIDE 30 MEQ: 750 CAPSULE, EXTENDED RELEASE ORAL at 05:01

## 2019-01-08 RX ADMIN — GABAPENTIN 200 MG: 100 CAPSULE ORAL at 03:01

## 2019-01-08 RX ADMIN — HYDRALAZINE HYDROCHLORIDE AND ISOSORBIDE DINITRATE 1 TABLET: 37.5; 2 TABLET, FILM COATED ORAL at 08:01

## 2019-01-08 RX ADMIN — LEVOFLOXACIN 250 MG: 250 TABLET, FILM COATED ORAL at 08:01

## 2019-01-08 RX ADMIN — LEVOTHYROXINE SODIUM 75 MCG: 75 TABLET ORAL at 08:01

## 2019-01-08 RX ADMIN — LABETALOL HYDROCHLORIDE 200 MG: 100 TABLET, FILM COATED ORAL at 08:01

## 2019-01-08 RX ADMIN — MAGNESIUM SULFATE IN WATER 2 G: 40 INJECTION, SOLUTION INTRAVENOUS at 05:01

## 2019-01-08 RX ADMIN — LOSARTAN POTASSIUM 100 MG: 50 TABLET, FILM COATED ORAL at 08:01

## 2019-01-08 RX ADMIN — ENOXAPARIN SODIUM 40 MG: 100 INJECTION SUBCUTANEOUS at 04:01

## 2019-01-08 RX ADMIN — SODIUM CHLORIDE TAB 1 GM 2 G: 1 TAB at 09:01

## 2019-01-08 RX ADMIN — LEVOTHYROXINE SODIUM 75 MCG: 75 TABLET ORAL at 05:01

## 2019-01-08 RX ADMIN — ACETAMINOPHEN 650 MG: 325 TABLET, FILM COATED ORAL at 03:01

## 2019-01-08 RX ADMIN — ATORVASTATIN CALCIUM 20 MG: 10 TABLET, FILM COATED ORAL at 08:01

## 2019-01-08 RX ADMIN — POTASSIUM CHLORIDE 30 MEQ: 750 CAPSULE, EXTENDED RELEASE ORAL at 08:01

## 2019-01-08 NOTE — PLAN OF CARE
Problem: Adult Inpatient Plan of Care  Goal: Plan of Care Review  Outcome: Ongoing (interventions implemented as appropriate)    No acute events throughout day. See vital signs and assessments in flowsheets. See below for updates on today's progress.     Pulmonary: pt on RA sats >90%, breath sounds clear and equal bilaterally, diminished in bases    Cardiovascular: HR 60s, -190/70-80 -120, team aware of high BP throughout shift Pulses 2+/2+    Neurological: AAOx4, PERRL 4mm. Follows commands and moves all extremities. Afebrile    Gastrointestinal: BS active, cardiac diet 1L fluid restriction    Genitourinary: Purwick in place, UOP 1950 clear yellow    Integumentary/Other: skin intact, edema in lower extremities, 2+. Foam dressings in place.    Infusions:     Mag 1.3 replaced with 2G IVPB, Potassium 3.4 replaced wit 30 mEq     Patient progressing towards goals as tolerated, plan of care communicated and reviewed with Kristin Quintanilla and family. All concerns addressed. Will continue to monitor.

## 2019-01-08 NOTE — PLAN OF CARE
Problem: Physical Therapy Goal  Goal: Physical Therapy Goal  Goals to be met by: 1/15/2019    Patient will increase functional independence with mobility by performin. Sit to stand transfer with Supervision using RW - not met  2. Gait  x 250 feet with Supervision using Rolling Walker. - not met  3. Lower extremity exercise program x20 reps per handout, with independence - not met    Outcome: Ongoing (interventions implemented as appropriate)  Eval completed and goals appropriate.

## 2019-01-08 NOTE — ASSESSMENT & PLAN NOTE
BP in /76 mm Hg, although at clinic was hypotensive.  Home meds - Amlodipine 10 mg OD, HCTZ 25 mg OD, Labetalol 200mg BID, Losartan 100 mg OD    - Continuing Amlodipine, Labetalol and losartan  - Stop HCTZ due to hyponatremia  - monitor BP

## 2019-01-08 NOTE — HOSPITAL COURSE
Patient came in with hyponatremia of 117 and was hypervolemic. Initially managed with lasix 80 mg iv once for HF, which she has a good response but NA did not improve. So was continued on fluid restriction and hydrochlorothiazide was stopped.  Pt appears dry, so Lasix stopped. NA stable at around 119, 120.  Head CT obtained for malignancy, which was negative. No sign of malignancy on CXR. TSH and cortisol wnl. Nephrology consulted on 1/9.

## 2019-01-08 NOTE — PROGRESS NOTES
[facility]  Critical Care Medicine  Progress Note    Patient Name: Kristin Quintanilla  MRN: [id number]  Admission Date: 1/7/2019  Hospital Length of Stay: 1 days  Code Status: Full Code  Attending Provider: Chucho Segundo MD  Primary Care Provider: Jairo Schmidt MD   Principal Problem: Chronic diastolic heart failure    Subjective:     HPI:  Kristin Quintanilla is a 88 y.o. lady with CAD with diastolic HF and mild AR, HTN, hyperlipidemia, hypothyroidism was sent here by her PCP  for SOB and generalized weaknesses. She developed progressive SOB and leg swelling 2 weeks ago. She believed she has a cold, along with productive cough ( yellowish) associated with SOB for which she was initially managed with a steroid injection and albuterol inhaler, in spite of it was getting worse, so she was started on Levaquin 2 days ago for acute bronchitis. She felt she was more SOB even at rest, especially while lying flat.  At baseline she is able to do her normal activities including cooking for herself. As per her son and grand daughter, she also is more confused at times compared to her baseline. She denies any chest pain, palpitations or fever.     She was sent by her PCP as she had was consistently hypotensive 110/40 mm HG. In the ED her Na was 117 and BNP was 539. CXR suggestive of pulmonary edema and left sided pleural effusion.  She started developing pain along the medial part of left thigh like cramps.    Hospital/ICU Course:  Patient came in with hyponatremia of 117 and was hypervolemic. Initially managed with lasix 80 mg iv once for HF, which she has a good response but NA did not improve. So was continued on fluid restriction and hydrochlorothiazide was stopped, following which her sodium has improved.    Interval History:  SOB and leg swelling have improved, doesn't seem as confused.   Her sodium is improving gradually.     Review of Systems   Constitutional: Positive for activity change and fatigue.  Negative for chills and fever.   HENT: Positive for rhinorrhea. Negative for sore throat and trouble swallowing.    Respiratory: Positive for cough (more dry) and shortness of breath. Negative for chest tightness.    Cardiovascular: Positive for leg swelling (Improved). Negative for chest pain and palpitations.   Gastrointestinal: Negative for abdominal distention, abdominal pain, diarrhea, nausea and vomiting.   Genitourinary: Negative for dysuria and hematuria.   Musculoskeletal: Positive for myalgias (Muscle cramps). Negative for arthralgias.   Skin: Negative for rash and wound.   Neurological: Negative for dizziness and headaches.   Psychiatric/Behavioral: Negative for agitation and confusion.     Objective:     Vital Signs (Most Recent):  Temp: 98.1 °F (36.7 °C) (01/08/19 0301)  Pulse: 62 (01/08/19 0730)  Resp: (!) 27 (01/08/19 0730)  BP: (!) 185/103 (01/08/19 0730)  SpO2: (!) 92 % (01/08/19 0730) Vital Signs (24h Range):  Temp:  [97 °F (36.1 °C)-98.5 °F (36.9 °C)] 98.1 °F (36.7 °C)  Pulse:  [55-79] 62  Resp:  [16-41] 27  SpO2:  [91 %-97 %] 92 %  BP: (106-205)/() 185/103   Weight: 53.8 kg (118 lb 9.7 oz)  Body mass index is 20.36 kg/m².      Intake/Output Summary (Last 24 hours) at 1/8/2019 0820  Last data filed at 1/8/2019 0601  Gross per 24 hour   Intake 875 ml   Output 3500 ml   Net -2625 ml       Physical Exam   Constitutional: She is oriented to person, place, and time. She appears well-developed.   HENT:   Head: Normocephalic and atraumatic.   Eyes: EOM are normal. Pupils are equal, round, and reactive to light.   Neck: Normal range of motion. No JVD present. No tracheal deviation present.   Cardiovascular: Normal rate and regular rhythm.   Murmur (Left sternal border) heard.  Pulmonary/Chest: Effort normal and breath sounds normal. She has no wheezes. She has no rales.   Abdominal: Soft. Bowel sounds are normal. She exhibits no distension. There is no tenderness.   Musculoskeletal: Normal range of  motion. She exhibits edema (B/l pitting (trace)).   Neurological: She is alert and oriented to person, place, and time. No cranial nerve deficit. She exhibits normal muscle tone.   Psychiatric: She has a normal mood and affect. Judgment normal.       Vents:     Lines/Drains/Airways     Drain            Female External Urinary Catheter 01/07/19 1620 less than 1 day          Peripheral Intravenous Line                 Peripheral IV - Single Lumen 01/07/19 1200 Left Antecubital less than 1 day         Peripheral IV - Single Lumen 01/08/19 0045 Anterior;Right Wrist less than 1 day              Significant Labs:    CBC/Anemia Profile:  Recent Labs   Lab 01/07/19  1201 01/07/19  1327 01/08/19  0310   WBC 9.62  --  8.53   HGB 9.1*  --  9.8*   HCT 27.3* 26* 28.2*     --  236   MCV 89  --  89   RDW 13.7  --  13.2        Chemistries:  Recent Labs   Lab 01/07/19  1201  01/07/19  2021 01/08/19  0021 01/08/19  0310   *   < > 117* 119* 120*   K 4.0   < > 3.7 3.5 3.4*   CL 87*   < > 83* 83* 83*   CO2 24   < > 26 29 27   BUN 17   < > 15 15 14   CREATININE 0.8   < > 0.8 0.8 0.8   CALCIUM 9.5   < > 9.8 9.7 9.8   ALBUMIN 2.6*  --   --   --   --    PROT 5.2*  --   --   --   --    BILITOT 0.8  --   --   --   --    ALKPHOS 82  --   --   --   --    ALT 38  --   --   --   --    AST 33  --   --   --   --    MG  --   --   --   --  1.3*    < > = values in this interval not displayed.       All pertinent labs within the past 24 hours have been reviewed.    Significant Imaging:  I have reviewed and interpreted all pertinent imaging results/findings within the past 24 hours.      ABG  No results for input(s): PH, PO2, PCO2, HCO3, BE in the last 168 hours.  Assessment/Plan:     Pulmonary   Acute bronchitis    She has cough with productive sputum yellowish associated with SOB. She is afebrile and WBC count is wnl.     - She was started on Levaquin 250 mg x 7 days, will complete the course     Cardiac/Vascular   * Chronic diastolic  heart failure    88 y.o. lady with progressive SOB at rest and leg swelling, , CXR suggestive of pulmonary edema. She has been drinking more water than usual, though is compliant with her medications.    ECHO 4/18 - Diastolic HF ( grade 2), EF 60-65%, mild AR with pericardial effusion, no wall motion abnormalities.    - Received one dose of Lasix 80 mg iv with good urine ouput. However since Na went further down will hold it for now.  - Control BP  - Strict I/O  - Daily weights  - Cardiac diet with Fluid restriction     ASCVD (arteriosclerotic cardiovascular disease)    - Continue Aspirin, Statin and control BP     Essential hypertension    BP in /76 mm Hg, although at clinic was hypotensive.  Home meds - Amlodipine 10 mg OD, HCTZ 25 mg OD, Labetalol 200mg BID, Losartan 100 mg OD    - Continuing Amlodipine, Labetalol and losartan  - Stop HCTZ due to hyponatremia  - monitor BP     Renal/   Hyponatremia    Patient NA - 117, she has muscle cramps and as per family slightly more confused. She has not had dizziness, seizures or loss of consciousness. She is hyponatremic most likely from Hypervolemia secondary to CHF, other differential include drug induced, unlikely from her current medications except thiazide. Her glucose in wnl. Her sodium got worse to 118 from 11 after lasix, so it was stopped. After fluid restriction and holding HCTZ her Na is improving. If trend is not further improving and may consider other euvolemic causes as serum osm <280. 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    - Will stop Hydrochlorothiazide  - Will Fluid restrict her to 1000 ml /day  - Goal to correct 6 mEq/l in 24 hr period  - BMP q4hrs  - Na has improved from 117 ->120  - check TSh and Cortisol.          Critical Care Daily Checklist:    A: Awake: RASS Goal/Actual Goal:    Actual: Padron Agitation Sedation Scale (RASS): Alert and calm   B: Spontaneous Breathing Trial  Performed?     C: SAT & SBT Coordinated?  -                      D: Delirium: CAM-ICU Overall CAM-ICU: Negative   E: Early Mobility Performed? Yes   F: Feeding Goal:    Status:     Current Diet Order   Procedures    Diet Cardiac     Fluid Restriction to <1000 ml / day      AS: Analgesia/Sedation -   T: Thromboembolic Prophylaxis Yes   H: HOB > 300 Yes   U: Stress Ulcer Prophylaxis (if needed) -   G: Glucose Control -   B: Bowel Function     I: Indwelling Catheter (Lines & Miller) Necessity -   D: De-escalation of Antimicrobials/Pharmacotherapies -    Plan for the day/ETD -    Code Status:  Family/Goals of Care: Full Code  -       Critical secondary to Patient has a condition that poses threat to life and bodily function: Hyponatremia      Critical care was time spent personally by me on the following activities: development of treatment plan with patient or surrogate and bedside caregivers, discussions with consultants, evaluation of patient's response to treatment, examination of patient, ordering and performing treatments and interventions, ordering and review of laboratory studies, ordering and review of radiographic studies, pulse oximetry, re-evaluation of patient's condition. This critical care time did not overlap with that of any other provider or involve time for any procedures.     Ariela Kam MD  Critical Care Medicine  [facility]

## 2019-01-08 NOTE — PLAN OF CARE
Problem: Occupational Therapy Goal  Goal: Occupational Therapy Goal  Goals to be met by: 7 days 1/15/19     Patient will increase functional independence with ADLs by performing:    UE Dressing with Supervision.  LE Dressing with Supervision.  Grooming while standing with Supervision.  Toileting from toilet with Supervision for hygiene and clothing management.   Toilet transfer to toilet with Supervision.    Goals and POC established today

## 2019-01-08 NOTE — H&P
[facility]  Critical Care Medicine  Progress Note    Patient Name: Kristin Quintanilla  MRN: [id number]  Admission Date: 1/7/2019  Hospital Length of Stay: 0 days  Code Status: Full Code  Attending Provider: Connor Morocho DO  Primary Care Provider: Jairo Schmidt MD   Principal Problem: Chronic diastolic heart failure    Subjective:     HPI:  Kristin Quintanilla is a 88 y.o. lady with CAD with diastolic HF and mild AR, HTN, hyperlipidemia, hypothyroidism was sent here by her PCP  for SOB and generalized weaknesses. She developed progressive SOB and leg swelling 2 weeks ago. She believed she has a cold, along with productive cough ( yellowish) associated with SOB for which she was initially managed with a steroid injection and albuterol inhaler, in spite of it was getting worse, so she was started on Levaquin 2 days ago for acute bronchitis. She felt she was more SOB even at rest, especially while lying flat.  At baseline she is able to do her normal activities including cooking for herself. As per her son and grand daughter, she also is more confused at times compared to her baseline. She denies any chest pain, palpitations or fever.     She was sent by her PCP as she had was consistently hypotensive 110/40 mm HG. In the ED her Na was 117 and BNP was 539. CXR suggestive of pulmonary edema and left sided pleural effusion.  She started developing pain along the medial part of left thigh like cramps.    Hospital/ICU Course:  No notes on file      Review of Systems   Constitutional: Positive for activity change and fatigue. Negative for chills and fever.   HENT: Positive for rhinorrhea. Negative for sore throat and trouble swallowing.    Respiratory: Positive for cough (Productive (yellowish)) and shortness of breath. Negative for chest tightness.    Cardiovascular: Positive for leg swelling. Negative for chest pain and palpitations.   Gastrointestinal: Negative for abdominal distention, abdominal pain,  diarrhea, nausea and vomiting.   Genitourinary: Negative for dysuria and hematuria.   Musculoskeletal: Positive for myalgias (Muscle cramps). Negative for arthralgias.   Skin: Negative for rash and wound.   Neurological: Negative for dizziness and headaches.   Psychiatric/Behavioral: Positive for confusion. Negative for agitation.     Objective:     Vital Signs (Most Recent):  Temp: 97.8 °F (36.6 °C) (01/07/19 1400)  Pulse: 69 (01/07/19 1503)  Resp: 18 (01/07/19 1503)  BP: (!) 171/73 (01/07/19 1447)  SpO2: 95 % (01/07/19 1447) Vital Signs (24h Range):  Temp:  [97 °F (36.1 °C)-98 °F (36.7 °C)] 97.8 °F (36.6 °C)  Pulse:  [55-79] 69  Resp:  [16-20] 18  SpO2:  [95 %-97 %] 95 %  BP: (106-205)/(38-83) 171/73   Weight: 54.4 kg (120 lb)  Body mass index is 20.6 kg/m².    No intake or output data in the 24 hours ending 01/07/19 1525    Physical Exam   Constitutional: She is oriented to person, place, and time. She appears well-developed.   HENT:   Head: Normocephalic and atraumatic.   Eyes: EOM are normal. Pupils are equal, round, and reactive to light.   Neck: Normal range of motion. No JVD present. No tracheal deviation present.   Cardiovascular: Normal rate and regular rhythm.   Murmur (Left sternal border) heard.  Pulmonary/Chest: Effort normal and breath sounds normal. She has no wheezes. She has no rales.   Abdominal: Soft. Bowel sounds are normal. She exhibits no distension. There is no tenderness.   Musculoskeletal: Normal range of motion. She exhibits edema (B/l pitting).   Neurological: She is alert and oriented to person, place, and time. No cranial nerve deficit. She exhibits normal muscle tone.   Psychiatric: She has a normal mood and affect. Judgment normal.       Vents:     Lines/Drains/Airways     Peripheral Intravenous Line                 Peripheral IV - Single Lumen 01/07/19 1200 Left Antecubital less than 1 day              Significant Labs:    CBC/Anemia Profile:  Recent Labs   Lab 01/07/19  1201  01/07/19  1327   WBC 9.62  --    HGB 9.1*  --    HCT 27.3* 26*     --    MCV 89  --    RDW 13.7  --         Chemistries:  Recent Labs   Lab 01/07/19  1201   *   K 4.0   CL 87*   CO2 24   BUN 17   CREATININE 0.8   CALCIUM 9.5   ALBUMIN 2.6*   PROT 5.2*   BILITOT 0.8   ALKPHOS 82   ALT 38   AST 33       All pertinent labs within the past 24 hours have been reviewed.    Significant Imaging:  I have reviewed and interpreted all pertinent imaging results/findings within the past 24 hours.      ABG  No results for input(s): PH, PO2, PCO2, HCO3, BE in the last 168 hours.  Assessment/Plan:     Pulmonary   Acute bronchitis    She has cough with productive sputum yellowish associated with SOB. She is afebrile and WBC count is wnl.     - She was started on Levaquin 250 mg x 7 days, will complete the course     Cardiac/Vascular   * Chronic diastolic heart failure    88 y.o. lady with progressive SOB at rest and leg swelling, , CXR suggestive of pulmonary edema. She has been drinking more water than usual, though is compliant with her medications.    ECHO 4/18 - Diastolic HF ( grade 2), EF 60-65%, mild AR with pericardial effusion, no wall motion abnormalities.    - Start with Lasix 80 mg iv, check her response and dose appropriately after that.  - Monitor BMP   - Control BP  - Strict I/O  - Daily weights  - Cardiac diet with Fluid restriction     ASCVD (arteriosclerotic cardiovascular disease)    - Continue Aspirin, Statin and control BP     Essential hypertension    BP in /76 mm Hg, although at clinic was hypotensive.  Home meds - Amlodipine 10 mg OD, HCTZ 25 mg OD, Labetalol 200mg BID, Losartan 100 mg OD    - Continuing Amlodipine, Labetalol and losartan  - Stop HCTZ due to hyponatremia     Renal/   Hyponatremia    Patient NA - 117, she has muscle cramps and as per family slightly more confused. She has not no headache, seizures or loss of consciousness. She is hyponatremic most likely from  Hypervolemia secondary to CHF, other differential include drug induced, unlikely from her current medications except thiazide. Her glucose in wnl. If she is refractory to correct management than will consider other causes. 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    - Will stop Hydrochlorothiazide  - Will Fluid restrict her to 1000 ml /day  - Treat CHF with Lasix  - Goal to correct 6 mEq/l in 24 hr period  - BNP q4hrs          Critical Care Daily Checklist:    A: Awake: RASS Goal/Actual Goal:    Actual:     B: Spontaneous Breathing Trial Performed?     C: SAT & SBT Coordinated?  -                      D: Delirium: CAM-ICU     E: Early Mobility Performed? Yes   F: Feeding Goal:    Status:     Current Diet Order   Procedures    Diet Cardiac     Fluid Restriction to <1000 ml / day      AS: Analgesia/Sedation --   T: Thromboembolic Prophylaxis Yes   H: HOB > 300 Yes   U: Stress Ulcer Prophylaxis (if needed) -   G: Glucose Control -   B: Bowel Function     I: Indwelling Catheter (Lines & Miller) Necessity -   D: De-escalation of Antimicrobials/Pharmacotherapies -    Plan for the day/ETD -    Code Status:  Family/Goals of Care: Full Code  -       Critical secondary to Patient has a condition that poses threat to life and bodily function: Hyponatremia      Critical care was time spent personally by me on the following activities: development of treatment plan with patient or surrogate and bedside caregivers, discussions with consultants, evaluation of patient's response to treatment, examination of patient, ordering and performing treatments and interventions, ordering and review of laboratory studies, ordering and review of radiographic studies, pulse oximetry, re-evaluation of patient's condition. This critical care time did not overlap with that of any other provider or involve time for any procedures.     Ariela Kam MD  Critical Care Medicine  [facility]

## 2019-01-08 NOTE — PLAN OF CARE
Jairo Schmidt MD  [address] / [place] [address]    [facility] [place] [address]  [address]  [place] [address]  Phone: [phone] Fax: [phone]    [facility] - [place], [place] - [address]  [address]  [facility] [address]  Phone: [phone] Fax: [phone]    [id number] - [place], [place] - [address]  [address]  [place] [address]  Phone: [phone] Fax: [phone]    Payor: regist[employer] MANAGED MEDICARE / Plan: registracija vo[employer]ila TOTAL CARE ADVANTAGE / Product Type: Medicare Advantage /     No future appointments.     Extended Emergency Contact Information  Primary Emergency Contact: Ty Quintanilla   [place]  Work Phone: [phone]  Mobile Phone: [phone]  Relation: Son  Secondary Emergency Contact: Margy Quintanilla   [place]  Mobile Phone: [phone]  Relation: Relative        01/08/19 1159   Discharge Assessment   Assessment Type Discharge Planning Assessment   Confirmed/corrected address and phone number on facesheet? Yes   Assessment information obtained from? Patient;Medical Record   Expected Length of Stay (days) 4   Communicated expected length of stay with patient/caregiver no   Prior to hospitilization cognitive status: Alert/Oriented   Prior to hospitalization functional status: Assistive Equipment   Current cognitive status: Alert/Oriented   Current Functional Status: Assistive Equipment;Needs Assistance   Facility Arrived From: Direct admit from PCP through ED   Lives With alone   Able to Return to Prior Arrangements yes   Is patient able to care for self after discharge? Unable to determine at this time (comments)   Who are your caregiver(s) and their phone number(s)? Ty Quintanilla (Son) [phone]; [phone]      Patient's perception of discharge disposition home or selfcare   Readmission Within the Last 30 Days no  previous admission in last 30 days   Patient currently being followed by outpatient case management? No   Patient currently receives any other outside agency services? No   Equipment Currently Used at Home rollator;shower chair;cane, straight   Do you have any problems affording any of your prescribed medications? No   Is the patient taking medications as prescribed? yes   Does the patient have transportation home? Yes   Transportation Anticipated family or friend will provide   Does the patient receive services at the [facility]? No   Discharge Plan A Home Health;Home with family   Discharge Plan B Skilled Nursing Facility   Patient/Family in Agreement with Plan yes   Zee Tatum RN, BSN, CCM  Case Management  [facility]  Ext. [phone]

## 2019-01-08 NOTE — ASSESSMENT & PLAN NOTE
Patient NA - 117, she has muscle cramps and as per family slightly more confused. She has not had dizziness, seizures or loss of consciousness. She is hyponatremic most likely from Hypervolemia secondary to CHF, other differential include drug induced, unlikely from her current medications except thiazide. Her glucose in wnl. Her sodium got worse to 118 from 11 after lasix, so it was stopped. After fluid restriction and holding HCTZ her Na is improving. If trend is not further improving and may consider other euvolemic causes as serum osm <280. 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    - Na has stable and not moving from 117-120 range  - Will hold Hydrochlorothiazide  -  TSh and Cortisol wnl  - continueFluid restrict 1000 ml /day  - Goal to correct 6 mEq/l in 24 hr period  - BMP q4hrs  -inpatient consult to nephrology, appreciate recs

## 2019-01-08 NOTE — PLAN OF CARE
On Call SW:    Per mauricio, pt will need home health upon dc.  SW met w/family to discuss dc plan and pt along w/sons were agreeable to plan.  [facility].  SW also provided family w/a resource guide booklet for choice of private sitter service.      DC date has not been determined @ this time.    Myles Magana, SABRINA  Ext. [phone]

## 2019-01-08 NOTE — PT/OT/SLP EVAL
"Physical Therapy Evaluation    Patient Name:  Kristin Quintanilla   MRN:  [id number]    Recommendations:     Discharge Recommendations:  (home with HH)   Discharge Equipment Recommendations: none   Barriers to discharge: None    Assessment:     Kristin Quintanilla is a 88 y.o. female admitted with a medical diagnosis of Chronic diastolic heart failure.  She presents with the following impairments/functional limitations:  weakness, impaired endurance, gait instability, impaired balance, impaired cardiopulmonary response to activity. Pt uses a rollator at home. Pt would benefit from use of a RW in the hospital to increase functional mobility with staff and to decrease fall risk. RW ordered for room.     Rehab Prognosis: Good; patient would benefit from acute skilled PT services to address these deficits and reach maximum level of function.    Recent Surgery: * No surgery found *      Plan:     During this hospitalization, patient to be seen 3 x/week to address the identified rehab impairments via gait training, therapeutic activities, therapeutic exercises, neuromuscular re-education and progress toward the following goals:    · Plan of Care Expires:  02/07/19    Subjective     Chief Complaint: "weakness"  Patient/Family Comments/goals: to get better and return home   Pain/Comfort:  · Pain Rating 1: 0/10  · Pain Rating Post-Intervention 1: 0/10    Patients cultural, spiritual, Amish conflicts given the current situation: no    Living Environment:  Pt lives alone in a [facility].   Prior to admission, patients level of function was mod indep with ambulation (rollator) and indep with ADL's.  Equipment used at home: rollator, shower chair.  DME owned (not currently used): none.  Upon discharge, patient will have assistance from lizbet. Lizbet checks in on pt frequently.     Objective:     Communicated with RN prior to session.  Patient found in bed with telemetry, pulse ox (continuous), blood pressure cuff, peripheral IV, " Rosmery  upon PT entry to room.    General Precautions: Standard, fall   Orthopedic Precautions:N/A   Braces: N/A     Exams:  · Cognitive Exam:    · AAOx4  · Follows multistep commands  · Communication clear/fluent   · RLE ROM: WFL  · RLE Strength: grossly 4/5  · LLE ROM: WFL  · LLE Strength: grossly 4/5    Functional Mobility:  · Bed Mobility:     · Rolling Right: stand by assistance  · Scooting: stand by assistance  · Supine to Sit: stand by assistance; pt required increased time   · Transfers:     · Sit to Stand:  contact guard assistance with no AD from EOB  · Gait: 10ft, 10ft with CGA (L HHA); pt demo'd decreased mario, decreased step length; no over LOB but pt unsteady  · Pt would benefit from use of RW      Therapeutic Activities and Exercises:  Educated pt on PT role/POC  Educated pt on importance of OOB activity   Pt verbalized understanding    Resting HR:106 bpm  HR with activity:140 bpm  HR end of session: 150 bpm  HR after session once supine with HOB elevated: 130 bpm  RN aware of all HR's    Standing at sink x 3 minutes with CGA to perform ADL's    T/f to chair to increase tolerance to OOB activity and to create optimal positioning for lung expansion     AM-PAC 6 CLICK MOBILITY  Total Score:17     Patient left up in chair with all lines intact, call button in reach, RN notified and granddaughter present  present.    GOALS:   Multidisciplinary Problems     Physical Therapy Goals        Problem: Physical Therapy Goal    Goal Priority Disciplines Outcome Goal Variances Interventions   Physical Therapy Goal     PT, PT/OT Ongoing (interventions implemented as appropriate)     Description:  Goals to be met by: 1/15/2019    Patient will increase functional independence with mobility by performin. Sit to stand transfer with Supervision using RW - not met  2. Gait  x 250 feet with Supervision using Rolling Walker. - not met  3. 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      Arthritis     ASCVD (arteriosclerotic cardiovascular disease) 7/8/2014    Atherosclerosis of aorta 1/7/2016    "There is atherosclerosis of the thoracic aorta" - Xray Chest 7-     Back pain     Basal cell carcinoma 10/7/2013    Right nasal columellar, right lower eyelid, left medial eyelid    Basal cell carcinoma 2/2015    mid back    Chronic diastolic heart failure 11/11/2014    Coronary artery disease     Gastric ulcer     Fosamax related.    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     Hypothyroidism (acquired) 9/27/2016    Joint pain     Lymphedema of Neck 3/17/2016    Occlusion and stenosis of carotid artery without mention of cerebral infarction     Pelvic mass in female 7/21/2015    Polyneuropathy in other diseases classified elsewhere 1/7/2016 6-     Squamous cell carcinoma of scalp 5/2014    scalp vertex with + LN met 10/14 s/p radiation    Ulcer        Past Surgical History:   Procedure Laterality Date    ADJACENT TISSUE TRANSFER/REARRANGEMENT N/A 5/14/2014    Performed by [facility]allos [facility] at [facility] OR 2ND FLR    APPENDECTOMY  8.14.2015    mucinous cystadenoma with low grade dysplasia.     APPENDECTOMY-LAPAROSCOPIC N/A 8/14/2015    Performed by Feroz Corado MD at [facility] OR 2ND [facility]LR    BIOPSY-LYMPH NODE Right 10/27/2014    Performed by [facility]os [facility] at [facility] OR 2ND [facility]LR    CATARACT EXTRACTION      ou dr morales    EYE SURGERY      HYSTERECTOMY  1970     NIC/BSO. took HRT immediatiely after wards.     LAPAROSCOPY-DIAGNOSTIC  8/14/2015    Performed by Gianfranco Crenshaw MD at [facility] OR 2ND FLR    LYMPH NODE BIOPSY Right 10/27/2014    posterior " triangle    Mohs excision of scalp SCC N/A 5/13/2014    Scalp flap N/A 5/14/204    posterior advancement-rotation    SKIN CANCER EXCISION      THYROIDECTOMY  1960's    hyperthyroidism       Time Tracking:     PT Received On: 01/08/19  PT Start Time: 0804     PT Stop Time: 0833  PT Total Time (min): 29 min     Billable Minutes: Evaluation 10 and Gait Training 8    Gaye Valentin, PT, DPT  1/8/2019  [phone]

## 2019-01-08 NOTE — SUBJECTIVE & OBJECTIVE
Interval History:  SOB and leg swelling have improved, doesn't seem as confused.   Her sodium is improving gradually.     Review of Systems   Constitutional: Positive for activity change and fatigue. Negative for chills and fever.   HENT: Positive for rhinorrhea. Negative for sore throat and trouble swallowing.    Respiratory: Positive for cough (more dry) and shortness of breath. Negative for chest tightness.    Cardiovascular: Positive for leg swelling (Improved). Negative for chest pain and palpitations.   Gastrointestinal: Negative for abdominal distention, abdominal pain, diarrhea, nausea and vomiting.   Genitourinary: Negative for dysuria and hematuria.   Musculoskeletal: Positive for myalgias (Muscle cramps). Negative for arthralgias.   Skin: Negative for rash and wound.   Neurological: Negative for dizziness and headaches.   Psychiatric/Behavioral: Negative for agitation and confusion.     Objective:     Vital Signs (Most Recent):  Temp: 98.1 °F (36.7 °C) (01/08/19 0301)  Pulse: 62 (01/08/19 0730)  Resp: (!) 27 (01/08/19 0730)  BP: (!) 185/103 (01/08/19 0730)  SpO2: (!) 92 % (01/08/19 0730) Vital Signs (24h Range):  Temp:  [97 °F (36.1 °C)-98.5 °F (36.9 °C)] 98.1 °F (36.7 °C)  Pulse:  [55-79] 62  Resp:  [16-41] 27  SpO2:  [91 %-97 %] 92 %  BP: (106-205)/() 185/103   Weight: 53.8 kg (118 lb 9.7 oz)  Body mass index is 20.36 kg/m².      Intake/Output Summary (Last 24 hours) at 1/8/2019 0820  Last data filed at 1/8/2019 0601  Gross per 24 hour   Intake 875 ml   Output 3500 ml   Net -2625 ml       Physical Exam   Constitutional: She is oriented to person, place, and time. She appears well-developed.   HENT:   Head: Normocephalic and atraumatic.   Eyes: EOM are normal. Pupils are equal, round, and reactive to light.   Neck: Normal range of motion. No JVD present. No tracheal deviation present.   Cardiovascular: Normal rate and regular rhythm.   Murmur (Left sternal border) heard.  Pulmonary/Chest: Effort  normal and breath sounds normal. She has no wheezes. She has no rales.   Abdominal: Soft. Bowel sounds are normal. She exhibits no distension. There is no tenderness.   Musculoskeletal: Normal range of motion. She exhibits edema (B/l pitting (trace)).   Neurological: She is alert and oriented to person, place, and time. No cranial nerve deficit. She exhibits normal muscle tone.   Psychiatric: She has a normal mood and affect. Judgment normal.       Vents:     Lines/Drains/Airways     Drain            Female External Urinary Catheter 01/07/19 1620 less than 1 day          Peripheral Intravenous Line                 Peripheral IV - Single Lumen 01/07/19 1200 Left Antecubital less than 1 day         Peripheral IV - Single Lumen 01/08/19 0045 Anterior;Right Wrist less than 1 day              Significant Labs:    CBC/Anemia Profile:  Recent Labs   Lab 01/07/19  1201 01/07/19  1327 01/08/19  0310   WBC 9.62  --  8.53   HGB 9.1*  --  9.8*   HCT 27.3* 26* 28.2*     --  236   MCV 89  --  89   RDW 13.7  --  13.2        Chemistries:  Recent Labs   Lab 01/07/19  1201  01/07/19  2021 01/08/19  0021 01/08/19  0310   *   < > 117* 119* 120*   K 4.0   < > 3.7 3.5 3.4*   CL 87*   < > 83* 83* 83*   CO2 24   < > 26 29 27   BUN 17   < > 15 15 14   CREATININE 0.8   < > 0.8 0.8 0.8   CALCIUM 9.5   < > 9.8 9.7 9.8   ALBUMIN 2.6*  --   --   --   --    PROT 5.2*  --   --   --   --    BILITOT 0.8  --   --   --   --    ALKPHOS 82  --   --   --   --    ALT 38  --   --   --   --    AST 33  --   --   --   --    MG  --   --   --   --  1.3*    < > = values in this interval not displayed.       All pertinent labs within the past 24 hours have been reviewed.    Significant Imaging:  I have reviewed and interpreted all pertinent imaging results/findings within the past 24 hours.

## 2019-01-08 NOTE — PT/OT/SLP EVAL
"Occupational Therapy   Evaluation    Name: Kristin Quintanilla  MRN: [id number]  Admitting Diagnosis:  Chronic diastolic heart failure    PT admitted with SOB/weakness x 2 weeks. Pt found to have left side pleural effusion with pulmonary edema.   Recommendations:     Discharge Recommendations:  Home with fly support;     History:     Occupational Profile:  Living Environment: pt reports she lives lone in in one story home with one step to enter. Pt also must naviget one step in the home to access her laundry  Previous level of function: Pt reports she was MOD I with rollator prior to arrival. Pt reports she has slowly been declining in functional skills immediately prior to admit.   Equipment Used at Home:  rollator, shower chair  Assistance upon Discharge: pt reports she has sons who live locally who are supportive.     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      Arthritis     ASCVD (arteriosclerotic cardiovascular disease) 7/8/2014    Atherosclerosis of aorta 1/7/2016    "There is atherosclerosis of the thoracic aorta" - Xray Chest 7-     Back pain     Basal cell carcinoma 10/7/2013    Right nasal columellar, right lower eyelid, left medial eyelid    Basal cell carcinoma 2/2015    mid back    Chronic diastolic heart failure 11/11/2014    Coronary artery disease     Gastric ulcer     Fosamax related.    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     Hypothyroidism (acquired) 9/27/2016    Joint pain     Lymphedema of Neck 3/17/2016    Occlusion and stenosis of carotid artery without mention of cerebral infarction     Pelvic mass in female 7/21/2015    Polyneuropathy in other diseases classified elsewhere 1/7/2016 6-     Squamous cell carcinoma of scalp 5/2014    " "scalp vertex with + LN met 10/14 s/p radiation    Ulcer        Past Surgical History:   Procedure Laterality Date    ADJACENT TISSUE TRANSFER/REARRANGEMENT N/A 5/14/2014    [facility]    APPENDECTOMY  8.14.2015    mucinous cystadenoma with low grade dysplasia.     APPENDECTOMY-LAPAROSCOPIC N/A 8/14/2015    Performed by Feroz Corado MD at [facility] OR 2ND FLR    BIOPSY-LYMPH NODE Right 10/27/2014    [facility]    CATARACT EXTRACTION      ou dr morales    EYE SURGERY      HYSTERECTOMY  1970     NIC/BSO. took HRT immediatiely after wards.     LAPAROSCOPY-DIAGNOSTIC  8/14/2015    [facility]    LYMPH NODE BIOPSY Right 10/27/2014    posterior triangle    Mohs excision of scalp SCC N/A 5/13/2014    Scalp flap N/A 5/14/204    posterior advancement-rotation    SKIN CANCER EXCISION      THYROIDECTOMY  1960's    hyperthyroidism       Subjective     "I feel weak" pt reports.     Pain/Comfort:  · Pain Rating 1: 0/10    Patients cultural, spiritual, Orthodoxy conflicts given the current situation:    None stated   Objective:     Communicated with: nsg  prior to session.    Pt found supine in bed and agreeable to therapy.     General Precautions: Standard, fall       Occupational Performance:    Bed Mobility:    · Supine>sit with SBA    Functional Mobility/Transfers:  · Pt completed sit>stand with CGA    Activities of Daily Living:  · Feeding: set-up  · G/H: standing with CGA  · UE dressing: MIN A   · LE dressing: MAX A     Cognitive/Visual Perceptual  Pt awake, alert and orientated. Pt with pleasant and appropriate affect.     Physical Exam:  Pt is right hand dominant and demo WFL UE strength/ROM. Pt reports numbness/tingling in hands since prior to admit. Pt reports often dropping things at home. Pt has moderate joint deformities in B fingers which limit ROM of the fingers especially right " "dominant hand.     [facility] 6 Click ADL:  AMPA Total Score: 19    Treatment & Education:  Pt completed functional mobility in room with CGA. Pt is unsteady during mobility but moves slowly and cautiously. Education provided re: OT POC and safety with functional mobility/ADL skills. Pt receptive.   Education:    Patient left up in chair with all lines intact, call button in reach and nsg and granddaughter present    Assessment:     Kristin Quintanilla is a 88 y.o. female with a medical diagnosis of Chronic diastolic heart failure.  She presents with the following performance deficits affecting function: weakness, impaired self care skills, impaired balance, impaired functional mobilty, impaired endurance, gait instability.  Pt tolerated session well with good effort. Pt noted to have HR approx 68 BPM which elevated at times to >100BPM but this HR was not sustained.  Pt may benefit from use of RW next session and OT anticipates pt will progress well enough for d/c home with fly support. However, pt appears  realistic and reports she may have to make alternate plans for d/c if she is not strong enough for d/c home as PT.    Rehab Prognosis: Good; patient would benefit from acute skilled OT services to address these deficits and reach maximum level of function.         Clinical Decision Makin.  OT Low:  "Pt evaluation falls under low complexity for evaluation coding due to performance deficits noted in 1-3 areas as stated above and 0 co-morbities affecting current functional status. Data obtained from problem focused assessments. No modifications or assistance was required for completion of evaluation. Only brief occupational profile and history review completed."     Plan:     Patient to be seen 3 x/week to address the above listed problems via self-care/home management, therapeutic activities, therapeutic exercises  · Plan of Care Expires:    · Plan of Care Reviewed with: patient    This Plan of care has been " discussed with the patient who was involved in its development and understands and is in agreement with the identified goals and treatment plan    GOALS:   Multidisciplinary Problems     Occupational Therapy Goals        Problem: Occupational Therapy Goal    Goal Priority Disciplines Outcome Interventions   Occupational Therapy Goal     OT, PT/OT     Description:  Goals to be met by: 7 days 1/15/19     Patient will increase functional independence with ADLs by performing:    UE Dressing with Supervision.  LE Dressing with Supervision.  Grooming while standing with Supervision.  Toileting from toilet with Supervision for hygiene and clothing management.   Toilet transfer to toilet with Supervision.                      Time Tracking:     OT Date of Treatment: 01/08/19  OT Start Time: 0830  OT Stop Time: 0900  OT Total Time (min): 30 min    Billable Minutes:Evaluation 30    NOE White  1/8/2019

## 2019-01-08 NOTE — ASSESSMENT & PLAN NOTE
Patient NA - 117, she has muscle cramps and as per family slightly more confused. She has not no headache, seizures or loss of consciousness. She is hyponatremic most likely from Hypervolemia secondary to CHF, other differential include drug induced, unlikely from her current medications except thiazide. Her glucose in wnl. If she is refractory to correct management than will consider other causes. 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  Her sodium got worse to 118 from 11 after lasix, so it was stopped. After fluid restriction and holding HCTZ her Na is improving.    - Will stop Hydrochlorothiazide  - Will Fluid restrict her to 1000 ml /day  - Goal to correct 6 mEq/l in 24 hr period  - BMP q4hrs  - Na has improved from 117 ->120

## 2019-01-08 NOTE — ASSESSMENT & PLAN NOTE
88 y.o. lady with progressive SOB at rest and leg swelling, , CXR suggestive of pulmonary edema. She has been drinking more water than usual, though is compliant with her medications.    ECHO 4/18 - Diastolic HF ( grade 2), EF 60-65%, mild AR with pericardial effusion, no wall motion abnormalities.    - Received one dose of Lasix 80 mg iv with good urine ouput. However since Na went further down will hold it for now.  - Control BP  - Strict I/O  - Daily weights  - Cardiac diet with Fluid restriction

## 2019-01-08 NOTE — PLAN OF CARE
Pt has been declined by [facility] as facility doesn't have enough staff to accommodate any new patients.  [facility] ([facility] is unable to accept any new patients as they are full w/this area of patients.     SW will speak w/son for another h/h preference.    Myles Magana, [facility] [place]  Ext. [phone]

## 2019-01-09 ENCOUNTER — PATIENT MESSAGE (OUTPATIENT)
Dept: CARDIOLOGY | Facility: CLINIC | Age: 84
End: 2019-01-09

## 2019-01-09 ENCOUNTER — PATIENT MESSAGE (OUTPATIENT)
Dept: FAMILY MEDICINE | Facility: CLINIC | Age: 84
End: 2019-01-09

## 2019-01-09 LAB
ANION GAP SERPL CALC-SCNC: 4 MMOL/L
ANION GAP SERPL CALC-SCNC: 5 MMOL/L
ANION GAP SERPL CALC-SCNC: 5 MMOL/L
ANION GAP SERPL CALC-SCNC: 6 MMOL/L
ANION GAP SERPL CALC-SCNC: 6 MMOL/L
BASOPHILS # BLD AUTO: 0.02 K/UL
BASOPHILS NFR BLD: 0.2 %
BUN SERPL-MCNC: 19 MG/DL
BUN SERPL-MCNC: 22 MG/DL
BUN SERPL-MCNC: 23 MG/DL
BUN SERPL-MCNC: 24 MG/DL
BUN SERPL-MCNC: 25 MG/DL
CALCIUM SERPL-MCNC: 8.6 MG/DL
CALCIUM SERPL-MCNC: 8.9 MG/DL
CALCIUM SERPL-MCNC: 8.9 MG/DL
CALCIUM SERPL-MCNC: 9 MG/DL
CALCIUM SERPL-MCNC: 9.4 MG/DL
CHLORIDE SERPL-SCNC: 86 MMOL/L
CHLORIDE SERPL-SCNC: 87 MMOL/L
CHLORIDE SERPL-SCNC: 88 MMOL/L
CHLORIDE SERPL-SCNC: 88 MMOL/L
CHLORIDE SERPL-SCNC: 89 MMOL/L
CO2 SERPL-SCNC: 26 MMOL/L
CO2 SERPL-SCNC: 27 MMOL/L
CO2 SERPL-SCNC: 28 MMOL/L
CO2 SERPL-SCNC: 29 MMOL/L
CO2 SERPL-SCNC: 29 MMOL/L
CREAT SERPL-MCNC: 0.9 MG/DL
CREAT SERPL-MCNC: 1.1 MG/DL
CREAT SERPL-MCNC: 1.2 MG/DL
CREAT SERPL-MCNC: 1.2 MG/DL
CREAT SERPL-MCNC: 1.3 MG/DL
DIFFERENTIAL METHOD: ABNORMAL
EOSINOPHIL # BLD AUTO: 0.3 K/UL
EOSINOPHIL NFR BLD: 3.6 %
ERYTHROCYTE [DISTWIDTH] IN BLOOD BY AUTOMATED COUNT: 13.5 %
EST. GFR  (AFRICAN AMERICAN): 42.3 ML/MIN/1.73 M^2
EST. GFR  (AFRICAN AMERICAN): 46.6 ML/MIN/1.73 M^2
EST. GFR  (AFRICAN AMERICAN): 46.6 ML/MIN/1.73 M^2
EST. GFR  (AFRICAN AMERICAN): 51.8 ML/MIN/1.73 M^2
EST. GFR  (AFRICAN AMERICAN): >60 ML/MIN/1.73 M^2
EST. GFR  (NON AFRICAN AMERICAN): 36.7 ML/MIN/1.73 M^2
EST. GFR  (NON AFRICAN AMERICAN): 40.4 ML/MIN/1.73 M^2
EST. GFR  (NON AFRICAN AMERICAN): 40.4 ML/MIN/1.73 M^2
EST. GFR  (NON AFRICAN AMERICAN): 44.9 ML/MIN/1.73 M^2
EST. GFR  (NON AFRICAN AMERICAN): 57.3 ML/MIN/1.73 M^2
GLUCOSE SERPL-MCNC: 100 MG/DL
GLUCOSE SERPL-MCNC: 103 MG/DL
GLUCOSE SERPL-MCNC: 118 MG/DL
GLUCOSE SERPL-MCNC: 126 MG/DL
GLUCOSE SERPL-MCNC: 97 MG/DL
HCT VFR BLD AUTO: 24.9 %
HGB BLD-MCNC: 8.6 G/DL
IMM GRANULOCYTES # BLD AUTO: 0.03 K/UL
IMM GRANULOCYTES NFR BLD AUTO: 0.3 %
LYMPHOCYTES # BLD AUTO: 0.6 K/UL
LYMPHOCYTES NFR BLD: 7 %
MAGNESIUM SERPL-MCNC: 1.8 MG/DL
MCH RBC QN AUTO: 31.2 PG
MCHC RBC AUTO-ENTMCNC: 34.5 G/DL
MCV RBC AUTO: 90 FL
MONOCYTES # BLD AUTO: 0.9 K/UL
MONOCYTES NFR BLD: 10.6 %
NEUTROPHILS # BLD AUTO: 6.8 K/UL
NEUTROPHILS NFR BLD: 78.3 %
NRBC BLD-RTO: 0 /100 WBC
PLATELET # BLD AUTO: 229 K/UL
PMV BLD AUTO: 10.6 FL
POTASSIUM SERPL-SCNC: 3.9 MMOL/L
POTASSIUM SERPL-SCNC: 4.4 MMOL/L
POTASSIUM SERPL-SCNC: 4.6 MMOL/L
RBC # BLD AUTO: 2.76 M/UL
SODIUM SERPL-SCNC: 119 MMOL/L
SODIUM SERPL-SCNC: 120 MMOL/L
SODIUM SERPL-SCNC: 120 MMOL/L
SODIUM SERPL-SCNC: 122 MMOL/L
SODIUM SERPL-SCNC: 122 MMOL/L
WBC # BLD AUTO: 8.67 K/UL

## 2019-01-09 PROCEDURE — 25000003 PHARM REV CODE 250: Performed by: STUDENT IN AN ORGANIZED HEALTH CARE EDUCATION/TRAINING PROGRAM

## 2019-01-09 PROCEDURE — 80048 BASIC METABOLIC PNL TOTAL CA: CPT | Mod: 91

## 2019-01-09 PROCEDURE — 99233 SBSQ HOSP IP/OBS HIGH 50: CPT | Mod: ,,, | Performed by: INTERNAL MEDICINE

## 2019-01-09 PROCEDURE — 83735 ASSAY OF MAGNESIUM: CPT

## 2019-01-09 PROCEDURE — 11000001 HC ACUTE MED/SURG PRIVATE ROOM

## 2019-01-09 PROCEDURE — 25000003 PHARM REV CODE 250

## 2019-01-09 PROCEDURE — 36415 COLL VENOUS BLD VENIPUNCTURE: CPT

## 2019-01-09 PROCEDURE — 99233 PR SUBSEQUENT HOSPITAL CARE,LEVL III: ICD-10-PCS | Mod: ,,, | Performed by: INTERNAL MEDICINE

## 2019-01-09 PROCEDURE — 85025 COMPLETE CBC W/AUTO DIFF WBC: CPT

## 2019-01-09 PROCEDURE — 80048 BASIC METABOLIC PNL TOTAL CA: CPT

## 2019-01-09 PROCEDURE — 63600175 PHARM REV CODE 636 W HCPCS: Performed by: STUDENT IN AN ORGANIZED HEALTH CARE EDUCATION/TRAINING PROGRAM

## 2019-01-09 RX ORDER — GABAPENTIN 300 MG/1
CAPSULE ORAL
Status: DISPENSED
Start: 2019-01-09 | End: 2019-01-09

## 2019-01-09 RX ORDER — GABAPENTIN 100 MG/1
CAPSULE ORAL
Qty: 180 CAPSULE | Refills: 3 | Status: CANCELLED | OUTPATIENT
Start: 2019-01-09

## 2019-01-09 RX ORDER — TRAMADOL HYDROCHLORIDE 50 MG/1
50 TABLET ORAL EVERY 6 HOURS PRN
Status: DISCONTINUED | OUTPATIENT
Start: 2019-01-09 | End: 2019-01-14 | Stop reason: HOSPADM

## 2019-01-09 RX ORDER — HYDRALAZINE HYDROCHLORIDE 20 MG/ML
10 INJECTION INTRAMUSCULAR; INTRAVENOUS EVERY 6 HOURS PRN
Status: DISCONTINUED | OUTPATIENT
Start: 2019-01-09 | End: 2019-01-14 | Stop reason: HOSPADM

## 2019-01-09 RX ORDER — TRAMADOL HYDROCHLORIDE 50 MG/1
50 TABLET ORAL EVERY 6 HOURS PRN
Status: DISCONTINUED | OUTPATIENT
Start: 2019-01-09 | End: 2019-01-09

## 2019-01-09 RX ORDER — GABAPENTIN 100 MG/1
200 CAPSULE ORAL 2 TIMES DAILY
Status: COMPLETED | OUTPATIENT
Start: 2019-01-09 | End: 2019-01-11

## 2019-01-09 RX ORDER — GABAPENTIN 300 MG/1
300 CAPSULE ORAL ONCE
Status: COMPLETED | OUTPATIENT
Start: 2019-01-09 | End: 2019-01-09

## 2019-01-09 RX ADMIN — SODIUM CHLORIDE TAB 1 GM 2 G: 1 TAB at 08:01

## 2019-01-09 RX ADMIN — GABAPENTIN 300 MG: 300 CAPSULE ORAL at 04:01

## 2019-01-09 RX ADMIN — CALCIUM CARBONATE-CHOLECALCIFEROL TAB 250 MG-125 UNIT 1 TABLET: 250-125 TAB at 09:01

## 2019-01-09 RX ADMIN — ATORVASTATIN CALCIUM 20 MG: 10 TABLET, FILM COATED ORAL at 08:01

## 2019-01-09 RX ADMIN — ACETAMINOPHEN 650 MG: 325 TABLET, FILM COATED ORAL at 09:01

## 2019-01-09 RX ADMIN — LEVOTHYROXINE SODIUM 75 MCG: 75 TABLET ORAL at 05:01

## 2019-01-09 RX ADMIN — GABAPENTIN 200 MG: 100 CAPSULE ORAL at 09:01

## 2019-01-09 RX ADMIN — LABETALOL HCL 300 MG: 200 TABLET, FILM COATED ORAL at 09:01

## 2019-01-09 RX ADMIN — AMLODIPINE BESYLATE 10 MG: 10 TABLET ORAL at 08:01

## 2019-01-09 RX ADMIN — TRAMADOL HYDROCHLORIDE 50 MG: 50 TABLET, COATED ORAL at 11:01

## 2019-01-09 RX ADMIN — CALCIUM CARBONATE-CHOLECALCIFEROL TAB 250 MG-125 UNIT 1 TABLET: 250-125 TAB at 08:01

## 2019-01-09 RX ADMIN — LOSARTAN POTASSIUM 100 MG: 50 TABLET, FILM COATED ORAL at 08:01

## 2019-01-09 RX ADMIN — LABETALOL HYDROCHLORIDE 200 MG: 100 TABLET, FILM COATED ORAL at 08:01

## 2019-01-09 RX ADMIN — ACETAMINOPHEN 650 MG: 325 TABLET, FILM COATED ORAL at 12:01

## 2019-01-09 RX ADMIN — TRAMADOL HYDROCHLORIDE 50 MG: 50 TABLET, COATED ORAL at 09:01

## 2019-01-09 RX ADMIN — ENOXAPARIN SODIUM 40 MG: 100 INJECTION SUBCUTANEOUS at 05:01

## 2019-01-09 RX ADMIN — LEVOFLOXACIN 250 MG: 250 TABLET, FILM COATED ORAL at 09:01

## 2019-01-09 RX ADMIN — HYDRALAZINE HYDROCHLORIDE AND ISOSORBIDE DINITRATE 1 TABLET: 37.5; 2 TABLET, FILM COATED ORAL at 08:01

## 2019-01-09 NOTE — ASSESSMENT & PLAN NOTE
BP in /76 mm Hg, although at clinic was hypotensive.  Home meds - Amlodipine 10 mg OD, HCTZ 25 mg OD, Labetalol 200mg BID, Losartan 100 mg OD    Plan:  - Continuing Amlodipine, Labetalol and losartan  -started isosorbide-hydralazine  - Stop HCTZ due to hyponatremia  - monitor BP

## 2019-01-09 NOTE — NURSING
Patient sat up in chair most of day with sons at bedside. Eating and drinking without complaint voiced. NA+ at 118 at end of shift.

## 2019-01-09 NOTE — PROGRESS NOTES
[facility]  Critical Care Medicine  Progress Note    Patient Name: Kristin Quintanilla  MRN: [id number]  Admission Date: 1/7/2019  Hospital Length of Stay: 2 days  Code Status: DNR  Attending Provider: Chucho Segundo MD  Primary Care Provider: Jairo Schmidt MD   Principal Problem: Chronic diastolic heart failure    Subjective:     HPI:  Kristin Quintanilla is a 88 y.o. lady with CAD with diastolic HF and mild AR, HTN, hyperlipidemia, hypothyroidism was sent here by her PCP  for SOB and generalized weaknesses. She developed progressive SOB and leg swelling 2 weeks ago. She believed she has a cold, along with productive cough ( yellowish) associated with SOB for which she was initially managed with a steroid injection and albuterol inhaler, in spite of it was getting worse, so she was started on Levaquin 2 days ago for acute bronchitis. She felt she was more SOB even at rest, especially while lying flat.  At baseline she is able to do her normal activities including cooking for herself. As per her son and grand daughter, she also is more confused at times compared to her baseline. She denies any chest pain, palpitations or fever.     She was sent by her PCP as she had was consistently hypotensive 110/40 mm HG. In the ED her Na was 117 and BNP was 539. CXR suggestive of pulmonary edema and left sided pleural effusion.  She started developing pain along the medial part of left thigh like cramps.    Hospital/ICU Course:  Patient came in with hyponatremia of 117 and was hypervolemic. Initially managed with lasix 80 mg iv once for HF, which she has a good response but NA did not improve. So was continued on fluid restriction and hydrochlorothiazide was stopped.  Pt appears dry, so Lasix stopped. NA stable at around 119, 120.  Head CT obtained for malignancy, which was negative. No sign of malignancy on CXR. TSH and cortisol wnl. Nephrology consulted on 1/9.    Interval History: NAEON. NA stable at around  119, 120.  Head CT obtained for malignancy, which was negative. No sign of malignancy on CXR. TSH and cortisol wnl. Nephrology consulted on 1/9.    Review of Systems   Constitutional: Positive for activity change and fatigue. Negative for chills and fever.   HENT: Positive for rhinorrhea. Negative for sore throat and trouble swallowing.    Respiratory: Positive for cough (more dry) and shortness of breath. Negative for chest tightness.    Cardiovascular: Positive for leg swelling (Improved). Negative for chest pain and palpitations.   Gastrointestinal: Negative for abdominal distention, abdominal pain, diarrhea, nausea and vomiting.   Genitourinary: Negative for dysuria and hematuria.   Musculoskeletal: Positive for myalgias (Muscle cramps). Negative for arthralgias.   Skin: Negative for rash and wound.   Neurological: Negative for dizziness and headaches.   Psychiatric/Behavioral: Negative for agitation and confusion.     Objective:     Vital Signs (Most Recent):  Temp: 97.7 °F (36.5 °C) (01/09/19 0300)  Pulse: 71 (01/09/19 0600)  Resp: (!) 23 (01/09/19 0600)  BP: (!) 199/83 (01/09/19 0600)  SpO2: (!) 94 % (01/09/19 0600) Vital Signs (24h Range):  Temp:  [97.7 °F (36.5 °C)-98 °F (36.7 °C)] 97.7 °F (36.5 °C)  Pulse:  [] 71  Resp:  [19-50] 23  SpO2:  [92 %-97 %] 94 %  BP: (107-199)/() 199/83   Weight: 53.8 kg (118 lb 9.7 oz)  Body mass index is 20.36 kg/m².      Intake/Output Summary (Last 24 hours) at 1/9/2019 0723  Last data filed at 1/9/2019 0600  Gross per 24 hour   Intake 680 ml   Output 1150 ml   Net -470 ml       Physical Exam   Constitutional: She is oriented to person, place, and time. She appears well-developed.   HENT:   Head: Normocephalic and atraumatic.   Eyes: EOM are normal. Pupils are equal, round, and reactive to light.   Neck: Normal range of motion. No JVD present. No tracheal deviation present.   Cardiovascular: Normal rate and regular rhythm.   Murmur (Left sternal border)  heard.  Pulmonary/Chest: Effort normal and breath sounds normal. She has no wheezes. She has no rales.   Abdominal: Soft. Bowel sounds are normal. She exhibits no distension. There is no tenderness.   Musculoskeletal: Normal range of motion. No edema  Neurological: She is alert and oriented to person, place, and time. No cranial nerve deficit. She exhibits normal muscle tone.   Psychiatric: She has a normal mood and affect. Judgment normal.       Vents:     Lines/Drains/Airways     Drain            Female External Urinary Catheter 01/07/19 1620 1 day          Peripheral Intravenous Line                 Peripheral IV - Single Lumen 01/07/19 1200 Left Antecubital 1 day         Peripheral IV - Single Lumen 01/08/19 0045 Anterior;Right Wrist 1 day              Significant Labs:    CBC/Anemia Profile:  Recent Labs   Lab 01/07/19  1201 01/07/19  1327 01/08/19  0310 01/09/19  0403   WBC 9.62  --  8.53 8.67   HGB 9.1*  --  9.8* 8.6*   HCT 27.3* 26* 28.2* 24.9*     --  236 229   MCV 89  --  89 90   RDW 13.7  --  13.2 13.5        Chemistries:  Recent Labs   Lab 01/07/19  1201  01/08/19  0310  01/08/19  2014 01/09/19  0006 01/09/19  0403   *   < > 120*   < > 118* 120* 119*   K 4.0   < > 3.4*   < > 4.6 4.4 4.4   CL 87*   < > 83*   < > 85* 87* 86*   CO2 24   < > 27   < > 27 27 28   BUN 17   < > 14   < > 23 24* 22   CREATININE 0.8   < > 0.8   < > 1.4 1.2 1.1   CALCIUM 9.5   < > 9.8   < > 9.1 8.9 9.0   ALBUMIN 2.6*  --   --   --   --   --   --    PROT 5.2*  --   --   --   --   --   --    BILITOT 0.8  --   --   --   --   --   --    ALKPHOS 82  --   --   --   --   --   --    ALT 38  --   --   --   --   --   --    AST 33  --   --   --   --   --   --    MG  --   --  1.3*  --   --   --  1.8   PHOS  --   --  3.0  --   --   --   --     < > = values in this interval not displayed.       All pertinent labs within the past 24 hours have been reviewed.    Significant Imaging:  I have reviewed and interpreted all pertinent  imaging results/findings within the past 24 hours.      ABG  No results for input(s): PH, PO2, PCO2, HCO3, BE in the last 168 hours.  Assessment/Plan:     Pulmonary   Acute bronchitis    She has cough with productive sputum yellowish associated with SOB. She is afebrile and WBC count is wnl.     - She was started on Levaquin 250 mg x 7 days, will complete the course      Cardiac/Vascular   * Chronic diastolic heart failure    88 y.o. lady with progressive SOB at rest and leg swelling, , CXR suggestive of pulmonary edema. She has been drinking more water than usual, though is compliant with her medications.    ECHO 4/18 - Diastolic HF ( grade 2), EF 60-65%, mild AR with pericardial effusion, no wall motion abnormalities.    - Received one dose of Lasix 80 mg iv with good urine ouput. However patient appears hypovolemic after and will hold lasix  - Control BP  - Strict I/O  - Daily weights  - Cardiac diet with Fluid restriction     Essential hypertension    BP in /76 mm Hg, although at clinic was hypotensive.  Home meds - Amlodipine 10 mg OD, HCTZ 25 mg OD, Labetalol 200mg BID, Losartan 100 mg OD    Plan:  - Continuing Amlodipine, Labetalol and losartan  -started isosorbide-hydralazine  - Stop HCTZ due to hyponatremia  - monitor BP     ASCVD (arteriosclerotic cardiovascular disease)    - Continue Aspirin, Statin and control BP     Renal/   Hyponatremia    Patient NA - 117, she has muscle cramps and as per family slightly more confused. She has not had dizziness, seizures or loss of consciousness. She is hyponatremic most likely from Hypervolemia secondary to CHF, other differential include drug induced, unlikely from her current medications except thiazide. Her glucose in wnl. Her sodium got worse to 118 from 11 after lasix, so it was stopped. After fluid restriction and holding HCTZ her Na is improving. If trend is not further improving and may consider other euvolemic causes as serum osm <280. 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    - Na has stable and not moving from 117-120 range  - Will hold Hydrochlorothiazide  -  TSh and Cortisol wnl  - continueFluid restrict 1000 ml /day  - Goal to correct 6 mEq/l in 24 hr period  - BMP q4hrs  -inpatient consult to nephrology, appreciate recs            Critical care was time spent personally by me on the following activities: development of treatment plan with patient or surrogate and bedside caregivers, discussions with consultants, evaluation of patient's response to treatment, examination of patient, ordering and performing treatments and interventions, ordering and review of laboratory studies, ordering and review of radiographic studies, pulse oximetry, re-evaluation of patient's condition. This critical care time did not overlap with that of any other provider or involve time for any procedures.     Minerva Regalado MD  Critical Care Medicine  [facility]

## 2019-01-09 NOTE — SUBJECTIVE & OBJECTIVE
Interval History: NAEON. NA stable at around 119, 120.  Head CT obtained for malignancy, which was negative. No sign of malignancy on CXR. TSH and cortisol wnl. Nephrology consulted on 1/9.    Review of Systems   Constitutional: Positive for activity change and fatigue. Negative for chills and fever.   HENT: Positive for rhinorrhea. Negative for sore throat and trouble swallowing.    Respiratory: Positive for cough (more dry) and shortness of breath. Negative for chest tightness.    Cardiovascular: Positive for leg swelling (Improved). Negative for chest pain and palpitations.   Gastrointestinal: Negative for abdominal distention, abdominal pain, diarrhea, nausea and vomiting.   Genitourinary: Negative for dysuria and hematuria.   Musculoskeletal: Positive for myalgias (Muscle cramps). Negative for arthralgias.   Skin: Negative for rash and wound.   Neurological: Negative for dizziness and headaches.   Psychiatric/Behavioral: Negative for agitation and confusion.     Objective:     Vital Signs (Most Recent):  Temp: 97.7 °F (36.5 °C) (01/09/19 0300)  Pulse: 71 (01/09/19 0600)  Resp: (!) 23 (01/09/19 0600)  BP: (!) 199/83 (01/09/19 0600)  SpO2: (!) 94 % (01/09/19 0600) Vital Signs (24h Range):  Temp:  [97.7 °F (36.5 °C)-98 °F (36.7 °C)] 97.7 °F (36.5 °C)  Pulse:  [] 71  Resp:  [19-50] 23  SpO2:  [92 %-97 %] 94 %  BP: (107-199)/() 199/83   Weight: 53.8 kg (118 lb 9.7 oz)  Body mass index is 20.36 kg/m².      Intake/Output Summary (Last 24 hours) at 1/9/2019 0723  Last data filed at 1/9/2019 0600  Gross per 24 hour   Intake 680 ml   Output 1150 ml   Net -470 ml       Physical Exam   Constitutional: She is oriented to person, place, and time. She appears well-developed.   HENT:   Head: Normocephalic and atraumatic.   Eyes: EOM are normal. Pupils are equal, round, and reactive to light.   Neck: Normal range of motion. No JVD present. No tracheal deviation present.   Cardiovascular: Normal rate and regular  rhythm.   Murmur (Left sternal border) heard.  Pulmonary/Chest: Effort normal and breath sounds normal. She has no wheezes. She has no rales.   Abdominal: Soft. Bowel sounds are normal. She exhibits no distension. There is no tenderness.   Musculoskeletal: Normal range of motion. She exhibits edema (B/l pitting (trace)).   Neurological: She is alert and oriented to person, place, and time. No cranial nerve deficit. She exhibits normal muscle tone.   Psychiatric: She has a normal mood and affect. Judgment normal.       Vents:     Lines/Drains/Airways     Drain            Female External Urinary Catheter 01/07/19 1620 1 day          Peripheral Intravenous Line                 Peripheral IV - Single Lumen 01/07/19 1200 Left Antecubital 1 day         Peripheral IV - Single Lumen 01/08/19 0045 Anterior;Right Wrist 1 day              Significant Labs:    CBC/Anemia Profile:  Recent Labs   Lab 01/07/19  1201 01/07/19  1327 01/08/19  0310 01/09/19  0403   WBC 9.62  --  8.53 8.67   HGB 9.1*  --  9.8* 8.6*   HCT 27.3* 26* 28.2* 24.9*     --  236 229   MCV 89  --  89 90   RDW 13.7  --  13.2 13.5        Chemistries:  Recent Labs   Lab 01/07/19  1201  01/08/19  0310  01/08/19 2014 01/09/19  0006 01/09/19  0403   *   < > 120*   < > 118* 120* 119*   K 4.0   < > 3.4*   < > 4.6 4.4 4.4   CL 87*   < > 83*   < > 85* 87* 86*   CO2 24   < > 27   < > 27 27 28   BUN 17   < > 14   < > 23 24* 22   CREATININE 0.8   < > 0.8   < > 1.4 1.2 1.1   CALCIUM 9.5   < > 9.8   < > 9.1 8.9 9.0   ALBUMIN 2.6*  --   --   --   --   --   --    PROT 5.2*  --   --   --   --   --   --    BILITOT 0.8  --   --   --   --   --   --    ALKPHOS 82  --   --   --   --   --   --    ALT 38  --   --   --   --   --   --    AST 33  --   --   --   --   --   --    MG  --   --  1.3*  --   --   --  1.8   PHOS  --   --  3.0  --   --   --   --     < > = values in this interval not displayed.       All pertinent labs within the past 24 hours have been  reviewed.    Significant Imaging:  I have reviewed and interpreted all pertinent imaging results/findings within the past 24 hours.

## 2019-01-09 NOTE — RESIDENT HANDOFF
Handoff     Primary Team: [facility] Room Number: 6072/6072 A     Patient Name: Kristin Quintanilla MRN: [id number]     Date of Birth: [id number] Allergies: Codeine; Darvocet a500 [propoxyphene n-acetaminophen]; Sulfa (sulfonamide antibiotics); and Fosamax [alendronate]     Age: 88 y.o. Admit Date: 1/7/2019     Sex: female  BMI: Body mass index is 20.36 kg/m².     Code Status: DNR        Illness Level (current clinical status): Watcher - No    Reason for Admission: Hyponatremia    Brief HPI (pertinent PMH and diagnosis or differential diagnosis):     Kristin Quintanilla is a 88 y.o. lady with CAD with diastolic HF and mild AR, HTN, hyperlipidemia, hypothyroidism was sent here by her PCP  for SOB and generalized weaknesses. She developed progressive SOB and leg swelling 2 weeks ago. She believed she has a cold, along with productive cough ( yellowish) associated with SOB for which she was initially managed with a steroid injection and albuterol inhaler, in spite of it was getting worse, so she was started on Levaquin 2 days ago for acute bronchitis. She felt she was more SOB even at rest, especially while lying flat.  At baseline she is able to do her normal activities including cooking for herself. As per her son and grand daughter, she also is more confused at times compared to her baseline. She denies any chest pain, palpitations or fever.      She was sent by her PCP as she had was consistently hypotensive 110/40 mm HG. In the ED her Na was 117 and BNP was 539. CXR suggestive of pulmonary edema and left sided pleural effusion.  She started developing pain along the medial part of left thigh like cramps.    Procedure Date: NA    Hospital Course (updated, brief assessment by system or problem, significant events): \    Patient came in with hyponatremia of 117 and was hypervolemic. Initially managed with lasix 80 mg iv once for HF, which she has a good response but NA did not improve. So was continued on fluid  restriction and hydrochlorothiazide was stopped.  Pt appears dry, so Lasix stopped. NA stable at around 119, 120.  Head CT obtained for malignancy, which was negative. No sign of malignancy on CXR. TSH and cortisol wnl.       Tasks (specific, using if-then statements):   1.  Continue to hold HCTZ, we think this is 2/2 to increasing in med dose  2. F/u on ECHO. Patient has a pericardial effusion. Has been present since 2012.  Unlikely going to , we want to see if it is getting any larger  3. Continue fluid restriction (1500ml)  4. BMP can be monitored q6h (we had them q4h but not much change)  5. If not responding, work up for other causes of hyponatremia (patient has hx of squamous cell CA and RXT) We discussed getting nephro involved but thought it was too premature.   6. PT and OT (patient is very worried she is going to fall since she has become more debilitated over the past 2 weeks)    Contingency Plan (special circumstances anticipated and plan): NA    Estimated Discharge Date:1/11/19    Discharge Disposition: Home or Self Care    Mentored By: Dr. Ta and Dr. Segundo

## 2019-01-09 NOTE — PLAN OF CARE
[facility] Medicine ICU Stepdown Acceptance Note    Date of Admission: 1/7/2019  Date of Transfer / Stepdown: 1/9/2019  Jose, IGNACIO/J, L, Onc (IV chemo w/in 1 month), Gyn/Onc, or other special case?: no   ICU team stepping patient down: CCM / MICU  ICU team member giving verbal handoff: [phone]  Accepting  team: IMD    Brief History of Present Illness:      Kristin Quintanilla is a 88 y.o. female with diastolic HF and mild AR, HTN, hyperlipidemia, hypothyroidism sent here by her PCP  for SOB and generalized weaknesses. She developed progressive SOB and leg swelling 2 weeks ago. She believed she has a cold, along with productive cough ( yellowish) associated with SOB for which she was initially managed with a steroid injection and albuterol inhaler, in spite of it was getting worse, so she was started on Levaquin 2 days ago for acute bronchitis. She felt she was more SOB even at rest, especially while lying flat.  At baseline she is able to do her normal activities including cooking for herself. As per her son and grand-daughter, she also is more confused at times compared to her baseline. She denies any chest pain, palpitations or fever.   She was consistently hypotensive 110/40 mm HG. In the ED her Na was 117 and BNP was 539. CXR suggestive of pulmonary edema and left sided pleural effusion.  She started developing pain along the medial part of left thigh like cramps.    Hospital/ICU Course:     Patient came in with hyponatremia of 117 and was hypervolemic. Initially managed with Lasix 80 mg IV once for HF, which she has a good response but sodium did not improve. So was continued on fluid restriction and hydrochlorothiazide was stopped.  Pt appears dry, so Lasix stopped. Na+ stable at around 119 - 120.  Head CT obtained for malignancy, which was negative. No sign of malignancy on CXR. TSH and cortisol wnl.     Consultants and Procedures:     Consultants:   Consults (From admission, onward)        Status Ordering  Provider     Inpatient consult to Critical Care Medicine  Once     Provider:  (Not yet assigned)    Completed JOSÉ MANUEL CEJA          Procedures:  none    Transfer Information:     Code status: DNR (Do Not Resuscitate)    Diet: Diet Adult Regular (IDDSI Level 7) Fluid - 1500mL    Physical Activity: OT/PT    To Do / Pending Studies / Follow ups:    1.  Continue to hold HCTZ  2. F/u on ECHO. Patient has a pericardial effusion. Has been present since 2012.  Unlikely going to .  3. Continue fluid restriction (1500 mL)  4. BMP monitored   5. If not responding, work up for other causes of hyponatremia (patient has hx of squamous cell CA and RXT)  6. PT and OT (patient is very worried she is going to fall since she has become more debilitated over the past 2 weeks)    Patient has been accepted by Hospital Medicine Team IMD, who will assume care of the patient upon arrival to the floor from the ICU. Please contact ICU team with any concerns prior to arrival. Please contact Hospital Medicine at [phone] or [phone] (please do NOT leave a voicemail) when patient arrives to the floor.    Denice Morales MD  Department of Hospital Medicine  Contact Information 7 AM - 7 PM  Spectralink # [phone]  Pager [phone]

## 2019-01-10 PROBLEM — D64.9 ANEMIA: Status: ACTIVE | Noted: 2019-01-10

## 2019-01-10 LAB
ALBUMIN SERPL BCP-MCNC: 2.3 G/DL
ANION GAP SERPL CALC-SCNC: 4 MMOL/L
ANION GAP SERPL CALC-SCNC: 4 MMOL/L
AV INDEX (PROSTH): 0.86
AV MEAN GRADIENT: 3.98 MMHG
AV PEAK GRADIENT: 5.95 MMHG
AV VALVE AREA: 2.61 CM2
BASOPHILS # BLD AUTO: 0.05 K/UL
BASOPHILS NFR BLD: 0.5 %
BSA FOR ECHO PROCEDURE: 1.59 M2
BUN SERPL-MCNC: 23 MG/DL
BUN SERPL-MCNC: 24 MG/DL
CALCIUM SERPL-MCNC: 9.1 MG/DL
CALCIUM SERPL-MCNC: 9.3 MG/DL
CHLORIDE SERPL-SCNC: 89 MMOL/L
CHLORIDE SERPL-SCNC: 91 MMOL/L
CO2 SERPL-SCNC: 27 MMOL/L
CO2 SERPL-SCNC: 27 MMOL/L
CREAT SERPL-MCNC: 0.9 MG/DL
CREAT SERPL-MCNC: 1 MG/DL
CV ECHO LV RWT: 0.35 CM
DIFFERENTIAL METHOD: ABNORMAL
DOP CALC AO PEAK VEL: 1.22 M/S
DOP CALC AO VTI: 32.53 CM
DOP CALC LVOT AREA: 3.05 CM2
DOP CALC LVOT DIAMETER: 1.97 CM
DOP CALC LVOT STROKE VOLUME: 84.94 CM3
DOP CALCLVOT PEAK VEL VTI: 27.88 CM
E WAVE DECELERATION TIME: 179.32 MSEC
E/A RATIO: 1.83
E/E' RATIO: 25
ECHO LV POSTERIOR WALL: 0.85 CM (ref 0.6–1.1)
EOSINOPHIL # BLD AUTO: 0.5 K/UL
EOSINOPHIL NFR BLD: 4.7 %
ERYTHROCYTE [DISTWIDTH] IN BLOOD BY AUTOMATED COUNT: 13.7 %
EST. GFR  (AFRICAN AMERICAN): 58.1 ML/MIN/1.73 M^2
EST. GFR  (AFRICAN AMERICAN): >60 ML/MIN/1.73 M^2
EST. GFR  (NON AFRICAN AMERICAN): 50.4 ML/MIN/1.73 M^2
EST. GFR  (NON AFRICAN AMERICAN): 57.3 ML/MIN/1.73 M^2
FERRITIN SERPL-MCNC: 122 NG/ML
FRACTIONAL SHORTENING: 47 % (ref 28–44)
GLUCOSE SERPL-MCNC: 75 MG/DL
GLUCOSE SERPL-MCNC: 93 MG/DL
HCT VFR BLD AUTO: 26.3 %
HGB BLD-MCNC: 8.8 G/DL
IMM GRANULOCYTES # BLD AUTO: 0.09 K/UL
IMM GRANULOCYTES NFR BLD AUTO: 0.9 %
INTERVENTRICULAR SEPTUM: 1.01 CM (ref 0.6–1.1)
IRON SERPL-MCNC: 32 UG/DL
LA MAJOR: 5.46 CM
LA MINOR: 5.18 CM
LA WIDTH: 4.23 CM
LEFT ATRIUM SIZE: 4.5 CM
LEFT ATRIUM VOLUME INDEX: 53.9 ML/M2
LEFT ATRIUM VOLUME: 86.02 CM3
LEFT INTERNAL DIMENSION IN SYSTOLE: 2.6 CM (ref 2.1–4)
LEFT VENTRICLE DIASTOLIC VOLUME INDEX: 70.88 ML/M2
LEFT VENTRICLE DIASTOLIC VOLUME: 113.15 ML
LEFT VENTRICLE MASS INDEX: 100.4 G/M2
LEFT VENTRICLE SYSTOLIC VOLUME INDEX: 15.3 ML/M2
LEFT VENTRICLE SYSTOLIC VOLUME: 24.5 ML
LEFT VENTRICULAR INTERNAL DIMENSION IN DIASTOLE: 4.91 CM (ref 3.5–6)
LEFT VENTRICULAR MASS: 160.28 G
LV LATERAL E/E' RATIO: 25
LV SEPTAL E/E' RATIO: 25
LYMPHOCYTES # BLD AUTO: 0.7 K/UL
LYMPHOCYTES NFR BLD: 6.9 %
MAGNESIUM SERPL-MCNC: 1.6 MG/DL
MCH RBC QN AUTO: 31.5 PG
MCHC RBC AUTO-ENTMCNC: 33.5 G/DL
MCV RBC AUTO: 94 FL
MONOCYTES # BLD AUTO: 0.9 K/UL
MONOCYTES NFR BLD: 9.7 %
MV PEAK A VEL: 0.82 M/S
MV PEAK E VEL: 1.5 M/S
NEUTROPHILS # BLD AUTO: 7.4 K/UL
NEUTROPHILS NFR BLD: 77.3 %
NRBC BLD-RTO: 0 /100 WBC
PHOSPHATE SERPL-MCNC: 2.8 MG/DL
PLATELET # BLD AUTO: 219 K/UL
PMV BLD AUTO: 10.7 FL
POTASSIUM SERPL-SCNC: 4.4 MMOL/L
POTASSIUM SERPL-SCNC: 4.5 MMOL/L
RA MAJOR: 4.45 CM
RA PRESSURE: 3 MMHG
RA WIDTH: 2.77 CM
RBC # BLD AUTO: 2.79 M/UL
RIGHT VENTRICULAR END-DIASTOLIC DIMENSION: 2.91 CM
SATURATED IRON: 13 %
SINUS: 3.12 CM
SODIUM SERPL-SCNC: 120 MMOL/L
SODIUM SERPL-SCNC: 122 MMOL/L
STJ: 2.43 CM
TDI LATERAL: 0.06
TDI SEPTAL: 0.06
TDI: 0.06
TOTAL IRON BINDING CAPACITY: 252 UG/DL
TRANSFERRIN SERPL-MCNC: 170 MG/DL
TRICUSPID ANNULAR PLANE SYSTOLIC EXCURSION: 2.1 CM
WBC # BLD AUTO: 9.54 K/UL

## 2019-01-10 PROCEDURE — 83735 ASSAY OF MAGNESIUM: CPT

## 2019-01-10 PROCEDURE — 25000003 PHARM REV CODE 250: Performed by: STUDENT IN AN ORGANIZED HEALTH CARE EDUCATION/TRAINING PROGRAM

## 2019-01-10 PROCEDURE — 11000001 HC ACUTE MED/SURG PRIVATE ROOM

## 2019-01-10 PROCEDURE — 83540 ASSAY OF IRON: CPT

## 2019-01-10 PROCEDURE — 82728 ASSAY OF FERRITIN: CPT

## 2019-01-10 PROCEDURE — 25000003 PHARM REV CODE 250

## 2019-01-10 PROCEDURE — 99232 SBSQ HOSP IP/OBS MODERATE 35: CPT | Mod: ,,, | Performed by: INTERNAL MEDICINE

## 2019-01-10 PROCEDURE — 36415 COLL VENOUS BLD VENIPUNCTURE: CPT

## 2019-01-10 PROCEDURE — 80069 RENAL FUNCTION PANEL: CPT

## 2019-01-10 PROCEDURE — 85025 COMPLETE CBC W/AUTO DIFF WBC: CPT

## 2019-01-10 PROCEDURE — 25000003 PHARM REV CODE 250: Performed by: INTERNAL MEDICINE

## 2019-01-10 PROCEDURE — 63600175 PHARM REV CODE 636 W HCPCS: Performed by: STUDENT IN AN ORGANIZED HEALTH CARE EDUCATION/TRAINING PROGRAM

## 2019-01-10 PROCEDURE — 80048 BASIC METABOLIC PNL TOTAL CA: CPT

## 2019-01-10 PROCEDURE — 99232 PR SUBSEQUENT HOSPITAL CARE,LEVL II: ICD-10-PCS | Mod: ,,, | Performed by: INTERNAL MEDICINE

## 2019-01-10 RX ORDER — GUAIFENESIN 600 MG/1
600 TABLET, EXTENDED RELEASE ORAL 2 TIMES DAILY
Status: DISCONTINUED | OUTPATIENT
Start: 2019-01-10 | End: 2019-01-14 | Stop reason: HOSPADM

## 2019-01-10 RX ADMIN — AMLODIPINE BESYLATE 10 MG: 10 TABLET ORAL at 09:01

## 2019-01-10 RX ADMIN — TRAMADOL HYDROCHLORIDE 50 MG: 50 TABLET, COATED ORAL at 09:01

## 2019-01-10 RX ADMIN — LABETALOL HCL 300 MG: 200 TABLET, FILM COATED ORAL at 09:01

## 2019-01-10 RX ADMIN — ASPIRIN 81 MG: 81 TABLET, COATED ORAL at 09:01

## 2019-01-10 RX ADMIN — GABAPENTIN 200 MG: 100 CAPSULE ORAL at 09:01

## 2019-01-10 RX ADMIN — CALCIUM CARBONATE-CHOLECALCIFEROL TAB 250 MG-125 UNIT 1 TABLET: 250-125 TAB at 09:01

## 2019-01-10 RX ADMIN — LOSARTAN POTASSIUM 100 MG: 50 TABLET, FILM COATED ORAL at 09:01

## 2019-01-10 RX ADMIN — ENOXAPARIN SODIUM 40 MG: 100 INJECTION SUBCUTANEOUS at 05:01

## 2019-01-10 RX ADMIN — LEVOTHYROXINE SODIUM 75 MCG: 75 TABLET ORAL at 06:01

## 2019-01-10 RX ADMIN — TRAMADOL HYDROCHLORIDE 50 MG: 50 TABLET, COATED ORAL at 12:01

## 2019-01-10 RX ADMIN — GUAIFENESIN 600 MG: 600 TABLET, EXTENDED RELEASE ORAL at 03:01

## 2019-01-10 RX ADMIN — ATORVASTATIN CALCIUM 20 MG: 10 TABLET, FILM COATED ORAL at 09:01

## 2019-01-10 NOTE — PHYSICIAN QUERY
"PT Name: Kristin Quintanilla  MR #: [id number]    Physician Query Form - Heart  Condition Clarification     CDS/: Ally Collins               Contact information: [email or web address]    This form is a permanent document in the medical record.     Query Date: January 10, 2019    By submitting this query, we are merely seeking further clarification of documentation. Please utilize your independent clinical judgment when addressing the question(s) below.    The medical record contains the following   Indicators     Supporting Clinical Findings Location in Medical Record   x BNP  [facility] progress note 1/9   x EF EF 60-65%, [facility] progress note 1/9    Radiology findings     x Echo Results ECHO 4/18 - Diastolic HF ( grade 2), EF 60-65%, mild AR with pericardial effusion, no wall motion abnormalities. [facility] progress note 1/9    "Ascites" documented      "SOB" or "GUERIN" documented      "Hypoxia" documented     x Heart Failure documented Chronic diastolic heart failure    88 y.o. lady with progressive SOB at rest and leg swelling, , CXR suggestive of pulmonary edema. She has been drinking more water than usual, though is compliant with her medications.      - Received one dose of Lasix 80 mg iv with good urine ouput. However patient appears hypovolemic after and will hold lasix  - Control BP  - Strict I/O  - Daily weights  - Cardiac diet with Fluid restriction   CCS progress note 1/9    "Edema" documented     x Diuretics/Meds Received one dose of Lasix 80 mg iv with good urine ouput. [facility] progress note 1/9    Treatment:      Other:      Heart failure (HF) can be acute, chronic or both. It is generally further specificed as systolic, diastolic, or combined. Lastly, it is important to identify an underlying etiology if known or suspected.     Common clues to acute exacerbation:  Rapidly progressive symptoms (w/in 2 weeks of presentation), using IV diuretics to treat, using supplemental O2, pulmonary edema on Xray, MI w/in " 4 weeks, and/or BNP >500    Systolic Heart Failure: is defined as chart documentation of a left ventricular ejection fraction (LVEF) less than 40%     Diastolic Heart Failure: is defined as a left ventricular ejection fraction (LVEF) greater than 40%   +      Evidence of diastolic dysfunction on echocardiography OR    Right heart catheterization wedge pressure above 12 mm Hg OR    Left heart catheterization left ventricular end diastolic pressure 18 mm Hg or above.    References: *American Heart Association    The clinical guidelines noted below are only system guidelines, and do not replace the providers clinical judgment.     Provider, please specify the diagnosis associated with above clinical findings    [ x  ] Acute on Chronic Diastolic Heart Failure -    Pre-existing diastoic HF diagnosis.  EF > 40%  and acute HF symptoms documented                                 [   ] Chronic Diastolic Heart Failure - Pre-existing diastolic HF diagnosis.  EF > 40%  without  acute HF symptoms documented  [   ] Other Type of Heart Failure (please specify type): _________________________  [   ] Heart Failure Ruled Out  [   ] Other (please specify): ___________________________________  [   ] Clinically Undetermined                          Please document in your progress notes daily for the duration of treatment until resolved and include in your discharge summary.

## 2019-01-10 NOTE — PLAN OF CARE
Problem: Fall Injury Risk  Goal: Absence of Fall and Fall-Related Injury    Intervention: Promote Injury-Free Environment   01/10/19 0108   Optimize [place] and Functional Mobility   Environmental Safety Modification assistive device/personal items within reach;clutter free environment maintained;lighting adjusted   Optimize Balance and Safe Activity   Safety Promotion/Fall Prevention high risk medications identified;bed alarm set         Problem: Adult Inpatient Plan of Care  Goal: Plan of Care Review  Outcome: Ongoing (interventions implemented as appropriate)   01/10/19 0108   Plan of Care Review   Plan of Care Reviewed With patient     Patient alert to self and reoriented to time.  Patient reports pain and mediated with tramadol 50 mg po with effectiveness.  Patient requires assist x 1-2 and high risk for falls with a fall at home.  Pur wick applied with dark yellow urine. In the ED her Na was 117 and BNP was 539. CXR suggestive of pulmonary edema and left sided pleural effusion.   CT of the head obtained for malignancy, which was negative. No sign of malignancy on CXR.  Labs results: TSH and cortisol WNL.                  Problem: Pain Chronic (Persistent)  Goal: Acceptable Pain Control and Functional Ability  Outcome: Ongoing (interventions implemented as appropriate)  Intervention: Develop Pain Management Plan   01/10/19 0108   Prevent or Manage Pain   Pain Management Interventions care clustered;pain management plan reviewed with patient/caregiver;quiet environment facilitated

## 2019-01-10 NOTE — PROGRESS NOTES
Patient transferred to room 7068 via wheelchair with belongings, telemetry box and RN. Chart tube to 7R. Patient tolerated transferred well. EMMANUEL Mccallum notified of patients arrival and hand off given. Family with patient at the bedside.

## 2019-01-10 NOTE — PROGRESS NOTES
Physician Attestation for Scribe:  I, Denice Morales MD, personally performed the services described in this documentation. All medical record entries made by the scribe were at my direction and in my presence.  I have reviewed this note and agree that the record reflects my personal performance and is accurate and complete.     Hospital Medicine  Progress Note     Patient Name: Kristin Quintanilla  MRN: [id number]  Team: [facility]OS[facility] MED D Deniec Morales MD   Admit Date: 1/7/2019  LAMAR 1/12/2019  Code status: DNR (Do Not Resuscitate)     Principal Problem:  Chronic diastolic heart failure     Interval history: Complains of dizziness with movement and per family, continues to have some mild confusion.       Review of Systems   Constitutional: Negative for fever.   Respiratory: Positive for cough.          Physical Exam:  Temp:  [96.3 °F (35.7 °C)-98 °F (36.7 °C)]   Pulse:  [54-70]   Resp:  [16-20]   BP: (101-176)/()   SpO2:  [90 %-97 %]       Temp: 96.7 °F (35.9 °C) (01/10/19 1105)  Pulse: (!) 58 (01/10/19 1105)  Resp: 16 (01/10/19 0817)  BP: (!) 101/53 (01/10/19 1105)  SpO2: (!) 92 % (01/10/19 1105)     Intake/Output Summary (Last 24 hours) at 1/10/2019 1138  Last data filed at 1/10/2019 0607      Gross per 24 hour   Intake 300 ml   Output 400 ml   Net -100 ml      Weight: 56.5 kg (124 lb 9 oz)  Body mass index is 21.38 kg/m².     Physical Exam   Constitutional: No distress.   Eyes: Conjunctivae and lids are normal.   Cardiovascular: S1 normal and S2 normal.   Pulmonary/Chest: Effort normal and breath sounds normal.   Abdominal: Soft. Bowel sounds are normal. There is no tenderness.   Musculoskeletal: She exhibits no edema.         Significant Labs:          Recent Labs   Lab 01/07/19  1201 01/07/19  1327 01/08/19  0310 01/09/19  0403 01/10/19  0444   WBC 9.62  --  8.53 8.67 9.54   HGB 9.1*  --  9.8* 8.6* 8.8*   HCT 27.3* 26* 28.2* 24.9* 26.3*     --  236 229 219      Recent Labs   Lab 01/07/19  1201    01/08/19  0310   01/09/19  0403   01/09/19  1929 01/10/19  0444 01/10/19  1558   *   < > 120*   < > 119*   < > 122* 122* 120*   K 4.0   < > 3.4*   < > 4.4   < > 4.6 4.4 4.5   CL 87*   < > 83*   < > 86*   < > 89* 91* 89*   CO2 24   < > 27   < > 28   < > 29 27 27   BUN 17   < > 14   < > 22   < > 25* 24* 23   CREATININE 0.8   < > 0.8   < > 1.1   < > 1.2 0.9 1.0   GLU 97   < > 81   < > 97   < > 118* 75 93   CALCIUM 9.5   < > 9.8   < > 9.0   < > 8.9 9.1 9.3   MG  --   --  1.3*  --  1.8  --   --  1.6  --    PHOS  --   --  3.0  --   --   --   --  2.8  --    ALKPHOS 82  --   --   --   --   --   --   --   --    ALT 38  --   --   --   --   --   --   --   --    AST 33  --   --   --   --   --   --   --   --    ALBUMIN 2.6*  --   --   --   --   --   --  2.3*  --    PROT 5.2*  --   --   --   --   --   --   --   --    BILITOT 0.8  --   --   --   --   --   --   --   --     < > = values in this interval not displayed.             Recent Labs   Lab 01/07/19  1201   TROPONINI 0.006      TSH:       Recent Labs   Lab 01/08/19  0957   TSH 1.942         Inpatient Medications prescribed for management of current Problems:   Scheduled Meds:    amLODIPine  10 mg Oral Daily    aspirin  81 mg Oral Every other day    atorvastatin  20 mg Oral Daily    calcium carbonate-vitamin D3 250-125 mg  1 tablet Oral BID    enoxaparin  40 mg Subcutaneous Daily    gabapentin  200 mg Oral BID    labetalol  300 mg Oral BID    levothyroxine  75 mcg Oral Before breakfast    losartan  100 mg Oral Daily      Continuous Infusions:   As Needed: acetaminophen, benzonatate, hydrALAZINE, sodium chloride 0.9%, traMADol            Active Hospital Problems     Diagnosis   POA    *Chronic diastolic heart failure [I50.32]   Yes    Anemia [D64.9]   Yes    Essential hypertension [I10]   Yes    Hyponatremia [E87.1]   Yes    Acute bronchitis [J20.9]   Yes    Hypothyroidism (acquired) [E03.9]   Yes    Pericardial effusion [I31.3]   Yes       Chronic     ASCVD (arteriosclerotic cardiovascular disease) [I25.10]   Yes       Chronic       Resolved Hospital Problems   No resolved problems to display.         Overview:    89 yo female on a background significant for CAD, Diastolic HF, HTN, HLD and hypothyroidism presented from PCP for dyspnea, LE edema, and fatigue and was admitted to MICU for hyponatremia (117) with evidence of hypervolemia. Prior to admission, patient was being treated with Levaquin for acute bronchitis and completed course. CXR on admission indicated pulmonary edema and she was managed with IV Lasix x1. For her hyponatremia, she was fluid restricted and home HCTZ was discontinued; CTH/CXR did not reveal any obvious malignancy.      Assessment and Plan for Problems addressed today:     Chronic diastolic heart failure  Dyspnea  · Echo (4/2018): EF 60-65%, Grade 2 diastolic dysfunction. Biatrial enlargment, pericardial effusion.   · BNP elevated on admission. CXR (1/8): increasing bilateral effusions. BLE U/S: negative for DVT.  · Received IV Lasix 80 mg x1 with appropriate UOP. Held additional doses as patient clinically appeared hypovolemic.  · Strict I/O, Daily weights, fluid restriction.  · Repeat Echo ordered. Called by Echo MD with concern for early tamponade per Echo findings. VSS. Cardiology consulted. (1/10)     Hyponatremia  · Possibly due to hypervolemia from CHF. 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   · Since admission, Na 117 -> 122. TSH & cortisol WNL. Serum osm <270. Continuing fluid restriction and monitoring BMP for slow Na correction. (1/9)  · Monitoring Na+ (1/10)     Acute bronchitis  · She is afebrile with no leukocytosis. Completed course of Levaquin. PRN benzonatate ordered. Ordered Mucinex.      Essential hypertension  ASCVD (arteriosclerotic cardiovascular disease)  · EKG (1/7): NSR. Continue ASA, atorvastatin and antihypertensives: amlodipine, labetalol (dose increased) and  losartan. Home HCTZ discontinued due to hyponatremia.      Normocytic anemia  · Hgb ~9, stable. Ordered iron studies.      Hypothyroidism  · Continued home levothyroxine 75mcg.      Hypokalemia  Hyomagnesemia  · Replete as needed     Diet: Diet Adult Regular (IDDSI Level 7) Fluid - 1500mL     DVT Prophylaxis:         Anticoagulants   Medication Route Frequency    enoxaparin injection 40 mg Subcutaneous Daily         L/D/A:  PIV x2  External urinary catheter     Discharge plan and follow up  Home-Health Care [facility]        Provider  Denice Morales MD  [facility] [place] [facility]OSP MED D   Department of Hospital Medicine     Nick Attestation: I personally scribed for Denice Morales MD on 01/10/2019 at 11:38 AM. Electronically signed by nick Kern on 01/10/2019 at 11:38 AM.

## 2019-01-10 NOTE — PLAN OF CARE
"Briefly Ms. Quintanilla is 87 y/o F with large pericardial effusion noted on echo today. She has had a moderate to large pericardial effusion for the past 2 years, currently her SBP is 160 mmhg and there is no pulsus paradoxus. Her heart rates are in the 60's. She is DNR and wishes to remain so, states she wants a "natural death". Furthermore she said she prefers to avoid invasive interventions. Given that there is no clinical evidence of cardiac tamponde and in conjunction with the patient wishes, we will not plan on a pericardiocentesis as of now.    Discussed with Dr. Shahida Isbell DO  Cardiology Fellow PGY-5   "

## 2019-01-10 NOTE — MEDICAL/APP STUDENT
Hospital Medicine  Progress Note    Patient Name: Kristin Quintanilla  MRN: [id number]  Team: [facility]OS[facility] MED D Denice Morales MD   Admit Date: 1/7/2019  LAMAR 1/12/2019  Code status: DNR (Do Not Resuscitate)    Principal Problem:  Chronic diastolic heart failure    Interval history: Complains of dizziness with movement and per family, continues to have some mild confusion.      Review of Systems   Constitutional: Negative for fever.   Respiratory: Positive for cough.        Physical Exam:  Temp:  [96.3 °F (35.7 °C)-98 °F (36.7 °C)]   Pulse:  [54-70]   Resp:  [16-20]   BP: (101-176)/()   SpO2:  [90 %-97 %]      Temp: 96.7 °F (35.9 °C) (01/10/19 1105)  Pulse: (!) 58 (01/10/19 1105)  Resp: 16 (01/10/19 0817)  BP: (!) 101/53 (01/10/19 1105)  SpO2: (!) 92 % (01/10/19 1105)    Intake/Output Summary (Last 24 hours) at 1/10/2019 1138  Last data filed at 1/10/2019 0607  Gross per 24 hour   Intake 300 ml   Output 400 ml   Net -100 ml     Weight: 56.5 kg (124 lb 9 oz)  Body mass index is 21.38 kg/m².    Physical Exam   Constitutional: No distress.   Eyes: Conjunctivae and lids are normal.   Cardiovascular: S1 normal and S2 normal.   Pulmonary/Chest: Effort normal and breath sounds normal.   Abdominal: Soft. Bowel sounds are normal. There is no tenderness.   Musculoskeletal: She exhibits no edema.       Significant Labs:  Recent Labs   Lab 01/07/19  1201 01/07/19  1327 01/08/19  0310 01/09/19  0403 01/10/19  0444   WBC 9.62  --  8.53 8.67 9.54   HGB 9.1*  --  9.8* 8.6* 8.8*   HCT 27.3* 26* 28.2* 24.9* 26.3*     --  236 229 219     Recent Labs   Lab 01/07/19  1201  01/08/19  0310  01/09/19  0403  01/09/19  1929 01/10/19  0444 01/10/19  1558   *   < > 120*   < > 119*   < > 122* 122* 120*   K 4.0   < > 3.4*   < > 4.4   < > 4.6 4.4 4.5   CL 87*   < > 83*   < > 86*   < > 89* 91* 89*   CO2 24   < > 27   < > 28   < > 29 27 27   BUN 17   < > 14   < > 22   < > 25* 24* 23   CREATININE 0.8   < > 0.8   < > 1.1   < > 1.2 0.9 1.0    GLU 97   < > 81   < > 97   < > 118* 75 93   CALCIUM 9.5   < > 9.8   < > 9.0   < > 8.9 9.1 9.3   MG  --   --  1.3*  --  1.8  --   --  1.6  --    PHOS  --   --  3.0  --   --   --   --  2.8  --    ALKPHOS 82  --   --   --   --   --   --   --   --    ALT 38  --   --   --   --   --   --   --   --    AST 33  --   --   --   --   --   --   --   --    ALBUMIN 2.6*  --   --   --   --   --   --  2.3*  --    PROT 5.2*  --   --   --   --   --   --   --   --    BILITOT 0.8  --   --   --   --   --   --   --   --     < > = values in this interval not displayed.       Recent Labs   Lab 01/07/19  1201   TROPONINI 0.006     TSH:   Recent Labs   Lab 01/08/19  0957   TSH 1.942       Inpatient Medications prescribed for management of current Problems:   Scheduled Meds:    amLODIPine  10 mg Oral Daily    aspirin  81 mg Oral Every other day    atorvastatin  20 mg Oral Daily    calcium carbonate-vitamin D3 250-125 mg  1 tablet Oral BID    enoxaparin  40 mg Subcutaneous Daily    gabapentin  200 mg Oral BID    labetalol  300 mg Oral BID    levothyroxine  75 mcg Oral Before breakfast    losartan  100 mg Oral Daily     Continuous Infusions:   As Needed: acetaminophen, benzonatate, hydrALAZINE, sodium chloride 0.9%, traMADol    Active Hospital Problems    Diagnosis  POA    *Chronic diastolic heart failure [I50.32]  Yes    Anemia [D64.9]  Yes    Essential hypertension [I10]  Yes    Hyponatremia [E87.1]  Yes    Acute bronchitis [J20.9]  Yes    Hypothyroidism (acquired) [E03.9]  Yes    Pericardial effusion [I31.3]  Yes     Chronic    ASCVD (arteriosclerotic cardiovascular disease) [I25.10]  Yes     Chronic      Resolved Hospital Problems   No resolved problems to display.       Overview:    89 yo female on a background significant for CAD, Diastolic HF, HTN, HLD and hypothyroidism presented from PCP for dyspnea, LE edema, and fatigue and was admitted to MICU for hyponatremia (117) with evidence of hypervolemia. Prior to  admission, patient was being treated with Levaquin for acute bronchitis and completed course. CXR on admission indicated pulmonary edema and she was managed with IV Lasix x1. For her hyponatremia, she was fluid restricted and home HCTZ was discontinued; CTH/CXR did not reveal any obvious malignancy.     Assessment and Plan for Problems addressed today:    Chronic diastolic heart failure  Dyspnea  · Echo (4/2018): EF 60-65%, Grade 2 diastolic dysfunction. Biatrial enlargment, pericardial effusion.   · BNP elevated on admission. CXR (1/8): increasing bilateral effusions. BLE U/S: negative for DVT.  · Received IV Lasix 80 mg x1 with appropriate UOP. Held additional doses as patient clinically appeared hypovolemic.  · Strict I/O, Daily weights, fluid restriction.  · Repeat Echo ordered. Called by Echo MD with concern for early tamponade per Echo findings. VSS. Cardiology consulted. (1/10)    Hyponatremia  · Possibly due to hypervolemia from CHF. 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   · Since admission, Na 117 -> 122. TSH & cortisol WNL. Serum osm <270. Continuing fluid restriction and monitoring BMP for slow Na correction. (1/9)  · Monitoring Na+ (1/10)    Acute bronchitis  · She is afebrile with no leukocytosis. Completed course of Levaquin. PRN benzonatate ordered. Ordered Mucinex.     Essential hypertension  ASCVD (arteriosclerotic cardiovascular disease)  · EKG (1/7): NSR. Continue ASA, atorvastatin and antihypertensives: amlodipine, labetalol (dose increased) and losartan. Home HCTZ discontinued due to hyponatremia.     Normocytic anemia  · Hgb ~9, stable. Ordered iron studies.     Hypothyroidism  · Continued home levothyroxine 75mcg.     Hypokalemia  Hyomagnesemia  · Replete as needed    Diet: Diet Adult Regular (IDDSI Level 7) Fluid - 1500mL    DVT Prophylaxis:   Anticoagulants   Medication Route Frequency    enoxaparin injection 40 mg Subcutaneous Daily        L/D/A:  PIV x2  External urinary catheter    Discharge plan and follow up  Home-Health Care [facility] [place]      Provider  Denice Morales MD  [facility] [place] HOSP MED D   Department of Hospital Medicine    Nick Attestation: I personally scribed for Denice Morales MD on 01/10/2019 at 11:38 AM. Electronically signed by nick Kern on 01/10/2019 at 11:38 AM.

## 2019-01-11 LAB
ALBUMIN SERPL BCP-MCNC: 2.2 G/DL
ANION GAP SERPL CALC-SCNC: 5 MMOL/L
BASOPHILS # BLD AUTO: 0.03 K/UL
BASOPHILS NFR BLD: 0.4 %
BUN SERPL-MCNC: 21 MG/DL
CALCIUM SERPL-MCNC: 9 MG/DL
CHLORIDE SERPL-SCNC: 91 MMOL/L
CO2 SERPL-SCNC: 27 MMOL/L
CREAT SERPL-MCNC: 0.9 MG/DL
DIFFERENTIAL METHOD: ABNORMAL
EOSINOPHIL # BLD AUTO: 0.5 K/UL
EOSINOPHIL NFR BLD: 6 %
ERYTHROCYTE [DISTWIDTH] IN BLOOD BY AUTOMATED COUNT: 13.9 %
EST. GFR  (AFRICAN AMERICAN): >60 ML/MIN/1.73 M^2
EST. GFR  (NON AFRICAN AMERICAN): 57.3 ML/MIN/1.73 M^2
GLUCOSE SERPL-MCNC: 65 MG/DL
HCT VFR BLD AUTO: 24.7 %
HGB BLD-MCNC: 8.5 G/DL
IMM GRANULOCYTES # BLD AUTO: 0.03 K/UL
IMM GRANULOCYTES NFR BLD AUTO: 0.4 %
LYMPHOCYTES # BLD AUTO: 0.7 K/UL
LYMPHOCYTES NFR BLD: 8.9 %
MAGNESIUM SERPL-MCNC: 1.6 MG/DL
MCH RBC QN AUTO: 30.9 PG
MCHC RBC AUTO-ENTMCNC: 34.4 G/DL
MCV RBC AUTO: 90 FL
MONOCYTES # BLD AUTO: 0.9 K/UL
MONOCYTES NFR BLD: 11.8 %
NEUTROPHILS # BLD AUTO: 5.6 K/UL
NEUTROPHILS NFR BLD: 72.5 %
NRBC BLD-RTO: 0 /100 WBC
PHOSPHATE SERPL-MCNC: 2.9 MG/DL
PLATELET # BLD AUTO: 227 K/UL
PMV BLD AUTO: 10.5 FL
POTASSIUM SERPL-SCNC: 4.6 MMOL/L
RBC # BLD AUTO: 2.75 M/UL
RETICS/RBC NFR AUTO: 2.5 %
SODIUM SERPL-SCNC: 123 MMOL/L
WBC # BLD AUTO: 7.65 K/UL

## 2019-01-11 PROCEDURE — 80069 RENAL FUNCTION PANEL: CPT

## 2019-01-11 PROCEDURE — 25000003 PHARM REV CODE 250: Performed by: INTERNAL MEDICINE

## 2019-01-11 PROCEDURE — 36415 COLL VENOUS BLD VENIPUNCTURE: CPT

## 2019-01-11 PROCEDURE — 63600175 PHARM REV CODE 636 W HCPCS: Performed by: STUDENT IN AN ORGANIZED HEALTH CARE EDUCATION/TRAINING PROGRAM

## 2019-01-11 PROCEDURE — 83735 ASSAY OF MAGNESIUM: CPT

## 2019-01-11 PROCEDURE — 25000003 PHARM REV CODE 250: Performed by: STUDENT IN AN ORGANIZED HEALTH CARE EDUCATION/TRAINING PROGRAM

## 2019-01-11 PROCEDURE — 25000003 PHARM REV CODE 250

## 2019-01-11 PROCEDURE — 85025 COMPLETE CBC W/AUTO DIFF WBC: CPT

## 2019-01-11 PROCEDURE — 99232 SBSQ HOSP IP/OBS MODERATE 35: CPT | Mod: ,,, | Performed by: INTERNAL MEDICINE

## 2019-01-11 PROCEDURE — 11000001 HC ACUTE MED/SURG PRIVATE ROOM

## 2019-01-11 PROCEDURE — 85045 AUTOMATED RETICULOCYTE COUNT: CPT

## 2019-01-11 PROCEDURE — 97116 GAIT TRAINING THERAPY: CPT

## 2019-01-11 PROCEDURE — 99232 PR SUBSEQUENT HOSPITAL CARE,LEVL II: ICD-10-PCS | Mod: ,,, | Performed by: INTERNAL MEDICINE

## 2019-01-11 PROCEDURE — 97530 THERAPEUTIC ACTIVITIES: CPT

## 2019-01-11 RX ADMIN — LEVOTHYROXINE SODIUM 75 MCG: 75 TABLET ORAL at 06:01

## 2019-01-11 RX ADMIN — ENOXAPARIN SODIUM 40 MG: 100 INJECTION SUBCUTANEOUS at 05:01

## 2019-01-11 RX ADMIN — GUAIFENESIN 600 MG: 600 TABLET, EXTENDED RELEASE ORAL at 09:01

## 2019-01-11 RX ADMIN — LOSARTAN POTASSIUM 100 MG: 50 TABLET, FILM COATED ORAL at 08:01

## 2019-01-11 RX ADMIN — CALCIUM CARBONATE-CHOLECALCIFEROL TAB 250 MG-125 UNIT 1 TABLET: 250-125 TAB at 09:01

## 2019-01-11 RX ADMIN — AMLODIPINE BESYLATE 10 MG: 10 TABLET ORAL at 08:01

## 2019-01-11 RX ADMIN — LABETALOL HCL 300 MG: 200 TABLET, FILM COATED ORAL at 09:01

## 2019-01-11 RX ADMIN — GABAPENTIN 200 MG: 100 CAPSULE ORAL at 08:01

## 2019-01-11 RX ADMIN — ACETAMINOPHEN 650 MG: 325 TABLET, FILM COATED ORAL at 12:01

## 2019-01-11 RX ADMIN — GUAIFENESIN 600 MG: 600 TABLET, EXTENDED RELEASE ORAL at 08:01

## 2019-01-11 RX ADMIN — ATORVASTATIN CALCIUM 20 MG: 10 TABLET, FILM COATED ORAL at 08:01

## 2019-01-11 RX ADMIN — LABETALOL HCL 300 MG: 200 TABLET, FILM COATED ORAL at 08:01

## 2019-01-11 RX ADMIN — TRAMADOL HYDROCHLORIDE 50 MG: 50 TABLET, COATED ORAL at 09:01

## 2019-01-11 NOTE — SUBJECTIVE & OBJECTIVE
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      Arthritis     ASCVD (arteriosclerotic cardiovascular disease) 7/8/2014    Atherosclerosis of aorta 1/7/2016    "There is atherosclerosis of the thoracic aorta" - Xray Chest 7-     Back pain     Basal cell carcinoma 10/7/2013    Right nasal columellar, right lower eyelid, left medial eyelid    Basal cell carcinoma 2/2015    mid back    Chronic diastolic heart failure 11/11/2014    Coronary artery disease     Gastric ulcer     Fosamax related.    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     APPENDECTOMY-LAPAROSCOPIC N/A 8/14/2015    Performed by Feroz Corado MD at [facility] OR 2ND FLR    BIOPSY-LYMPH NODE Right 10/27/2014    Performed by Olvin Cevallos MD at [facility] OR 2ND FLR    CATARACT EXTRACTION      ou dr morales    EYE SURGERY      HYSTERECTOMY  1970     NIC/BSO. took HRT immediatiely after wards.     LAPAROSCOPY-DIAGNOSTIC  8/14/2015    Performed by Gianfranco Crenshaw MD at [facility] OR 2ND FLR    LYMPH NODE BIOPSY Right 10/27/2014    posterior triangle    Mohs excision " of scalp SCC N/A 5/13/2014    Scalp flap N/A 5/14/204    posterior advancement-rotation    SKIN CANCER EXCISION      THYROIDECTOMY  1960's    hyperthyroidism       Review of patient's allergies indicates:   Allergen Reactions    Codeine     Darvocet a500 [propoxyphene n-acetaminophen] Nausea Only    Sulfa (sulfonamide antibiotics) Rash    Fosamax [alendronate]      Gastric ulcer       No current facility-administered medications on file prior to encounter.      Current Outpatient Medications on File Prior to Encounter   Medication Sig    albuterol (PROVENTIL/VENTOLIN HFA) 90 mcg/actuation inhaler Inhale 2 puffs into the lungs every 6 (six) hours as needed for Wheezing. Rescue    amLODIPine (NORVASC) 10 MG tablet TAKE 1 TABLET EVERY DAY    aspirin (ECOTRIN) 81 MG EC tablet Take 81 mg by mouth every other day. Pt taking one pill every other day.    atorvastatin (LIPITOR) 20 MG tablet TAKE 1 TABLET EVERY DAY    FLAXSEED-OMEGA3,6,9-FATTY ACID ORAL Take 1 capsule by mouth once daily.      hydroCHLOROthiazide (HYDRODIURIL) 25 MG tablet Take 1 tablet (25 mg total) by mouth once daily.    labetalol (NORMODYNE) 200 MG tablet TAKE 1 TABLET TWICE DAILY (Patient taking differently: TAKE 1 1/2 TABLET TWICE DAILY)    levoFLOXacin (LEVAQUIN) 250 MG tablet Take 1 tablet (250 mg total) by mouth once daily. for 7 days    levothyroxine (SYNTHROID) 75 MCG tablet TAKE 1 TABLET ONE TIME DAILY    losartan (COZAAR) 100 MG tablet TAKE 1 TABLET EVERY DAY    magnesium 250 mg Tab Take 1 tablet by mouth once daily.      acetaminophen (TYLENOL ARTHRITIS) 650 MG TbSR Take 650 mg by mouth once daily. 1-2 tablet once a day.    albuterol (PROVENTIL) 2.5 mg /3 mL (0.083 %) nebulizer solution Take 3 mLs (2.5 mg total) by nebulization every 4 (four) hours as needed for Wheezing or Shortness of Breath (chest tightness).    calcium carbonate/vitamin D3 (CALTRATE 600 + D ORAL) Take by mouth once daily.    chlorpheniramine maleate  (CHLOR-TRIMETON REPETABS ORAL) Take by mouth.    guaifenesin 100 mg/5 ml (ROBITUSSIN) 100 mg/5 mL syrup Take 200 mg by mouth 3 (three) times daily as needed for Cough.    OPTICHAMBER DARREN LG MASK Spcr U UTD    triamcinolone acetonide 0.1% (KENALOG) 0.1 % cream APPLY TO THE AFFECTED AREA OF lower legs TWICE DAILY    vitamin D 1000 units Tab Take 1,000 Units by mouth once daily.      Family History     Problem Relation (Age of Onset)    Cancer Sister    Clotting disorder Father    Eczema Mother    Heart attack Mother    Heart disease Mother    Heart failure Mother, Father    Hypertension Mother, Father    No Known Problems Brother, Maternal Aunt, Maternal Uncle, Paternal Aunt, Paternal Uncle, Maternal Grandmother, Maternal Grandfather, Paternal Grandmother, Paternal Grandfather    Thyroid disease Sister        Tobacco Use    Smoking status: Former Smoker    Smokeless tobacco: Never Used    Tobacco comment: Quit 35 years ago   Substance and Sexual Activity    Alcohol use: No    Drug use: No    Sexual activity: No     Review of Systems   Constitution: Negative for chills, decreased appetite, diaphoresis, fever, weight gain and weight loss.   Eyes: Negative for blurred vision.   Cardiovascular: Positive for dyspnea on exertion. Negative for chest pain, irregular heartbeat, leg swelling, near-syncope, orthopnea and palpitations.   Respiratory: Positive for cough and shortness of breath. Negative for snoring and wheezing.    Gastrointestinal: Negative for abdominal pain, nausea and vomiting.   Genitourinary: Negative for bladder incontinence and urgency.     Objective:     Vital Signs (Most Recent):  Temp: 98.4 °F (36.9 °C) (01/10/19 2016)  Pulse: 65 (01/10/19 2016)  Resp: 17 (01/10/19 2016)  BP: (!) 158/67 (01/10/19 2016)  SpO2: (!) 94 % (01/10/19 2016) Vital Signs (24h Range):  Temp:  [96.7 °F (35.9 °C)-98.4 °F (36.9 °C)] 98.4 °F (36.9 °C)  Pulse:  [58-70] 65  Resp:  [16-18] 17  SpO2:  [90 %-97 %] 94 %  BP:  (101-176)/(53-83) 158/67     Weight: 56.2 kg (124 lb)  Body mass index is 21.28 kg/m².    SpO2: (!) 94 %  O2 Device (Oxygen Therapy): room air      Intake/Output Summary (Last 24 hours) at 1/10/2019 2252  Last data filed at 1/10/2019 1300  Gross per 24 hour   Intake 1060 ml   Output 650 ml   Net 410 ml       Lines/Drains/Airways     Drain            Female External Urinary Catheter 01/07/19 1620 3 days          Peripheral Intravenous Line                 Peripheral IV - Single Lumen 01/07/19 1200 Left Antecubital 3 days         Peripheral IV - Single Lumen 01/08/19 0045 Anterior;Right Wrist 2 days                Physical Exam   Constitutional: She is oriented to person, place, and time. She appears well-developed and well-nourished. No distress.   HENT:   Head: Normocephalic and atraumatic.   Eyes: Conjunctivae are normal. Pupils are equal, round, and reactive to light.   Neck: Normal range of motion. Neck supple. No thyromegaly present.   Cardiovascular: Normal rate, regular rhythm and intact distal pulses. Exam reveals distant heart sounds. Exam reveals no gallop and no friction rub.   No murmur heard.  Pulses:       Carotid pulses are 2+ on the right side, and 2+ on the left side.       Radial pulses are 2+ on the right side, and 2+ on the left side.   No pulsus paradoxicus    Pulmonary/Chest: Effort normal and breath sounds normal. No respiratory distress. She has no wheezes.   Abdominal: Soft. Bowel sounds are normal. She exhibits no distension. There is no tenderness.   Neurological: She is alert and oriented to person, place, and time.   Skin: She is not diaphoretic.       Significant Labs:   BMP:   Recent Labs   Lab 01/09/19  0403  01/09/19  1929 01/10/19  0444 01/10/19  1558   GLU 97   < > 118* 75 93   *   < > 122* 122* 120*   K 4.4   < > 4.6 4.4 4.5   CL 86*   < > 89* 91* 89*   CO2 28   < > 29 27 27   BUN 22   < > 25* 24* 23   CREATININE 1.1   < > 1.2 0.9 1.0   CALCIUM 9.0   < > 8.9 9.1 9.3   MG 1.8   --   --  1.6  --     < > = values in this interval not displayed.   , CMP   Recent Labs   Lab 01/09/19  1929 01/10/19  0444 01/10/19  1558   * 122* 120*   K 4.6 4.4 4.5   CL 89* 91* 89*   CO2 29 27 27   * 75 93   BUN 25* 24* 23   CREATININE 1.2 0.9 1.0   CALCIUM 8.9 9.1 9.3   ALBUMIN  --  2.3*  --    ANIONGAP 4* 4* 4*   ESTGFRAFRICA 46.6* >60.0 58.1*   EGFRNONAA 40.4* 57.3* 50.4*   , CBC   Recent Labs   Lab 01/09/19  0403 01/10/19  0444   WBC 8.67 9.54   HGB 8.6* 8.8*   HCT 24.9* 26.3*    219   , INR No results for input(s): INR, PROTIME in the last 48 hours. and Lipid Panel No results for input(s): CHOL, HDL, LDLCALC, TRIG, CHOLHDL in the last 48 hours.    Significant Imaging: Echocardiogram:   2D echo with color flow doppler:   Results for orders placed or performed in visit on 04/23/18   2D echo with color flow doppler   Result Value Ref Range    QEF 63 55 - 65    Diastolic Dysfunction Yes (A)     Aortic Valve Regurgitation MILD     Pericardial Effusion MODERATE (A)

## 2019-01-11 NOTE — MEDICAL/APP STUDENT
Hospital Medicine  Progress Note    Patient Name: Kristin Quintanilla  MRN: [id number]  Team: [facility] Denice Morales MD   Admit Date: 1/7/2019  LAMAR 1/15/2019  Code status: DNR (Do Not Resuscitate)    Principal Problem:  Hyponatremia    Interval history: Patient with no new complaints. Generally feeling better.    Review of Systems   Constitutional: Positive for malaise/fatigue. Negative for fever.   Respiratory: Negative for shortness of breath.    Neurological: Positive for weakness.       Physical Exam:  Temp:  [96.7 °F (35.9 °C)-98.9 °F (37.2 °C)]   Pulse:  [58-67]   Resp:  [14-17]   BP: (101-158)/(53-70)   SpO2:  [90 %-94 %]      Temp: 98.9 °F (37.2 °C) (01/11/19 0752)  Pulse: 67 (01/11/19 0801)  Resp: 14 (01/11/19 0752)  BP: (!) 135/58 (01/11/19 0801)  SpO2: (!) 90 % (01/11/19 0752)    Intake/Output Summary (Last 24 hours) at 1/11/2019 1041  Last data filed at 1/11/2019 0900  Gross per 24 hour   Intake 830 ml   Output 820 ml   Net 10 ml     Weight: 50.6 kg (111 lb 8.8 oz)  Body mass index is 19.15 kg/m².    Physical Exam   Constitutional: No distress.   Eyes: Conjunctivae and lids are normal.   Cardiovascular: S1 normal and S2 normal.   Pulmonary/Chest: Effort normal and breath sounds normal.   Abdominal: Soft. Bowel sounds are normal. There is no tenderness.   Musculoskeletal: She exhibits no edema.       Significant Labs:  Recent Labs   Lab 01/08/19  0310 01/09/19  0403 01/10/19  0444 01/11/19  0516   WBC 8.53 8.67 9.54 7.65   HGB 9.8* 8.6* 8.8* 8.5*   HCT 28.2* 24.9* 26.3* 24.7*    229 219 227     Recent Labs   Lab 01/07/19  1201  01/08/19  0310  01/09/19  0403  01/10/19  0444 01/10/19  1558 01/11/19  0516   *   < > 120*   < > 119*   < > 122* 120* 123*   K 4.0   < > 3.4*   < > 4.4   < > 4.4 4.5 4.6   CL 87*   < > 83*   < > 86*   < > 91* 89* 91*   CO2 24   < > 27   < > 28   < > 27 27 27   BUN 17   < > 14   < > 22   < > 24* 23 21   CREATININE 0.8   < > 0.8   < > 1.1   < > 0.9 1.0 0.9   GLU 97    < > 81   < > 97   < > 75 93 65*   CALCIUM 9.5   < > 9.8   < > 9.0   < > 9.1 9.3 9.0   MG  --    < > 1.3*  --  1.8  --  1.6  --  1.6   PHOS  --   --  3.0  --   --   --  2.8  --  2.9   ALKPHOS 82  --   --   --   --   --   --   --   --    ALT 38  --   --   --   --   --   --   --   --    AST 33  --   --   --   --   --   --   --   --    ALBUMIN 2.6*  --   --   --   --   --  2.3*  --  2.2*   PROT 5.2*  --   --   --   --   --   --   --   --    BILITOT 0.8  --   --   --   --   --   --   --   --     < > = values in this interval not displayed.       Recent Labs   Lab 01/07/19  1201   TROPONINI 0.006     TSH:   Recent Labs   Lab 01/08/19  0957   TSH 1.942       Inpatient Medications prescribed for management of current Problems:   Scheduled Meds:    amLODIPine  10 mg Oral Daily    aspirin  81 mg Oral Every other day    atorvastatin  20 mg Oral Daily    calcium carbonate-vitamin D3 250-125 mg  1 tablet Oral BID    enoxaparin  40 mg Subcutaneous Daily    guaiFENesin  600 mg Oral BID    labetalol  300 mg Oral BID    levothyroxine  75 mcg Oral Before breakfast    losartan  100 mg Oral Daily     Continuous Infusions:   As Needed: acetaminophen, benzonatate, hydrALAZINE, sodium chloride 0.9%, traMADol    Active Hospital Problems    Diagnosis  POA    *Hyponatremia [E87.1]  Yes    Anemia [D64.9]  Yes    Essential hypertension [I10]  Yes    Acute bronchitis [J20.9]  Yes    Hypothyroidism (acquired) [E03.9]  Yes    Pericardial effusion [I31.3]  Yes     Chronic    Chronic diastolic heart failure [I50.32]  Yes    ASCVD (arteriosclerotic cardiovascular disease) [I25.10]  Yes     Chronic      Resolved Hospital Problems   No resolved problems to display.       Overview:    87 yo female on a background significant for CAD, Diastolic HF, HTN, HLD and hypothyroidism presented from PCP for dyspnea, LE edema, and fatigue and was admitted to MICU for hyponatremia (117) with evidence of hypervolemia. Prior to admission, patient  was being treated with Levaquin for acute bronchitis and completed course. CXR on admission indicated pulmonary edema and she was managed with IV Lasix x1. For her hyponatremia, she was fluid restricted and home HCTZ was discontinued; CTH/CXR did not reveal any obvious malignancy.     Assessment and Plan for Problems addressed today:    Chronic diastolic heart failure  Dyspnea  · Echo (4/2018): EF 60-65%, Grade 2 diastolic dysfunction. Biatrial enlargment, pericardial effusion.   · BNP elevated on admission. CXR (1/8): increasing bilateral effusions. BLE U/S: negative for DVT.  · Received IV Lasix 80 mg x1 with appropriate UOP. Held additional doses as patient clinically appeared hypovolemic.  · Strict I/O, Daily weights, fluid restriction.  · Repeat Echo: Large pericardial effusion; severe left atrial enlargement; EF 60%; Grade II diastolic dysfunction consistent with pseudonormalization. Called by Echo MD with concern for early tamponade per Echo findings. VSS. Cardiology consulted. (1/10)  · Per Cardiology discussion with patient and family, patient wishes to avoid invasive procedures such as pericardiocentesis. Patient not clinically in tamponade; plan for OP follow up. Plan to avoid aggressive BP treatment and diuresis. Orthostatic BPs revealed mild postural changes with MAP maintained > 80.. (1/11)    Hyponatremia  · Possibly due to hypervolemia from CHF. 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   · Since admission, Na 117 -> 122. TSH & cortisol WNL. Serum osm <270. Continuing fluid restriction and monitoring BMP for slow Na correction. (1/9)  · Monitoring Na+; slowly improving (1/11)    Acute bronchitis  · She is afebrile with no leukocytosis. Completed course of Levaquin. PRN benzonatate ordered. Ordered Mucinex.     Essential hypertension  ASCVD (arteriosclerotic cardiovascular disease)  · EKG (1/7): NSR. Continue ASA, atorvastatin and antihypertensives:  amlodipine, labetalol (dose increased) and losartan. Home HCTZ discontinued due to hyponatremia.     Normocytic anemia  · Hgb ~9 on admit, slowly down-trending.  · Iron studies consistent with mild iron deficiency anemia. Retic % WNL. Ordered FOBT. (1/11)    Hypothyroidism  · Continued home levothyroxine 75mcg.     Hypokalemia  Hyomagnesemia  · Replete as needed    Diet: Diet Adult Regular (IDDSI Level 7) Fluid - 1500mL    DVT Prophylaxis:   Anticoagulants   Medication Route Frequency    enoxaparin injection 40 mg Subcutaneous Daily       L/D/A:  PIV x2  External urinary catheter    Discharge plan and follow up  [place] [facility] [place]  Denice Morales MD  [facility]   Department of Hospital Medicine    Scribe Attestation: I personally scribed for Denice Morales MD on 01/11/2019 at 11:38 AM. Electronically signed by nick Kern on 01/11/2019 at 11:38 AM.

## 2019-01-11 NOTE — HPI
Ms. Quintanilla is a 88 yoF, cardiology consulted for large pericardial effusion concerning for tamponade physiology. 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 On presentation, hypervolemic hyponatremia (117), CXR with pulmonary edema, started on Lasix IV with -3L thus far. Prior to her admission follows up with Dr. Triana, BP meds were being titrated up as her BP in clinic on repeat checks were elevated, she was started HCTZ with fluid restriction diet.     With her ongoing SOB / GUERIN, TTE completed noted large pericardial effusion, unchanged from prior 4/2018., LVef 60% with Grade II DD. Patient seen with son at bedside, states she is aware of effusion for long time (since 2013), prior to her admission patient lives along, very independent, able to take care of ADLs, son very supportive. Currently only c/o being weak and debilitated.     Patient seen by Dr. Isbell / discussed with Dr. Pinedo, since no clinically signs of tamponade and patients wishes to be DNR / DNI, she does not want needle decompression. Given that there is no clinical evidence of cardiac tamponde and in conjunction with the patient wishes, we will not plan on a pericardiocentesis as of now.

## 2019-01-11 NOTE — PT/OT/SLP PROGRESS
"Physical Therapy Treatment    Patient Name:  Kristin Quintanilla   MRN:  [id number]    Recommendations:     Discharge Recommendations:  (home with home health)   Discharge Equipment Recommendations: none   Barriers to discharge: None    Assessment:     Kristin Quintanilla is a 88 y.o. female admitted with a medical diagnosis of Chronic diastolic heart failure.  She presents with the following impairments/functional limitations:  weakness, impaired endurance, impaired self care skills, impaired functional mobilty, gait instability, impaired balance, impaired cardiopulmonary response to activity, decreased lower extremity function, decreased safety awareness. Pt tolerated session well with focus on transfers, and gait training. Pt progressing well with improvement in ambulation distance and pt remaining consistent with assistance needed. Pts HR remains WNL this session, monitored between gait trials. Pt and son educated on pts current functional level, assistance needed, and recommendations for continued therapy. Pt will continue to benefit from therapy services to improve impairments listed above.     Rehab Prognosis: Good; patient would benefit from acute skilled PT services to address these deficits and reach maximum level of function.    Recent Surgery: * No surgery found *      Plan:     During this hospitalization, patient to be seen 3 x/week to address the identified rehab impairments via gait training, therapeutic activities, therapeutic exercises, neuromuscular re-education and progress toward the following goals:    · Plan of Care Expires:  02/07/19    Subjective     Chief Complaint: fatigue  Patient/Family Comments/goals: "I just don't have balance like I did before all this."   Pain/Comfort:  · Pain Rating 1: 0/10  · Pain Rating Post-Intervention 1: 0/10      Objective:     Communicated with NSG prior to session.  Patient found all lines intact, call button in reach and son present and pt UIC with telemetry  upon " PTA entry to room.     General Precautions: Standard, fall   Orthopedic Precautions:N/A   Braces: N/A     Functional Mobility:  · Transfers:     · Sit to Stand:  contact guard assistance and minimum assistance with rolling walker  · Bed to Chair: contact guard assistance with  rolling walker  using  Step Transfer  · Gait: Pt ambulates 24 ft and 76 ft with RW and CGA. Pt with increased reliance on BUE support, FFP, and decreased step clearance bilaterally. Limited by fatigue.   · Balance: Pt sits with SBA. Pt stands with CGA and RW support once upright.       AM-PAC 6 CLICK MOBILITY  Turning over in bed (including adjusting bedclothes, sheets and blankets)?: 3  Sitting down on and standing up from a chair with arms (e.g., wheelchair, bedside commode, etc.): 3  Moving from lying on back to sitting on the side of the bed?: 3  Moving to and from a bed to a chair (including a wheelchair)?: 3  Need to walk in hospital room?: 3  Climbing 3-5 steps with a railing?: 2  Basic Mobility Total Score: 17       Therapeutic Activities and Exercises:   Pt assisted with functional mobility as noted above.   Pts HR monitored between gait trials, remains WNL throughout tx.   Pt and son educated on AD recommendation for RW and importance of its use for safety vs. Use of rollator.   Pt safe for ambulation in room with assistance of 1 and RW. Family educated on safe mobilization of pt.   Pt educated on importance of increased time UIC.     Patient left up in chair with all lines intact, call button in reach, NSG notified and son present.    GOALS:   Multidisciplinary Problems     Physical Therapy Goals        Problem: Physical Therapy Goal    Goal Priority Disciplines Outcome Goal Variances Interventions   Physical Therapy Goal     PT, PT/OT Ongoing (interventions implemented as appropriate)     Description:  Goals to be met by: 1/15/2019    Patient will increase functional independence with mobility by performin. Sit to stand  transfer with Supervision using RW - not met  2. Gait  x 250 feet with Supervision using Rolling Walker. - not met  3. Lower extremity exercise program x20 reps per handout, with independence - not met                      Time Tracking:     PT Received On: 01/11/19  PT Start Time: 0948     PT Stop Time: 1018  PT Total Time (min): 30 min     Billable Minutes: Gait Training 16 and Therapeutic Activity 14    Treatment Type: Treatment  PT/PTA: PTA     PTA Visit Number: 1     Massimo Hwang, PTA  01/11/2019

## 2019-01-11 NOTE — ASSESSMENT & PLAN NOTE
Ms. Quintanilla is a 88 yoF, admitted to medicine for ADHF, cardiology consulted for pericardial effusion noted on TTE 1/10/19. Further review she has had mod-large pericardial effusion posteriorly, today's echo with 25% mitral inflow respiratory variation. Clinically does not appear to be in tamponade with hypertensive SBP 160s, no pulses paradoxicus on exam. Only c/o SOB / GUERIN due to ongoing debility.     - At length discussion with Ms. Zaragoza and son at bedside, patient is DNR and wishes to avoid any invasive procedure including needle decompression / pericardiocentesis. She understands risk, benefits and alternatives of the procedure. Given that there is no clinical evidence of cardiac tamponde and in conjunction with the patient wishes, we will not plan on a pericardiocentesis as of now.  - Cardiology will sign off, please call for any further questions or concerns  - Caution with aggressive diuresis, likely preload dependent   - Caution with aggressive BP treatment  - Outpatient follow up with Dr. Triana on discharge      Discussed with Dr. Bernardo

## 2019-01-11 NOTE — PLAN OF CARE
Problem: Physical Therapy Goal  Goal: Physical Therapy Goal  Goals to be met by: 1/15/2019    Patient will increase functional independence with mobility by performin. Sit to stand transfer with Supervision using RW - not met  2. Gait  x 250 feet with Supervision using Rolling Walker. - not met  3. Lower extremity exercise program x20 reps per handout, with independence - not met     Outcome: Ongoing (interventions implemented as appropriate)  Goals remain appropriate.

## 2019-01-11 NOTE — PLAN OF CARE
Problem: Adult Inpatient Plan of Care  Goal: Plan of Care Review  Outcome: Ongoing (interventions implemented as appropriate)  Greeted patient and gained consent for nursing care. Patient ambulated to restroom with assistance of one person. POC reviewed, verbalized understanding. Prepped and made comfortable for the night. Bed on lowest position, locked. Call bell within reach. Assisted in her needs. No complaint of pain. Slept early for the night. Regularly checked on her. Will continue to monitor.

## 2019-01-11 NOTE — CONSULTS
[facility] [address]  Cardiology  Consult Note    Patient Name: Kristin Quintanilla  MRN: [id number]  Admission Date: 1/7/2019  Hospital Length of Stay: 3 days  Code Status: DNR   Attending Provider: Denice Morales MD   Consulting Provider: Estefania Erickson MD  Primary Care Physician: Jairo Schmidt MD  Principal Problem:Chronic diastolic heart failure    Patient information was obtained from patient and ER records.     Inpatient consult to Cardiology  Consult performed by: Estefania Berg MD  Consult ordered by: Denice Morales MD        Subjective:     Chief Complaint:   I feel SOB     HPI:   Ms. Quintanilla is a 88 yoF, cardiology consulted for large pericardial effusion concerning for tamponade physiology. 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 On presentation, hypervolemic hyponatremia (117), CXR with pulmonary edema, started on Lasix IV with -3L thus far. Prior to her admission follows up with Dr. Triana, BP meds were being titrated up as her BP in clinic on repeat checks were elevated, she was started HCTZ with fluid restriction diet.     With her ongoing SOB / GUERIN, TTE completed noted large pericardial effusion, unchanged from prior 4/2018., LVef 60% with Grade II DD. Patient seen with son at bedside, states she is aware of effusion for long time (since 2013), prior to her admission patient lives along, very independent, able to take care of ADLs, son very supportive. Currently only c/o being weak and debilitated.     Patient seen by Dr. Isbell / discussed with Dr. Pinedo, since no clinically signs of tamponade and patients wishes to be DNR / DNI, she does not want needle decompression. Given that there is no clinical evidence of cardiac tamponde and in conjunction with the patient wishes, we will not plan on a pericardiocentesis as of now.             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      Arthritis     ASCVD (arteriosclerotic cardiovascular disease) 7/8/2014    Atherosclerosis of aorta 1/7/2016    "There is atherosclerosis of the thoracic aorta" - Xray Chest 7-     Back pain     Basal cell carcinoma 10/7/2013    Right nasal columellar, right lower eyelid, left medial eyelid    Basal cell carcinoma 2/2015    mid back    Chronic diastolic heart failure 11/11/2014    Coronary artery disease     Gastric ulcer     Fosamax related.    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     APPENDECTOMY-LAPAROSCOPIC N/A 8/14/2015    Performed by Feroz Corado MD at [facility] OR 2ND FLR    BIOPSY-LYMPH NODE Right 10/27/2014    Performed by Olvin Cevallos MD at [facility] OR 2ND FLR    CATARACT EXTRACTION      ou dr morales    EYE SURGERY      HYSTERECTOMY  1970     NIC/BSO. took HRT immediatiely after wards.     LAPAROSCOPY-DIAGNOSTIC  8/14/2015    Performed by Gianfranco Crenshaw MD at [facility] OR 2ND FLR    LYMPH NODE BIOPSY Right 10/27/2014    posterior triangle    Mohs excision of scalp SCC N/A " 5/13/2014    Scalp flap N/A 5/14/204    posterior advancement-rotation    SKIN CANCER EXCISION      THYROIDECTOMY  1960's    hyperthyroidism       Review of patient's allergies indicates:   Allergen Reactions    Codeine     Darvocet a500 [propoxyphene n-acetaminophen] Nausea Only    Sulfa (sulfonamide antibiotics) Rash    Fosamax [alendronate]      Gastric ulcer       No current facility-administered medications on file prior to encounter.      Current Outpatient Medications on File Prior to Encounter   Medication Sig    albuterol (PROVENTIL/VENTOLIN HFA) 90 mcg/actuation inhaler Inhale 2 puffs into the lungs every 6 (six) hours as needed for Wheezing. Rescue    amLODIPine (NORVASC) 10 MG tablet TAKE 1 TABLET EVERY DAY    aspirin (ECOTRIN) 81 MG EC tablet Take 81 mg by mouth every other day. Pt taking one pill every other day.    atorvastatin (LIPITOR) 20 MG tablet TAKE 1 TABLET EVERY DAY    FLAXSEED-OMEGA3,6,9-FATTY ACID ORAL Take 1 capsule by mouth once daily.      hydroCHLOROthiazide (HYDRODIURIL) 25 MG tablet Take 1 tablet (25 mg total) by mouth once daily.    labetalol (NORMODYNE) 200 MG tablet TAKE 1 TABLET TWICE DAILY (Patient taking differently: TAKE 1 1/2 TABLET TWICE DAILY)    levoFLOXacin (LEVAQUIN) 250 MG tablet Take 1 tablet (250 mg total) by mouth once daily. for 7 days    levothyroxine (SYNTHROID) 75 MCG tablet TAKE 1 TABLET ONE TIME DAILY    losartan (COZAAR) 100 MG tablet TAKE 1 TABLET EVERY DAY    magnesium 250 mg Tab Take 1 tablet by mouth once daily.      acetaminophen (TYLENOL ARTHRITIS) 650 MG TbSR Take 650 mg by mouth once daily. 1-2 tablet once a day.    albuterol (PROVENTIL) 2.5 mg /3 mL (0.083 %) nebulizer solution Take 3 mLs (2.5 mg total) by nebulization every 4 (four) hours as needed for Wheezing or Shortness of Breath (chest tightness).    calcium carbonate/vitamin D3 (CALTRATE 600 + D ORAL) Take by mouth once daily.    chlorpheniramine maleate (CHLOR-TRIMETON  REPETABS ORAL) Take by mouth.    guaifenesin 100 mg/5 ml (ROBITUSSIN) 100 mg/5 mL syrup Take 200 mg by mouth 3 (three) times daily as needed for Cough.    OPTICHAMBER DARREN LG MASK Spcr U UTD    triamcinolone acetonide 0.1% (KENALOG) 0.1 % cream APPLY TO THE AFFECTED AREA OF lower legs TWICE DAILY    vitamin D 1000 units Tab Take 1,000 Units by mouth once daily.      Family History     Problem Relation (Age of Onset)    Cancer Sister    Clotting disorder Father    Eczema Mother    Heart attack Mother    Heart disease Mother    Heart failure Mother, Father    Hypertension Mother, Father    No Known Problems Brother, Maternal Aunt, Maternal Uncle, Paternal Aunt, Paternal Uncle, Maternal Grandmother, Maternal Grandfather, Paternal Grandmother, Paternal Grandfather    Thyroid disease Sister        Tobacco Use    Smoking status: Former Smoker    Smokeless tobacco: Never Used    Tobacco comment: Quit 35 years ago   Substance and Sexual Activity    Alcohol use: No    Drug use: No    Sexual activity: No     Review of Systems   Constitution: Negative for chills, decreased appetite, diaphoresis, fever, weight gain and weight loss.   Eyes: Negative for blurred vision.   Cardiovascular: Positive for dyspnea on exertion. Negative for chest pain, irregular heartbeat, leg swelling, near-syncope, orthopnea and palpitations.   Respiratory: Positive for cough and shortness of breath. Negative for snoring and wheezing.    Gastrointestinal: Negative for abdominal pain, nausea and vomiting.   Genitourinary: Negative for bladder incontinence and urgency.     Objective:     Vital Signs (Most Recent):  Temp: 98.4 °F (36.9 °C) (01/10/19 2016)  Pulse: 65 (01/10/19 2016)  Resp: 17 (01/10/19 2016)  BP: (!) 158/67 (01/10/19 2016)  SpO2: (!) 94 % (01/10/19 2016) Vital Signs (24h Range):  Temp:  [96.7 °F (35.9 °C)-98.4 °F (36.9 °C)] 98.4 °F (36.9 °C)  Pulse:  [58-70] 65  Resp:  [16-18] 17  SpO2:  [90 %-97 %] 94 %  BP:  (101-176)/(53-83) 158/67     Weight: 56.2 kg (124 lb)  Body mass index is 21.28 kg/m².    SpO2: (!) 94 %  O2 Device (Oxygen Therapy): room air      Intake/Output Summary (Last 24 hours) at 1/10/2019 2252  Last data filed at 1/10/2019 1300  Gross per 24 hour   Intake 1060 ml   Output 650 ml   Net 410 ml       Lines/Drains/Airways     Drain            Female External Urinary Catheter 01/07/19 1620 3 days          Peripheral Intravenous Line                 Peripheral IV - Single Lumen 01/07/19 1200 Left Antecubital 3 days         Peripheral IV - Single Lumen 01/08/19 0045 Anterior;Right Wrist 2 days                Physical Exam   Constitutional: She is oriented to person, place, and time. She appears well-developed and well-nourished. No distress.   HENT:   Head: Normocephalic and atraumatic.   Eyes: Conjunctivae are normal. Pupils are equal, round, and reactive to light.   Neck: Normal range of motion. Neck supple. No thyromegaly present.   Cardiovascular: Normal rate, regular rhythm and intact distal pulses. Exam reveals distant heart sounds. Exam reveals no gallop and no friction rub.   No murmur heard.  Pulses:       Carotid pulses are 2+ on the right side, and 2+ on the left side.       Radial pulses are 2+ on the right side, and 2+ on the left side.   No pulsus paradoxicus    Pulmonary/Chest: Effort normal and breath sounds normal. No respiratory distress. She has no wheezes.   Abdominal: Soft. Bowel sounds are normal. She exhibits no distension. There is no tenderness.   Neurological: She is alert and oriented to person, place, and time.   Skin: She is not diaphoretic.       Significant Labs:   BMP:   Recent Labs   Lab 01/09/19  0403  01/09/19  1929 01/10/19  0444 01/10/19  1558   GLU 97   < > 118* 75 93   *   < > 122* 122* 120*   K 4.4   < > 4.6 4.4 4.5   CL 86*   < > 89* 91* 89*   CO2 28   < > 29 27 27   BUN 22   < > 25* 24* 23   CREATININE 1.1   < > 1.2 0.9 1.0   CALCIUM 9.0   < > 8.9 9.1 9.3   MG 1.8   --   --  1.6  --     < > = values in this interval not displayed.   , CMP   Recent Labs   Lab 01/09/19  1929 01/10/19  0444 01/10/19  1558   * 122* 120*   K 4.6 4.4 4.5   CL 89* 91* 89*   CO2 29 27 27   * 75 93   BUN 25* 24* 23   CREATININE 1.2 0.9 1.0   CALCIUM 8.9 9.1 9.3   ALBUMIN  --  2.3*  --    ANIONGAP 4* 4* 4*   ESTGFRAFRICA 46.6* >60.0 58.1*   EGFRNONAA 40.4* 57.3* 50.4*   , CBC   Recent Labs   Lab 01/09/19  0403 01/10/19  0444   WBC 8.67 9.54   HGB 8.6* 8.8*   HCT 24.9* 26.3*    219   , INR No results for input(s): INR, PROTIME in the last 48 hours. and Lipid Panel No results for input(s): CHOL, HDL, LDLCALC, TRIG, CHOLHDL in the last 48 hours.    Significant Imaging: Echocardiogram:   2D echo with color flow doppler:   Results for orders placed or performed in visit on 04/23/18   2D echo with color flow doppler   Result Value Ref Range    QEF 63 55 - 65    Diastolic Dysfunction Yes (A)     Aortic Valve Regurgitation MILD     Pericardial Effusion MODERATE (A)      Assessment and Plan:     Pericardial effusion    Ms. Quintanilla is a 88 yoF, admitted to medicine for ADHF, cardiology consulted for pericardial effusion noted on TTE 1/10/19. Further review she has had mod-large pericardial effusion posteriorly, today's echo with 25% mitral inflow respiratory variation. Clinically does not appear to be in tamponade with hypertensive SBP 160s, no pulses paradoxicus on exam. Only c/o SOB / GUERIN due to ongoing debility.     - At length discussion with Ms. Zaragoza and son at bedside, patient is DNR and wishes to avoid any invasive procedure including needle decompression / pericardiocentesis. She understands risk, benefits and alternatives of the procedure. Given that there is no clinical evidence of cardiac tamponde and in conjunction with the patient wishes, we will not plan on a pericardiocentesis as of now.  - Cardiology will sign off, please call for any further questions or concerns  - Caution with  aggressive diuresis, likely preload dependent   - Caution with aggressive BP treatment  - Outpatient follow up with Dr. Triana on discharge      Discussed with Dr. Bernardo                VTE Risk Mitigation (From admission, onward)        Ordered     enoxaparin injection 40 mg  Daily      01/07/19 1428     IP VTE HIGH RISK PATIENT  Once      01/07/19 1428          Thank you for your consult. I will sign off. Please contact us if you have any additional questions.    Estefania Erickson MD  Cardiology   [facility]

## 2019-01-12 LAB
ALBUMIN SERPL BCP-MCNC: 2.1 G/DL
ANION GAP SERPL CALC-SCNC: 5 MMOL/L
BASOPHILS # BLD AUTO: 0.04 K/UL
BASOPHILS NFR BLD: 0.6 %
BUN SERPL-MCNC: 27 MG/DL
CALCIUM SERPL-MCNC: 8.8 MG/DL
CHLORIDE SERPL-SCNC: 94 MMOL/L
CO2 SERPL-SCNC: 27 MMOL/L
CREAT SERPL-MCNC: 0.9 MG/DL
DIFFERENTIAL METHOD: ABNORMAL
EOSINOPHIL # BLD AUTO: 0.5 K/UL
EOSINOPHIL NFR BLD: 6.7 %
ERYTHROCYTE [DISTWIDTH] IN BLOOD BY AUTOMATED COUNT: 14.3 %
EST. GFR  (AFRICAN AMERICAN): >60 ML/MIN/1.73 M^2
EST. GFR  (NON AFRICAN AMERICAN): 57.3 ML/MIN/1.73 M^2
GLUCOSE SERPL-MCNC: 70 MG/DL
HCT VFR BLD AUTO: 24.8 %
HGB BLD-MCNC: 8.1 G/DL
IMM GRANULOCYTES # BLD AUTO: 0.03 K/UL
IMM GRANULOCYTES NFR BLD AUTO: 0.4 %
LYMPHOCYTES # BLD AUTO: 0.7 K/UL
LYMPHOCYTES NFR BLD: 10.1 %
MAGNESIUM SERPL-MCNC: 1.6 MG/DL
MCH RBC QN AUTO: 30.2 PG
MCHC RBC AUTO-ENTMCNC: 32.7 G/DL
MCV RBC AUTO: 93 FL
MONOCYTES # BLD AUTO: 0.8 K/UL
MONOCYTES NFR BLD: 11.7 %
NEUTROPHILS # BLD AUTO: 5.1 K/UL
NEUTROPHILS NFR BLD: 70.5 %
NRBC BLD-RTO: 0 /100 WBC
PHOSPHATE SERPL-MCNC: 3.2 MG/DL
PLATELET # BLD AUTO: 224 K/UL
PMV BLD AUTO: 10.7 FL
POTASSIUM SERPL-SCNC: 4.6 MMOL/L
RBC # BLD AUTO: 2.68 M/UL
SODIUM SERPL-SCNC: 126 MMOL/L
WBC # BLD AUTO: 7.16 K/UL

## 2019-01-12 PROCEDURE — 25000003 PHARM REV CODE 250: Performed by: STUDENT IN AN ORGANIZED HEALTH CARE EDUCATION/TRAINING PROGRAM

## 2019-01-12 PROCEDURE — 99232 PR SUBSEQUENT HOSPITAL CARE,LEVL II: ICD-10-PCS | Mod: ,,, | Performed by: INTERNAL MEDICINE

## 2019-01-12 PROCEDURE — 25000003 PHARM REV CODE 250: Performed by: INTERNAL MEDICINE

## 2019-01-12 PROCEDURE — 80069 RENAL FUNCTION PANEL: CPT

## 2019-01-12 PROCEDURE — 11000001 HC ACUTE MED/SURG PRIVATE ROOM

## 2019-01-12 PROCEDURE — 63600175 PHARM REV CODE 636 W HCPCS: Performed by: INTERNAL MEDICINE

## 2019-01-12 PROCEDURE — 85025 COMPLETE CBC W/AUTO DIFF WBC: CPT

## 2019-01-12 PROCEDURE — 83735 ASSAY OF MAGNESIUM: CPT

## 2019-01-12 PROCEDURE — 36415 COLL VENOUS BLD VENIPUNCTURE: CPT

## 2019-01-12 PROCEDURE — 25000003 PHARM REV CODE 250

## 2019-01-12 PROCEDURE — 99232 SBSQ HOSP IP/OBS MODERATE 35: CPT | Mod: ,,, | Performed by: INTERNAL MEDICINE

## 2019-01-12 RX ORDER — GABAPENTIN 100 MG/1
200 CAPSULE ORAL 2 TIMES DAILY
Status: DISCONTINUED | OUTPATIENT
Start: 2019-01-12 | End: 2019-01-14 | Stop reason: HOSPADM

## 2019-01-12 RX ORDER — ENOXAPARIN SODIUM 100 MG/ML
30 INJECTION SUBCUTANEOUS EVERY 24 HOURS
Status: DISCONTINUED | OUTPATIENT
Start: 2019-01-12 | End: 2019-01-14 | Stop reason: HOSPADM

## 2019-01-12 RX ADMIN — ACETAMINOPHEN 650 MG: 325 TABLET, FILM COATED ORAL at 02:01

## 2019-01-12 RX ADMIN — ASPIRIN 81 MG: 81 TABLET, COATED ORAL at 09:01

## 2019-01-12 RX ADMIN — LOSARTAN POTASSIUM 100 MG: 50 TABLET, FILM COATED ORAL at 09:01

## 2019-01-12 RX ADMIN — CALCIUM CARBONATE-CHOLECALCIFEROL TAB 250 MG-125 UNIT 1 TABLET: 250-125 TAB at 09:01

## 2019-01-12 RX ADMIN — ATORVASTATIN CALCIUM 20 MG: 10 TABLET, FILM COATED ORAL at 09:01

## 2019-01-12 RX ADMIN — GABAPENTIN 200 MG: 100 CAPSULE ORAL at 02:01

## 2019-01-12 RX ADMIN — TRAMADOL HYDROCHLORIDE 50 MG: 50 TABLET, COATED ORAL at 09:01

## 2019-01-12 RX ADMIN — LEVOTHYROXINE SODIUM 75 MCG: 75 TABLET ORAL at 05:01

## 2019-01-12 RX ADMIN — GUAIFENESIN 600 MG: 600 TABLET, EXTENDED RELEASE ORAL at 09:01

## 2019-01-12 RX ADMIN — ENOXAPARIN SODIUM 30 MG: 100 INJECTION SUBCUTANEOUS at 04:01

## 2019-01-12 RX ADMIN — GABAPENTIN 200 MG: 100 CAPSULE ORAL at 09:01

## 2019-01-12 RX ADMIN — LABETALOL HCL 300 MG: 200 TABLET, FILM COATED ORAL at 09:01

## 2019-01-12 RX ADMIN — AMLODIPINE BESYLATE 10 MG: 10 TABLET ORAL at 09:01

## 2019-01-12 RX ADMIN — CALCIUM CARBONATE-CHOLECALCIFEROL TAB 250 MG-125 UNIT 1 TABLET: 250-125 TAB at 10:01

## 2019-01-12 NOTE — PROGRESS NOTES
Physician Attestation for Scribe:  I, Denice Morales MD, personally performed the services described in this documentation. All medical record entries made by the scribe were at my direction and in my presence.  I have reviewed this note and agree that the record reflects my personal performance and is accurate and complete.     Hospital Medicine  Progress Note     Patient Name: Kristin Quintanilla  MRN: [id number]  Team: [facility] MED D Denice Morales MD   Admit Date: 1/7/2019  LAMAR 1/15/2019  Code status: DNR (Do Not Resuscitate)     Principal Problem:  Hyponatremia     Interval history: Patient with no new complaints. Generally feeling better.     Review of Systems   Constitutional: Positive for malaise/fatigue. Negative for fever.   Respiratory: Negative for shortness of breath.    Neurological: Positive for weakness.         Physical Exam:  Temp:  [96.7 °F (35.9 °C)-98.9 °F (37.2 °C)]   Pulse:  [58-67]   Resp:  [14-17]   BP: (101-158)/(53-70)   SpO2:  [90 %-94 %]       Temp: 98.9 °F (37.2 °C) (01/11/19 0752)  Pulse: 67 (01/11/19 0801)  Resp: 14 (01/11/19 0752)  BP: (!) 135/58 (01/11/19 0801)  SpO2: (!) 90 % (01/11/19 0752)     Intake/Output Summary (Last 24 hours) at 1/11/2019 1041  Last data filed at 1/11/2019 0900      Gross per 24 hour   Intake 830 ml   Output 820 ml   Net 10 ml      Weight: 50.6 kg (111 lb 8.8 oz)  Body mass index is 19.15 kg/m².     Physical Exam   Constitutional: No distress.   Eyes: Conjunctivae and lids are normal.   Cardiovascular: S1 normal and S2 normal.   Pulmonary/Chest: Effort normal and breath sounds normal.   Abdominal: Soft. Bowel sounds are normal. There is no tenderness.   Musculoskeletal: She exhibits no edema.         Significant Labs:         Recent Labs   Lab 01/08/19  0310 01/09/19  0403 01/10/19  0444 01/11/19  0516   WBC 8.53 8.67 9.54 7.65   HGB 9.8* 8.6* 8.8* 8.5*   HCT 28.2* 24.9* 26.3* 24.7*    229 219 227                  Recent Labs   Lab 01/07/19  1201    01/08/19  0310   01/09/19  0403   01/10/19  0444 01/10/19  1558 01/11/19  0516   *   < > 120*   < > 119*   < > 122* 120* 123*   K 4.0   < > 3.4*   < > 4.4   < > 4.4 4.5 4.6   CL 87*   < > 83*   < > 86*   < > 91* 89* 91*   CO2 24   < > 27   < > 28   < > 27 27 27   BUN 17   < > 14   < > 22   < > 24* 23 21   CREATININE 0.8   < > 0.8   < > 1.1   < > 0.9 1.0 0.9   GLU 97   < > 81   < > 97   < > 75 93 65*   CALCIUM 9.5   < > 9.8   < > 9.0   < > 9.1 9.3 9.0   MG  --    < > 1.3*  --  1.8  --  1.6  --  1.6   PHOS  --   --  3.0  --   --   --  2.8  --  2.9   ALKPHOS 82  --   --   --   --   --   --   --   --    ALT 38  --   --   --   --   --   --   --   --    AST 33  --   --   --   --   --   --   --   --    ALBUMIN 2.6*  --   --   --   --   --  2.3*  --  2.2*   PROT 5.2*  --   --   --   --   --   --   --   --    BILITOT 0.8  --   --   --   --   --   --   --   --     < > = values in this interval not displayed.             Recent Labs   Lab 01/07/19  1201   TROPONINI 0.006      TSH:       Recent Labs   Lab 01/08/19  0957   TSH 1.942         Inpatient Medications prescribed for management of current Problems:   Scheduled Meds:    amLODIPine  10 mg Oral Daily    aspirin  81 mg Oral Every other day    atorvastatin  20 mg Oral Daily    calcium carbonate-vitamin D3 250-125 mg  1 tablet Oral BID    enoxaparin  40 mg Subcutaneous Daily    guaiFENesin  600 mg Oral BID    labetalol  300 mg Oral BID    levothyroxine  75 mcg Oral Before breakfast    losartan  100 mg Oral Daily      Continuous Infusions:   As Needed: acetaminophen, benzonatate, hydrALAZINE, sodium chloride 0.9%, traMADol            Active Hospital Problems     Diagnosis   POA    *Hyponatremia [E87.1]   Yes    Anemia [D64.9]   Yes    Essential hypertension [I10]   Yes    Acute bronchitis [J20.9]   Yes    Hypothyroidism (acquired) [E03.9]   Yes    Pericardial effusion [I31.3]   Yes       Chronic    Chronic diastolic heart failure [I50.32]   Yes    ASCVD  (arteriosclerotic cardiovascular disease) [I25.10]   Yes       Chronic       Resolved Hospital Problems   No resolved problems to display.         Overview:    89 yo female on a background significant for CAD, Diastolic HF, HTN, HLD and hypothyroidism presented from PCP for dyspnea, LE edema, and fatigue and was admitted to MICU for hyponatremia (117) with evidence of hypervolemia. Prior to admission, patient was being treated with Levaquin for acute bronchitis and completed course. CXR on admission indicated pulmonary edema and she was managed with IV Lasix x1. For her hyponatremia, she was fluid restricted and home HCTZ was discontinued; CTH/CXR did not reveal any obvious malignancy.      Assessment and Plan for Problems addressed today:     Chronic diastolic heart failure  Dyspnea  · Echo (4/2018): EF 60-65%, Grade 2 diastolic dysfunction. Biatrial enlargment, pericardial effusion.   · BNP elevated on admission. CXR (1/8): increasing bilateral effusions. BLE U/S: negative for DVT.  · Received IV Lasix 80 mg x1 with appropriate UOP. Held additional doses as patient clinically appeared hypovolemic.  · Strict I/O, Daily weights, fluid restriction.  · Repeat Echo: Large pericardial effusion; severe left atrial enlargement; EF 60%; Grade II diastolic dysfunction consistent with pseudonormalization. Called by Echo MD with concern for early tamponade per Echo findings. VSS. Cardiology consulted. (1/10)  · Per Cardiology discussion with patient and family, patient wishes to avoid invasive procedures such as pericardiocentesis. Patient not clinically in tamponade; plan for OP follow up. Plan to avoid aggressive BP treatment and diuresis. Orthostatic BPs revealed mild postural changes with MAP maintained > 80.. (1/11)     Hyponatremia  · Possibly due to hypervolemia from CHF. 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   · Since admission, Na 117 -> 122. TSH & cortisol WNL.  Serum osm <270. Continuing fluid restriction and monitoring BMP for slow Na correction. (1/9)  · Monitoring Na+; slowly improving (1/11)     Acute bronchitis  · She is afebrile with no leukocytosis. Completed course of Levaquin. PRN benzonatate ordered. Ordered Mucinex.      Essential hypertension  ASCVD (arteriosclerotic cardiovascular disease)  · EKG (1/7): NSR. Continue ASA, atorvastatin and antihypertensives: amlodipine, labetalol (dose increased) and losartan. Home HCTZ discontinued due to hyponatremia.      Normocytic anemia  · Hgb ~9 on admit, slowly down-trending.  · Iron studies consistent with mild iron deficiency anemia. Retic % WNL. Ordered FOBT. (1/11)     Hypothyroidism  · Continued home levothyroxine 75mcg.      Hypokalemia  Hyomagnesemia  · Replete as needed     Diet: Diet Adult Regular (IDDSI Level 7) Fluid - 1500mL     DVT Prophylaxis:         Anticoagulants   Medication Route Frequency    enoxaparin injection 40 mg Subcutaneous Daily         L/D/A:  PIV x2  External urinary catheter     Discharge plan and follow up  [place] [place] [facility]        Provider  Denice Morales MD  [facility] MED D   Department of Hospital Medicine     Scribcarlos Attestation: I personally scribed for Denice Morales MD on 01/11/2019 at 11:38 AM. Electronically signed by nick Kern on 01/11/2019 at 11:38 AM.

## 2019-01-12 NOTE — PROGRESS NOTES
Pharmacist Renal Dose Adjustment Note    Kristin Quintanilla is a 88 y.o. female being prophylaxed with enoxaparin 40mg daily    Patient Data:    Vital Signs (Most Recent):  Temp: 98.7 °F (37.1 °C) (01/12/19 0805)  Pulse: (!) 56 (01/12/19 1123)  Resp: 18 (01/12/19 0805)  BP: (!) 171/70 (01/12/19 0805)  SpO2: (!) 92 % (01/12/19 0805)   Vital Signs (72h Range):  Temp:  [96.3 °F (35.7 °C)-98.9 °F (37.2 °C)]   Pulse:  [54-70]   Resp:  [14-20]   BP: (101-176)/()   SpO2:  [89 %-97 %]      Recent Labs   Lab 01/10/19  1558 01/11/19  0516 01/12/19  0511   CREATININE 1.0 0.9 0.9     Serum creatinine: 0.9 mg/dL 01/12/19 0511  Estimated creatinine clearance: 36.2 mL/min    Plan:  Stop enoxaparin 40mg daily  Start enoxaparin 30mg daily due to borderline renal function, age, and low body weight    Neville Tanner, PharmTRE  Clinical Pharmacist  [facility] [email or web address]  [email or web address]   1/12/2019 12:20 PM

## 2019-01-12 NOTE — PLAN OF CARE
Problem: Adult Inpatient Plan of Care  Goal: Plan of Care Review  Outcome: Ongoing (interventions implemented as appropriate)  Greeted patient and gained consent for nursing care. Patient in chair eating dinner. Assisted her back to bed. Prepped and made comfortable for the night. Bed on lowest position, locked. Call bell within reach. Assisted in her needs. Will continue to monitor.

## 2019-01-13 LAB
ALBUMIN SERPL BCP-MCNC: 2.1 G/DL
ANION GAP SERPL CALC-SCNC: 5 MMOL/L
BASOPHILS # BLD AUTO: 0.04 K/UL
BASOPHILS NFR BLD: 0.5 %
BUN SERPL-MCNC: 28 MG/DL
CALCIUM SERPL-MCNC: 9.1 MG/DL
CHLORIDE SERPL-SCNC: 99 MMOL/L
CO2 SERPL-SCNC: 25 MMOL/L
CREAT SERPL-MCNC: 0.9 MG/DL
DIFFERENTIAL METHOD: ABNORMAL
EOSINOPHIL # BLD AUTO: 0.5 K/UL
EOSINOPHIL NFR BLD: 7 %
ERYTHROCYTE [DISTWIDTH] IN BLOOD BY AUTOMATED COUNT: 14.4 %
EST. GFR  (AFRICAN AMERICAN): >60 ML/MIN/1.73 M^2
EST. GFR  (NON AFRICAN AMERICAN): 57.3 ML/MIN/1.73 M^2
GLUCOSE SERPL-MCNC: 87 MG/DL
HCT VFR BLD AUTO: 25.9 %
HGB BLD-MCNC: 8.3 G/DL
IMM GRANULOCYTES # BLD AUTO: 0.03 K/UL
IMM GRANULOCYTES NFR BLD AUTO: 0.4 %
LYMPHOCYTES # BLD AUTO: 0.8 K/UL
LYMPHOCYTES NFR BLD: 10.4 %
MAGNESIUM SERPL-MCNC: 1.8 MG/DL
MCH RBC QN AUTO: 30.1 PG
MCHC RBC AUTO-ENTMCNC: 32 G/DL
MCV RBC AUTO: 94 FL
MONOCYTES # BLD AUTO: 1 K/UL
MONOCYTES NFR BLD: 13.6 %
NEUTROPHILS # BLD AUTO: 5 K/UL
NEUTROPHILS NFR BLD: 68.1 %
NRBC BLD-RTO: 0 /100 WBC
PHOSPHATE SERPL-MCNC: 3.3 MG/DL
PLATELET # BLD AUTO: 239 K/UL
PMV BLD AUTO: 10.4 FL
POTASSIUM SERPL-SCNC: 4.6 MMOL/L
RBC # BLD AUTO: 2.76 M/UL
SODIUM SERPL-SCNC: 129 MMOL/L
WBC # BLD AUTO: 7.38 K/UL

## 2019-01-13 PROCEDURE — 11000001 HC ACUTE MED/SURG PRIVATE ROOM

## 2019-01-13 PROCEDURE — 25000003 PHARM REV CODE 250: Performed by: STUDENT IN AN ORGANIZED HEALTH CARE EDUCATION/TRAINING PROGRAM

## 2019-01-13 PROCEDURE — 80069 RENAL FUNCTION PANEL: CPT

## 2019-01-13 PROCEDURE — 63600175 PHARM REV CODE 636 W HCPCS: Performed by: INTERNAL MEDICINE

## 2019-01-13 PROCEDURE — 25000003 PHARM REV CODE 250

## 2019-01-13 PROCEDURE — 99231 SBSQ HOSP IP/OBS SF/LOW 25: CPT | Mod: ,,, | Performed by: INTERNAL MEDICINE

## 2019-01-13 PROCEDURE — 36415 COLL VENOUS BLD VENIPUNCTURE: CPT

## 2019-01-13 PROCEDURE — 25000003 PHARM REV CODE 250: Performed by: INTERNAL MEDICINE

## 2019-01-13 PROCEDURE — 99231 PR SUBSEQUENT HOSPITAL CARE,LEVL I: ICD-10-PCS | Mod: ,,, | Performed by: INTERNAL MEDICINE

## 2019-01-13 PROCEDURE — 85025 COMPLETE CBC W/AUTO DIFF WBC: CPT

## 2019-01-13 PROCEDURE — 83735 ASSAY OF MAGNESIUM: CPT

## 2019-01-13 RX ADMIN — ACETAMINOPHEN 650 MG: 325 TABLET, FILM COATED ORAL at 06:01

## 2019-01-13 RX ADMIN — CALCIUM CARBONATE-CHOLECALCIFEROL TAB 250 MG-125 UNIT 1 TABLET: 250-125 TAB at 08:01

## 2019-01-13 RX ADMIN — GABAPENTIN 200 MG: 100 CAPSULE ORAL at 09:01

## 2019-01-13 RX ADMIN — LABETALOL HCL 300 MG: 200 TABLET, FILM COATED ORAL at 09:01

## 2019-01-13 RX ADMIN — LABETALOL HCL 300 MG: 200 TABLET, FILM COATED ORAL at 08:01

## 2019-01-13 RX ADMIN — GUAIFENESIN 600 MG: 600 TABLET, EXTENDED RELEASE ORAL at 08:01

## 2019-01-13 RX ADMIN — LEVOTHYROXINE SODIUM 75 MCG: 75 TABLET ORAL at 06:01

## 2019-01-13 RX ADMIN — ACETAMINOPHEN 650 MG: 325 TABLET, FILM COATED ORAL at 10:01

## 2019-01-13 RX ADMIN — CALCIUM CARBONATE-CHOLECALCIFEROL TAB 250 MG-125 UNIT 1 TABLET: 250-125 TAB at 09:01

## 2019-01-13 RX ADMIN — ENOXAPARIN SODIUM 30 MG: 100 INJECTION SUBCUTANEOUS at 05:01

## 2019-01-13 RX ADMIN — GUAIFENESIN 600 MG: 600 TABLET, EXTENDED RELEASE ORAL at 09:01

## 2019-01-13 RX ADMIN — AMLODIPINE BESYLATE 10 MG: 10 TABLET ORAL at 08:01

## 2019-01-13 RX ADMIN — LOSARTAN POTASSIUM 100 MG: 50 TABLET, FILM COATED ORAL at 08:01

## 2019-01-13 RX ADMIN — ATORVASTATIN CALCIUM 20 MG: 10 TABLET, FILM COATED ORAL at 08:01

## 2019-01-13 RX ADMIN — TRAMADOL HYDROCHLORIDE 50 MG: 50 TABLET, COATED ORAL at 09:01

## 2019-01-13 RX ADMIN — GABAPENTIN 200 MG: 100 CAPSULE ORAL at 08:01

## 2019-01-13 NOTE — PLAN OF CARE
Problem: Adult Inpatient Plan of Care  Goal: Plan of Care Review  Outcome: Ongoing (interventions implemented as appropriate)  Greeted patient and gained consent for nursing care. Assisted her back to bed from chair. POC reviewed, verbalized understanding. Prepped and made comfortable for the night. Asked PRN tramadol for the night. Assisted in her needs. Bed on lowest position, locked. Call bell within reach. Will continue to monitor.

## 2019-01-13 NOTE — PROGRESS NOTES
Physician Attestation for Scribe:  I, Denice Morales MD, personally performed the services described in this documentation. All medical record entries made by the scribe were at my direction and in my presence.  I have reviewed this note and agree that the record reflects my personal performance and is accurate and complete.     Hospital Medicine  Progress Note     Patient Name: Kristin Quintanilla  MRN: [id number]  Team: [facility] [place] [facility] MED D Denice Morales MD   Admit Date: 1/7/2019  LAMAR 1/15/2019  Code status: DNR (Do Not Resuscitate)     Principal Problem:  Hyponatremia     Interval history: Reporting back pain. Generally feeling better.     Review of Systems   Constitutional: Negative for fever.   Respiratory: Negative for shortness of breath.          Physical Exam:  Temp:  [96.8 °F (36 °C)-98.7 °F (37.1 °C)]   Pulse:  [57-69]   Resp:  [14-18]   BP: (110-171)/(57-91)   SpO2:  [89 %-97 %]       Temp: 98.7 °F (37.1 °C) (01/12/19 0805)  Pulse: 65 (01/12/19 0805)  Resp: 18 (01/12/19 0805)  BP: (!) 171/70 (01/12/19 0805)  SpO2: (!) 92 % (01/12/19 0805)     Intake/Output Summary (Last 24 hours) at 1/12/2019 0954  Last data filed at 1/12/2019 0524      Gross per 24 hour   Intake 590 ml   Output 0 ml   Net 590 ml      Weight: 53 kg (116 lb 13.5 oz)  Body mass index is 20.06 kg/m².     Physical Exam   Constitutional: No distress.   Eyes: Conjunctivae and lids are normal.   Cardiovascular: S1 normal and S2 normal.   Pulmonary/Chest: Effort normal and breath sounds normal.   Abdominal: Soft. Bowel sounds are normal. There is no tenderness.   Musculoskeletal: She exhibits no edema.         Significant Labs:         Recent Labs   Lab 01/09/19  0403 01/10/19  0444 01/11/19  0516 01/12/19  0511   WBC 8.67 9.54 7.65 7.16   HGB 8.6* 8.8* 8.5* 8.1*   HCT 24.9* 26.3* 24.7* 24.8*    219 227 224               Recent Labs   Lab 01/07/19  1201   01/10/19  0444 01/10/19  1558 01/11/19  0516 01/12/19  0511   *   < > 122* 120* 123*  126*   K 4.0   < > 4.4 4.5 4.6 4.6   CL 87*   < > 91* 89* 91* 94*   CO2 24   < > 27 27 27 27   BUN 17   < > 24* 23 21 27*   CREATININE 0.8   < > 0.9 1.0 0.9 0.9   GLU 97   < > 75 93 65* 70   CALCIUM 9.5   < > 9.1 9.3 9.0 8.8   MG  --    < > 1.6  --  1.6 1.6   PHOS  --    < > 2.8  --  2.9 3.2   ALKPHOS 82  --   --   --   --   --    ALT 38  --   --   --   --   --    AST 33  --   --   --   --   --    ALBUMIN 2.6*  --  2.3*  --  2.2* 2.1*   PROT 5.2*  --   --   --   --   --    BILITOT 0.8  --   --   --   --   --     < > = values in this interval not displayed.             Recent Labs   Lab 01/07/19  1201   TROPONINI 0.006      TSH:       Recent Labs   Lab 01/08/19  0957   TSH 1.942         Inpatient Medications prescribed for management of current Problems:   Scheduled Meds:    amLODIPine  10 mg Oral Daily    aspirin  81 mg Oral Every other day    atorvastatin  20 mg Oral Daily    calcium carbonate-vitamin D3 250-125 mg  1 tablet Oral BID    enoxaparin  30 mg Subcutaneous Daily    gabapentin  200 mg Oral BID    guaiFENesin  600 mg Oral BID    labetalol  300 mg Oral BID    levothyroxine  75 mcg Oral Before breakfast    losartan  100 mg Oral Daily      Continuous Infusions:   As Needed: acetaminophen, benzonatate, hydrALAZINE, sodium chloride 0.9%, traMADol            Active Hospital Problems     Diagnosis   POA    *Hyponatremia [E87.1]   Yes    Anemia [D64.9]   Yes    Essential hypertension [I10]   Yes    Acute bronchitis [J20.9]   Yes    Hypothyroidism (acquired) [E03.9]   Yes    Pericardial effusion [I31.3]   Yes       Chronic    Chronic diastolic heart failure [I50.32]   Yes    ASCVD (arteriosclerotic cardiovascular disease) [I25.10]   Yes       Chronic       Resolved Hospital Problems   No resolved problems to display.         Overview:    89 yo female on a background significant for CAD, Diastolic HF, HTN, HLD and hypothyroidism presented from PCP for dyspnea, LE edema, and fatigue and was admitted  to MICU for hyponatremia (117) with evidence of hypervolemia. Prior to admission, patient was being treated with Levaquin for acute bronchitis and completed course. CXR on admission indicated pulmonary edema and she was managed with IV Lasix x1. For her hyponatremia, she was fluid restricted and home HCTZ was discontinued; CTH/CXR did not reveal any obvious malignancy. Cardiology was consulted for finding of large pericardial fluid on Echo; per discussion with family, no pericardiocentesis planned in conjunction with patient's goals of care.       Assessment and Plan for Problems addressed today:     Chronic diastolic heart failure  Dyspnea  · Echo (4/2018): EF 60-65%, Grade 2 diastolic dysfunction. Biatrial enlargment, pericardial effusion.   · BNP elevated on admission. CXR (1/8): increasing bilateral effusions. BLE U/S: negative for DVT.  · Received IV Lasix 80 mg x1 with appropriate UOP. Held additional doses as patient clinically appeared hypovolemic.  · Strict I/O, Daily weights, fluid restriction.  · Repeat Echo: Large pericardial effusion; severe left atrial enlargement; EF 60%; Grade II diastolic dysfunction consistent with pseudonormalization. Called by Echo MD with concern for early tamponade per Echo findings. VSS. Cardiology consulted. (1/10)  · Per Cardiology discussion with patient and family, patient wishes to avoid invasive procedures such as pericardiocentesis. Patient not clinically in tamponade; plan for OP follow up. Plan to avoid aggressive BP treatment and diuresis. Orthostatic BPs revealed mild postural changes with MAP maintained > 80. (1/11)  · Appears compensated     Hyponatremia  · Possibly due to hypervolemia from CHF. 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   · Since admission, Na 117 -> 122. TSH & cortisol WNL. Serum osm <270. Continuing fluid restriction and monitoring BMP for slow Na correction. (1/9)  · Monitoring Na+; slowly  improving (1/12)     Acute bronchitis  · She is afebrile with no leukocytosis. Completed course of Levaquin. PRN benzonatate ordered. Ordered Mucinex.      Essential hypertension  ASCVD (arteriosclerotic cardiovascular disease)  · EKG (1/7): NSR. Continue ASA, atorvastatin and antihypertensives: amlodipine, labetalol (dose increased) and losartan. Home HCTZ discontinued due to hyponatremia.      Normocytic anemia  · Hgb ~9 on admit, slowly down-trending.  · Iron studies consistent with mild iron deficiency anemia. Retic % WNL. Ordered FOBT. (1/11)     Hypothyroidism  · Continued home levothyroxine 75mcg.      Hypokalemia  Hyomagnesemia  · Replete as needed     Diet: Diet Adult Regular (IDDSI Level 7) Fluid - 1500mL     DVT Prophylaxis:         Anticoagulants   Medication Route Frequency    enoxaparin injection 40 mg Subcutaneous Daily         L/D/A:  PIV x2  External urinary catheter     Discharge plan and follow up  [place] [facility] [place] [facility] [place]        Provider  Denice Morales MD  [facility] [place] [facility] MED D   Department of Hospital Medicine     Scribe Attestation: I personally scribed for Denice Morales MD on 01/12/2019 at 11:38 AM. Electronically signed by nick Kern on 01/12/2019 at 11:38 AM.

## 2019-01-13 NOTE — MEDICAL/APP STUDENT
Hospital Medicine  Progress Note    Patient Name: Kristin Quintanilla  MRN: [id number]  Team: [facility] [place] [facility] MED D Denice Morales MD   Admit Date: 1/7/2019  LAMAR 1/15/2019  Code status: DNR (Do Not Resuscitate)    Principal Problem:  Hyponatremia    Interval history: Patient with no new complaints.    Review of Systems   Constitutional: Negative for fever.   Respiratory: Negative for shortness of breath.        Physical Exam:  Temp:  [97.6 °F (36.4 °C)-98.2 °F (36.8 °C)]   Pulse:  [56-77]   Resp:  [16-18]   BP: (132-211)/(62-84)   SpO2:  [91 %-95 %]      Temp: 97.9 °F (36.6 °C) (01/13/19 0855)  Pulse: 63 (01/13/19 0855)  Resp: 17 (01/13/19 0855)  BP: (!) 211/84 (01/13/19 0855)  SpO2: 95 % (01/13/19 0855)    Intake/Output Summary (Last 24 hours) at 1/13/2019 1035  Last data filed at 1/13/2019 0910  Gross per 24 hour   Intake 630 ml   Output 1450 ml   Net -820 ml     Weight: 53 kg (116 lb 13.5 oz)  Body mass index is 20.06 kg/m².    Physical Exam   Constitutional: No distress.   Eyes: Conjunctivae and lids are normal.   Cardiovascular: S1 normal and S2 normal.   Murmur heard.  Pulmonary/Chest: Effort normal and breath sounds normal.   Abdominal: Soft. Bowel sounds are normal.   Musculoskeletal: She exhibits no edema.       Significant Labs:  Recent Labs   Lab 01/10/19  0444 01/11/19  0516 01/12/19  0511 01/13/19  0447   WBC 9.54 7.65 7.16 7.38   HGB 8.8* 8.5* 8.1* 8.3*   HCT 26.3* 24.7* 24.8* 25.9*    227 224 239     Recent Labs   Lab 01/07/19  1201  01/11/19  0516 01/12/19  0511 01/13/19  0447   *   < > 123* 126* 129*   K 4.0   < > 4.6 4.6 4.6   CL 87*   < > 91* 94* 99   CO2 24   < > 27 27 25   BUN 17   < > 21 27* 28*   CREATININE 0.8   < > 0.9 0.9 0.9   GLU 97   < > 65* 70 87   CALCIUM 9.5   < > 9.0 8.8 9.1   MG  --    < > 1.6 1.6 1.8   PHOS  --    < > 2.9 3.2 3.3   ALKPHOS 82  --   --   --   --    ALT 38  --   --   --   --    AST 33  --   --   --   --    ALBUMIN 2.6*   < > 2.2* 2.1* 2.1*   PROT 5.2*  --   --   --    --    BILITOT 0.8  --   --   --   --     < > = values in this interval not displayed.       Recent Labs   Lab 01/07/19  1201   TROPONINI 0.006     TSH:   Recent Labs   Lab 01/08/19  0957   TSH 1.942       Inpatient Medications prescribed for management of current Problems:   Scheduled Meds:    amLODIPine  10 mg Oral Daily    aspirin  81 mg Oral Every other day    atorvastatin  20 mg Oral Daily    calcium carbonate-vitamin D3 250-125 mg  1 tablet Oral BID    enoxaparin  30 mg Subcutaneous Daily    gabapentin  200 mg Oral BID    guaiFENesin  600 mg Oral BID    labetalol  300 mg Oral BID    levothyroxine  75 mcg Oral Before breakfast    losartan  100 mg Oral Daily     Continuous Infusions:   As Needed: acetaminophen, benzonatate, hydrALAZINE, sodium chloride 0.9%, traMADol    Active Hospital Problems    Diagnosis  POA    *Hyponatremia [E87.1]  Yes    Anemia [D64.9]  Yes    Essential hypertension [I10]  Yes    Acute bronchitis [J20.9]  Yes    Hypothyroidism (acquired) [E03.9]  Yes    Pericardial effusion [I31.3]  Yes     Chronic    Chronic diastolic heart failure [I50.32]  Yes    ASCVD (arteriosclerotic cardiovascular disease) [I25.10]  Yes     Chronic      Resolved Hospital Problems   No resolved problems to display.       Overview:    89 yo female on a background significant for CAD, Diastolic HF, HTN, HLD and hypothyroidism presented from PCP for dyspnea, LE edema, and fatigue and was admitted to MICU for hyponatremia (117) with evidence of hypervolemia. Prior to admission, patient was being treated with Levaquin for acute bronchitis and completed course. CXR on admission indicated pulmonary edema and she was managed with IV Lasix x1. For her hyponatremia, she was fluid restricted and home HCTZ was discontinued; CTH/CXR did not reveal any obvious malignancy. Cardiology was consulted for finding of large pericardial fluid on Echo; per discussion with family, no pericardiocentesis planned in  conjunction with patient's goals of care.      Assessment and Plan for Problems addressed today:    Chronic diastolic heart failure  Dyspnea  · Echo (4/2018): EF 60-65%, Grade 2 diastolic dysfunction. Biatrial enlargment, pericardial effusion.   · BNP elevated on admission. CXR (1/8): increasing bilateral effusions. BLE U/S: negative for DVT.  · Received IV Lasix 80 mg x1 with appropriate UOP. Held additional doses as patient clinically appeared hypovolemic.  · Strict I/O, Daily weights, fluid restriction.  · Repeat Echo: Large pericardial effusion; severe left atrial enlargement; EF 60%; Grade II diastolic dysfunction consistent with pseudonormalization. Called by Echo MD with concern for early tamponade per Echo findings. VSS. Cardiology consulted. (1/10)  · Per Cardiology discussion with patient and family, patient wishes to avoid invasive procedures such as pericardiocentesis. Patient not clinically in tamponade; plan for OP follow up. Plan to avoid aggressive BP treatment and diuresis. Orthostatic BPs revealed mild postural changes with MAP maintained > 80. (1/11)  · Appears compensated    Hyponatremia  · Possibly due to hypervolemia from CHF. 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   · Since admission, Na 117 -> 122. TSH & cortisol WNL. Serum osm <270. Continuing fluid restriction and monitoring BMP for slow Na correction. (1/9)  · Monitoring Na+; slowly improving (1/13)    Acute bronchitis  · She is afebrile with no leukocytosis. Completed course of Levaquin. PRN benzonatate ordered. Ordered Mucinex.     Essential hypertension  ASCVD (arteriosclerotic cardiovascular disease)  · EKG (1/7): NSR. Continue ASA, atorvastatin and antihypertensives: amlodipine, labetalol (dose increased) and losartan. Home HCTZ discontinued due to hyponatremia.     Normocytic anemia  · Hgb ~9 on admit, slowly down-trending.  · Iron studies consistent with mild iron deficiency  anemia. Retic % WNL. Ordered FOBT. (1/11)    Hypothyroidism  · Continued home levothyroxine 75mcg.     Hypokalemia  Hyomagnesemia  · Replete as needed    Diet: Diet Adult Regular (IDDSI Level 7) Fluid - 1500mL    DVT Prophylaxis:   Anticoagulants   Medication Route Frequency    enoxaparin injection 30 mg Subcutaneous Daily       L/D/A:  PIV x2  External urinary catheter    Discharge plan and follow up  [facility]-Health Care [facility] [place]      Provider  Denice Morales MD  [facility] [place] [facility]OSP MED D   Department of Hospital Medicine    Scribe Attestation: I personally scribed for Denice Morales MD on 01/13/2019 at 11:38 AM. Electronically signed by nick Kern on 01/13/2019 at 11:38 AM.

## 2019-01-14 VITALS
WEIGHT: 117.31 LBS | OXYGEN SATURATION: 94 % | HEIGHT: 64 IN | HEART RATE: 69 BPM | RESPIRATION RATE: 16 BRPM | TEMPERATURE: 98 F | SYSTOLIC BLOOD PRESSURE: 183 MMHG | BODY MASS INDEX: 20.03 KG/M2 | DIASTOLIC BLOOD PRESSURE: 77 MMHG

## 2019-01-14 PROBLEM — J20.9 ACUTE BRONCHITIS: Status: RESOLVED | Noted: 2019-01-07 | Resolved: 2019-01-14

## 2019-01-14 LAB
ALBUMIN SERPL BCP-MCNC: 2.1 G/DL
ANION GAP SERPL CALC-SCNC: 5 MMOL/L
BASOPHILS # BLD AUTO: 0.04 K/UL
BASOPHILS NFR BLD: 0.5 %
BUN SERPL-MCNC: 28 MG/DL
CALCIUM SERPL-MCNC: 9.3 MG/DL
CHLORIDE SERPL-SCNC: 101 MMOL/L
CO2 SERPL-SCNC: 25 MMOL/L
CREAT SERPL-MCNC: 1 MG/DL
DIFFERENTIAL METHOD: ABNORMAL
EOSINOPHIL # BLD AUTO: 0.5 K/UL
EOSINOPHIL NFR BLD: 6.3 %
ERYTHROCYTE [DISTWIDTH] IN BLOOD BY AUTOMATED COUNT: 14.6 %
EST. GFR  (AFRICAN AMERICAN): 58.1 ML/MIN/1.73 M^2
EST. GFR  (NON AFRICAN AMERICAN): 50.4 ML/MIN/1.73 M^2
GLUCOSE SERPL-MCNC: 75 MG/DL
HCT VFR BLD AUTO: 25.8 %
HGB BLD-MCNC: 8.3 G/DL
IMM GRANULOCYTES # BLD AUTO: 0.04 K/UL
IMM GRANULOCYTES NFR BLD AUTO: 0.5 %
LYMPHOCYTES # BLD AUTO: 0.9 K/UL
LYMPHOCYTES NFR BLD: 10.7 %
MAGNESIUM SERPL-MCNC: 1.9 MG/DL
MCH RBC QN AUTO: 30.4 PG
MCHC RBC AUTO-ENTMCNC: 32.2 G/DL
MCV RBC AUTO: 95 FL
MONOCYTES # BLD AUTO: 1 K/UL
MONOCYTES NFR BLD: 12.2 %
NEUTROPHILS # BLD AUTO: 5.7 K/UL
NEUTROPHILS NFR BLD: 69.8 %
NRBC BLD-RTO: 0 /100 WBC
PHOSPHATE SERPL-MCNC: 3.2 MG/DL
PLATELET # BLD AUTO: 259 K/UL
PMV BLD AUTO: 10.4 FL
POTASSIUM SERPL-SCNC: 4.7 MMOL/L
RBC # BLD AUTO: 2.73 M/UL
SODIUM SERPL-SCNC: 131 MMOL/L
WBC # BLD AUTO: 8.1 K/UL

## 2019-01-14 PROCEDURE — 80069 RENAL FUNCTION PANEL: CPT

## 2019-01-14 PROCEDURE — 99239 HOSP IP/OBS DSCHRG MGMT >30: CPT | Mod: ,,, | Performed by: INTERNAL MEDICINE

## 2019-01-14 PROCEDURE — 25000003 PHARM REV CODE 250: Performed by: STUDENT IN AN ORGANIZED HEALTH CARE EDUCATION/TRAINING PROGRAM

## 2019-01-14 PROCEDURE — 85025 COMPLETE CBC W/AUTO DIFF WBC: CPT

## 2019-01-14 PROCEDURE — 99239 PR HOSPITAL DISCHARGE DAY,>30 MIN: ICD-10-PCS | Mod: ,,, | Performed by: INTERNAL MEDICINE

## 2019-01-14 PROCEDURE — 83735 ASSAY OF MAGNESIUM: CPT

## 2019-01-14 PROCEDURE — 36415 COLL VENOUS BLD VENIPUNCTURE: CPT

## 2019-01-14 PROCEDURE — 97535 SELF CARE MNGMENT TRAINING: CPT

## 2019-01-14 PROCEDURE — 25000003 PHARM REV CODE 250: Performed by: INTERNAL MEDICINE

## 2019-01-14 RX ORDER — TRAMADOL HYDROCHLORIDE 50 MG/1
50 TABLET ORAL EVERY 12 HOURS PRN
Qty: 15 TABLET | Refills: 0 | Status: SHIPPED | OUTPATIENT
Start: 2019-01-14

## 2019-01-14 RX ORDER — TRAMADOL HYDROCHLORIDE 50 MG/1
50 TABLET ORAL EVERY 12 HOURS PRN
Qty: 15 TABLET | Refills: 0 | Status: SHIPPED | OUTPATIENT
Start: 2019-01-14 | End: 2019-01-14

## 2019-01-14 RX ORDER — GABAPENTIN 100 MG/1
200 CAPSULE ORAL 2 TIMES DAILY
Qty: 120 CAPSULE | Refills: 11 | Status: SHIPPED | OUTPATIENT
Start: 2019-01-14 | End: 2020-01-14

## 2019-01-14 RX ORDER — LABETALOL 300 MG/1
300 TABLET, FILM COATED ORAL 2 TIMES DAILY
Qty: 180 TABLET | Refills: 3 | Status: SHIPPED | OUTPATIENT
Start: 2019-01-14 | End: 2020-01-14

## 2019-01-14 RX ADMIN — GUAIFENESIN 600 MG: 600 TABLET, EXTENDED RELEASE ORAL at 10:01

## 2019-01-14 RX ADMIN — ASPIRIN 81 MG: 81 TABLET, COATED ORAL at 10:01

## 2019-01-14 RX ADMIN — LOSARTAN POTASSIUM 100 MG: 50 TABLET, FILM COATED ORAL at 10:01

## 2019-01-14 RX ADMIN — ATORVASTATIN CALCIUM 20 MG: 10 TABLET, FILM COATED ORAL at 10:01

## 2019-01-14 RX ADMIN — ACETAMINOPHEN 650 MG: 325 TABLET, FILM COATED ORAL at 01:01

## 2019-01-14 RX ADMIN — LABETALOL HCL 300 MG: 200 TABLET, FILM COATED ORAL at 10:01

## 2019-01-14 RX ADMIN — AMLODIPINE BESYLATE 10 MG: 10 TABLET ORAL at 10:01

## 2019-01-14 RX ADMIN — LEVOTHYROXINE SODIUM 75 MCG: 75 TABLET ORAL at 05:01

## 2019-01-14 RX ADMIN — GABAPENTIN 200 MG: 100 CAPSULE ORAL at 10:01

## 2019-01-14 RX ADMIN — CALCIUM CARBONATE-CHOLECALCIFEROL TAB 250 MG-125 UNIT 1 TABLET: 250-125 TAB at 10:01

## 2019-01-14 NOTE — PLAN OF CARE
JUN called Hemalatha with [facility].  They plan to see the Pt tomorrow and do not need further information from JUN.

## 2019-01-14 NOTE — PLAN OF CARE
JUN met at the bedside with the Pt and her son  Ty Quintanilla [phone].  JUN explained [facility] has contacted Bharath since the [facility] office does not have staff available.  Hemalatha with [facility] told this JUN she will let the family know progress made.

## 2019-01-14 NOTE — PLAN OF CARE
Problem: Fall Injury Risk  Goal: Absence of Fall and Fall-Related Injury  Outcome: Ongoing (interventions implemented as appropriate)  Meds given as ordered tolerated well. No signs or symptoms of distress/discomfort noted. Prn pain med given for hip pain. Will continue to monitor.

## 2019-01-14 NOTE — PLAN OF CARE
SW received a call from Hemalatha of [facility].  They can staff this Pt and have told the son Franklyn then will call Pt's nurse.

## 2019-01-14 NOTE — MEDICAL/APP STUDENT
Hospital Medicine  Progress Note    Patient Name: Kristin Quintanilla  MRN: [id number]  Team: [facility] [place] [facility] MED D Denice Morales MD   Admit Date: 1/7/2019  LAMAR 1/15/2019  Code status: DNR (Do Not Resuscitate)    Principal Problem:  Hyponatremia    Interval history: Patient with no new complaints. Feels better.    Review of Systems   Constitutional: Negative for fever.   Respiratory: Negative for shortness of breath.        Physical Exam:  Temp:  [97.8 °F (36.6 °C)-98.9 °F (37.2 °C)]   Pulse:  [65-94]   Resp:  [16-20]   BP: (124-186)/(60-77)   SpO2:  [90 %-97 %]      Temp: 98.3 °F (36.8 °C) (01/14/19 0815)  Pulse: 70 (01/14/19 0815)  Resp: 16 (01/14/19 0815)  BP: (!) 183/77 (01/14/19 0815)  SpO2: (!) 94 % (01/14/19 0815)    Intake/Output Summary (Last 24 hours) at 1/14/2019 0929  Last data filed at 1/14/2019 0600  Gross per 24 hour   Intake 1400 ml   Output 1400 ml   Net 0 ml     Weight: 53.2 kg (117 lb 4.8 oz)  Body mass index is 20.13 kg/m².    Physical Exam   Constitutional: No distress.   Eyes: Conjunctivae and lids are normal.   Cardiovascular: S1 normal and S2 normal.   Murmur heard.  Pulmonary/Chest: Effort normal and breath sounds normal.   Abdominal: Soft. Bowel sounds are normal.   Musculoskeletal: She exhibits no edema.       Significant Labs:  Recent Labs   Lab 01/11/19  0516 01/12/19  0511 01/13/19  0447 01/14/19  0404   WBC 7.65 7.16 7.38 8.10   HGB 8.5* 8.1* 8.3* 8.3*   HCT 24.7* 24.8* 25.9* 25.8*    224 239 259     Recent Labs   Lab 01/07/19  1201  01/12/19  0511 01/13/19  0447 01/14/19  0404   *   < > 126* 129* 131*   K 4.0   < > 4.6 4.6 4.7   CL 87*   < > 94* 99 101   CO2 24   < > 27 25 25   BUN 17   < > 27* 28* 28*   CREATININE 0.8   < > 0.9 0.9 1.0   GLU 97   < > 70 87 75   CALCIUM 9.5   < > 8.8 9.1 9.3   MG  --    < > 1.6 1.8 1.9   PHOS  --    < > 3.2 3.3 3.2   ALKPHOS 82  --   --   --   --    ALT 38  --   --   --   --    AST 33  --   --   --   --    ALBUMIN 2.6*   < > 2.1* 2.1* 2.1*   PROT  5.2*  --   --   --   --    BILITOT 0.8  --   --   --   --     < > = values in this interval not displayed.       Recent Labs   Lab 01/07/19  1201   TROPONINI 0.006     TSH:   Recent Labs   Lab 01/08/19  0957   TSH 1.942       Inpatient Medications prescribed for management of current Problems:   Scheduled Meds:    amLODIPine  10 mg Oral Daily    aspirin  81 mg Oral Every other day    atorvastatin  20 mg Oral Daily    calcium carbonate-vitamin D3 250-125 mg  1 tablet Oral BID    enoxaparin  30 mg Subcutaneous Daily    gabapentin  200 mg Oral BID    guaiFENesin  600 mg Oral BID    labetalol  300 mg Oral BID    levothyroxine  75 mcg Oral Before breakfast    losartan  100 mg Oral Daily     Continuous Infusions:   As Needed: acetaminophen, benzonatate, hydrALAZINE, sodium chloride 0.9%, traMADol    Active Hospital Problems    Diagnosis  POA    *Hyponatremia [E87.1]  Yes    Anemia [D64.9]  Yes    Essential hypertension [I10]  Yes    Spinal stenosis of lumbar region [M48.061]  Yes    Hypothyroidism (acquired) [E03.9]  Yes    Pericardial effusion [I31.3]  Yes     Chronic    Chronic diastolic heart failure [I50.32]  Yes    ASCVD (arteriosclerotic cardiovascular disease) [I25.10]  Yes     Chronic      Resolved Hospital Problems    Diagnosis Date Resolved POA    Acute bronchitis [J20.9] 01/14/2019 Yes       Overview:    89 yo female with CAD, Diastolic HF, HTN, HLD and hypothyroidism presented from Clinic for dyspnea, LE edema, and fatigue and was admitted to MICU for hyponatremia (Na 117) with evidence of hypervolemia. Prior to admission, patient was being treated with Levaquin for acute bronchitis and completed course. CXR on admission indicated pulmonary edema and she was managed with IV Lasix x1. For her hyponatremia, she was fluid restricted and home HCTZ was discontinued; labetalol was increased. CTH/CXR did not reveal any obvious malignancy. Her sodium improved appropriately. Cardiology was consulted  for finding of large pericardial fluid on Echo; per discussion with family, no pericardiocentesis planned in conjunction with patient's goals of care to avoid invasive procedures. Plan to avoid aggressive BP treatment and diuresis with outpatient Cardiology follow up.      Assessment and Plan for Problems addressed today:    Chronic diastolic heart failure  Dyspnea  Pericardial effusion, chronic   · Echo (4/2018): EF 60-65%, Grade 2 diastolic dysfunction. Biatrial enlargment, pericardial effusion.   · BNP elevated on admission. CXR (1/8): increasing bilateral effusions. BLE U/S: negative for DVT.  · Received IV Lasix 80 mg x1 with appropriate UOP. Held additional doses as patient clinically appeared hypovolemic.  · Strict I/O, Daily weights, fluid restriction.  · Repeat Echo: Large pericardial effusion; severe left atrial enlargement; EF 60%; Grade II diastolic dysfunction consistent with pseudonormalization. Called by Echo MD with concern for early tamponade per Echo findings. VSS. Cardiology consulted. (1/10)  · Per Cardiology discussion with patient and family, patient wishes to avoid invasive procedures such as pericardiocentesis. Patient not clinically in tamponade; plan for OP follow up. Plan to avoid aggressive BP treatment and diuresis. Orthostatic BPs revealed mild postural changes with MAP maintained > 80. (1/11)  · HF appears compensated.    Hyponatremia  · Possibly due to hypervolemia from CHF or HCTZ. Patient has PMHx of SCC of scalp, s/p radiation; CTH without evidence of malignancy.   · Since admission, Na 117 -> 122. TSH & cortisol WNL. Serum Osm <270. Continuing fluid restriction and monitoring BMP for slow Na correction. (1/9)  · Monitored Na+; slowly improving (1/13)    Acute bronchitis  · She is afebrile with no leukocytosis. Completed course of Levaquin. PRN benzonatate ordered. Ordered Mucinex.     Essential hypertension  ASCVD (arteriosclerotic cardiovascular disease)  · EKG (1/7): NSR.  Continue ASA, atorvastatin and antihypertensives: amlodipine, labetalol (dose increased) and losartan. Home HCTZ discontinued due to hyponatremia.     Normocytic anemia  · Hgb ~9 on admit, slowly down-trending.  · Iron studies consistent with mild iron deficiency anemia. Retic % WNL.  (1/11)    Hypothyroidism  · Continued home levothyroxine 75mcg.     Hypokalemia  Hyomagnesemia  · Replete as needed    Diet: Diet Adult Regular (IDDSI Level 7) Fluid - 1500mL    DVT Prophylaxis:   Anticoagulants   Medication Route Frequency    enoxaparin injection 30 mg Subcutaneous Daily       L/D/A:  PIV   External urinary catheter    Discharge plan and follow up  [place] [facility] [place]      Provider  Denice Morales MD  [facility] [place] [facility] [place]   Department of Hospital Medicine    Nick Attestation: I personally scribed for Denice Morales MD on 01/14/2019 at 11:38 AM. Electronically signed by nick Kern on 01/14/2019 at 11:38 AM.

## 2019-01-14 NOTE — PLAN OF CARE
Problem: Occupational Therapy Goal  Goal: Occupational Therapy Goal  Goals to be met by: 1/24    Patient will increase functional independence with ADLs by performing:    UE Dressing with Supervision.  LE Dressing with Supervision.  Grooming while standing with Supervision.  Toileting from toilet with Supervision for hygiene and clothing management.   Toilet transfer to toilet with Supervision.     Outcome: Ongoing (interventions implemented as appropriate)  Goals updated

## 2019-01-14 NOTE — DISCHARGE SUMMARY
"Discharge Summary  Hospital Medicine    Patient Name: Kristin Quintanilla  MRN: [id number]  Attending Provider on Discharge: Denice Morales MD  Hospital Medicine Team: [facility] MED D  Date of Admission:  1/7/2019     Date of Discharge:  1/14/2019  3:52 PM  Code status: DNR (Do Not Resuscitate)    Active Hospital Problems    Diagnosis  POA    *Hyponatremia [E87.1]  Yes    Anemia [D64.9]  Yes    Essential hypertension [I10]  Yes    Spinal stenosis of lumbar region [M48.061]  Yes    Hypothyroidism (acquired) [E03.9]  Yes    Pericardial effusion [I31.3]  Yes     Chronic    Chronic diastolic heart failure [I50.32]  Yes    ASCVD (arteriosclerotic cardiovascular disease) [I25.10]  Yes     Chronic      Resolved Hospital Problems    Diagnosis Date Resolved POA    Acute bronchitis [J20.9] 01/14/2019 Yes        HPI:  "88 y.o. lady with CAD with diastolic HF and mild AR, HTN, hyperlipidemia, hypothyroidism sent here by her PCP for SOB and generalized weaknesses. She developed progressive SOB and leg swelling 2 weeks ago. She believed she has a cold, along with productive cough ( yellowish) associated with SOB for which she was initially managed with a steroid injection and albuterol inhaler, in spite of it was getting worse, so she was started on Levaquin 2 days ago for acute bronchitis. She felt she was more SOB even at rest, especially while lying flat.  At baseline she is able to do her normal activities including cooking for herself. As per her son and grand daughter, she also is more confused at times compared to her baseline. She denies any chest pain, palpitations or fever. She was consistently hypotensive 110/40. In the ED, her Na was 117 and BNP was 539. CXR suggestive of pulmonary edema and left sided pleural effusion.  She started developing pain along the medial part of left thigh like cramps."    Hospital Course:  Patient with CAD, Diastolic HF, HTN, HLD, hypothyroidism, and spinal stensos presented from " Clinic for dyspnea, LE edema, and fatigue and was admitted to MICU for hyponatremia (Na 117) with evidence of hypervolemia. Prior to admission, patient was being treated with Levaquin for acute bronchitis and completed course. CXR on admission indicated pulmonary edema and she was managed with IV Lasix x1. For her hyponatremia, she was fluid restricted and home HCTZ was discontinued; labetalol was increased. CTH/CXR did not reveal any obvious malignancy. Her sodium improved appropriately. Cardiology was consulted for finding of large pericardial fluid on Echo; per discussion with family, no pericardiocentesis planned in conjunction with patient's goals of care to avoid invasive procedures. Plan to avoid aggressive BP treatment and diuresis with outpatient Cardiology follow up.       Chronic diastolic heart failure  Dyspnea  Pericardial effusion, chronic   · Echo (4/2018): EF 60-65%, Grade 2 diastolic dysfunction. Biatrial enlargment, pericardial effusion.   · BNP elevated on admission. CXR (1/8): increasing bilateral effusions. BLE U/S: negative for DVT.  · Received IV Lasix 80 mg x1 with appropriate UOP. Held additional doses as patient clinically appeared hypovolemic.  · Strict I/O, Daily weights, fluid restriction.  · Repeat Echo: Large pericardial effusion; severe left atrial enlargement; EF 60%; Grade II diastolic dysfunction consistent with pseudonormalization. Called by Echo MD with concern for early tamponade per Echo findings. VSS. Cardiology consulted. (1/10)  · Per Cardiology discussion with patient and family, patient wishes to avoid invasive procedures such as pericardiocentesis. Patient not clinically in tamponade; plan for OP follow up. Plan to avoid aggressive BP treatment and diuresis. Orthostatic BPs revealed mild postural changes with MAP maintained > 80. (1/11)  · HF appears compensated.     Hyponatremia  · Possibly due to hypervolemia from CHF or HCTZ. Patient has PMHx of SCC of scalp, s/p  radiation; CTH without evidence of malignancy.   · Since admission, Na 117 -> 122. TSH & cortisol WNL. Serum Osm <270. Continuing fluid restriction and monitoring BMP for slow Na correction. (1/9)  · Monitored Na+; slowly improving (1/13)  · Follow up as OP with repeat BMP.     Acute bronchitis  · She is afebrile with no leukocytosis. Completed course of Levaquin. PRN benzonatate ordered. Ordered Mucinex.      Essential hypertension  ASCVD (arteriosclerotic cardiovascular disease)  · EKG (1/7): NSR. Continue ASA, atorvastatin and antihypertensives: amlodipine, labetalol (dose increased) and losartan. Home HCTZ discontinued due to hyponatremia.      Normocytic anemia  · Hgb ~9 on admit, slowly down-trending.  · Iron studies consistent with mild iron deficiency anemia. Retic % WNL.  (1/11)     Hypothyroidism  · Continued home levothyroxine 75mcg.      Hypokalemia  Hyomagnesemia  · Repleted as needed    Spinal stenosis  · Continued Neurontin and Tramadol      Recent Labs   Lab 01/12/19  0511 01/13/19 0447 01/14/19  0404   WBC 7.16 7.38 8.10   HGB 8.1* 8.3* 8.3*   HCT 24.8* 25.9* 25.8*    239 259     Recent Labs   Lab 01/12/19  0511 01/13/19 0447 01/14/19  0404   * 129* 131*   K 4.6 4.6 4.7   CL 94* 99 101   CO2 27 25 25   BUN 27* 28* 28*   CREATININE 0.9 0.9 1.0   GLU 70 87 75   CALCIUM 8.8 9.1 9.3   MG 1.6 1.8 1.9   PHOS 3.2 3.3 3.2     Recent Labs   Lab 01/12/19  0511 01/13/19 0447 01/14/19  0404   ALBUMIN 2.1* 2.1* 2.1*        Procedures: none    Consultants:   Consults (From admission, onward)        Status Ordering Provider     Inpatient consult to Cardiology  Once     Provider:  (Not yet assigned)    Completed SHAKIRA BARAKAT     Inpatient consult to Critical Care Medicine  Once     Provider:  (Not yet assigned)    Completed JOSÉ MANUEL CEJA     IP consult to case management  Once     Provider:  (Not yet assigned)    Acknowledged JORDY JAIME          Medications:    Current Discharge  Medication List      START taking these medications    Details   gabapentin (NEURONTIN) 100 MG capsule Take 2 capsules (200 mg total) by mouth 2 (two) times daily.  Qty: 120 capsule, Refills: 11      traMADol (ULTRAM) 50 mg tablet Take 1 tablet (50 mg total) by mouth every 12 (twelve) hours as needed for Pain.  Qty: 15 tablet, Refills: 0         CONTINUE these medications which have CHANGED    Details   labetalol (NORMODYNE) 300 MG tablet Take 1 tablet (300 mg total) by mouth 2 (two) times daily.  Qty: 180 tablet, Refills: 3         CONTINUE these medications which have NOT CHANGED    Details   albuterol (PROVENTIL/VENTOLIN HFA) 90 mcg/actuation inhaler Inhale 2 puffs into the lungs every 6 (six) hours as needed for Wheezing. Rescue  Qty: 6 g, Refills: 0    Associated Diagnoses: Acute bronchitis, unspecified organism      amLODIPine (NORVASC) 10 MG tablet TAKE 1 TABLET EVERY DAY  Qty: 90 tablet, Refills: 3      aspirin (ECOTRIN) 81 MG EC tablet Take 81 mg by mouth every other day. Pt taking one pill every other day.      atorvastatin (LIPITOR) 20 MG tablet TAKE 1 TABLET EVERY DAY  Qty: 90 tablet, Refills: 3    Associated Diagnoses: Essential hypertension      FLAXSEED-OMEGA3,6,9-FATTY ACID ORAL Take 1 capsule by mouth once daily.        levothyroxine (SYNTHROID) 75 MCG tablet TAKE 1 TABLET ONE TIME DAILY  Qty: 90 tablet, Refills: 3    Associated Diagnoses: Acquired hypothyroidism      losartan (COZAAR) 100 MG tablet TAKE 1 TABLET EVERY DAY  Qty: 90 tablet, Refills: 0      magnesium 250 mg Tab Take 1 tablet by mouth once daily.        acetaminophen (TYLENOL ARTHRITIS) 650 MG TbSR Take 650 mg by mouth once daily. 1-2 tablet once a day.      albuterol (PROVENTIL) 2.5 mg /3 mL (0.083 %) nebulizer solution Take 3 mLs (2.5 mg total) by nebulization every 4 (four) hours as needed for Wheezing or Shortness of Breath (chest tightness).  Qty: 1 Box, Refills: 6    Associated Diagnoses: Acute bronchitis, unspecified organism     "  calcium carbonate/vitamin D3 (CALTRATE 600 + D ORAL) Take by mouth once daily.      chlorpheniramine maleate (CHLOR-TRIMETON REPETABS ORAL) Take by mouth.      guaifenesin 100 mg/5 ml (ROBITUSSIN) 100 mg/5 mL syrup Take 200 mg by mouth 3 (three) times daily as needed for Cough.      OPTICHAMBER DARREN LG MASK Spcr U UTD  Refills: 0      triamcinolone acetonide 0.1% (KENALOG) 0.1 % cream APPLY TO THE AFFECTED AREA OF lower legs TWICE DAILY  Qty: 453.6 g, Refills: 1    Associated Diagnoses: Asteatotic eczema      vitamin D 1000 units Tab Take 1,000 Units by mouth once daily.          STOP taking these medications       hydroCHLOROthiazide (HYDRODIURIL) 25 MG tablet Comments:   Reason for Stopping:         levoFLOXacin (LEVAQUIN) 250 MG tablet Comments:   Reason for Stopping:               Discharge Instructions:  Discharge Procedure Orders   WALKER FOR HOME USE     Order Specific Question Answer Comments   Type of Walker: Adult (5'4"-6'6")    With wheels? Yes    Height: 5' 4" (1.626 m)    Weight: 53.2 kg (117 lb 4.8 oz)    Length of need (1-99 months): 99    Does patient have medical equipment at home? cane, straight    Please check all that apply: Patient is unable to safely ambulate without equipment.      Diet Adult Regular     Order Specific Question Answer Comments   Fluid restriction: Fluid - 1500mL    Additional restrictions: Cardiac (Low Na/Chol)      Notify your health care provider if you experience any of the following:  temperature >100.4     Notify your health care provider if you experience any of the following:  persistent nausea and vomiting or diarrhea     Notify your health care provider if you experience any of the following:  difficulty breathing or increased cough     Notify your health care provider if you experience any of the following:  persistent dizziness, light-headedness, or visual disturbances     Notify your health care provider if you experience any of the following:  increased " confusion or weakness     Activity as tolerated       Discharge Condition: stable    Disposition: Home-Health Care Svc    Indwelling Lines/Drains at time of discharge: none    Tests pending at the time of discharge: none      Time spent on the discharge of the patient including review of hospital course with the patient, reviewing discharge medications and arranging follow-up care: 50 minutes.    Discharge examination Patient was seen and examined on 1/14/2019 and determined to be suitable for discharge.    Discharge plan and follow up:  Follow-up Information     Jairo Schmidt MD. Schedule an appointment as soon as possible for a visit in 2 weeks.    Specialty:  Internal Medicine  Why:  For discharge from hospital follow up, Repeat labwork  Contact information:  [address] [facility] [place] [address]  [phone]             Schedule an appointment as soon as possible for a visit with Lenka Triana MD.    Specialty:  Cardiology  Why:  As previously planned  Contact information:  [address] [facility]  8TH FLOOR  [facility] [address]  [phone]                 Future Appointments   Date Time Provider [facility]   1/31/2019  2:20 PM Jairo Schmidt MD [facility] MED [place]   3/27/2019 10:00 AM LAB, [facility] LAB [place]   4/3/2019 11:30 AM Lenka Triana MD [facility] CARDIO [place]       Provider  Denice Morales MD  Department of Hospital Medicine  [facility]

## 2019-01-14 NOTE — PT/OT/SLP PROGRESS
Occupational Therapy   Treatment    Name: Kristin Quintanilla  MRN: [id number]  Admitting Diagnosis:  Hyponatremia       Recommendations:     Discharge Recommendations: (home health, 24 hour (A))    Assessment:     Kristin Quintanilla is a 88 y.o. female with a medical diagnosis of Hyponatremia.  She presents with good participation and motivation.  Pt continues to be at risk for falls and will require (A) with all activities/ADL's in standing.  Pt & pt's son aware that pt will need 24 hour (A) initially upon discharge home & have hired 24 hour sitters for the 1st week home. Performance deficits affecting function are weakness, impaired endurance, impaired self care skills, impaired functional mobilty, impaired balance, decreased upper extremity function, decreased lower extremity function, decreased safety awareness.     Rehab Prognosis:  Good; patient would benefit from acute skilled OT services to address these deficits and reach maximum level of function.       Plan:     Patient to be seen 3 x/week to address the above listed problems via self-care/home management, therapeutic activities, therapeutic exercises  · Plan of Care Expires:    · Plan of Care Reviewed with: patient, son    Subjective   Pt reported that she is going home today.  Pain/Comfort:  · Pain Rating 1: 0/10  · Pain Addressed 1: Reposition, Distraction, Cessation of Activity  · Pain Rating Post-Intervention 1: 0/10(pt reported pain during mobility in lower back/groin regions however did not rate & no pain at rest)    Objective:     Communicated with: RN prior to session.  Patient found seated in chair with son present in room with telemetry upon OT entry to room.    General Precautions: Standard, fall(DNR)     Occupational Performance:     Functional Mobility/Transfers:  · Patient completed Sit <> Stand Transfer with SBA-CGA  with  rolling walker from toilet & chair  · Patient completed Toilet Transfer Stand Pivot technique with SBA-CGA with  rolling  walker  · Functional Mobility: Pt performed functional mobility in room during ADL's using RW with SBA.    Activities of Daily Living:  · Lower Body Dressing: minimum assistance donning underpants, pants, & shoes with (A) for balance in standing, technique, & to fully don shoes  · Toileting: minimum assistance from standard commode with (A) for balance during clothing management in standing      [facility] 6 Click ADL: 19    Treatment & Education:  Provided education on transfer techniques during toileting, potential for using bedside commode, and safe use of RW during transfers.  Provided education on technique for LE dressing, balance during LE dressing, & safety.  Provided education on best ways for others to assist pt during standing activities.  Pt verbalized understanding of all education provided.  Pt had no further questions & when asked whether there were any concerns pt reported none.      Patient left up in chair with all lines intact, call button in reach, RN notified, son present and white board updated.Education:      GOALS:   Multidisciplinary Problems     Occupational Therapy Goals        Problem: Occupational Therapy Goal    Goal Priority Disciplines Outcome Interventions   Occupational Therapy Goal     OT, PT/OT Ongoing (interventions implemented as appropriate)    Description:  Goals to be met by: 1/24    Patient will increase functional independence with ADLs by performing:    UE Dressing with Supervision.  LE Dressing with Supervision.  Grooming while standing with Supervision.  Toileting from toilet with Supervision for hygiene and clothing management.   Toilet transfer to toilet with Supervision.                       Time Tracking:     OT Date of Treatment: 01/14/19  OT Start Time: 1125  OT Stop Time: 1207  OT Total Time (min): 42 min    Billable Minutes:Self Care/Home Management 42    NOE Jama  1/14/2019

## 2019-01-14 NOTE — NURSING
IV d/c cath intact, site without redness, swelling or pain to site.gauze and bandage applied.  Reviewed d/c and Rx and follow up with pt and son, verbal understanding given with satisfactory teach back.  Copy of AVS printed and given to pt son.  Awaiting transport to [facility], will monitor until transport arrives.

## 2019-01-14 NOTE — PLAN OF CARE
[facility]    HOME HEALTH ORDERS  FACE TO FACE ENCOUNTER    Patient Name: Kristin Quintanilla  YOB: 1930    PCP: Jairo Schmidt MD   PCP Address: [address] [place] [address] / [place] [id number]  PCP Phone Number: [phone]  PCP Fax: [phone]    Encounter Date: 01/14/2019    Admit to Home Health    Diagnoses:  Active Hospital Problems    Diagnosis  POA    *Hyponatremia [E87.1]  Yes    Anemia [D64.9]  Yes    Essential hypertension [I10]  Yes    Spinal stenosis of lumbar region [M48.061]  Yes    Hypothyroidism (acquired) [E03.9]  Yes    Pericardial effusion [I31.3]  Yes     Chronic    Chronic diastolic heart failure [I50.32]  Yes    ASCVD (arteriosclerotic cardiovascular disease) [I25.10]  Yes     Chronic      Resolved Hospital Problems    Diagnosis Date Resolved POA    Acute bronchitis [J20.9] 01/14/2019 Yes       Follow-up Information     Jairo Schmidt MD. Schedule an appointment as soon as possible for a visit in 2 weeks.    Specialty:  Internal Medicine  Why:  For discharge from hospital follow up, Repeat labwork  Contact information:  [facility] [facility] [address]  [phone]             Schedule an appointment as soon as possible for a visit with Lenka Triana MD.    Specialty:  Cardiology  Why:  As previously planned  Contact information:  [address] [facility]  8TH FLOOR  [facility] [address]  [phone]                   I have seen and examined this patient face to face today. My clinical findings that support the need for the home health skilled services and home bound status are the following:  Requiring assistive device to leave home due to unsteady gait caused by  Weakness/Debility.    Allergies:  Review of patient's allergies indicates:   Allergen Reactions    Codeine     Darvocet a500 [propoxyphene n-acetaminophen] Nausea Only    Sulfa (sulfonamide antibiotics) Rash    Fosamax [alendronate]      Gastric ulcer       Diet: cardiac diet  and fluid restriction: 1500 mL    Activities: activity as tolerated    Nursing:   SN to complete comprehensive assessment including routine vital signs. Instruct on disease process and s/s of complications to report to MD. Review/verify medication list sent home with the patient at time of discharge  and instruct patient/caregiver as needed. Frequency may be adjusted depending on start of care date.    Notify MD if SBP > 160 or < 90; DBP > 90 or < 50; HR > 120 or < 50; Temp > 101    Draw BMP in 1 week. Send results to:  Jairo Schmidt MD.  [address]  [place] [address]  [phone]    CONSULTS:    Physical Therapy to evaluate and treat. Evaluate for home safety and equipment needs; Establish/upgrade home exercise program. Perform / instruct on therapeutic exercises, gait training, transfer training, and Range of Motion.  Occupational Therapy to evaluate and treat. Evaluate home environment for safety and equipment needs. Perform/Instruct on transfers, ADL training, ROM, and therapeutic exercises.  Aide to provide assistance with personal care, ADLs, and vital signs.        Medications: Review discharge medications with patient and family and provide education.      Current Discharge Medication List      START taking these medications    Details   traMADol (ULTRAM) 50 mg tablet Take 1 tablet (50 mg total) by mouth every 12 (twelve) hours as needed for Pain.  Qty: 15 tablet, Refills: 0         CONTINUE these medications which have NOT CHANGED    Details   albuterol (PROVENTIL/VENTOLIN HFA) 90 mcg/actuation inhaler Inhale 2 puffs into the lungs every 6 (six) hours as needed for Wheezing. Rescue  Qty: 6 g, Refills: 0    Associated Diagnoses: Acute bronchitis, unspecified organism      amLODIPine (NORVASC) 10 MG tablet TAKE 1 TABLET EVERY DAY  Qty: 90 tablet, Refills: 3      aspirin (ECOTRIN) 81 MG EC tablet Take 81 mg by mouth every other day. Pt taking one pill every other day.      atorvastatin (LIPITOR) 20  MG tablet TAKE 1 TABLET EVERY DAY  Qty: 90 tablet, Refills: 3    Associated Diagnoses: Essential hypertension      FLAXSEED-OMEGA3,6,9-FATTY ACID ORAL Take 1 capsule by mouth once daily.        hydroCHLOROthiazide (HYDRODIURIL) 25 MG tablet Take 1 tablet (25 mg total) by mouth once daily.  Qty: 90 tablet, Refills: 3    Associated Diagnoses: ASCVD (arteriosclerotic cardiovascular disease); Atherosclerosis of aorta; Bilateral carotid artery stenosis; Chronic combined systolic and diastolic heart failure      labetalol (NORMODYNE) 200 MG tablet TAKE 1 TABLET TWICE DAILY  Qty: 180 tablet, Refills: 3      levothyroxine (SYNTHROID) 75 MCG tablet TAKE 1 TABLET ONE TIME DAILY  Qty: 90 tablet, Refills: 3    Associated Diagnoses: Acquired hypothyroidism      losartan (COZAAR) 100 MG tablet TAKE 1 TABLET EVERY DAY  Qty: 90 tablet, Refills: 0      magnesium 250 mg Tab Take 1 tablet by mouth once daily.        acetaminophen (TYLENOL ARTHRITIS) 650 MG TbSR Take 650 mg by mouth once daily. 1-2 tablet once a day.      albuterol (PROVENTIL) 2.5 mg /3 mL (0.083 %) nebulizer solution Take 3 mLs (2.5 mg total) by nebulization every 4 (four) hours as needed for Wheezing or Shortness of Breath (chest tightness).  Qty: 1 Box, Refills: 6    Associated Diagnoses: Acute bronchitis, unspecified organism      calcium carbonate/vitamin D3 (CALTRATE 600 + D ORAL) Take by mouth once daily.      chlorpheniramine maleate (CHLOR-TRIMETON REPETABS ORAL) Take by mouth.      guaifenesin 100 mg/5 ml (ROBITUSSIN) 100 mg/5 mL syrup Take 200 mg by mouth 3 (three) times daily as needed for Cough.      OPTICHAMBER DARREN LG MASK Spcr U UTD  Refills: 0      triamcinolone acetonide 0.1% (KENALOG) 0.1 % cream APPLY TO THE AFFECTED AREA OF lower legs TWICE DAILY  Qty: 453.6 g, Refills: 1    Associated Diagnoses: Asteatotic eczema      vitamin D 1000 units Tab Take 1,000 Units by mouth once daily.          STOP taking these medications       levoFLOXacin  (LEVAQUIN) 250 MG tablet Comments:   Reason for Stopping:               I certify that this patient is confined to her home and needs intermittent skilled nursing care, physical therapy and occupational therapy.      Denice Morales MD

## 2019-01-14 NOTE — PLAN OF CARE
Per JUN Demarco the patient was accepted to [facility]. KASEY spoke with Franklyn, the patient's son, and he states that Hemalatha Stein with [facility] states the patient is OK for follow up and initiation of HH services Post Hospitalization.    Patient's son also states the rolling walker has recently been delivered to the bedside.

## 2019-01-14 NOTE — PLAN OF CARE
Problem: Fall Injury Risk  Goal: Absence of Fall and Fall-Related Injury  Outcome: Ongoing (interventions implemented as appropriate)  No falls or injuries overnight.    Problem: Adult Inpatient Plan of Care  Goal: Absence of Hospital-Acquired Illness or Injury  Outcome: Ongoing (interventions implemented as appropriate)  No injuries overnight. No signs of hospital acquired illness.  Intervention: Prevent Skin Injury  No wounds. Skin intact.    Goal: Optimal Comfort and Wellbeing  Outcome: Ongoing (interventions implemented as appropriate)  Pt reports comfort overnight.  Intervention: Monitor Pain and Promote Comfort  Denies pain.    Goal: Readiness for Transition of Care  Outcome: Ongoing (interventions implemented as appropriate)  Pt reports readiness for discharge. Pt has 3 sons as support system.

## 2019-01-14 NOTE — PROGRESS NOTES
Physician Attestation for Scribe:  I, Denice Morales MD, personally performed the services described in this documentation. All medical record entries made by the scribe were at my direction and in my presence.  I have reviewed this note and agree that the record reflects my personal performance and is accurate and complete.     Hospital Medicine  Progress Note     Patient Name: Kristin Quintanilla  MRN: [id number]  [place] [facility] [place] [facility] Denice Morales MD   Admit Date: 1/7/2019  LAMAR 1/15/2019  Code status: DNR (Do Not Resuscitate)     Principal Problem:  Hyponatremia     Interval history: Patient with no new complaints.     Review of Systems   Constitutional: Negative for fever.   Respiratory: Negative for shortness of breath.          Physical Exam:  Temp:  [97.6 °F (36.4 °C)-98.2 °F (36.8 °C)]   Pulse:  [56-77]   Resp:  [16-18]   BP: (132-211)/(62-84)   SpO2:  [91 %-95 %]       Temp: 97.9 °F (36.6 °C) (01/13/19 0855)  Pulse: 63 (01/13/19 0855)  Resp: 17 (01/13/19 0855)  BP: (!) 211/84 (01/13/19 0855)  SpO2: 95 % (01/13/19 0855)     Intake/Output Summary (Last 24 hours) at 1/13/2019 1035  Last data filed at 1/13/2019 0910      Gross per 24 hour   Intake 630 ml   Output 1450 ml   Net -820 ml      Weight: 53 kg (116 lb 13.5 oz)  Body mass index is 20.06 kg/m².     Physical Exam   Constitutional: No distress.   Eyes: Conjunctivae and lids are normal.   Cardiovascular: S1 normal and S2 normal.   Murmur heard.  Pulmonary/Chest: Effort normal and breath sounds normal.   Abdominal: Soft. Bowel sounds are normal.   Musculoskeletal: She exhibits no edema.         Significant Labs:         Recent Labs   Lab 01/10/19  0444 01/11/19  0516 01/12/19  0511 01/13/19  0447   WBC 9.54 7.65 7.16 7.38   HGB 8.8* 8.5* 8.1* 8.3*   HCT 26.3* 24.7* 24.8* 25.9*    227 224 239              Recent Labs   Lab 01/07/19  1201   01/11/19  0516 01/12/19  0511 01/13/19  0447   *   < > 123* 126* 129*   K 4.0   < > 4.6 4.6 4.6   CL 87*    < > 91* 94* 99   CO2 24   < > 27 27 25   BUN 17   < > 21 27* 28*   CREATININE 0.8   < > 0.9 0.9 0.9   GLU 97   < > 65* 70 87   CALCIUM 9.5   < > 9.0 8.8 9.1   MG  --    < > 1.6 1.6 1.8   PHOS  --    < > 2.9 3.2 3.3   ALKPHOS 82  --   --   --   --    ALT 38  --   --   --   --    AST 33  --   --   --   --    ALBUMIN 2.6*   < > 2.2* 2.1* 2.1*   PROT 5.2*  --   --   --   --    BILITOT 0.8  --   --   --   --     < > = values in this interval not displayed.             Recent Labs   Lab 01/07/19  1201   TROPONINI 0.006      TSH:       Recent Labs   Lab 01/08/19  0957   TSH 1.942         Inpatient Medications prescribed for management of current Problems:   Scheduled Meds:    amLODIPine  10 mg Oral Daily    aspirin  81 mg Oral Every other day    atorvastatin  20 mg Oral Daily    calcium carbonate-vitamin D3 250-125 mg  1 tablet Oral BID    enoxaparin  30 mg Subcutaneous Daily    gabapentin  200 mg Oral BID    guaiFENesin  600 mg Oral BID    labetalol  300 mg Oral BID    levothyroxine  75 mcg Oral Before breakfast    losartan  100 mg Oral Daily      Continuous Infusions:   As Needed: acetaminophen, benzonatate, hydrALAZINE, sodium chloride 0.9%, traMADol            Active Hospital Problems     Diagnosis   POA    *Hyponatremia [E87.1]   Yes    Anemia [D64.9]   Yes    Essential hypertension [I10]   Yes    Acute bronchitis [J20.9]   Yes    Hypothyroidism (acquired) [E03.9]   Yes    Pericardial effusion [I31.3]   Yes       Chronic    Chronic diastolic heart failure [I50.32]   Yes    ASCVD (arteriosclerotic cardiovascular disease) [I25.10]   Yes       Chronic       Resolved Hospital Problems   No resolved problems to display.         Overview:    89 yo female on a background significant for CAD, Diastolic HF, HTN, HLD and hypothyroidism presented from PCP for dyspnea, LE edema, and fatigue and was admitted to MICU for hyponatremia (117) with evidence of hypervolemia. Prior to admission, patient was being  treated with Levaquin for acute bronchitis and completed course. CXR on admission indicated pulmonary edema and she was managed with IV Lasix x1. For her hyponatremia, she was fluid restricted and home HCTZ was discontinued; CTH/CXR did not reveal any obvious malignancy. Cardiology was consulted for finding of large pericardial fluid on Echo; per discussion with family, no pericardiocentesis planned in conjunction with patient's goals of care.       Assessment and Plan for Problems addressed today:     Chronic diastolic heart failure  Dyspnea  · Echo (4/2018): EF 60-65%, Grade 2 diastolic dysfunction. Biatrial enlargment, pericardial effusion.   · BNP elevated on admission. CXR (1/8): increasing bilateral effusions. BLE U/S: negative for DVT.  · Received IV Lasix 80 mg x1 with appropriate UOP. Held additional doses as patient clinically appeared hypovolemic.  · Strict I/O, Daily weights, fluid restriction.  · Repeat Echo: Large pericardial effusion; severe left atrial enlargement; EF 60%; Grade II diastolic dysfunction consistent with pseudonormalization. Called by Echo MD with concern for early tamponade per Echo findings. VSS. Cardiology consulted. (1/10)  · Per Cardiology discussion with patient and family, patient wishes to avoid invasive procedures such as pericardiocentesis. Patient not clinically in tamponade; plan for OP follow up. Plan to avoid aggressive BP treatment and diuresis. Orthostatic BPs revealed mild postural changes with MAP maintained > 80. (1/11)  · Appears compensated     Hyponatremia  · Possibly due to hypervolemia from CHF. Patient does have PMHx of SCC of scalp, s/p radiation; CTH without evidence of malignancy.   · Since admission, Na 117 -> 122. TSH & cortisol WNL. Serum osm <270. Continuing fluid restriction and monitoring BMP for slow Na correction. (1/9)  · Monitoring Na+; slowly improving (1/13)     Acute bronchitis  · She is afebrile with no leukocytosis. Completed course of  Levaquin. PRN benzonatate ordered. Ordered Mucinex.      Essential hypertension  ASCVD (arteriosclerotic cardiovascular disease)  · EKG (1/7): NSR. Continue ASA, atorvastatin and antihypertensives: amlodipine, labetalol (dose increased) and losartan. Home HCTZ discontinued due to hyponatremia.      Normocytic anemia  · Hgb ~9 on admit, slowly down-trending.  · Iron studies consistent with mild iron deficiency anemia. Retic % WNL. Ordered FOBT. (1/11)     Hypothyroidism  · Continued home levothyroxine 75mcg.      Hypokalemia  Hyomagnesemia  · Replete as needed     Diet: Diet Adult Regular (IDDSI Level 7) Fluid - 1500mL     DVT Prophylaxis:         Anticoagulants   Medication Route Frequency    enoxaparin injection 30 mg Subcutaneous Daily         L/D/A:  PIV x2  External urinary catheter     Discharge plan and follow up  [facility] [place]        Provider  Denice Morales MD  [facility] [place] [facility]   Department of Hospital Medicine     Scribe Attestation: I personally scribed for Denice Morales MD on 01/13/2019 at 11:38 AM. Electronically signed by nick Kern on 01/13/2019 at 11:38 AM.

## 2019-01-15 NOTE — PLAN OF CARE
01/15/19 0730   Final Note   Assessment Type Final Discharge Note   Anticipated Discharge Disposition Home-Health   What phone number can be called within the next 1-3 days to see how you are doing after discharge? [phone]   Hospital Follow Up  Appt(s) scheduled? Yes  (1/31/2019)   Discharge plans and expectations educations in teach back method with documentation complete? Yes

## 2019-01-16 ENCOUNTER — TELEPHONE (OUTPATIENT)
Dept: CARDIOLOGY | Facility: CLINIC | Age: 84
End: 2019-01-16

## 2019-01-16 DIAGNOSIS — E03.9 HYPOTHYROIDISM (ACQUIRED): ICD-10-CM

## 2019-01-16 DIAGNOSIS — G63 POLYNEUROPATHY IN OTHER DISEASES CLASSIFIED ELSEWHERE: ICD-10-CM

## 2019-01-16 DIAGNOSIS — E78.2 MIXED HYPERLIPIDEMIA: Primary | ICD-10-CM

## 2019-01-17 NOTE — PT/OT/SLP DISCHARGE
Physical Therapy Discharge Summary    Name: Kristin Quintanilla  MRN: [id number]   Principal Problem: Hyponatremia     Patient Discharged from acute Physical Therapy on 2019.       Assessment:     Patient was discharged unexpectedly.  Information required to complete an accurate discharge summary is unknown.  Refer to therapy initial evaluation and last progress note for initial and most recent functional status and goal achievement.  Recommendations made may be found in medical record.    Objective:     GOALS:   Multidisciplinary Problems     Physical Therapy Goals        Problem: Physical Therapy Goal    Goal Priority Disciplines Outcome Goal Variances Interventions   Physical Therapy Goal     PT, PT/OT Ongoing (interventions implemented as appropriate)     Description:  Goals to be met by: 1/15/2019    Patient will increase functional independence with mobility by performin. Sit to stand transfer with Supervision using RW - not met  2. Gait  x 250 feet with Supervision using Rolling Walker. - not met  3. Lower extremity exercise program x20 reps per handout, with independence - not met                      Reasons for Discontinuation of Therapy Services  Transfer to alternate level of care.      Plan:     Patient Discharged to: Home with Home Health Service.    Gaye Valentin PT, DPT  2019  [phone]

## 2019-01-21 ENCOUNTER — PATIENT MESSAGE (OUTPATIENT)
Dept: CARDIOLOGY | Facility: CLINIC | Age: 84
End: 2019-01-21

## 2019-01-21 ENCOUNTER — TELEPHONE (OUTPATIENT)
Dept: CARDIOLOGY | Facility: CLINIC | Age: 84
End: 2019-01-21

## 2019-01-21 ENCOUNTER — HOSPITAL ENCOUNTER (OUTPATIENT)
Dept: CARDIOLOGY | Facility: CLINIC | Age: 84
Discharge: HOME | End: 2019-01-21
Attending: INTERNAL MEDICINE
Payer: MEDICARE

## 2019-01-21 VITALS
SYSTOLIC BLOOD PRESSURE: 166 MMHG | BODY MASS INDEX: 19.97 KG/M2 | WEIGHT: 117 LBS | DIASTOLIC BLOOD PRESSURE: 78 MMHG | HEIGHT: 64 IN | HEART RATE: 75 BPM

## 2019-01-21 DIAGNOSIS — I31.39 PERICARDIAL EFFUSION: ICD-10-CM

## 2019-01-21 DIAGNOSIS — I31.39 PERICARDIAL EFFUSION: Primary | ICD-10-CM

## 2019-01-21 LAB
ASCENDING AORTA: 3.15 CM
AV INDEX (PROSTH): 0.66
AV MEAN GRADIENT: 5.83 MMHG
AV PEAK GRADIENT: 9.73 MMHG
AV VALVE AREA: 1.84 CM2
AV VELOCITY RATIO: 0.64
BSA FOR ECHO PROCEDURE: 1.55 M2
CV ECHO LV RWT: 0.27 CM
DOP CALC AO PEAK VEL: 1.56 M/S
DOP CALC AO VTI: 43.97 CM
DOP CALC LVOT AREA: 2.8 CM2
DOP CALC LVOT DIAMETER: 1.89 CM
DOP CALC LVOT PEAK VEL: 0.99 M/S
DOP CALC LVOT STROKE VOLUME: 81.04 CM3
DOP CALCLVOT PEAK VEL VTI: 28.9 CM
E WAVE DECELERATION TIME: 182.93 MSEC
E/A RATIO: 1.51
ECHO LV POSTERIOR WALL: 0.72 CM (ref 0.6–1.1)
FRACTIONAL SHORTENING: 54 % (ref 28–44)
INTERVENTRICULAR SEPTUM: 0.7 CM (ref 0.6–1.1)
LA MAJOR: 6.48 CM
LA MINOR: 5.55 CM
LA WIDTH: 5.1 CM
LEFT ATRIUM SIZE: 4.48 CM
LEFT ATRIUM VOLUME INDEX: 74.6 ML/M2
LEFT ATRIUM VOLUME: 116.12 CM3
LEFT INTERNAL DIMENSION IN SYSTOLE: 2.45 CM (ref 2.1–4)
LEFT VENTRICLE DIASTOLIC VOLUME INDEX: 89.45 ML/M2
LEFT VENTRICLE DIASTOLIC VOLUME: 139.32 ML
LEFT VENTRICLE MASS INDEX: 84.9 G/M2
LEFT VENTRICLE SYSTOLIC VOLUME INDEX: 13.7 ML/M2
LEFT VENTRICLE SYSTOLIC VOLUME: 21.33 ML
LEFT VENTRICULAR INTERNAL DIMENSION IN DIASTOLE: 5.37 CM (ref 3.5–6)
LEFT VENTRICULAR MASS: 132.22 G
LV SEPTAL E/E' RATIO: 21.17
MV PEAK A VEL: 0.84 M/S
MV PEAK E VEL: 1.27 M/S
PISA TR MAX VEL: 2.54 M/S
PULM VEIN S/D RATIO: 0.37
PV PEAK D VEL: 0.78 M/S
PV PEAK S VEL: 0.29 M/S
RA MAJOR: 5 CM
RA PRESSURE: 8 MMHG
RA WIDTH: 3.02 CM
RIGHT VENTRICULAR END-DIASTOLIC DIMENSION: 4.03 CM
RV TISSUE DOPPLER FREE WALL SYSTOLIC VELOCITY 1 (APICAL 4 CHAMBER VIEW): 13.23 M/S
SINUS: 3.05 CM
STJ: 2.33 CM
TDI SEPTAL: 0.06
TR MAX PG: 25.81 MMHG
TRICUSPID ANNULAR PLANE SYSTOLIC EXCURSION: 2.1 CM
TV REST PULMONARY ARTERY PRESSURE: 34 MMHG

## 2019-01-21 PROCEDURE — 93306 TRANSTHORACIC ECHO (TTE) COMPLETE (CUPID ONLY): ICD-10-PCS | Mod: S$GLB,,, | Performed by: INTERNAL MEDICINE

## 2019-01-21 PROCEDURE — 93306 TTE W/DOPPLER COMPLETE: CPT | Mod: S$GLB,,, | Performed by: INTERNAL MEDICINE

## 2019-01-21 NOTE — TELEPHONE ENCOUNTER
Yue called stating that pt's family stated that pt is having more LE edema and sob than usual. Yue stated that pt's left ankle is normally 22 cm and today it was 23cm and that her weight fluctuates 1lb. Yue stated that she had the pt walk today and that the pt didn't seem like she was in any distress to Yue. Yue stated that pt's SpO2 was 97% after she had there to walk. Yue stated that they tried to schedule pt to see her PCP and they advised them to get pt in to see her cardiologist. Please advise.

## 2019-01-21 NOTE — TELEPHONE ENCOUNTER
----- Message from Christi Mccloud sent at 1/21/2019 10:13 AM CST -----  Contact: Yue for [facility]   Yue would like for the pt to have a sooner appt due to increase SOB and fluid in both ankles but mainly  the left  left ankle  . Please call Yue @ [phone]. Thank you.

## 2019-01-22 ENCOUNTER — TELEPHONE (OUTPATIENT)
Dept: CARDIOLOGY | Facility: CLINIC | Age: 84
End: 2019-01-22

## 2019-01-22 NOTE — TELEPHONE ENCOUNTER
----- Message from Lenka Triana MD sent at 1/22/2019  3:58 PM CST -----  Mr. Quintanilla,   Please review the results of your heart echo, It did not show any changes and it looks fine with a chronic effusion around your heart that is not significant.  I will see you on Friday and hope you are OK

## 2019-01-23 ENCOUNTER — TELEPHONE (OUTPATIENT)
Dept: CARDIOLOGY | Facility: CLINIC | Age: 84
End: 2019-01-23

## 2019-01-24 ENCOUNTER — HOSPITAL ENCOUNTER (OUTPATIENT)
Facility: HOSPITAL | Age: 84
Discharge: HOME | End: 2019-01-28
Attending: EMERGENCY MEDICINE | Admitting: HOSPITALIST
Payer: MEDICARE

## 2019-01-24 ENCOUNTER — OFFICE VISIT (OUTPATIENT)
Dept: FAMILY MEDICINE | Facility: CLINIC | Age: 84
End: 2019-01-24
Payer: MEDICARE

## 2019-01-24 VITALS
HEIGHT: 64 IN | TEMPERATURE: 99 F | WEIGHT: 146.63 LBS | HEART RATE: 66 BPM | SYSTOLIC BLOOD PRESSURE: 140 MMHG | DIASTOLIC BLOOD PRESSURE: 50 MMHG | BODY MASS INDEX: 25.03 KG/M2 | OXYGEN SATURATION: 93 %

## 2019-01-24 DIAGNOSIS — I82.452 ACUTE DEEP VEIN THROMBOSIS (DVT) OF LEFT PERONEAL VEIN: Primary | ICD-10-CM

## 2019-01-24 DIAGNOSIS — R06.02 SHORTNESS OF BREATH: ICD-10-CM

## 2019-01-24 DIAGNOSIS — R60.9 EDEMA: ICD-10-CM

## 2019-01-24 DIAGNOSIS — I50.33 ACUTE ON CHRONIC DIASTOLIC (CONGESTIVE) HEART FAILURE: Primary | ICD-10-CM

## 2019-01-24 DIAGNOSIS — R06.02 SOB (SHORTNESS OF BREATH): ICD-10-CM

## 2019-01-24 PROBLEM — I82.409 DVT (DEEP VENOUS THROMBOSIS): Status: ACTIVE | Noted: 2019-01-24

## 2019-01-24 LAB
ALBUMIN SERPL-MCNC: 2.5 G/DL (ref 3.3–5.5)
ALP SERPL-CCNC: 142 U/L (ref 42–141)
BILIRUB SERPL-MCNC: 0.6 MG/DL (ref 0.2–1.6)
BUN SERPL-MCNC: 20 MG/DL (ref 7–22)
CALCIUM SERPL-MCNC: 9.1 MG/DL (ref 8–10.3)
CHLORIDE SERPL-SCNC: 98 MMOL/L (ref 98–108)
CREAT SERPL-MCNC: 1.2 MG/DL (ref 0.6–1.2)
GLUCOSE SERPL-MCNC: 110 MG/DL (ref 73–118)
POC ALT (SGPT): 47 U/L (ref 10–47)
POC AST (SGOT): 39 U/L (ref 11–38)
POC B-TYPE NATRIURETIC PEPTIDE: 549 PG/ML (ref 0–100)
POC CARDIAC TROPONIN I: 0 NG/ML
POC D-DI: 3380 NG/ML (ref 0–450)
POC PTINR: 1.2 (ref 0.9–1.2)
POC PTWBT: 14 SEC (ref 9.7–14.3)
POC TCO2: 26 MMOL/L (ref 18–33)
POTASSIUM BLD-SCNC: 4.3 MMOL/L (ref 3.6–5.1)
PROTEIN, POC: 5.3 G/DL (ref 6.4–8.1)
SAMPLE: NORMAL
SAMPLE: NORMAL
SODIUM BLD-SCNC: 137 MMOL/L (ref 128–145)

## 2019-01-24 PROCEDURE — 85025 COMPLETE CBC W/AUTO DIFF WBC: CPT | Mod: ER

## 2019-01-24 PROCEDURE — 84484 ASSAY OF TROPONIN QUANT: CPT | Mod: ER

## 2019-01-24 PROCEDURE — 99499 RISK ADDL DX/OHS AUDIT: ICD-10-PCS | Mod: HCNC,S$GLB,, | Performed by: NURSE PRACTITIONER

## 2019-01-24 PROCEDURE — 93010 ELECTROCARDIOGRAM REPORT: CPT | Mod: ,,, | Performed by: INTERNAL MEDICINE

## 2019-01-24 PROCEDURE — 96372 THER/PROPH/DIAG INJ SC/IM: CPT | Mod: ER

## 2019-01-24 PROCEDURE — 96374 THER/PROPH/DIAG INJ IV PUSH: CPT | Mod: ER

## 2019-01-24 PROCEDURE — 80053 COMPREHEN METABOLIC PANEL: CPT | Mod: ER

## 2019-01-24 PROCEDURE — 99999 PR PBB SHADOW E&M-EST. PATIENT-LVL IV: CPT | Mod: PBBFAC,,, | Performed by: NURSE PRACTITIONER

## 2019-01-24 PROCEDURE — 83880 ASSAY OF NATRIURETIC PEPTIDE: CPT | Mod: ER

## 2019-01-24 PROCEDURE — 25000242 PHARM REV CODE 250 ALT 637 W/ HCPCS: Mod: ER | Performed by: EMERGENCY MEDICINE

## 2019-01-24 PROCEDURE — 1101F PR PT FALLS ASSESS DOC 0-1 FALLS W/OUT INJ PAST YR: ICD-10-PCS | Mod: CPTII,S$GLB,, | Performed by: NURSE PRACTITIONER

## 2019-01-24 PROCEDURE — 99213 PR OFFICE/OUTPT VISIT, EST, LEVL III, 20-29 MIN: ICD-10-PCS | Mod: S$GLB,,, | Performed by: NURSE PRACTITIONER

## 2019-01-24 PROCEDURE — 99999 PR PBB SHADOW E&M-EST. PATIENT-LVL IV: ICD-10-PCS | Mod: PBBFAC,,, | Performed by: NURSE PRACTITIONER

## 2019-01-24 PROCEDURE — 93005 ELECTROCARDIOGRAM TRACING: CPT | Mod: ER

## 2019-01-24 PROCEDURE — 1101F PT FALLS ASSESS-DOCD LE1/YR: CPT | Mod: CPTII,S$GLB,, | Performed by: NURSE PRACTITIONER

## 2019-01-24 PROCEDURE — 99285 EMERGENCY DEPT VISIT HI MDM: CPT | Mod: 25,ER

## 2019-01-24 PROCEDURE — 99499 UNLISTED E&M SERVICE: CPT | Mod: HCNC,S$GLB,, | Performed by: NURSE PRACTITIONER

## 2019-01-24 PROCEDURE — 85379 FIBRIN DEGRADATION QUANT: CPT | Mod: ER

## 2019-01-24 PROCEDURE — 94640 AIRWAY INHALATION TREATMENT: CPT | Mod: ER

## 2019-01-24 PROCEDURE — 93010 EKG 12-LEAD: ICD-10-PCS | Mod: ,,, | Performed by: INTERNAL MEDICINE

## 2019-01-24 PROCEDURE — 85610 PROTHROMBIN TIME: CPT | Mod: ER

## 2019-01-24 PROCEDURE — 99213 OFFICE O/P EST LOW 20 MIN: CPT | Mod: S$GLB,,, | Performed by: NURSE PRACTITIONER

## 2019-01-24 PROCEDURE — 63600175 PHARM REV CODE 636 W HCPCS: Mod: ER | Performed by: EMERGENCY MEDICINE

## 2019-01-24 RX ORDER — ALBUTEROL SULFATE 2.5 MG/.5ML
2.5 SOLUTION RESPIRATORY (INHALATION)
Status: COMPLETED | OUTPATIENT
Start: 2019-01-24 | End: 2019-01-24

## 2019-01-24 RX ORDER — AMLODIPINE BESYLATE 10 MG/1
10 TABLET ORAL
Status: ACTIVE | OUTPATIENT
Start: 2019-01-24 | End: 2019-01-25

## 2019-01-24 RX ORDER — FUROSEMIDE 10 MG/ML
40 INJECTION INTRAMUSCULAR; INTRAVENOUS
Status: DISCONTINUED | OUTPATIENT
Start: 2019-01-24 | End: 2019-01-25

## 2019-01-24 RX ORDER — ENOXAPARIN SODIUM 100 MG/ML
1 INJECTION SUBCUTANEOUS
Status: COMPLETED | OUTPATIENT
Start: 2019-01-24 | End: 2019-01-24

## 2019-01-24 RX ADMIN — ENOXAPARIN SODIUM 60 MG: 100 INJECTION SUBCUTANEOUS at 10:01

## 2019-01-24 RX ADMIN — ALBUTEROL SULFATE 2.5 MG: 2.5 SOLUTION RESPIRATORY (INHALATION) at 08:01

## 2019-01-24 NOTE — PROGRESS NOTES
"Patient Name: Kristin Quintanilla    : 3/12/1930  MRN: [id number]    Subjective:  Kristin is a 88 y.o. female with CAD, chronic diastolic heart failure, mild AR, HTN, HLD, hypothyroidism  who presents today with son and daughter in law for     1. SOB on exertion, excessive sleepiness, edema with mild hypoxia (oxygen desaturates to 80s) started 2 days ago on Tuesday. She was recently hospitalized on 19 for hyponatremia and her diuretics were held. She has a follow up appt with her cardiologist next day but was advised to come to  for evaluation by her home health caretakers who felt patient needed to be seen immediately. She doesn't feel that her symptoms are "that bad" . SOB usually happens when her legs are elevated and improves when dependent. She is using nebulizers 3 x a day which seems to help.     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      Arthritis     ASCVD (arteriosclerotic cardiovascular disease) 2014    Atherosclerosis of aorta 2016    "There is atherosclerosis of the thoracic aorta" - Xray Chest      Back pain     Basal cell carcinoma 10/7/2013    Right nasal columellar, right lower eyelid, left medial eyelid    Basal cell carcinoma 2015    mid back    Chronic diastolic heart failure 2014    Coronary artery disease     Gastric ulcer     Fosamax related.    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     Hypothyroidism (acquired) 2016    Joint pain     Lymphedema of Neck 3/17/2016    Occlusion and stenosis of carotid artery without mention of cerebral infarction     Pelvic mass in female 2015    Polyneuropathy in other diseases classified elsewhere 2016     Squamous " cell carcinoma of scalp 5/2014    scalp vertex with + LN met 10/14 s/p radiation    Ulcer        Past Surgical History  Past Surgical History:   Procedure Laterality Date    ADJACENT TISSUE TRANSFER/REARRANGEMENT N/A 5/14/2014    Performed [facility] at [facility] OR 2ND FLR    APPENDECTOMY  8.14.2015    mucinous cystadenoma with low grade dysplasia.     APPENDECTOMY-LAPAROSCOPIC N/A 8/14/2015    Performed by Feroz Corado [facility] at [facility] OR 2ND FLR    BIOPSY-LYMPH NODE Right 10/27/2014    Performed by [facility]os [facility] at [facility] OR 2ND FLR    CATARACT EXTRACTION      ou dr morales    EYE SURGERY      HYSTERECTOMY  1970     NIC/BSO. took HRT immediatiely after wards.     LAPAROSCOPY-DIAGNOSTIC  8/14/2015    Performed by Gianfranco Crenshaw [facility] at [facility] OR [facility]    LYMPH NODE BIOPSY Right 10/27/2014    posterior triangle    Mohs excision of scalp SCC N/A 5/13/2014    Scalp flap N/A 5/14/204    posterior advancement-rotation    SKIN CANCER EXCISION      THYROIDECTOMY  1960's    hyperthyroidism       Family History  Family History   Problem Relation Age of Onset    Heart attack Mother     Heart disease Mother     Hypertension Mother     Eczema Mother     Heart failure Mother     Clotting disorder Father     Hypertension Father     Heart failure Father     Thyroid disease Sister     Cancer Sister         thyroid CA     No Known Problems Brother     No Known Problems Maternal Aunt     No Known Problems Maternal Uncle     No Known Problems Paternal Aunt     No Known Problems Paternal Uncle     No Known Problems Maternal Grandmother     No Known Problems Maternal Grandfather     No Known Problems Paternal Grandmother     No Known Problems Paternal Grandfather     Amblyopia Neg Hx     Blindness Neg Hx     Cataracts Neg Hx     Glaucoma Neg Hx     Macular degeneration Neg Hx     Retinal detachment Neg Hx     Strabismus Neg Hx     Stroke Neg Hx     Melanoma Neg Hx     Psoriasis  Neg Hx     Lupus Neg Hx     Acne Neg Hx     Ovarian cancer Neg Hx     Uterine cancer Neg Hx     Breast cancer Neg Hx     Colon cancer Neg Hx     Diabetes Neg Hx     Hyperlipidemia Neg Hx        Social History  Social History     Socioeconomic History    Marital status:      Spouse name: Not on file    Number of children: Not on file    Years of education: Not on file    Highest education level: Not on file   Social Needs    Financial resource strain: Not on file    Food insecurity - worry: Not on file    Food insecurity - inability: Not on file    Transportation needs - medical: Not on file    Transportation needs - non-medical: Not on file   Occupational History    Occupation: Homemaker   Tobacco Use    Smoking status: Former Smoker    Smokeless tobacco: Never Used    Tobacco comment: Quit 35 years ago   Substance and Sexual Activity    Alcohol use: No    Drug use: No    Sexual activity: No   Other Topics Concern    Are you pregnant or think you may be? No    Breast-feeding No   Social History Narrative    She is active and independent.        Allergies  Review of patient's allergies indicates:   Allergen Reactions    Codeine     Darvocet a500 [propoxyphene n-acetaminophen] Nausea Only    Sulfa (sulfonamide antibiotics) Rash    Fosamax [alendronate]      Gastric ulcer    -reviewed and updated      Medications  Reviewed and updated.   Current Outpatient Medications   Medication Sig Dispense Refill    acetaminophen (TYLENOL ARTHRITIS) 650 MG TbSR Take 650 mg by mouth once daily. 1-2 tablet once a day.      albuterol (PROVENTIL) 2.5 mg /3 mL (0.083 %) nebulizer solution Take 3 mLs (2.5 mg total) by nebulization every 4 (four) hours as needed for Wheezing or Shortness of Breath (chest tightness). 1 Box 6    albuterol (PROVENTIL/VENTOLIN HFA) 90 mcg/actuation inhaler Inhale 2 puffs into the lungs every 6 (six) hours as needed for Wheezing. Rescue 6 g 0    amLODIPine (NORVASC) 10  MG tablet TAKE 1 TABLET EVERY DAY 90 tablet 3    aspirin (ECOTRIN) 81 MG EC tablet Take 81 mg by mouth every other day. Pt taking one pill every other day.      atorvastatin (LIPITOR) 20 MG tablet TAKE 1 TABLET EVERY DAY 90 tablet 3    calcium carbonate/vitamin D3 (CALTRATE 600 + D ORAL) Take by mouth once daily.      chlorpheniramine maleate (CHLOR-TRIMETON REPETABS ORAL) Take by mouth.      FLAXSEED-OMEGA3,6,9-FATTY ACID ORAL Take 1 capsule by mouth once daily.        gabapentin (NEURONTIN) 100 MG capsule Take 2 capsules (200 mg total) by mouth 2 (two) times daily. 120 capsule 11    guaifenesin 100 mg/5 ml (ROBITUSSIN) 100 mg/5 mL syrup Take 200 mg by mouth 3 (three) times daily as needed for Cough.      labetalol (NORMODYNE) 300 MG tablet Take 1 tablet (300 mg total) by mouth 2 (two) times daily. 180 tablet 3    levothyroxine (SYNTHROID) 75 MCG tablet TAKE 1 TABLET ONE TIME DAILY 90 tablet 3    losartan (COZAAR) 100 MG tablet TAKE 1 TABLET EVERY DAY 90 tablet 0    magnesium 250 mg Tab Take 1 tablet by mouth once daily.        OPTICHAMBER DARREN LG MASK Spcr U UTD  0    traMADol (ULTRAM) 50 mg tablet Take 1 tablet (50 mg total) by mouth every 12 (twelve) hours as needed for Pain. 15 tablet 0    triamcinolone acetonide 0.1% (KENALOG) 0.1 % cream APPLY TO THE AFFECTED AREA OF lower legs TWICE DAILY 453.6 g 1    vitamin D 1000 units Tab Take 1,000 Units by mouth once daily.        No current facility-administered medications for this visit.          Review of Systems   Constitutional: Positive for malaise/fatigue. Negative for chills and fever.        Excessive sleepiness per family and per report by family home health   Respiratory: Positive for cough and shortness of breath. Negative for sputum production.    Cardiovascular: Negative for chest pain and palpitations.   Gastrointestinal:        Abdomen is more swollen         Physical Exam  BP (!) 140/50 (BP Location: Left arm, Patient Position:  "Sitting, BP Method: Medium (Manual))   Pulse 66   Temp 98.7 °F (37.1 °C) (Oral)   Ht 5' 4" (1.626 m)   Wt 66.5 kg (146 lb 9.7 oz)   LMP  (LMP Unknown)   SpO2 (!) 93%   BMI 25.16 kg/m²   Physical Exam   Constitutional: She is oriented to person, place, and time. She appears well-developed. No distress.   HENT:   Head: Normocephalic.   Eyes: Conjunctivae and EOM are normal.   Cardiovascular: Normal rate and regular rhythm.   Pulmonary/Chest: Effort normal. She has rales (bilateral ).   Musculoskeletal:   3+ bilateral   Neurological: She is alert and oriented to person, place, and time.   Skin: She is not diaphoretic.   Psychiatric: She has a normal mood and affect. Her behavior is normal. Judgment and thought content normal.     1/7/2019      Assessment/Plan:  Kristin Quintanilla is a 88 y.o. female who presents today for :    Acute on chronic diastolic (congestive) heart failure  Pt with acute worsening sob, possibly depress mental status, unusual  fatigue per family with findings of crackles, edema, desaturation from baseline, advance age. Since this is after hours, recommend pt go to ED for workup and treatment.       Follow-up ER for workup and evaluation.      "

## 2019-01-25 PROBLEM — I82.452 ACUTE DEEP VEIN THROMBOSIS (DVT) OF LEFT PERONEAL VEIN: Status: ACTIVE | Noted: 2019-01-25

## 2019-01-25 PROBLEM — R53.81 DEBILITY: Status: ACTIVE | Noted: 2019-01-25

## 2019-01-25 LAB
ANION GAP SERPL CALC-SCNC: 6 MMOL/L
BASOPHILS # BLD AUTO: 0.05 K/UL
BASOPHILS NFR BLD: 0.7 %
BNP SERPL-MCNC: 613 PG/ML
BUN SERPL-MCNC: 22 MG/DL
CALCIUM SERPL-MCNC: 9.8 MG/DL
CHLORIDE SERPL-SCNC: 99 MMOL/L
CO2 SERPL-SCNC: 26 MMOL/L
CREAT SERPL-MCNC: 0.9 MG/DL
DIFFERENTIAL METHOD: ABNORMAL
EOSINOPHIL # BLD AUTO: 0.6 K/UL
EOSINOPHIL NFR BLD: 8.5 %
ERYTHROCYTE [DISTWIDTH] IN BLOOD BY AUTOMATED COUNT: 14.8 %
EST. GFR  (AFRICAN AMERICAN): >60 ML/MIN/1.73 M^2
EST. GFR  (NON AFRICAN AMERICAN): 57.3 ML/MIN/1.73 M^2
GLUCOSE SERPL-MCNC: 89 MG/DL
HCT VFR BLD AUTO: 25.8 %
HGB BLD-MCNC: 8 G/DL
IMM GRANULOCYTES # BLD AUTO: 0.03 K/UL
IMM GRANULOCYTES NFR BLD AUTO: 0.4 %
LYMPHOCYTES # BLD AUTO: 0.8 K/UL
LYMPHOCYTES NFR BLD: 11.7 %
MAGNESIUM SERPL-MCNC: 2.4 MG/DL
MCH RBC QN AUTO: 29.4 PG
MCHC RBC AUTO-ENTMCNC: 31 G/DL
MCV RBC AUTO: 95 FL
MONOCYTES # BLD AUTO: 0.8 K/UL
MONOCYTES NFR BLD: 12 %
NEUTROPHILS # BLD AUTO: 4.5 K/UL
NEUTROPHILS NFR BLD: 66.7 %
NRBC BLD-RTO: 0 /100 WBC
PHOSPHATE SERPL-MCNC: 3.3 MG/DL
PLATELET # BLD AUTO: 363 K/UL
PMV BLD AUTO: 10.2 FL
POCT GLUCOSE: 83 MG/DL (ref 70–110)
POCT GLUCOSE: 87 MG/DL (ref 70–110)
POTASSIUM SERPL-SCNC: 4.8 MMOL/L
RBC # BLD AUTO: 2.72 M/UL
SODIUM SERPL-SCNC: 131 MMOL/L
WBC # BLD AUTO: 6.74 K/UL

## 2019-01-25 PROCEDURE — 80048 BASIC METABOLIC PNL TOTAL CA: CPT

## 2019-01-25 PROCEDURE — A4216 STERILE WATER/SALINE, 10 ML: HCPCS | Performed by: HOSPITALIST

## 2019-01-25 PROCEDURE — 83735 ASSAY OF MAGNESIUM: CPT

## 2019-01-25 PROCEDURE — 63600175 PHARM REV CODE 636 W HCPCS: Performed by: HOSPITALIST

## 2019-01-25 PROCEDURE — G0378 HOSPITAL OBSERVATION PER HR: HCPCS

## 2019-01-25 PROCEDURE — 25000003 PHARM REV CODE 250: Performed by: HOSPITALIST

## 2019-01-25 PROCEDURE — 83880 ASSAY OF NATRIURETIC PEPTIDE: CPT

## 2019-01-25 PROCEDURE — 99220 PR INITIAL OBSERVATION CARE,LEVL III: CPT | Mod: ,,, | Performed by: PHYSICIAN ASSISTANT

## 2019-01-25 PROCEDURE — 25000242 PHARM REV CODE 250 ALT 637 W/ HCPCS: Performed by: HOSPITALIST

## 2019-01-25 PROCEDURE — 99220 PR INITIAL OBSERVATION CARE,LEVL III: ICD-10-PCS | Mod: ,,, | Performed by: PHYSICIAN ASSISTANT

## 2019-01-25 PROCEDURE — 94640 AIRWAY INHALATION TREATMENT: CPT

## 2019-01-25 PROCEDURE — 96374 THER/PROPH/DIAG INJ IV PUSH: CPT | Mod: ER

## 2019-01-25 PROCEDURE — 85025 COMPLETE CBC W/AUTO DIFF WBC: CPT

## 2019-01-25 PROCEDURE — 36415 COLL VENOUS BLD VENIPUNCTURE: CPT

## 2019-01-25 PROCEDURE — 84100 ASSAY OF PHOSPHORUS: CPT

## 2019-01-25 PROCEDURE — 63600175 PHARM REV CODE 636 W HCPCS: Performed by: STUDENT IN AN ORGANIZED HEALTH CARE EDUCATION/TRAINING PROGRAM

## 2019-01-25 PROCEDURE — 25000003 PHARM REV CODE 250: Performed by: STUDENT IN AN ORGANIZED HEALTH CARE EDUCATION/TRAINING PROGRAM

## 2019-01-25 RX ORDER — AMLODIPINE BESYLATE 10 MG/1
10 TABLET ORAL DAILY
Status: DISCONTINUED | OUTPATIENT
Start: 2019-01-25 | End: 2019-01-28 | Stop reason: HOSPADM

## 2019-01-25 RX ORDER — FUROSEMIDE 10 MG/ML
40 INJECTION INTRAMUSCULAR; INTRAVENOUS 2 TIMES DAILY
Status: DISCONTINUED | OUTPATIENT
Start: 2019-01-25 | End: 2019-01-27

## 2019-01-25 RX ORDER — LOSARTAN POTASSIUM 50 MG/1
100 TABLET ORAL DAILY
Status: DISCONTINUED | OUTPATIENT
Start: 2019-01-25 | End: 2019-01-28 | Stop reason: HOSPADM

## 2019-01-25 RX ORDER — ACETAMINOPHEN 325 MG/1
650 TABLET ORAL EVERY 4 HOURS PRN
Status: DISCONTINUED | OUTPATIENT
Start: 2019-01-25 | End: 2019-01-28 | Stop reason: HOSPADM

## 2019-01-25 RX ORDER — IPRATROPIUM BROMIDE AND ALBUTEROL SULFATE 2.5; .5 MG/3ML; MG/3ML
3 SOLUTION RESPIRATORY (INHALATION) EVERY 4 HOURS PRN
Status: DISCONTINUED | OUTPATIENT
Start: 2019-01-25 | End: 2019-01-28 | Stop reason: HOSPADM

## 2019-01-25 RX ORDER — ASPIRIN 81 MG/1
81 TABLET ORAL EVERY OTHER DAY
Status: DISCONTINUED | OUTPATIENT
Start: 2019-01-25 | End: 2019-01-28 | Stop reason: HOSPADM

## 2019-01-25 RX ORDER — SODIUM CHLORIDE 0.9 % (FLUSH) 0.9 %
5 SYRINGE (ML) INJECTION
Status: DISCONTINUED | OUTPATIENT
Start: 2019-01-25 | End: 2019-01-28 | Stop reason: HOSPADM

## 2019-01-25 RX ORDER — CHOLECALCIFEROL (VITAMIN D3) 25 MCG
1000 TABLET ORAL DAILY
Status: DISCONTINUED | OUTPATIENT
Start: 2019-01-25 | End: 2019-01-28 | Stop reason: HOSPADM

## 2019-01-25 RX ORDER — ATORVASTATIN CALCIUM 20 MG/1
20 TABLET, FILM COATED ORAL DAILY
Status: DISCONTINUED | OUTPATIENT
Start: 2019-01-25 | End: 2019-01-28 | Stop reason: HOSPADM

## 2019-01-25 RX ORDER — LEVOTHYROXINE SODIUM 50 UG/1
50 TABLET ORAL
Status: DISCONTINUED | OUTPATIENT
Start: 2019-01-25 | End: 2019-01-25

## 2019-01-25 RX ORDER — AMOXICILLIN 250 MG
2 CAPSULE ORAL 2 TIMES DAILY PRN
Status: DISCONTINUED | OUTPATIENT
Start: 2019-01-25 | End: 2019-01-28 | Stop reason: HOSPADM

## 2019-01-25 RX ORDER — GLUCAGON 1 MG
1 KIT INJECTION
Status: DISCONTINUED | OUTPATIENT
Start: 2019-01-25 | End: 2019-01-28 | Stop reason: HOSPADM

## 2019-01-25 RX ORDER — LABETALOL 300 MG/1
300 TABLET, FILM COATED ORAL 2 TIMES DAILY
Status: DISCONTINUED | OUTPATIENT
Start: 2019-01-25 | End: 2019-01-28 | Stop reason: HOSPADM

## 2019-01-25 RX ORDER — IBUPROFEN 200 MG
16 TABLET ORAL
Status: DISCONTINUED | OUTPATIENT
Start: 2019-01-25 | End: 2019-01-28 | Stop reason: HOSPADM

## 2019-01-25 RX ORDER — IBUPROFEN 200 MG
24 TABLET ORAL
Status: DISCONTINUED | OUTPATIENT
Start: 2019-01-25 | End: 2019-01-28 | Stop reason: HOSPADM

## 2019-01-25 RX ORDER — RAMELTEON 8 MG/1
8 TABLET ORAL NIGHTLY PRN
Status: DISCONTINUED | OUTPATIENT
Start: 2019-01-25 | End: 2019-01-28 | Stop reason: HOSPADM

## 2019-01-25 RX ORDER — FUROSEMIDE 10 MG/ML
40 INJECTION INTRAMUSCULAR; INTRAVENOUS ONCE
Status: COMPLETED | OUTPATIENT
Start: 2019-01-25 | End: 2019-01-25

## 2019-01-25 RX ORDER — GABAPENTIN 100 MG/1
200 CAPSULE ORAL 2 TIMES DAILY
Status: DISCONTINUED | OUTPATIENT
Start: 2019-01-25 | End: 2019-01-28 | Stop reason: HOSPADM

## 2019-01-25 RX ORDER — FUROSEMIDE 10 MG/ML
20 INJECTION INTRAMUSCULAR; INTRAVENOUS 2 TIMES DAILY
Status: DISCONTINUED | OUTPATIENT
Start: 2019-01-25 | End: 2019-01-25

## 2019-01-25 RX ADMIN — ASPIRIN 81 MG: 81 TABLET, COATED ORAL at 09:01

## 2019-01-25 RX ADMIN — ACETAMINOPHEN 650 MG: 325 TABLET ORAL at 11:01

## 2019-01-25 RX ADMIN — LEVOTHYROXINE SODIUM 50 MCG: 50 TABLET ORAL at 05:01

## 2019-01-25 RX ADMIN — LOSARTAN POTASSIUM 100 MG: 50 TABLET, FILM COATED ORAL at 09:01

## 2019-01-25 RX ADMIN — APIXABAN 10 MG: 5 TABLET, FILM COATED ORAL at 09:01

## 2019-01-25 RX ADMIN — AMLODIPINE BESYLATE 10 MG: 10 TABLET ORAL at 09:01

## 2019-01-25 RX ADMIN — IPRATROPIUM BROMIDE AND ALBUTEROL SULFATE 3 ML: .5; 3 SOLUTION RESPIRATORY (INHALATION) at 09:01

## 2019-01-25 RX ADMIN — ATORVASTATIN CALCIUM 20 MG: 20 TABLET, FILM COATED ORAL at 09:01

## 2019-01-25 RX ADMIN — LABETALOL HCL 300 MG: 300 TABLET, FILM COATED ORAL at 09:01

## 2019-01-25 RX ADMIN — LABETALOL HCL 300 MG: 300 TABLET, FILM COATED ORAL at 08:01

## 2019-01-25 RX ADMIN — Medication 5 ML: at 08:01

## 2019-01-25 RX ADMIN — APIXABAN 10 MG: 5 TABLET, FILM COATED ORAL at 08:01

## 2019-01-25 RX ADMIN — VITAMIN D, TAB 1000IU (100/BT) 1000 UNITS: 25 TAB at 09:01

## 2019-01-25 RX ADMIN — FUROSEMIDE 40 MG: 10 INJECTION, SOLUTION INTRAVENOUS at 05:01

## 2019-01-25 RX ADMIN — GABAPENTIN 200 MG: 100 CAPSULE ORAL at 08:01

## 2019-01-25 RX ADMIN — FUROSEMIDE 40 MG: 10 INJECTION, SOLUTION INTRAVENOUS at 08:01

## 2019-01-25 RX ADMIN — GABAPENTIN 200 MG: 100 CAPSULE ORAL at 09:01

## 2019-01-25 NOTE — ED PROVIDER NOTES
Encounter Date: 1/24/2019    SCRIBE #1 NOTE: I, Margareth Marie, am scribing for, and in the presence of,  Dr. Diallo. I have scribed the following portions of the note - Other sections scribed: HPI, ROS, PE.       History     Chief Complaint   Patient presents with    Shortness of Breath     Patient with increase in shortness of breath and swelling to lower extremities for 3 days     Kristin Quintanilla is a 88 y.o. female with a history of CHF, HTN, HLD, aortic valve disorders, and CAD who presents to the ED complaining of increased shortness of breath over the past three days and swelling to her lower legs (left greater than right) for 3 days as well. Her son at bedside gave most of history. Patient reports her shortness of breath is worse during exertion, but she also experiences shortness of breath at rest. Her shortness of breath improves while sitting up. She came to the ED today because the RN who visited her today at 3PM said she should see a doctor. She went to urgent care who advised her to visit the ED. Patient denies fever, chest pains, and dysuria. She has not had much urine output, but is on a restricted fluid intake. She will do have about 3 albuterol treatments every day. She is not on home oxygen. She is not on any blood thinners.    Son at bedside states she was in at [facility] 12/29/2018 this year for shortness of breath. She was treated with steroids and albuterol, which improved her symptoms, but the symptoms came back 5 days later. She visited urgent care (1/05/2019) and was given antibiotics and continued albuterol. She was advised to follow up with her PCP. We was seen by her PCP (1/07/2019) and sent to the ER for more specific tests that same day. At the ER her sodium was 116. She was told maybe her Hydrochlorothiazide affected her electrolyte balance. She was given Lasix in the ER for leg swelling. She was admitted to the ICU for her low sodium level and leg swelling. She was in the ICU  "for 3 days and her sodium level increased to 120. She was moved to [facility] and was there for 3-4 days. Her sodium level increased to 131. She was discharged home with home health care.    She has had to begin physical therapy after leaving the hospital. Her son reports she has been able to walk over the past 10 days.        The history is provided by the patient and a relative. No  was used.     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      Arthritis     ASCVD (arteriosclerotic cardiovascular disease) 7/8/2014    Atherosclerosis of aorta 1/7/2016    "There is atherosclerosis of the thoracic aorta" - Xray Chest 7-     Back pain     Basal cell carcinoma 10/7/2013    Right nasal columellar, right lower eyelid, left medial eyelid    Basal cell carcinoma 2/2015    mid back    Chronic diastolic heart failure 11/11/2014    Coronary artery disease     Gastric ulcer     Fosamax related.    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     Hypothyroidism (acquired) 9/27/2016    Joint pain     Lymphedema of Neck 3/17/2016    Occlusion and stenosis of carotid artery without mention of cerebral infarction     Pelvic mass in female 7/21/2015    Polyneuropathy in other diseases classified elsewhere 1/7/2016 6-     Squamous cell carcinoma of scalp 5/2014    scalp vertex with + LN met 10/14 s/p radiation    Ulcer      Past Surgical History:   Procedure Laterality Date    ADJACENT TISSUE TRANSFER/REARRANGEMENT N/A " 5/14/2014    Performed by Olvin Cevallos MD at [facility] OR 2ND FLR    APPENDECTOMY  8.14.2015    mucinous cystadenoma with low grade dysplasia.     APPENDECTOMY-LAPAROSCOPIC N/A 8/14/2015    Performed by Feroz Corado MD at [facility] OR 2ND FLR    BIOPSY-LYMPH NODE Right 10/27/2014    Performed by Olvin Cevallos MD at [facility] OR 2ND FLR    CATARACT EXTRACTION      ou dr morales    EYE SURGERY      HYSTERECTOMY  1970     NIC/BSO. took HRT immediatiely after wards.     LAPAROSCOPY-DIAGNOSTIC  8/14/2015    Performed by Gianfranco Crenshaw MD at [facility] OR 2ND FLR    LYMPH NODE BIOPSY Right 10/27/2014    posterior triangle    Mohs excision of scalp SCC N/A 5/13/2014    Scalp flap N/A 5/14/204    posterior advancement-rotation    SKIN CANCER EXCISION      THYROIDECTOMY  1960's    hyperthyroidism     Family History   Problem Relation Age of Onset    Heart attack Mother     Heart disease Mother     Hypertension Mother     Eczema Mother     Heart failure Mother     Clotting disorder Father     Hypertension Father     Heart failure Father     Thyroid disease Sister     Cancer Sister         thyroid CA     No Known Problems Brother     No Known Problems Maternal Aunt     No Known Problems Maternal Uncle     No Known Problems Paternal Aunt     No Known Problems Paternal Uncle     No Known Problems Maternal Grandmother     No Known Problems Maternal Grandfather     No Known Problems Paternal Grandmother     No Known Problems Paternal Grandfather     Amblyopia Neg Hx     Blindness Neg Hx     Cataracts Neg Hx     Glaucoma Neg Hx     Macular degeneration Neg Hx     Retinal detachment Neg Hx     Strabismus Neg Hx     Stroke Neg Hx     Melanoma Neg Hx     Psoriasis Neg Hx     Lupus Neg Hx     Acne Neg Hx     Ovarian cancer Neg Hx     Uterine cancer Neg Hx     Breast cancer Neg Hx     Colon cancer Neg Hx     Diabetes Neg Hx     Hyperlipidemia Neg Hx      Social History     Tobacco Use     Smoking status: Former Smoker    Smokeless tobacco: Never Used    Tobacco comment: Quit 35 years ago   Substance Use Topics    Alcohol use: No    Drug use: No     Review of Systems   Constitutional: Negative for fever.   Respiratory: Positive for cough (sometimes) and shortness of breath.    Cardiovascular: Positive for leg swelling. Negative for chest pain.   Genitourinary: Positive for decreased urine volume. Negative for dysuria.   All other systems reviewed and are negative.      Physical Exam     Initial Vitals [01/24/19 1825]   BP Pulse Resp Temp SpO2   138/63 70 (!) 24 97.8 °F (36.6 °C) (!) 94 %      MAP       --         Physical Exam    Nursing note and vitals reviewed.  Constitutional: She appears well-developed and well-nourished. No distress.   HENT:   Head: Normocephalic and atraumatic.   Right Ear: External ear normal.   Left Ear: External ear normal.   Mouth/Throat: Mucous membranes are normal.   Eyes: Conjunctivae are normal.   Neck: Normal range of motion and phonation normal. Neck supple. JVD present.   Cardiovascular: Normal rate and regular rhythm. Exam reveals gallop.    Pulses:       Dorsalis pedis pulses are 2+ on the right side, and 2+ on the left side.   Pulmonary/Chest: No stridor. No respiratory distress. She has wheezes (mild end expiratory wheezing). She has no rhonchi. She has no rales. She exhibits no tenderness.   Abdominal: Soft. There is no tenderness.   Musculoskeletal: Normal range of motion. She exhibits edema.   Peripheral edema. Left greater than right.   Neurological: She is alert and oriented to person, place, and time.   Skin: Skin is warm and dry.   Skin is warm and pink.   Psychiatric: She has a normal mood and affect.         ED Course   Procedures  Labs Reviewed   POCT CMP - Abnormal; Notable for the following components:       Result Value    Albumin, POC 2.5 (*)     Alkaline Phosphatase,  (*)     AST (SGOT), POC 39 (*)     POC Chloride 98 (*)     POC  Creatinine 1.2 (*)     Protein 5.3 (*)     All other components within normal limits   POCT B-TYPE NATRIURETIC PEPTIDE (BNP) - Abnormal; Notable for the following components:    POC B-Type Natriuretic Peptide 549 (*)     All other components within normal limits   POCT D DIMER - Abnormal; Notable for the following components:    POC D-DI 3380 (*)     All other components within normal limits   TROPONIN ISTAT   POCT CBC   ISTAT PROCEDURE   POCT D DIMER   POCT TROPONIN   POCT PROTIME-INR   POCT B-TYPE NATRIURETIC PEPTIDE (BNP)          Imaging Results          X-Ray Chest 1 View (In process)  Result time 01/24/19 19:34:53               US Lower Extremity Veins Bilateral (In process)                             Scribe Attestation:   Scribe #1: I performed the above scribed service and the documentation accurately describes the services I performed. I attest to the accuracy of the note.    I attest that I personally performed the services documented by the scribe and acknowledged and confirm the content of the note. Lisa Diallo    Plan:  Labs, EKG, imaging, rule out DVT, reassess patient had.  Care has been transferred to the provider coming on for the night shift.  The family is aware and is requesting that the patient not be admitted unless absolutely necessary.    Transition/Larimer of care note:  I have communicated the patient's history and progression in the ED with Dr. Diallo  Brief H&P: Patient is a 88 y.o. female who presented to the ED with Shortness of Breath (Patient with increase in shortness of breath and swelling to lower extremities for 3 days)  Significant history and PE findings: mild tachypnea,   DDx:  Decompensated heart failure, symptomatic anemia, PE, pneumonia, COPD exacerbation, other  Consults/Referrals:  Pending   Significant lab and diagnostic findings:  White count 7, H&H 8.9 and 25 point , D-dimer is 3380, troponin 0, sodium 137, creatinine 1.2 with no significant change from  baseline  Pending studies and consultations:   Disposition:  Will continue to monitor while final disposition is pending and manage patient expectations for the duration of stay in ED.  Jesi Chun MD, Emergency Medicine Staff 7:00 PM 1/24/2019            ED Course as of Jan 25 2017   Thu Jan 24, 2019   1900 BNP is elevated POC B-Type Natriuretic Peptide: (!) 549 [MH]   1900 Troponin is normal POC Cardiac Troponin I: 0.00 [MH]   1900 Electrolytes are unremarkable POC Creatinine: (!) 1.2 [MH]   1901 Patient is tachypneic but able to maintain 94% on room air Resp: (!) 24 [MH]   1901 My independent interpretation of the EKG is normal sinus rhythm with the slightly prolonged NV interval but otherwise no ST segment elevation or depression.  The EKG is largely unchanged from the previous EKG EKG 12-lead [MH]   1950 Results for DANILO PEARL (MRN [id number]) as of 1/24/2019 19:49    1/9/2019 14:56  Creatinine: 1.3    1/9/2019 19:29  Creatinine: 1.2    1/10/2019 04:44  Creatinine: 0.9    1/10/2019 15:58  Creatinine: 1.0    [DL]   1950 Results for DANILO PEARL (MRN [id number]) as of 1/24/2019 19:49    1/12/2019 05:11  Hemoglobin: 8.1 (L)  Hematocrit: 24.8 (L)    1/13/2019 04:47  Hemoglobin: 8.3 (L)  Hematocrit: 25.9 (L)    1/14/2019 04:04  Hemoglobin: 8.3 (L)  Hematocrit: 25.8 (L)    [DL]   1951 Results for DANILO PEARL (MRN [id number]) as of 1/24/2019 19:49    1/12/2019 05:11  Sodium: 126 (L)    1/13/2019 04:47  Sodium: 129 (L)    1/14/2019 04:04  Sodium: 131 (L)    [DL]   1953 Room air stats at rest 90-94%  [DL]      ED Course User Index  [DL] Jesi Chun MD  [MH] Lisa Diallo MD     Clinical Impression:     1. Shortness of breath    2. SOB (shortness of breath)    3. Edema                                   Micelle J. Haydel, MD  01/25/19 2018

## 2019-01-25 NOTE — PLAN OF CARE
Problem: Adult Inpatient Plan of Care  Goal: Plan of Care Review  Outcome: Ongoing (interventions implemented as appropriate)  Direct admit received AAOx4; NAD noted; 0 active c/o at this time; BP noted to be slightly elevated; lasix IV administered; WCTM  sats maintained on RA; 0 ectopies noted on monitor; POC reviewed with pt; verbalized understanding; assessment per flowsheet; meds administered per MAR;     Plan to continue to monitor tele, trend labs/vitals, po AC for +DVT, IV diuresis; c/t cardiology    Pending: AM labs, cardiac consult

## 2019-01-25 NOTE — ASSESSMENT & PLAN NOTE
Volume overloaded on exam with significant peripheral edema  Would diurese with lasix 40mg IV BID through tomorrow, at which time transition to oral regimen may be appropriate  HTN appears to be uncontrolled and there is likely an appropriate role for a thiazide or thiazide type diuretic  Would recommend at discharge that the patient is started back on home HCTZ 25mg with daily weights, and using lasix 20mg daily PRN weight gain(3-4 pounds in 2-3 days or 4+ lbs in 5 days) until the weight is off

## 2019-01-25 NOTE — HPI
Kristin Quintanilla is an 88 year old female with PMH of CAD, Diastolic HF, HTN, HLD, hypothyroidism, chronic anemia (Hgb 8-9), and spinal stenosis, DNR who was admitted to [facility] [place] [place]/07 - 01/14 for diuretic induced hyponatremia and ADHF. Her hyponatremia was treated with fluid restriction and discontinuation of HCTZ. CHF exacerbation was treated with IV lasix. 2D Echo showed large pericardial effusion w/o tamponade. Cardiology was consulted and no pericardiocentesis planned in conjunction with patient's goals of care to avoid invasive procedures. Patient was discharged home with home health and follow up scheduled at cardiology clinic with Dr. Triana on 01/25/19. Repeat 2D echo on 01/21 showed similar moderate sized pericardial effusion without tamponade.      She was sent to [place] ED today by home health nurse for evaluation of Leg swelling L > R and worsening GUERIN with associated 3 lb wt gain, orthopnea. She reports daily SOB but has increased over the last few days.  She has been mostly non-ambulatory over the last 10 days and uses a walker to ambulate. She has elevated D-dimer and BNP at OSH.  US doppler lower ext in ED showed left peroneal vein DVT and complex left Baker's cyst. CXR showed mild pulmonary edema and small bilateral pleural effusions R >L. No evidence of bleeding and patient received lovenox 60 mg sq x 1 in ED for DVT. Family requesting transfer to [facility] and Dr. Chun spoke with patient's cardiologist Dr. Triana who promised to see patient tomorrow while she is in the hospital (patient has clinic appointment tomorrow afternoon).

## 2019-01-25 NOTE — ASSESSMENT & PLAN NOTE
D dimer positive and US RLE confirms thrombus in left peroneal vein  - etiology likely from venous stasis from decreased mobility  - given lovenox at OSH.  Will start with apixaban 10 mg PO BID x7 days (this AM)and transition to 5 mg PO BID  - monitor for any bleeding, no previous DVTs, PE

## 2019-01-25 NOTE — PLAN OF CARE
Problem: Adult Inpatient Plan of Care  Goal: Plan of Care Review  Outcome: Ongoing (interventions implemented as appropriate)  poc reviewed with patient sitter at the bedside, call light within reach, vs stable, no questions or concerns at the moment, will cont to monitor

## 2019-01-25 NOTE — ASSESSMENT & PLAN NOTE
Uncontrolled in setting of volume overload and missed BP medication doses  - c/w home meds  - monitor closely with diuresis

## 2019-01-25 NOTE — CONSULTS
[facility]  Cardiology  Consult Note    Patient Name: Kristin Quintanilla  MRN: [id number]  Admission Date: 1/24/2019  Hospital Length of Stay: 0 days  Code Status: DNR   Attending Provider: Mary Anne Swanson MD   Consulting Provider: Darius Daley MD  Primary Care Physician: Jairo Schmidt MD  Principal Problem:Acute deep vein thrombosis (DVT) of left peroneal vein    Patient information was obtained from patient and ER records.     Inpatient consult to Cardiology  Consult performed by: Darius Daley MD  Consult ordered by: Eda Morse DO        Subjective:     Chief Complaint:  Shortness of breath and leg swelling     HPI:   88 year old female with a significant cardiac history who presents to the ER for shortness of breath and leg swelling for 1 week. She has a relevant medical history of HFpEF, HTN, HLD, CAD and a large pericardial effusion(on most recent echo 1/21 posterior in nature without tamponade). She was recently admitted from 1/7-1/14 for symptomatic hyponatremia thought to be precipitated by pneumonia and possible thiazide diuretic. She was discharged by hospital medicine without any type of diuretic and her prior HCTZ was not resumed. She was doing well initially after discharge but her family notes that in the last week she developed weight gain of 2-3 pounds as well as leg swelling L>R. From a recovery standpoint her family notes she was working with physical therapy and gaining her strength back. Venous ultrasound done 1/24 pm showed a Left Peroneal Thrombus. Since admission she has received Lasix 40mg IV x 1. At this time she states her shortness of breath has improved a great deal.      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      Arthritis     ASCVD (arteriosclerotic cardiovascular disease) 7/8/2014     "Atherosclerosis of aorta 1/7/2016    "There is atherosclerosis of the thoracic aorta" - Xray Chest 7-     Back pain     Basal cell carcinoma 10/7/2013    Right nasal columellar, right lower eyelid, left medial eyelid    Basal cell carcinoma 2/2015    mid back    Chronic diastolic heart failure 11/11/2014    Coronary artery disease     Gastric ulcer     Fosamax related.    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     Hypothyroidism (acquired) 9/27/2016    Joint pain     Lymphedema of Neck 3/17/2016    Occlusion and stenosis of carotid artery without mention of cerebral infarction     Pelvic mass in female 7/21/2015    Polyneuropathy in other diseases classified elsewhere 1/7/2016 6-     Squamous cell carcinoma of scalp 5/2014    scalp vertex with + LN met 10/14 s/p radiation    Ulcer        Past Surgical History:   Procedure Laterality Date    ADJACENT TISSUE TRANSFER/REARRANGEMENT N/A 5/14/2014    Performed by Olvin [facility]allos [facility] at [facility] OR 2ND FLR    APPENDECTOMY  8.14.2015    mucinous cystadenoma with low grade dysplasia.     APPENDECTOMY-LAPAROSCOPIC N/A 8/14/2015    Performed by Feroz Corado MD at [facility] OR 2ND FLR    BIOPSY-LYMPH NODE Right 10/27/2014    Performed by Olvin C[facility]allos MD at [facility] OR 2ND FLR    CATARACT EXTRACTION      ou dr morales    EYE SURGERY      HYSTERECTOMY  1970     NIC/BSO. took HRT immediatiely after wards.     LAPAROSCOPY-DIAGNOSTIC  8/14/2015    Performed by Gianfranco Crenshaw MD at [facility] OR 2ND FLR    LYMPH NODE BIOPSY Right 10/27/2014    posterior triangle    Mohs excision of scalp SCC N/A 5/13/2014    Scalp flap N/A 5/14/204    posterior advancement-rotation    SKIN CANCER EXCISION      THYROIDECTOMY  1960's    hyperthyroidism       Review of patient's allergies indicates:   Allergen Reactions    Codeine     Darvocet a500 [propoxyphene n-acetaminophen] " Nausea Only    Sulfa (sulfonamide antibiotics) Rash    Fosamax [alendronate]      Gastric ulcer       No current facility-administered medications on file prior to encounter.      Current Outpatient Medications on File Prior to Encounter   Medication Sig    acetaminophen (TYLENOL ARTHRITIS) 650 MG TbSR Take 650 mg by mouth once daily. 1-2 tablet once a day.    albuterol (PROVENTIL) 2.5 mg /3 mL (0.083 %) nebulizer solution Take 3 mLs (2.5 mg total) by nebulization every 4 (four) hours as needed for Wheezing or Shortness of Breath (chest tightness).    albuterol (PROVENTIL/VENTOLIN HFA) 90 mcg/actuation inhaler Inhale 2 puffs into the lungs every 6 (six) hours as needed for Wheezing. Rescue    amLODIPine (NORVASC) 10 MG tablet TAKE 1 TABLET EVERY DAY    aspirin (ECOTRIN) 81 MG EC tablet Take 81 mg by mouth every other day. Pt taking one pill every other day.    atorvastatin (LIPITOR) 20 MG tablet TAKE 1 TABLET EVERY DAY    calcium carbonate/vitamin D3 (CALTRATE 600 + D ORAL) Take by mouth once daily.    chlorpheniramine maleate (CHLOR-TRIMETON REPETABS ORAL) Take by mouth.    FLAXSEED-OMEGA3,6,9-FATTY ACID ORAL Take 1 capsule by mouth once daily.      gabapentin (NEURONTIN) 100 MG capsule Take 2 capsules (200 mg total) by mouth 2 (two) times daily.    guaifenesin 100 mg/5 ml (ROBITUSSIN) 100 mg/5 mL syrup Take 200 mg by mouth 3 (three) times daily as needed for Cough.    labetalol (NORMODYNE) 300 MG tablet Take 1 tablet (300 mg total) by mouth 2 (two) times daily.    levothyroxine (SYNTHROID) 75 MCG tablet TAKE 1 TABLET ONE TIME DAILY    losartan (COZAAR) 100 MG tablet TAKE 1 TABLET EVERY DAY    magnesium 250 mg Tab Take 1 tablet by mouth once daily.      OPTICHAMBER DARREN LG MASK Spcr U UTD    traMADol (ULTRAM) 50 mg tablet Take 1 tablet (50 mg total) by mouth every 12 (twelve) hours as needed for Pain.    triamcinolone acetonide 0.1% (KENALOG) 0.1 % cream APPLY TO THE AFFECTED AREA OF lower  legs TWICE DAILY    vitamin D 1000 units Tab Take 1,000 Units by mouth once daily.      Family History     Problem Relation (Age of Onset)    Cancer Sister    Clotting disorder Father    Eczema Mother    Heart attack Mother    Heart disease Mother    Heart failure Mother, Father    Hypertension Mother, Father    No Known Problems Brother, Maternal Aunt, Maternal Uncle, Paternal Aunt, Paternal Uncle, Maternal Grandmother, Maternal Grandfather, Paternal Grandmother, Paternal Grandfather    Thyroid disease Sister        Tobacco Use    Smoking status: Former Smoker    Smokeless tobacco: Never Used    Tobacco comment: Quit 35 years ago   Substance and Sexual Activity    Alcohol use: No    Drug use: No    Sexual activity: No     Review of Systems   Constitution: Negative for chills, diaphoresis, fever and weight loss.   HENT: Negative for congestion, hoarse voice and sore throat.    Eyes: Negative for blurred vision and double vision.   Cardiovascular: Positive for dyspnea on exertion, leg swelling and orthopnea. Negative for palpitations, paroxysmal nocturnal dyspnea and syncope.   Respiratory: Positive for shortness of breath.    Endocrine: Negative for cold intolerance and heat intolerance.   Hematologic/Lymphatic: Does not bruise/bleed easily.   Gastrointestinal: Negative for abdominal pain, constipation, diarrhea, nausea and vomiting.   Genitourinary: Negative for dysuria.   Neurological: Negative for focal weakness and sensory change.     Objective:     Vital Signs (Most Recent):  Temp: 98.6 °F (37 °C) (01/25/19 0300)  Pulse: 68 (01/25/19 0757)  Resp: 18 (01/25/19 0757)  BP: (!) 210/89 (01/25/19 0757)  SpO2: (!) 92 % (01/25/19 0757) Vital Signs (24h Range):  Temp:  [97.8 °F (36.6 °C)-98.7 °F (37.1 °C)] 98.6 °F (37 °C)  Pulse:  [66-79] 68  Resp:  [16-24] 18  SpO2:  [92 %-95 %] 92 %  BP: (138-215)/(50-95) 210/89     Weight: 60.1 kg (132 lb 7.9 oz)  Body mass index is 25.88 kg/m².    SpO2: (!) 92 %  O2 Device  (Oxygen Therapy): (P) room air      Intake/Output Summary (Last 24 hours) at 1/25/2019 1150  Last data filed at 1/25/2019 0552  Gross per 24 hour   Intake --   Output 400 ml   Net -400 ml       Lines/Drains/Airways     Peripheral Intravenous Line                 Peripheral IV - Single Lumen 01/25/19 0501 Posterior;Right Forearm less than 1 day                Physical Exam   Constitutional: She is oriented to person, place, and time. She appears well-developed and well-nourished.   HENT:   Head: Normocephalic and atraumatic.   Eyes: EOM are normal.   Neck: JVD present.   Cardiovascular: Normal rate, regular rhythm and intact distal pulses. Exam reveals no gallop and no friction rub.   Murmur heard.  Pulmonary/Chest: No respiratory distress. She has rales.   Abdominal: Soft. Bowel sounds are normal. There is no tenderness.   Musculoskeletal: Normal range of motion. She exhibits edema.   Pitting edema of bilateral LE 2+ left and 1+ right. Dependent edema noted from foot to sacrum   Neurological: She is oriented to person, place, and time.   Skin: Skin is warm and dry.       Significant Labs:  Recent Labs   Lab 01/21/19  1413 01/25/19  0623   * 131*   K 5.3* 4.8    99   CO2 25 26   BUN 24* 22   CREATININE 0.9 0.9   ANIONGAP 5* 6*     No results for input(s): AST, ALT, ALKPHOS, BILITOT, BILIDIR, ALBUMIN in the last 168 hours.  Recent Labs   Lab 01/25/19  0623   *    Recent Labs   Lab 01/25/19  0624   WBC 6.74   HGB 8.0*   HCT 25.8*   *   GRAN 66.7  4.5     No results for input(s): PTT, INR in the last 168 hours.  Lab Results   Component Value Date    CHOL 150 09/04/2018    HDL 61 09/04/2018    LDLCALC 77.0 09/04/2018    TRIG 60 09/04/2018     Lab Results   Component Value Date    HGBA1C 6.2 07/16/2013                Significant Imaging: Echocardiogram:   2D echo with color flow doppler:   Results for orders placed or performed in visit on 04/23/18   2D echo with color flow doppler   Result  Value Ref Range    QEF 63 55 - 65    Diastolic Dysfunction Yes (A)     Aortic Valve Regurgitation MILD     Pericardial Effusion MODERATE (A)     and Transthoracic echo (TTE) complete (Cupid Only):   Results for orders placed or performed during the hospital encounter of 01/21/19   Transthoracic echo (TTE) complete (Cupid Only)   Result Value Ref Range    Ascending aorta 3.15 cm    STJ 2.33 cm    AV mean gradient 5.83 mmHg    Ao peak babar 1.56 m/s    Ao VTI 43.97 cm    IVS 0.70 0.6 - 1.1 cm    LA size 4.48 cm    Left Atrium Major Axis 6.48 cm    Left Atrium Minor Axis 5.55 cm    LVIDD 5.37 3.5 - 6.0 cm    LVIDS 2.45 2.1 - 4.0 cm    LVOT diameter 1.89 cm    LVOT peak VTI 28.90 cm    PW 0.72 0.6 - 1.1 cm    MV Peak A Babar 0.84 m/s    E wave decelartion time 182.93 msec    MV Peak E Babar 1.27 m/s    PV Peak D Babar 0.78 m/s    PV Peak S Babar 0.29 m/s    RA Major Axis 5.00 cm    RA Width 3.02 cm    RVDD 4.03 cm    Sinus 3.05 cm    TAPSE 2.10 cm    TR Max Babar 2.54 m/s    TDI SEPTAL 0.06     LA WIDTH 5.10 cm    LV Diastolic Volume 139.32 mL    LV Systolic Volume 21.33 mL    RV S' 13.23 m/s    LVOT peak babar [id number] m/s    LV SEPTAL E/E' RATIO 21.17     FS 54 %    LA volume 116.12 cm3    LV mass 132.22 g    Left Ventricle Relative Wall Thickness 0.27 cm    AV valve area 1.84 cm2    AV Velocity Ratio 0.64     AV index (prosthetic) 0.66     E/A ratio 1.51     Pulm vein S/D ratio 0.37     LVOT area 2.80 cm2    LVOT stroke volume 81.04 cm3    AV peak gradient 9.73 mmHg    Triscuspid Valve Regurgitation Peak Gradient 25.81 mmHg    BSA 1.55 m2    LV Systolic Volume Index 13.7 mL/m2    LV Diastolic Volume Index 89.45 mL/m2    LA Volume Index 74.6 mL/m2    LV Mass Index 84.9 g/m2    Right Atrial Pressure (from IVC) 8 mmHg    TV rest pulmonary artery pressure 34 mmHg     Assessment and Plan:     * Acute deep vein thrombosis (DVT) of left peroneal vein    Agree with eliquis, continue loading dosage with transition to maintenance  per protocol     Acute on chronic diastolic heart failure    Volume overloaded on exam with significant peripheral edema  Would diurese with lasix 40mg IV BID through tomorrow, at which time transition to oral regimen may be appropriate  HTN appears to be uncontrolled and there is likely an appropriate role for a thiazide or thiazide type diuretic  Would recommend at discharge that the patient is started back on home HCTZ 25mg with daily weights, and using lasix 20mg daily PRN weight gain(3-4 pounds in 2-3 days or 4+ lbs in 5 days) until the weight is off         VTE Risk Mitigation (From admission, onward)        Ordered     apixaban tablet 5 mg  2 times daily      01/25/19 0806     apixaban tablet 10 mg  2 times daily      01/25/19 0806     IP VTE HIGH RISK PATIENT  Once      01/25/19 0406          Thank you for your consult. Consult team to re-evaluate patient tomorrow.    Darius Daley DO  Cardiology Fellow, PGY-6    Cardiology   [facility]

## 2019-01-25 NOTE — H&P
[facility] Hospital Medicine  History & Physical    Patient Name: Kristin Quintanilla  MRN: [id number]  Admission Date: 1/24/2019  Attending Physician: Nadja Bales MD   Primary Care Provider: Jairo Schmidt MD    Hospital Medicine Team: [facility] [place] HOSP MED 2 Chas Holt PA-C     Patient information was obtained from patient, past medical records and ER records.     Subjective:     Principal Problem:Acute deep vein thrombosis (DVT) of left peroneal vein    Chief Complaint:   Chief Complaint   Patient presents with    Shortness of Breath     Patient with increase in shortness of breath and swelling to lower extremities for 3 days        HPI: Kristin Quintanilla is an 88 year old female with PMH of CAD, Diastolic HF, HTN, HLD, hypothyroidism, chronic anemia (Hgb 8-9), and spinal stenosis, DNR who was admitted to [facility] [place] 01/07 - 01/14 for diuretic induced hyponatremia and ADHF. Her hyponatremia was treated with fluid restriction and discontinuation of HCTZ. CHF exacerbation was treated with IV lasix. 2D Echo showed large pericardial effusion w/o tamponade. Cardiology was consulted and no pericardiocentesis planned in conjunction with patient's goals of care to avoid invasive procedures. Patient was discharged home with home health and follow up scheduled at cardiology clinic with Dr. Triana on 01/25/19. Repeat 2D echo on 01/21 showed similar moderate sized pericardial effusion without tamponade.      She was sent to [place] ED today by home health nurse for evaluation of Leg swelling L > R and worsening GUERIN with associated 3 lb wt gain, orthopnea. She reports daily SOB but has increased over the last few days.  She has been mostly non-ambulatory over the last 10 days and uses a walker to ambulate. She has elevated D-dimer and BNP at OSH.  US doppler lower ext in ED showed left peroneal vein DVT and complex left Baker's cyst. CXR showed mild pulmonary edema and small bilateral pleural effusions R >L.  "No evidence of bleeding and patient received lovenox 60 mg sq x 1 in ED for DVT. Family requesting transfer to [facility] and Dr. Chun spoke with patient's cardiologist Dr. Triana who promised to see patient tomorrow while she is in the hospital (patient has clinic appointment tomorrow afternoon).     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      Arthritis     ASCVD (arteriosclerotic cardiovascular disease) 7/8/2014    Atherosclerosis of aorta 1/7/2016    "There is atherosclerosis of the thoracic aorta" - Xray Chest 7-     Back pain     Basal cell carcinoma 10/7/2013    Right nasal columellar, right lower eyelid, left medial eyelid    Basal cell carcinoma 2/2015    mid back    Chronic diastolic heart failure 11/11/2014    Coronary artery disease     Gastric ulcer     Fosamax related.    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14.2015    mucinous cystadenoma with low grade dysplasia.     APPENDECTOMY-LAPAROSCOPIC N/A 8/14/2015    Performed by Feroz Corado [facility] at [facility] 2[facility] F[facility]    BIOPSY-LYMPH NODE Right 10/27/2014    Performed by Olvin [facility]allos [facility] at [facility] 2[facility] F[facility] "    CATARACT EXTRACTION      ou dr morales    EYE SURGERY      HYSTERECTOMY  1970     NIC/BSO. took HRT immediatiely after wards.     LAPAROSCOPY-DIAGNOSTIC  8/14/2015    Performed [facility]franco [facility]renshaw [facility] at [facility]    LYMPH NODE BIOPSY Right 10/27/2014    posterior triangle    Mohs excision of scalp SCC N/A 5/13/2014    Scalp flap N/A 5/14/204    posterior advancement-rotation    SKIN CANCER EXCISION      THYROIDECTOMY  1960's    hyperthyroidism       Review of patient's allergies indicates:   Allergen Reactions    Codeine     Darvocet a500 [propoxyphene n-acetaminophen] Nausea Only    Sulfa (sulfonamide antibiotics) Rash    Fosamax [alendronate]      Gastric ulcer       No current facility-administered medications on file prior to encounter.      Current Outpatient Medications on File Prior to Encounter   Medication Sig    acetaminophen (TYLENOL ARTHRITIS) 650 MG TbSR Take 650 mg by mouth once daily. 1-2 tablet once a day.    albuterol (PROVENTIL) 2.5 mg /3 mL (0.083 %) nebulizer solution Take 3 mLs (2.5 mg total) by nebulization every 4 (four) hours as needed for Wheezing or Shortness of Breath (chest tightness).    albuterol (PROVENTIL/VENTOLIN HFA) 90 mcg/actuation inhaler Inhale 2 puffs into the lungs every 6 (six) hours as needed for Wheezing. Rescue    amLODIPine (NORVASC) 10 MG tablet TAKE 1 TABLET EVERY DAY    aspirin (ECOTRIN) 81 MG EC tablet Take 81 mg by mouth every other day. Pt taking one pill every other day.    atorvastatin (LIPITOR) 20 MG tablet TAKE 1 TABLET EVERY DAY    calcium carbonate/vitamin D3 (CALTRATE 600 + D ORAL) Take by mouth once daily.    chlorpheniramine maleate (CHLOR-TRIMETON REPETABS ORAL) Take by mouth.    FLAXSEED-OMEGA3,6,9-FATTY ACID ORAL Take 1 capsule by mouth once daily.      gabapentin (NEURONTIN) 100 MG capsule Take 2 capsules (200 mg total) by mouth 2 (two) times daily.    guaifenesin 100 mg/5 ml (ROBITUSSIN) 100 mg/5 mL syrup Take 200 mg  by mouth 3 (three) times daily as needed for Cough.    labetalol (NORMODYNE) 300 MG tablet Take 1 tablet (300 mg total) by mouth 2 (two) times daily.    levothyroxine (SYNTHROID) 75 MCG tablet TAKE 1 TABLET ONE TIME DAILY    losartan (COZAAR) 100 MG tablet TAKE 1 TABLET EVERY DAY    magnesium 250 mg Tab Take 1 tablet by mouth once daily.      OPTICHAMBER DARREN LG MASK Spcr U UTD    traMADol (ULTRAM) 50 mg tablet Take 1 tablet (50 mg total) by mouth every 12 (twelve) hours as needed for Pain.    triamcinolone acetonide 0.1% (KENALOG) 0.1 % cream APPLY TO THE AFFECTED AREA OF lower legs TWICE DAILY    vitamin D 1000 units Tab Take 1,000 Units by mouth once daily.      Family History     Problem Relation (Age of Onset)    Cancer Sister    Clotting disorder Father    Eczema Mother    Heart attack Mother    Heart disease Mother    Heart failure Mother, Father    Hypertension Mother, Father    No Known Problems Brother, Maternal Aunt, Maternal Uncle, Paternal Aunt, Paternal Uncle, Maternal Grandmother, Maternal Grandfather, Paternal Grandmother, Paternal Grandfather    Thyroid disease Sister        Tobacco Use    Smoking status: Former Smoker    Smokeless tobacco: Never Used    Tobacco comment: Quit 35 years ago   Substance and Sexual Activity    Alcohol use: No    Drug use: No    Sexual activity: No     Review of Systems   Constitutional: Positive for unexpected weight change (wt gain2-3 lbs in one day). Negative for appetite change, chills and fever.   HENT: Negative for congestion and rhinorrhea.    Eyes: Negative for photophobia and visual disturbance.   Respiratory: Positive for cough (dry), shortness of breath and wheezing. Negative for chest tightness.         + orthopnea   Cardiovascular: Positive for leg swelling (BLE). Negative for chest pain and palpitations.   Gastrointestinal: Negative for abdominal pain, diarrhea, nausea and vomiting.   Genitourinary: Negative for difficulty urinating and  dysuria.   Musculoskeletal: Negative for back pain and gait problem.   Skin: Negative for rash and wound.   Neurological: Negative for dizziness and headaches.   Psychiatric/Behavioral: Negative for agitation and confusion.     Objective:     Vital Signs (Most Recent):  Temp: 98.6 °F (37 °C) (01/25/19 0300)  Pulse: 67 (01/25/19 0451)  Resp: 16 (01/25/19 0451)  BP: (!) 198/81(MD @ BS; instructed to administer ordered lasix) (01/25/19 0451)  SpO2: (!) 92 % (01/25/19 0451) Vital Signs (24h Range):  Temp:  [97.8 °F (36.6 °C)-98.7 °F (37.1 °C)] 98.6 °F (37 °C)  Pulse:  [66-79] 67  Resp:  [16-24] 16  SpO2:  [92 %-95 %] 92 %  BP: (138-215)/(50-95) 198/81     Weight: 60.1 kg (132 lb 7.9 oz)  Body mass index is 25.88 kg/m².    Physical Exam   Constitutional: She is oriented to person, place, and time. She appears well-developed and well-nourished.   Pleasant elderly female dyspneic while talking but able to hold conversation without abrupt changes in oxygenation   HENT:   Head: Normocephalic and atraumatic.   Eyes: EOM are normal. Pupils are equal, round, and reactive to light.   Neck: Normal range of motion. Neck supple. No JVD present.   Cardiovascular: Normal rate, regular rhythm and intact distal pulses.   Murmur (systolic) heard.  Pulmonary/Chest: She is in respiratory distress. She has no wheezes. She exhibits no tenderness.   Diminished lung sounds to R lower lube  Fine crackles to LLL   Abdominal: Soft. Bowel sounds are normal. She exhibits distension.   Musculoskeletal: Normal range of motion. She exhibits edema.   2-4+ to LLE  1-3+ to RLE   Neurological: She is alert and oriented to person, place, and time.   Skin: Skin is warm and dry.   Psychiatric: She has a normal mood and affect. Her behavior is normal. Judgment and thought content normal.   Nursing note and vitals reviewed.        CRANIAL NERVES     CN III, IV, VI   Pupils are equal, round, and reactive to light.  Extraocular motions are normal.         Significant Labs: CBC: No results for input(s): WBC, HGB, HCT, PLT in the last 48 hours.  CMP: No results for input(s): NA, K, CL, CO2, GLU, BUN, CREATININE, CALCIUM, PROT, ALBUMIN, BILITOT, ALKPHOS, AST, ALT, ANIONGAP, EGFRNONAA in the last 48 hours.    Invalid input(s): ESTGFAFRICA  Troponin: No results for input(s): TROPONINI in the last 48 hours.    Significant Imaging: I have reviewed all pertinent imaging results/findings within the past 24 hours.     X-ray Chest 1 View    Result Date: 1/24/2019  EXAMINATION: XR CHEST 1 VIEW CLINICAL HISTORY: Shortness of breath TECHNIQUE: Single frontal view of the chest was performed. COMPARISON: 01/08/2019. FINDINGS: The cardiac silhouette is prominent in size, unchanged.  Atherosclerotic calcification is present within the aortic arch.  There is prominence of the central pulmonary vasculature.  There is increased density within the bases of both hemithoraces likely due to a combination of layering bilateral pleural effusions with bibasilar atelectasis/consolidation.  Mild pulmonary edema is a consideration.  Overall, findings appear similar to the prior examination.  There is no evidence for pneumothorax.  Bony structures are grossly intact.     Prominence of the cardiac silhouette, stable. Probable bilateral pleural effusions, similar to the prior examination. Increased density within the mid to lower lungs bilaterally similar to the prior examination.  This is likely due to bibasilar atelectasis/consolidation.  Mild pulmonary edema should be considered.     Us Lower Extremity Veins Bilateral    Result Date: 1/24/2019  EXAMINATION: US LOWER EXTREMITY VEINS BILATERAL CLINICAL HISTORY: Edema, unspecified TECHNIQUE: Duplex and color flow Doppler and dynamic compression was performed of the bilateral lower extremity veins was performed. COMPARISON: 01/07/2019 FINDINGS: Right thigh veins: The common femoral, femoral, popliteal, upper greater saphenous, and deep femoral veins  are patent and free of thrombus. The veins are normally compressible and have normal phasic flow and augmentation response. Right calf veins: The visualized calf veins are patent. Left thigh veins: The common femoral, femoral, popliteal, upper greater saphenous, and deep femoral veins are patent and free of thrombus. The veins are normally compressible and have normal phasic flow and augmentation response. Left calf veins: Thrombus in the left peroneal vein. Miscellaneous : Complex heterogeneous area near the sapheno-femoral junction.  Adjacent collaterals.  No internal vascularity. Complex left Baker's cyst. Simple right Baker's cyst.     Thrombus in the left peroneal vein. Complex heterogeneous area near the sapheno-femoral junction.  Adjacent collaterals.  No internal vascularity. Complex left Baker's cyst.  Simple right Baker's cyst.     Assessment/Plan:     * Acute deep vein thrombosis (DVT) of left peroneal vein    D dimer positive and US RLE confirms thrombus in left peroneal vein  - etiology likely from venous stasis from decreased mobility  - given lovenox at OSH.  Will start with apixaban 10 mg PO BID x7 days (this AM)and transition to 5 mg PO BID  - monitor for any bleeding, no previous DVTs, PE     SOB (shortness of breath)    - likely multifactorial from heart failure, pericardial effusion, deconditioning  - although patient with GUERIN and DVT concerning for PE, do not feel imaging indicated at this time as patient started on AC   - Patient does not wish for pericardiocentesis (see previous documentation)  - oxygen PRN, but currently stable at 92% on RA     Acute on chronic diastolic heart failure    Aortic valve insufficiency  - recently taken off of hctz for hyponatremia   - hypervolemic on exam with evidence of pulmonary edema on CXR and elevated BNP  - Diurese with lasix, but monitor closely as lasix naive and sodium levels tenuous  - cardiology consulted and appreciate recs (Dr. Triana to see  patient)  - recent TTE (1/12) shows EF 65, indeterminate diast fxn, and moderately sized pericardial effusion (no tamponade)  - c/w BB, ARB  - strict I/O, daily weights, fluid restriction     Pericardial effusion    Pt does not want a pericardiocentesis  - monitor for now     Essential hypertension    - c/w home meds     Debility    PT/OT consult       VTE Risk Mitigation (From admission, onward)        Ordered     apixaban tablet 10 mg  2 times daily      01/25/19 0423     IP VTE HIGH RISK PATIENT  Once      01/25/19 0406             Chas Holt PA-C  Department of Hospital Medicine   [facility]

## 2019-01-25 NOTE — ED NOTES
"Pt is being transferred via POV to [facility] by her son. The serious and risk have been explained to him and the patient in detail and he states, " I'm taking her. This is not what we expected today." Pt's son appears to be agitated. Pt has been assigned to Room 3081 to Dr. Morse. Report will be called to Wilder at [phone].  "

## 2019-01-25 NOTE — ED NOTES
Lovenox administer to left lower abdomen no bruising observed, vital signs taken b/p 215/95 provider informed of elevated blood pressure

## 2019-01-25 NOTE — ED NOTES
Dr. Chun is in the room with the family to inform them of the results of the US of bilateral legs.

## 2019-01-25 NOTE — SUBJECTIVE & OBJECTIVE
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      Arthritis     ASCVD (arteriosclerotic cardiovascular disease) 7/8/2014    Atherosclerosis of aorta 1/7/2016    "There is atherosclerosis of the thoracic aorta" - Xray Chest 7-     Back pain     Basal cell carcinoma 10/7/2013    Right nasal columellar, right lower eyelid, left medial eyelid    Basal cell carcinoma 2/2015    mid back    Chronic diastolic heart failure 11/11/2014    Coronary artery disease     Gastric ulcer     Fosamax related.    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     Hypothyroidism (acquired) 9/27/2016    Joint pain     Lymphedema of Neck 3/17/2016    Occlusion and stenosis of carotid artery without mention of cerebral infarction     Pelvic mass in female 7/21/2015    Polyneuropathy in other diseases classified elsewhere 1/7/2016 6-     Squamous cell carcinoma of scalp 5/2014    scalp vertex with + LN met 10/14 s/p radiation    Ulcer        Past Surgical History:   Procedure Laterality Date    ADJACENT TISSUE TRANSFER/REARRANGEMENT N/A 5/14/2014    Performed by [facility]allos [facility] at [facility] OR 2ND FLR    APPENDECTOMY  8.14.2015    mucinous cystadenoma with low grade dysplasia.     APPENDECTOMY-LAPAROSCOPIC N/A 8/14/2015    Performed by Feroz Corado MD at [facility] OR 2ND FLR    BIOPSY-LYMPH NODE Right 10/27/2014    Performed by Ol[facility]allos [facility] at [facility] OR 2ND FLR    CATARACT EXTRACTION      ou dr morales    EYE SURGERY      HYSTERECTOMY  1970     NIC/BSO. took HRT immediatiely after wards.     LAPAROSCOPY-DIAGNOSTIC  8/14/2015    Performed by Gianfranco Crenshaw MD at [facility] OR 2ND FLR    LYMPH NODE BIOPSY Right 10/27/2014    posterior triangle    Mohs excision " of scalp SCC N/A 5/13/2014    Scalp flap N/A 5/14/204    posterior advancement-rotation    SKIN CANCER EXCISION      THYROIDECTOMY  1960's    hyperthyroidism       Review of patient's allergies indicates:   Allergen Reactions    Codeine     Darvocet a500 [propoxyphene n-acetaminophen] Nausea Only    Sulfa (sulfonamide antibiotics) Rash    Fosamax [alendronate]      Gastric ulcer       No current facility-administered medications on file prior to encounter.      Current Outpatient Medications on File Prior to Encounter   Medication Sig    acetaminophen (TYLENOL ARTHRITIS) 650 MG TbSR Take 650 mg by mouth once daily. 1-2 tablet once a day.    albuterol (PROVENTIL) 2.5 mg /3 mL (0.083 %) nebulizer solution Take 3 mLs (2.5 mg total) by nebulization every 4 (four) hours as needed for Wheezing or Shortness of Breath (chest tightness).    albuterol (PROVENTIL/VENTOLIN HFA) 90 mcg/actuation inhaler Inhale 2 puffs into the lungs every 6 (six) hours as needed for Wheezing. Rescue    amLODIPine (NORVASC) 10 MG tablet TAKE 1 TABLET EVERY DAY    aspirin (ECOTRIN) 81 MG EC tablet Take 81 mg by mouth every other day. Pt taking one pill every other day.    atorvastatin (LIPITOR) 20 MG tablet TAKE 1 TABLET EVERY DAY    calcium carbonate/vitamin D3 (CALTRATE 600 + D ORAL) Take by mouth once daily.    chlorpheniramine maleate (CHLOR-TRIMETON REPETABS ORAL) Take by mouth.    FLAXSEED-OMEGA3,6,9-FATTY ACID ORAL Take 1 capsule by mouth once daily.      gabapentin (NEURONTIN) 100 MG capsule Take 2 capsules (200 mg total) by mouth 2 (two) times daily.    guaifenesin 100 mg/5 ml (ROBITUSSIN) 100 mg/5 mL syrup Take 200 mg by mouth 3 (three) times daily as needed for Cough.    labetalol (NORMODYNE) 300 MG tablet Take 1 tablet (300 mg total) by mouth 2 (two) times daily.    levothyroxine (SYNTHROID) 75 MCG tablet TAKE 1 TABLET ONE TIME DAILY    losartan (COZAAR) 100 MG tablet TAKE 1 TABLET EVERY DAY    magnesium 250 mg  Tab Take 1 tablet by mouth once daily.      OPTICHAMBER DARREN LG MASK Spcr U UTD    traMADol (ULTRAM) 50 mg tablet Take 1 tablet (50 mg total) by mouth every 12 (twelve) hours as needed for Pain.    triamcinolone acetonide 0.1% (KENALOG) 0.1 % cream APPLY TO THE AFFECTED AREA OF lower legs TWICE DAILY    vitamin D 1000 units Tab Take 1,000 Units by mouth once daily.      Family History     Problem Relation (Age of Onset)    Cancer Sister    Clotting disorder Father    Eczema Mother    Heart attack Mother    Heart disease Mother    Heart failure Mother, Father    Hypertension Mother, Father    No Known Problems Brother, Maternal Aunt, Maternal Uncle, Paternal Aunt, Paternal Uncle, Maternal Grandmother, Maternal Grandfather, Paternal Grandmother, Paternal Grandfather    Thyroid disease Sister        Tobacco Use    Smoking status: Former Smoker    Smokeless tobacco: Never Used    Tobacco comment: Quit 35 years ago   Substance and Sexual Activity    Alcohol use: No    Drug use: No    Sexual activity: No     Review of Systems   Constitution: Negative for chills, diaphoresis, fever and weight loss.   HENT: Negative for congestion, hoarse voice and sore throat.    Eyes: Negative for blurred vision and double vision.   Cardiovascular: Positive for dyspnea on exertion, leg swelling and orthopnea. Negative for palpitations, paroxysmal nocturnal dyspnea and syncope.   Respiratory: Positive for shortness of breath.    Endocrine: Negative for cold intolerance and heat intolerance.   Hematologic/Lymphatic: Does not bruise/bleed easily.   Gastrointestinal: Negative for abdominal pain, constipation, diarrhea, nausea and vomiting.   Genitourinary: Negative for dysuria.   Neurological: Negative for focal weakness and sensory change.     Objective:     Vital Signs (Most Recent):  Temp: 98.6 °F (37 °C) (01/25/19 0300)  Pulse: 68 (01/25/19 0757)  Resp: 18 (01/25/19 0757)  BP: (!) 210/89 (01/25/19 0757)  SpO2: (!) 92 %  (01/25/19 4088) Vital Signs (24h Range):  Temp:  [97.8 °F (36.6 °C)-98.7 °F (37.1 °C)] 98.6 °F (37 °C)  Pulse:  [66-79] 68  Resp:  [16-24] 18  SpO2:  [92 %-95 %] 92 %  BP: (138-215)/(50-95) 210/89     Weight: 60.1 kg (132 lb 7.9 oz)  Body mass index is 25.88 kg/m².    SpO2: (!) 92 %  O2 Device (Oxygen Therapy): (P) room air      Intake/Output Summary (Last 24 hours) at 1/25/2019 1150  Last data filed at 1/25/2019 0552  Gross per 24 hour   Intake --   Output 400 ml   Net -400 ml       Lines/Drains/Airways     Peripheral Intravenous Line                 Peripheral IV - Single Lumen 01/25/19 0501 Posterior;Right Forearm less than 1 day                Physical Exam   Constitutional: She is oriented to person, place, and time. She appears well-developed and well-nourished.   HENT:   Head: Normocephalic and atraumatic.   Eyes: EOM are normal.   Neck: JVD present.   Cardiovascular: Normal rate, regular rhythm and intact distal pulses. Exam reveals no gallop and no friction rub.   Murmur heard.  Pulmonary/Chest: No respiratory distress. She has rales.   Abdominal: Soft. Bowel sounds are normal. There is no tenderness.   Musculoskeletal: Normal range of motion. She exhibits edema.   Pitting edema of bilateral LE 2+ left and 1+ right. Dependent edema noted from foot to sacrum   Neurological: She is oriented to person, place, and time.   Skin: Skin is warm and dry.       Significant Labs:  Recent Labs   Lab 01/21/19  1413 01/25/19  0623   * 131*   K 5.3* 4.8    99   CO2 25 26   BUN 24* 22   CREATININE 0.9 0.9   ANIONGAP 5* 6*     No results for input(s): AST, ALT, ALKPHOS, BILITOT, BILIDIR, ALBUMIN in the last 168 hours.  Recent Labs   Lab 01/25/19  0623   *    Recent Labs   Lab 01/25/19  0624   WBC 6.74   HGB 8.0*   HCT 25.8*   *   GRAN 66.7  4.5     No results for input(s): PTT, INR in the last 168 hours.  Lab Results   Component Value Date    CHOL 150 09/04/2018    HDL 61 09/04/2018    LDLCALC  77.0 09/04/2018    TRIG 60 09/04/2018     Lab Results   Component Value Date    HGBA1C 6.2 07/16/2013                Significant Imaging: Echocardiogram:   2D echo with color flow doppler:   Results for orders placed or performed in visit on 04/23/18   2D echo with color flow doppler   Result Value Ref Range    QEF 63 55 - 65    Diastolic Dysfunction Yes (A)     Aortic Valve Regurgitation MILD     Pericardial Effusion MODERATE (A)     and Transthoracic echo (TTE) complete (Cupid Only):   Results for orders placed or performed during the hospital encounter of 01/21/19   Transthoracic echo (TTE) complete (Cupid Only)   Result Value Ref Range    Ascending aorta 3.15 cm    STJ 2.33 cm    AV mean gradient 5.83 mmHg    Ao peak babar 1.56 m/s    Ao VTI 43.97 cm    IVS 0.70 0.6 - 1.1 cm    LA size 4.48 cm    Left Atrium Major Axis 6.48 cm    Left Atrium Minor Axis 5.55 cm    LVIDD 5.37 3.5 - 6.0 cm    LVIDS 2.45 2.1 - 4.0 cm    LVOT diameter 1.89 cm    LVOT peak VTI 28.90 cm    PW 0.72 0.6 - 1.1 cm    MV Peak A Babar 0.84 m/s    E wave decelartion time 182.93 msec    MV Peak E Babar 1.27 m/s    PV Peak D Babar 0.78 m/s    PV Peak S Babar 0.29 m/s    RA Major Axis 5.00 cm    RA Width 3.02 cm    RVDD 4.03 cm    Sinus 3.05 cm    TAPSE 2.10 cm    TR Max Babar 2.54 m/s    TDI SEPTAL 0.06     LA WIDTH 5.10 cm    LV Diastolic Volume 139.32 mL    LV Systolic Volume 21.33 mL    RV S' 13.23 m/s    LVOT peak babar [id number] m/s    LV SEPTAL E/E' RATIO 21.17     FS 54 %    LA volume 116.12 cm3    LV mass 132.22 g    Left Ventricle Relative Wall Thickness 0.27 cm    AV valve area 1.84 cm2    AV Velocity Ratio 0.64     AV index (prosthetic) 0.66     E/A ratio 1.51     Pulm vein S/D ratio 0.37     LVOT area 2.80 cm2    LVOT stroke volume 81.04 cm3    AV peak gradient 9.73 mmHg    Triscuspid Valve Regurgitation Peak Gradient 25.81 mmHg    BSA 1.55 m2    LV Systolic Volume Index 13.7 mL/m2    LV Diastolic Volume Index 89.45 mL/m2    LA Volume  Index 74.6 mL/m2    LV Mass Index 84.9 g/m2    Right Atrial Pressure (from IVC) 8 mmHg    TV rest pulmonary artery pressure 34 mmHg

## 2019-01-25 NOTE — ED NOTES
Pt resting on the stretcher, RR even and unlabored while pt is at rest. She denies any pain at this time. There is #18 gauge present in the LAC that is SL. Pt is attached to the monitor and VS are cycling. Son is at bedside. Pt is AAOx4. Orders have been placed and explained to the patient. Will continue to monitor and update pt as necessary.

## 2019-01-25 NOTE — ED NOTES
Pt family at bedside and requested to speak with the physician again. Family member is concerned about pt's BP and the fact she has not had her daily meds while being in the ED. Dr. Chun made aware.

## 2019-01-25 NOTE — PLAN OF CARE
Problem: Adult Inpatient Plan of Care  Goal: Plan of Care Review  Outcome: Ongoing (interventions implemented as appropriate)  Patient AAOX4 Vital signs stable. Safety and infection precautions taken. Patient is comfortable at this time,bedis lock and in a low position with call light in reach. Will cont to monitor.

## 2019-01-25 NOTE — HPI
88 year old female with a significant cardiac history who presents to the ER for shortness of breath and leg swelling for 1 week. She has a relevant medical history of HFpEF, HTN, HLD, CAD and a large pericardial effusion(on most recent echo 1/21 posterior in nature without tamponade). She was recently admitted from 1/7-1/14 for symptomatic hyponatremia thought to be precipitated by pneumonia and possible thiazide diuretic. She was discharged by hospital medicine without any type of diuretic and her prior HCTZ was not resumed. She was doing well initially after discharge but her family notes that in the last week she developed weight gain of 2-3 pounds as well as leg swelling L>R. From a recovery standpoint her family notes she was working with physical therapy and gaining her strength back. Venous ultrasound done 1/24 pm showed a Left Peroneal Thrombus. Since admission she has received Lasix 40mg IV x 1. At this time she states her shortness of breath has improved a great deal.

## 2019-01-25 NOTE — PLAN OF CARE
Please call extension [phone] upon patient arrival to floor for Hospital Medicine admit team assignment and for additional admit orders. If patient is coming from another [facility] facility please also call [phone] to inform the admit team/office that patient has arrived from the [facility] facility to the floor so patient can be evaluated.      Outside Transfer Acceptance Note    Transferring Physician or Mid Level Provider/Speciality: Dr. Chun / ED     Accepting Physician: Eda Morse     Date of Acceptance: 01/24/2019     Code Status: DNR    Transferring Facility/Hospital: [facility]     [place] [facility], GUERIN     Report from Transferring Physician or Mid-Level provider/Hospital course:     Patient is a 88 year old female with PMH of CAD, Diastolic HF, HTN, HLD, hypothyroidism, chronic anemia Hgb 8-9, and spinal stenosis, DNR who [place] [facility] [place] for diuretic induced hyponatremia and ADHF. Her hyponatremia was treated with fluid restriction and discontinuation of HCTZ. CHF exacerbation was treated with IV lasix. 2D Echo showed large pericardial effusion w/o tamponade. Cardiology was consulted and no pericardiocentesis planned in conjunction with patient's goals of care to avoid invasive procedures. Patient was discharged home with home health and follow up at cardiology clinic with Dr. Triana on 01/25/19. Repeat 2D echo on 01/21 showed similar moderate sized pericardial effusion without tamponade.     She was sent to [place] ED today by home health nurse for evaluation of Leg swelling L > R and worsening GUERIN. She has elevated D-dimer and BNP.  US doppler lower ext in ED showed left peroneal vein DVT and complex left Baker's cyst. CXR showed mild pulmonary edema and small bilateral pleural effusions R >L. No evidence of bleeding and patient received lovenox 60 mg sq x 1 in ED for DVT. Family requesting transfer to [facility] and Dr. Chun spoke with patient's cardiologist   "Kamilah who promised to see patient tomorrow while she is in the hospital (patient has clinic appointment tomorrow afternoon). I have asked Dr. Chun to give lasix 40 mg IVP for ADHF.     BP (!) 201/82   Pulse 75   Temp 98.7 °F (37.1 °C) (Oral)   Resp (!) 21   Ht 5' 4" (1.626 m)   Wt 59.4 kg (131 lb)   LMP  (LMP Unknown)   SpO2 (!) 92%   BMI 22.49 kg/m²       Recent Results (from the past 24 hour(s))   POCT CMP    Collection Time: 01/24/19  6:36 PM   Result Value Ref Range    Albumin, POC 2.5 (L) 3.3 - 5.5 g/dL    Alkaline Phosphatase,  (H) 42 - 141 U/L    ALT (SGPT), POC 47 10 - 47 U/L    AST (SGOT), POC 39 (H) 11 - 38 U/L    POC BUN 20 7 - 22 mg/dL    Calcium, POC 9.1 8.0 - 10.3 mg/dL    POC Chloride 98 (L) 98 - 108 mmol/L    POC Creatinine 1.2 (H) 0.6 - 1.2 mg/dL    POC Glucose 110 73 - 118 mg/dL    POC Potassium 4.3 3.6 - 5.1 mmol/L    POC Sodium 137 128 - 145 mmol/L    Bilirubin 0.6 0.2 - 1.6 mg/dL    POC TCO2 26 18 - 33 mmol/L    Protein 5.3 (L) 6.4 - 8.1 g/dL   ISTAT PROCEDURE    Collection Time: 01/24/19  6:41 PM   Result Value Ref Range    POC PTWBT 14.0 9.7 - 14.3 sec    POC PTINR 1.2 0.9 - 1.2    Sample UNK    Troponin ISTAT    Collection Time: 01/24/19  6:42 PM   Result Value Ref Range    POC Cardiac Troponin I 0.00 <0.09 ng/mL    Sample UNK    POCT B-type natriuretic peptide (BNP)    Collection Time: 01/24/19  6:58 PM   Result Value Ref Range    POC B-Type Natriuretic Peptide 549 (H) 0.0 - 100.0 pg/mL   POCT D Dimer    Collection Time: 01/24/19  7:13 PM   Result Value Ref Range    POC D-DI 3380 (H) 0.0 - 450.0 ng/mL     Hgb 8.9  Na 137  Scr 1.2    D-dimer 3380      To do list upon patient arrival:    Start on eliquis for DVT  Diurese with IV lasix for ADHF   Consult cardiology (Dr. Triana to see patient while in hospital)    Eda Morse DO   - PFC  Attending Staff Physician   Hospital Medicine      "

## 2019-01-25 NOTE — ASSESSMENT & PLAN NOTE
- likely multifactorial from heart failure, pericardial effusion, deconditioning  - although patient with GUERIN and DVT concerning for PE, do not feel imaging indicated at this time as patient started on AC   - Patient does not wish for pericardiocentesis (see previous documentation)  - oxygen PRN, but currently stable at 92% on RA

## 2019-01-25 NOTE — ASSESSMENT & PLAN NOTE
Aortic valve insufficiency  - recently taken off of hctz for hyponatremia   - hypervolemic on exam with evidence of pulmonary edema on CXR and elevated BNP  - Diurese with lasix, but monitor closely as lasix naive and sodium levels tenuous  - cardiology consulted and appreciate recs (Dr. Triana to see patient)  - recent TTE (1/12) shows EF 65, indeterminate diast fxn, and moderately sized pericardial effusion (no tamponade)  - c/w BB, ARB  - strict I/O, daily weights, fluid restriction

## 2019-01-25 NOTE — SUBJECTIVE & OBJECTIVE
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      Arthritis     ASCVD (arteriosclerotic cardiovascular disease) 7/8/2014    Atherosclerosis of aorta 1/7/2016    "There is atherosclerosis of the thoracic aorta" - Xray Chest 7-     Back pain     Basal cell carcinoma 10/7/2013    Right nasal columellar, right lower eyelid, left medial eyelid    Basal cell carcinoma 2/2015    mid back    Chronic diastolic heart failure 11/11/2014    Coronary artery disease     Gastric ulcer     Fosamax related.    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2015    mucinous cystadenoma with low grade dysplasia.     APPENDECTOMY-LAPAROSCOPIC N/A 8/14/2015    Performed by Feroz Corado MD at [facility] OR 2ND FLR    BIOPSY-LYMPH NODE Right 10/27/2014    Performed by Ol[facility]allos MD at [facility] OR 2ND FLR    CATARACT EXTRACTION      ou dr morales    EYE SURGERY      HYSTERECTOMY  1970     NIC/BSO. took HRT immediatiely after wards.     LAPAROSCOPY-DIAGNOSTIC  8/14/2015    Performed by Gianfranco Crenshaw MD at [facility] OR 2ND FLR    LYMPH NODE BIOPSY Right 10/27/2014    posterior triangle    Mohs excision " of scalp SCC N/A 5/13/2014    Scalp flap N/A 5/14/204    posterior advancement-rotation    SKIN CANCER EXCISION      THYROIDECTOMY  1960's    hyperthyroidism       Review of patient's allergies indicates:   Allergen Reactions    Codeine     Darvocet a500 [propoxyphene n-acetaminophen] Nausea Only    Sulfa (sulfonamide antibiotics) Rash    Fosamax [alendronate]      Gastric ulcer       No current facility-administered medications on file prior to encounter.      Current Outpatient Medications on File Prior to Encounter   Medication Sig    acetaminophen (TYLENOL ARTHRITIS) 650 MG TbSR Take 650 mg by mouth once daily. 1-2 tablet once a day.    albuterol (PROVENTIL) 2.5 mg /3 mL (0.083 %) nebulizer solution Take 3 mLs (2.5 mg total) by nebulization every 4 (four) hours as needed for Wheezing or Shortness of Breath (chest tightness).    albuterol (PROVENTIL/VENTOLIN HFA) 90 mcg/actuation inhaler Inhale 2 puffs into the lungs every 6 (six) hours as needed for Wheezing. Rescue    amLODIPine (NORVASC) 10 MG tablet TAKE 1 TABLET EVERY DAY    aspirin (ECOTRIN) 81 MG EC tablet Take 81 mg by mouth every other day. Pt taking one pill every other day.    atorvastatin (LIPITOR) 20 MG tablet TAKE 1 TABLET EVERY DAY    calcium carbonate/vitamin D3 (CALTRATE 600 + D ORAL) Take by mouth once daily.    chlorpheniramine maleate (CHLOR-TRIMETON REPETABS ORAL) Take by mouth.    FLAXSEED-OMEGA3,6,9-FATTY ACID ORAL Take 1 capsule by mouth once daily.      gabapentin (NEURONTIN) 100 MG capsule Take 2 capsules (200 mg total) by mouth 2 (two) times daily.    guaifenesin 100 mg/5 ml (ROBITUSSIN) 100 mg/5 mL syrup Take 200 mg by mouth 3 (three) times daily as needed for Cough.    labetalol (NORMODYNE) 300 MG tablet Take 1 tablet (300 mg total) by mouth 2 (two) times daily.    levothyroxine (SYNTHROID) 75 MCG tablet TAKE 1 TABLET ONE TIME DAILY    losartan (COZAAR) 100 MG tablet TAKE 1 TABLET EVERY DAY    magnesium 250 mg  Tab Take 1 tablet by mouth once daily.      OPTICHAMBER DARREN LG MASK Spcr U UTD    traMADol (ULTRAM) 50 mg tablet Take 1 tablet (50 mg total) by mouth every 12 (twelve) hours as needed for Pain.    triamcinolone acetonide 0.1% (KENALOG) 0.1 % cream APPLY TO THE AFFECTED AREA OF lower legs TWICE DAILY    vitamin D 1000 units Tab Take 1,000 Units by mouth once daily.      Family History     Problem Relation (Age of Onset)    Cancer Sister    Clotting disorder Father    Eczema Mother    Heart attack Mother    Heart disease Mother    Heart failure Mother, Father    Hypertension Mother, Father    No Known Problems Brother, Maternal Aunt, Maternal Uncle, Paternal Aunt, Paternal Uncle, Maternal Grandmother, Maternal Grandfather, Paternal Grandmother, Paternal Grandfather    Thyroid disease Sister        Tobacco Use    Smoking status: Former Smoker    Smokeless tobacco: Never Used    Tobacco comment: Quit 35 years ago   Substance and Sexual Activity    Alcohol use: No    Drug use: No    Sexual activity: No     Review of Systems   Constitutional: Positive for unexpected weight change (wt gain2-3 lbs in one day). Negative for appetite change, chills and fever.   HENT: Negative for congestion and rhinorrhea.    Eyes: Negative for photophobia and visual disturbance.   Respiratory: Positive for cough (dry), shortness of breath and wheezing. Negative for chest tightness.         + orthopnea   Cardiovascular: Positive for leg swelling (BLE). Negative for chest pain and palpitations.   Gastrointestinal: Negative for abdominal pain, diarrhea, nausea and vomiting.   Genitourinary: Negative for difficulty urinating and dysuria.   Musculoskeletal: Negative for back pain and gait problem.   Skin: Negative for rash and wound.   Neurological: Negative for dizziness and headaches.   Psychiatric/Behavioral: Negative for agitation and confusion.     Objective:     Vital Signs (Most Recent):  Temp: 98.6 °F (37 °C) (01/25/19  0300)  Pulse: 67 (01/25/19 0451)  Resp: 16 (01/25/19 0451)  BP: (!) 198/81(MD @ BS; instructed to administer ordered lasix) (01/25/19 0451)  SpO2: (!) 92 % (01/25/19 0451) Vital Signs (24h Range):  Temp:  [97.8 °F (36.6 °C)-98.7 °F (37.1 °C)] 98.6 °F (37 °C)  Pulse:  [66-79] 67  Resp:  [16-24] 16  SpO2:  [92 %-95 %] 92 %  BP: (138-215)/(50-95) 198/81     Weight: 60.1 kg (132 lb 7.9 oz)  Body mass index is 25.88 kg/m².    Physical Exam   Constitutional: She is oriented to person, place, and time. She appears well-developed and well-nourished.   Pleasant elderly female dyspneic while talking but able to hold conversation without abrupt changes in oxygenation   HENT:   Head: Normocephalic and atraumatic.   Eyes: EOM are normal. Pupils are equal, round, and reactive to light.   Neck: Normal range of motion. Neck supple. No JVD present.   Cardiovascular: Normal rate, regular rhythm and intact distal pulses.   Murmur (systolic) heard.  Pulmonary/Chest: She is in respiratory distress. She has no wheezes. She exhibits no tenderness.   Diminished lung sounds to R lower lube  Fine crackles to LLL   Abdominal: Soft. Bowel sounds are normal. She exhibits distension.   Musculoskeletal: Normal range of motion. She exhibits edema.   2-4+ to LLE  1-3+ to RLE   Neurological: She is alert and oriented to person, place, and time.   Skin: Skin is warm and dry.   Psychiatric: She has a normal mood and affect. Her behavior is normal. Judgment and thought content normal.   Nursing note and vitals reviewed.        CRANIAL NERVES     CN III, IV, VI   Pupils are equal, round, and reactive to light.  Extraocular motions are normal.        Significant Labs: CBC: No results for input(s): WBC, HGB, HCT, PLT in the last 48 hours.  CMP: No results for input(s): NA, K, CL, CO2, GLU, BUN, CREATININE, CALCIUM, PROT, ALBUMIN, BILITOT, ALKPHOS, AST, ALT, ANIONGAP, EGFRNONAA in the last 48 hours.    Invalid input(s): ESTGFAFRICA  Troponin: No results  for input(s): TROPONINI in the last 48 hours.    Significant Imaging: I have reviewed all pertinent imaging results/findings within the past 24 hours.     X-ray Chest 1 View    Result Date: 1/24/2019  EXAMINATION: XR CHEST 1 VIEW CLINICAL HISTORY: Shortness of breath TECHNIQUE: Single frontal view of the chest was performed. COMPARISON: 01/08/2019. FINDINGS: The cardiac silhouette is prominent in size, unchanged.  Atherosclerotic calcification is present within the aortic arch.  There is prominence of the central pulmonary vasculature.  There is increased density within the bases of both hemithoraces likely due to a combination of layering bilateral pleural effusions with bibasilar atelectasis/consolidation.  Mild pulmonary edema is a consideration.  Overall, findings appear similar to the prior examination.  There is no evidence for pneumothorax.  Bony structures are grossly intact.     Prominence of the cardiac silhouette, stable. Probable bilateral pleural effusions, similar to the prior examination. Increased density within the mid to lower lungs bilaterally similar to the prior examination.  This is likely due to bibasilar atelectasis/consolidation.  Mild pulmonary edema should be considered.     Us Lower Extremity Veins Bilateral    Result Date: 1/24/2019  EXAMINATION: US LOWER EXTREMITY VEINS BILATERAL CLINICAL HISTORY: Edema, unspecified TECHNIQUE: Duplex and color flow Doppler and dynamic compression was performed of the bilateral lower extremity veins was performed. COMPARISON: 01/07/2019 FINDINGS: Right thigh veins: The common femoral, femoral, popliteal, upper greater saphenous, and deep femoral veins are patent and free of thrombus. The veins are normally compressible and have normal phasic flow and augmentation response. Right calf veins: The visualized calf veins are patent. Left thigh veins: The common femoral, femoral, popliteal, upper greater saphenous, and deep femoral veins are patent and free of  thrombus. The veins are normally compressible and have normal phasic flow and augmentation response. Left calf veins: Thrombus in the left peroneal vein. Miscellaneous : Complex heterogeneous area near the sapheno-femoral junction.  Adjacent collaterals.  No internal vascularity. Complex left Baker's cyst. Simple right Baker's cyst.     Thrombus in the left peroneal vein. Complex heterogeneous area near the sapheno-femoral junction.  Adjacent collaterals.  No internal vascularity. Complex left Baker's cyst.  Simple right Baker's cyst.

## 2019-01-25 NOTE — PLAN OF CARE
On Discharge planning assessment patient is in bed, resting quietly, private sitter and sons at the bedside. Introduced myself and CM role in patients care plan, patient verbalized understanding.     Pt lives in a single story home alone, she currently has private duty sitter 24/7 with Yoselin Johnson. She has family support and transportation to discharge home. Patient denies HD, and coumadin. Pt has Home Health with [facility] currently. She has a rolling walker, shower chair, and nebulizer at home. Verified Pts Address, Emergency Contact, Pharmacy and PCP.     Family has concerns about f/u appointments, and equipment at discharge. CM notified the family that proper f/u will be made. CM will continue to follow. CM name and number placed on patients white board and Health Packet given to the patient with a brief overview of the information provided patient verbalized understanding.        01/25/19 0921   Discharge Assessment   Assessment Type Discharge Planning Assessment   Confirmed/corrected address and phone number on facesheet? Yes   Assessment information obtained from? Patient   Expected Length of Stay (days) 3   Communicated expected length of stay with patient/caregiver yes   Prior to hospitilization cognitive status: Alert/Oriented   Prior to hospitalization functional status: Independent   Current cognitive status: Alert/Oriented   Current Functional Status: Independent   Lives With alone   Able to Return to Prior Arrangements yes   Is patient able to care for self after discharge? Yes   Who are your caregiver(s) and their phone number(s)? Ty [phone]   Patient's perception of discharge disposition home or selfcare   Readmission Within the Last 30 Days current reason for admission unrelated to previous admission   If yes, most recent facility name: [facility]    Patient currently being followed by outpatient case management? No   Patient currently receives any other outside agency  services? No   Equipment Currently Used at Home walker, rolling;shower chair;nebulizer   Do you have any problems affording any of your prescribed medications? No   Is the patient taking medications as prescribed? yes   Does the patient have transportation home? Yes   Transportation Anticipated family or friend will provide   Does the patient receive services at the [facility] Clinic? No   Discharge Plan A Home Health   Discharge Plan B Home with family;Private Duty Nursing   DME Needed Upon Discharge  bedside commode;wheelchair   Patient/Family in Agreement with Plan yes   Readmission Questionnaire   At the time of your discharge, did someone talk to you about what your health problems were? Yes   At the time of discharge, did someone talk to you about what to watch out for regarding worsening of your health problem? Yes   At the time of discharge, did someone talk to you about what to do if you experienced worsening of your health problem? Yes   At the time of discharge, did someone talk to you about which medication to take when you left the hospital and which ones to stop taking? Yes   At the time of discharge, did someone talk to you about when and where to follow up with a doctor after you left the hospital? Yes   What do you believe caused you to be sick enough to be re-admitted? fluid overload    How often do you need to have someone help you when you read instructions, pamphlets, or other written material from your doctor or pharmacy? Sometimes   Do you have problems taking your medications as prescribed? No   Do you have any problems affording any of  your prescribed medications? No   Do you have problems obtaining/receiving your medications? No   Does the patient have transportation to healthcare appointments? Yes   Living Arrangements house   Does the patient have family/friends to help with healtcare needs after discharge? yes   Does your caregiver provide all the help you need? Yes   Are you currently  feeling confused? No   Are you currently having problems thinking? No   Are you currently having memory problems? No   Have you felt down, depressed, or hopeless? 0   Have you felt little interest or pleasure in doing things? 0   In the last 7 days, my sleep quality was: good     Jairo Schmidt MD  [address] / [place] [address] [phone]'    Extended Emergency Contact Information  Primary Emergency Contact: Ty Quintanilla   [place]  Work Phone: [phone]  Mobile Phone: [phone]  Relation: Son  Secondary Emergency Contact: Margy Quintanilla   [place]  Mobile Phone: [phone]  Relation: Relative      [facility] [place] - [place] - [address]  [address]  [place] [address]  Phone: [phone] Fax: [phone]    [facility] - [facility] [address]  [address] [facility] [address]  Phone: [phone] Fax: [phone]    [facility] [place] - [address] AT [facility]  [address]  [place] [address]  Phone: [phone] Fax: [phone]    [facility] [address] [place] [address]  Phone: [phone] Fax: [phone]

## 2019-01-26 LAB
BASOPHILS # BLD AUTO: 0.04 K/UL
BASOPHILS NFR BLD: 0.8 %
DIFFERENTIAL METHOD: ABNORMAL
EOSINOPHIL # BLD AUTO: 0.5 K/UL
EOSINOPHIL NFR BLD: 9.1 %
ERYTHROCYTE [DISTWIDTH] IN BLOOD BY AUTOMATED COUNT: 14.7 %
HCT VFR BLD AUTO: 23.6 %
HGB BLD-MCNC: 7.6 G/DL
IMM GRANULOCYTES # BLD AUTO: 0.02 K/UL
IMM GRANULOCYTES NFR BLD AUTO: 0.4 %
LYMPHOCYTES # BLD AUTO: 0.9 K/UL
LYMPHOCYTES NFR BLD: 17.7 %
MCH RBC QN AUTO: 30.2 PG
MCHC RBC AUTO-ENTMCNC: 32.2 G/DL
MCV RBC AUTO: 94 FL
MONOCYTES # BLD AUTO: 0.7 K/UL
MONOCYTES NFR BLD: 13.3 %
NEUTROPHILS # BLD AUTO: 3 K/UL
NEUTROPHILS NFR BLD: 58.7 %
NRBC BLD-RTO: 0 /100 WBC
PLATELET # BLD AUTO: 343 K/UL
PMV BLD AUTO: 9.9 FL
POCT GLUCOSE: 141 MG/DL (ref 70–110)
POCT GLUCOSE: 96 MG/DL (ref 70–110)
POCT GLUCOSE: 97 MG/DL (ref 70–110)
RBC # BLD AUTO: 2.52 M/UL
WBC # BLD AUTO: 5.04 K/UL

## 2019-01-26 PROCEDURE — 94640 AIRWAY INHALATION TREATMENT: CPT

## 2019-01-26 PROCEDURE — G8988 SELF CARE GOAL STATUS: HCPCS | Mod: CH

## 2019-01-26 PROCEDURE — 85025 COMPLETE CBC W/AUTO DIFF WBC: CPT

## 2019-01-26 PROCEDURE — 97161 PT EVAL LOW COMPLEX 20 MIN: CPT

## 2019-01-26 PROCEDURE — G0378 HOSPITAL OBSERVATION PER HR: HCPCS

## 2019-01-26 PROCEDURE — 25000003 PHARM REV CODE 250: Performed by: HOSPITALIST

## 2019-01-26 PROCEDURE — 97165 OT EVAL LOW COMPLEX 30 MIN: CPT

## 2019-01-26 PROCEDURE — G8987 SELF CARE CURRENT STATUS: HCPCS | Mod: CH

## 2019-01-26 PROCEDURE — 63600175 PHARM REV CODE 636 W HCPCS: Performed by: STUDENT IN AN ORGANIZED HEALTH CARE EDUCATION/TRAINING PROGRAM

## 2019-01-26 PROCEDURE — 99226 PR SUBSEQUENT OBSERVATION CARE,LEVEL III: ICD-10-PCS | Mod: GC,,, | Performed by: HOSPITALIST

## 2019-01-26 PROCEDURE — 36415 COLL VENOUS BLD VENIPUNCTURE: CPT

## 2019-01-26 PROCEDURE — 25000003 PHARM REV CODE 250: Performed by: PHYSICIAN ASSISTANT

## 2019-01-26 PROCEDURE — 99226 PR SUBSEQUENT OBSERVATION CARE,LEVEL III: CPT | Mod: GC,,, | Performed by: HOSPITALIST

## 2019-01-26 PROCEDURE — 25000242 PHARM REV CODE 250 ALT 637 W/ HCPCS: Performed by: HOSPITALIST

## 2019-01-26 PROCEDURE — G8989 SELF CARE D/C STATUS: HCPCS | Mod: CH

## 2019-01-26 PROCEDURE — 25000003 PHARM REV CODE 250: Performed by: STUDENT IN AN ORGANIZED HEALTH CARE EDUCATION/TRAINING PROGRAM

## 2019-01-26 RX ORDER — DOCUSATE SODIUM 100 MG/1
100 CAPSULE, LIQUID FILLED ORAL DAILY PRN
COMMUNITY
End: 2019-01-30

## 2019-01-26 RX ORDER — MULTIVITAMIN
1 TABLET ORAL DAILY
COMMUNITY
End: 2019-04-03

## 2019-01-26 RX ADMIN — LABETALOL HCL 300 MG: 300 TABLET, FILM COATED ORAL at 08:01

## 2019-01-26 RX ADMIN — FUROSEMIDE 40 MG: 10 INJECTION, SOLUTION INTRAVENOUS at 06:01

## 2019-01-26 RX ADMIN — LOSARTAN POTASSIUM 100 MG: 50 TABLET, FILM COATED ORAL at 08:01

## 2019-01-26 RX ADMIN — APIXABAN 10 MG: 5 TABLET, FILM COATED ORAL at 08:01

## 2019-01-26 RX ADMIN — VITAMIN D, TAB 1000IU (100/BT) 1000 UNITS: 25 TAB at 08:01

## 2019-01-26 RX ADMIN — AMLODIPINE BESYLATE 10 MG: 10 TABLET ORAL at 08:01

## 2019-01-26 RX ADMIN — FUROSEMIDE 40 MG: 10 INJECTION, SOLUTION INTRAVENOUS at 09:01

## 2019-01-26 RX ADMIN — IPRATROPIUM BROMIDE AND ALBUTEROL SULFATE 3 ML: .5; 3 SOLUTION RESPIRATORY (INHALATION) at 09:01

## 2019-01-26 RX ADMIN — LEVOTHYROXINE SODIUM 75 MCG: 25 TABLET ORAL at 06:01

## 2019-01-26 RX ADMIN — GABAPENTIN 200 MG: 100 CAPSULE ORAL at 08:01

## 2019-01-26 RX ADMIN — ATORVASTATIN CALCIUM 20 MG: 20 TABLET, FILM COATED ORAL at 08:01

## 2019-01-26 NOTE — HOSPITAL COURSE
01/26/2019 - IV Lasix, cardiology following. Continue IV Lasix today, still net positive 8 lbs  01/27/2019 - Restart HCTZ per cards recs, Monitor STANDING weights and BP, IV Lasix 40 PM     Patient admitted to obs for acute heart failure exacerbation as well as treatment for a DVT found in in the left peroneal vein. Patient was diuresed with IV furosemide with good urine output and improvement of her symptoms. She was started on apixaban for her DVT. Will need to follow up with PCP and cardiology. Discharged  Hemodynamically stable, afebrile on RA with greatly improved symptoms with minimal dyspnea on exertion.     Review of Systems   Constitutional: Negative for appetite change, chills and fever.   Eyes: Negative for photophobia and visual disturbance.   Respiratory: Positive for cough (dry) improving, shortness of breath (significantly improved). Negative for chest tightness.    Cardiovascular: Positive for leg swelling (BLE) improved. Negative for chest pain and palpitations.   Gastrointestinal: Negative for abdominal pain, diarrhea, nausea and vomiting.   Genitourinary: Negative for difficulty urinating and dysuria.   Musculoskeletal: Negative for back pain and gait problem.   Skin: Negative for rash and wound.   Neurological: Negative for dizziness and headaches.   Psychiatric/Behavioral: Negative for agitation and confusion.     Physical Exam   Constitutional: She is oriented to person, place, and time. She appears well-developed and well-nourished.   HENT:    Head: Normocephalic and atraumatic.   Eyes: EOM are normal.   Neck: Normal range of motion. Neck supple. No JVD appreciated.  Cardiovascular: Normal rate, regular rhythm and intact distal pulses. 1+ pitting edema to midcalf.  Murmur (systolic) heard.  Pulmonary/Chest: Effort normal. No respiratory distress. She has no wheezes. She exhibits no tenderness.   No rales appreciated   Abdominal: Soft. Bowel sounds are normal.  Musculoskeletal: Normal range of  motion. =  Neurological: She is alert and oriented to person, place, and time.   Skin: Skin is warm and dry.   Psychiatric: She has a normal mood and affect. Her behavior is normal. Judgment and thought content normal.   Vitals reviewed.

## 2019-01-26 NOTE — ASSESSMENT & PLAN NOTE
Aortic valve insufficiency  - recently taken off of hctz for hyponatremia, cardiology recommending restart HCTZ 25 on dc with 20 PO Lasix PRN with weight gain    - hypervolemic on exam with evidence of pulmonary edema on CXR and elevated BNP  - Diurese with lasix, but monitor closely as lasix naive and sodium levels tenuous  - cardiology consulted and appreciate recs (Dr. Triana to see patient)  - recent TTE (1/12) shows EF 65, indeterminate diast fxn, and moderately sized pericardial effusion (no tamponade)  - c/w BB, ARB  - strict I/O, daily weights, fluid restriction

## 2019-01-26 NOTE — PLAN OF CARE
Problem: Occupational Therapy Goal  Goal: Occupational Therapy Goal  Pt is not currently displaying a need for acute OT services. D/C acute OT services and recommend pt D/C home w/ HH.  Outcome: Outcome(s) achieved Date Met: 01/26/19  D/C acute OT services     Comments: Hood Acuña OTR/L  1/26/2019

## 2019-01-26 NOTE — PT/OT/SLP EVAL
Occupational Therapy   Evaluation and Discharge Note    Name: Kristin Quintanilla  MRN: [id number]  Admitting Diagnosis:  Acute deep vein thrombosis (DVT) of left peroneal vein      Recommendations:     Discharge Recommendations: (HH)  Discharge Equipment Recommendations:  none  Barriers to discharge:  None    Assessment:     Kristin Quintanilla is a 88 y.o. female with a medical diagnosis of Acute deep vein thrombosis (DVT) of left peroneal vein. At this time, patient is functioning at their prior level of function and does not require further acute OT services.     Plan:     During this hospitalization, patient does not require further acute OT services.  Please re-consult if situation changes.    · Plan of Care Reviewed with: patient, family    Subjective     Chief Complaint: sob  Patient/Family Comments/goals: return home    Occupational Profile:  Living Environment: Pt lives alone in a [facility] w/ a TH to enter. Pt was receiving  OT and PT.  Previous level of function: Indep w/ ADLs and MOD I for mobility.  Roles and Routines: N/A  Equipment Used at home:  walker, rolling, rollator, shower chair, nebulizer  Assistance upon Discharge: Pt has assistance upon D/C.     Pain/Comfort:  · Pain Rating 1: 0/10  · Pain Rating Post-Intervention 1: 0/10    Patients cultural, spiritual, Christian conflicts given the current situation:      Objective:     Communicated with: RN prior to session.  Patient found up in chair with telemetry upon OT entry to room.    General Precautions: Standard, fall   Orthopedic Precautions:N/A   Braces: N/A     Occupational Performance:      Functional Mobility/Transfers:  · Patient completed Sit <> Stand Transfer with supervision  with  rolling walker   · Functional Mobility: Pt ambulated in hallway w/ PT using RW. Refer to PT note for details.    Activities of Daily Living:  · Lower Body Dressing: supervision donned and doffed socks seated EOB    Cognitive/Visual Perceptual:  Cognitive/Psychosocial  "Skills:     -       Oriented to: Person, Place, Time and Situation   -       Follows Commands/attention:Follows multistep  commands  -       Communication: clear/fluent  -       Memory: No Deficits noted  -       Safety awareness/insight to disability: intact   -       Mood/Affect/Coping skills/emotional control: Appropriate to situation  Visual/Perceptual:      -Intact      Physical Exam:  Balance:    -       Pt displayed good overall balance   Postural examination/scapula alignment:    -       Rounded shoulders  Skin integrity: Visible skin intact  Upper Extremity Range of Motion:     -       Right Upper Extremity: WFL  -       Left Upper Extremity: WFL  Upper Extremity Strength:    -       Right Upper Extremity: WFL  -       Left Upper Extremity: WFL   Strength:    -       Right Upper Extremity: WFL  -       Left Upper Extremity: WFL  Fine Motor Coordination:    -       Intact  Gross motor coordination:   WFL    AMPAC 6 Click ADL:  AMPAC Total Score: 24    Treatment & Education:  Pt and pt's family were educated on POC.  Education:    Patient left up in chair with all lines intact, call button in reach and family present    GOALS:   Multidisciplinary Problems     Occupational Therapy Goals     Not on file          Multidisciplinary Problems (Resolved)        Problem: Occupational Therapy Goal    Goal Priority Disciplines Outcome Interventions   Occupational Therapy Goal   (Resolved)     OT, PT/OT Outcome(s) achieved    Description:  Pt is not currently displaying a need for acute OT services. D/C acute OT services and recommend pt D/C home w/ HH.                    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      Arthritis     ASCVD (arteriosclerotic cardiovascular disease) 7/8/2014    Atherosclerosis of aorta 1/7/2016    "There " "is atherosclerosis of the thoracic aorta" - Xray Chest      Back pain     Basal cell carcinoma 10/7/2013    Right nasal columellar, right lower eyelid, left medial eyelid    Basal cell carcinoma 2015    mid back    Chronic diastolic heart failure 2014    Coronary artery disease     Gastric ulcer     Fosamax related.    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     Hypothyroidism (acquired) 2016    Joint pain     Lymphedema of Neck 3/17/2016    Occlusion and stenosis of carotid artery without mention of cerebral infarction     Pelvic mass in female 2015    Polyneuropathy in other diseases classified elsewhere 2016     Squamous cell carcinoma of scalp 2014    scalp vertex with + LN met 10/14 s/p radiation    Ulcer        Past Surgical History:   Procedure Laterality Date    ADJACENT TISSUE TRANSFER/REARRANGEMENT N/A 2014    Performed by [facility]vin [facility]evallos [facility] at [facility] OR 2ND FLR    APPENDECTOMY  2015    mucinous cystadenoma with low grade dysplasia.     APPENDECTOMY-LAPAROSCOPIC N/A 2015    Performed by Feroz Corado MD at [facility] OR 2ND FLR    BIOPSY-LYMPH NODE Right 10/27/2014    Performed by Olvin Cevallos MD at [facility] OR 2ND FLR    CATARACT EXTRACTION      ou dr morales    EYE SURGERY      HYSTERECTOMY  1970     NIC/BSO. took HRT immediatiely after wards.     LAPAROSCOPY-DIAGNOSTIC  2015    Performed by Gianfranco Crenshaw MD at [facility] OR 2ND FLR    LYMPH NODE BIOPSY Right 10/27/2014    posterior triangle    Mohs excision of scalp SCC N/A 2014    Scalp flap N/A     posterior advancement-rotation    SKIN CANCER EXCISION      THYROIDECTOMY  1960's    hyperthyroidism       Clinical Decision Makin.  OT Low:  "Pt evaluation falls under low complexity for evaluation coding due to performance deficits noted in 1-3 areas as stated " "above and 0 co-morbities affecting current functional status. Data obtained from problem focused assessments. No modifications or assistance was required for completion of evaluation. Only brief occupational profile and history review completed."     Time Tracking:     OT Date of Treatment: 01/26/19  OT Start Time: 0955  OT Stop Time: 1010  OT Total Time (min): 15 min    Billable Minutes:Evaluation 15 minutes    Hood Acuña, OT  1/26/2019    "

## 2019-01-26 NOTE — PLAN OF CARE
Problem: Venous Thromboembolism  Goal: VTE (Venous Thromboembolism) Symptom Resolution  Outcome: Ongoing (interventions implemented as appropriate)  Pt medicated with ordered blood thinners. Pt monitored for signs of bleeding.

## 2019-01-26 NOTE — PLAN OF CARE
Problem: Physical Therapy Goal  Goal: Physical Therapy Goal  Outcome: Outcome(s) achieved Date Met: 01/26/19  PT fuentes complete. Pt able to perform all functional tasks during evaluation with no LOB or instability using RW for support. Pt strength WFL and was receiving HHPT PTA and will resume once she returns home. Pt safe to d/c home with HHPT at this time.

## 2019-01-26 NOTE — ASSESSMENT & PLAN NOTE
Volume overloaded on exam with significant peripheral edema  Continue lasix 40mg IV BID.   Once euvolemic, transition to oral regimen may be appropriate  HTN appears to be uncontrolled and there is likely an appropriate role for a thiazide.  Would recommend at discharge, that the patient is started back on home HCTZ 25 mg with daily weights, and using lasix 20mg daily PRN weight gain (3-4 pounds in 2-3 days or 4+ lbs in 5 days) until the weight is off.    Discussed w Dr Arreola

## 2019-01-26 NOTE — PLAN OF CARE
Problem: Adult Inpatient Plan of Care  Goal: Plan of Care Review  Outcome: Ongoing (interventions implemented as appropriate)  Plan of care continued per orders. Resp treatments administered as needed. I/Os measured. Edema to BLE monitored. Shortness of breath on ambulation decreasing. Comfort and safety maintained

## 2019-01-26 NOTE — PROGRESS NOTES
[facility] Hospital Medicine  Progress Note    Patient Name: Kristin Quintanilla  MRN: [id number]  Patient Class: OP- Observation   Admission Date: 1/24/2019  Length of Stay: 0 days  Attending Physician: Mary Anne Swanson MD  Primary Care Provider: Jairo Schmidt MD    [facility] Medicine Team: [facility] HOSP MED 2 Pierce García MD    Subjective:     Principal Problem:Acute deep vein thrombosis (DVT) of left peroneal vein    HPI:  Kristin Quintanilla is an 88 year old female with PMH of CAD, Diastolic HF, HTN, HLD, hypothyroidism, chronic anemia (Hgb 8-9), and spinal stenosis, DNR who was admitted to [facility] 01/07 - 01/14 for diuretic induced hyponatremia and ADHF. Her hyponatremia was treated with fluid restriction and discontinuation of HCTZ. CHF exacerbation was treated with IV lasix. 2D Echo showed large pericardial effusion w/o tamponade. Cardiology was consulted and no pericardiocentesis planned in conjunction with patient's goals of care to avoid invasive procedures. Patient was discharged home with home health and follow up scheduled at cardiology clinic with Dr. Triana on 01/25/19. Repeat 2D echo on 01/21 showed similar moderate sized pericardial effusion without tamponade.      She was sent to [place] ED today by home health nurse for evaluation of Leg swelling L > R and worsening GUERIN with associated 3 lb wt gain, orthopnea. She reports daily SOB but has increased over the last few days.  She has been mostly non-ambulatory over the last 10 days and uses a walker to ambulate. She has elevated D-dimer and BNP at OSH.  US doppler lower ext in ED showed left peroneal vein DVT and complex left Baker's cyst. CXR showed mild pulmonary edema and small bilateral pleural effusions R >L. No evidence of bleeding and patient received lovenox 60 mg sq x 1 in ED for DVT. Family requesting transfer to [facility] and Dr. Chun spoke with patient's cardiologist Dr. Triana who promised to see patient tomorrow while  she is in the hospital (patient has clinic appointment tomorrow afternoon).     Hospital Course:  01/26/2019 - IV Lasix, cardiology following. Continue IV Lasix today, still net positive 8 lbs    Interval History: NAEON. Continue IV Lasix until euvolemic. Still +8 lbs this am from baseline. Will continue to monitor during dieresis. Cardiology following, appreciate recs. LE edema improved and pt stating that she is back at her baseline breathing.     Review of Systems   Constitutional: Positive for unexpected weight change (wt gain2-3 lbs in one day). Negative for appetite change, chills and fever.   HENT: Negative for congestion and rhinorrhea.    Eyes: Negative for photophobia and visual disturbance.   Respiratory: Positive for cough (dry), shortness of breath and wheezing. Negative for chest tightness.         + orthopnea   Cardiovascular: Positive for leg swelling (BLE). Negative for chest pain and palpitations.   Gastrointestinal: Negative for abdominal pain, diarrhea, nausea and vomiting.   Genitourinary: Negative for difficulty urinating and dysuria.   Musculoskeletal: Negative for back pain and gait problem.   Skin: Negative for rash and wound.   Neurological: Negative for dizziness and headaches.   Psychiatric/Behavioral: Negative for agitation and confusion.     Objective:     Vital Signs (Most Recent):  Temp: 97.8 °F (36.6 °C) (01/26/19 1135)  Pulse: (!) 57 (01/26/19 1135)  Resp: 18 (01/26/19 1135)  BP: (!) 155/65 (01/26/19 1135)  SpO2: (!) 93 % (01/26/19 1135) Vital Signs (24h Range):  Temp:  [97.8 °F (36.6 °C)-99.5 °F (37.5 °C)] 97.8 °F (36.6 °C)  Pulse:  [57-73] 57  Resp:  [16-20] 18  SpO2:  [92 %-98 %] 93 %  BP: (149-185)/(65-85) 155/65     Weight: 56.5 kg (124 lb 9 oz)  Body mass index is 24.33 kg/m².    Intake/Output Summary (Last 24 hours) at 1/26/2019 1222  Last data filed at 1/26/2019 0958  Gross per 24 hour   Intake 800 ml   Output 2550 ml   Net -1750 ml      Physical Exam   Constitutional: She  is oriented to person, place, and time. She appears well-developed and well-nourished.   Pleasant elderly female dyspneic while talking but able to hold conversation without abrupt changes in oxygenation   HENT:   Head: Normocephalic and atraumatic.   Eyes: EOM are normal. Pupils are equal, round, and reactive to light.   Neck: Normal range of motion. Neck supple. No JVD present.   Cardiovascular: Normal rate, regular rhythm and intact distal pulses.   Murmur (systolic) heard.  Pulmonary/Chest: Effort normal. No respiratory distress. She has no wheezes. She exhibits no tenderness.   Fine crackles, improved from yesterday   Abdominal: Soft. Bowel sounds are normal. She exhibits distension.   Musculoskeletal: Normal range of motion. She exhibits no edema.   Neurological: She is alert and oriented to person, place, and time.   Skin: Skin is warm and dry.   Psychiatric: She has a normal mood and affect. Her behavior is normal. Judgment and thought content normal.   Nursing note and vitals reviewed.      Significant Labs:   BMP:   Recent Labs   Lab 01/25/19  0623   GLU 89   *   K 4.8   CL 99   CO2 26   BUN 22   CREATININE 0.9   CALCIUM 9.8   MG 2.4     CBC:   Recent Labs   Lab 01/25/19  0624 01/26/19  0410   WBC 6.74 5.04   HGB 8.0* 7.6*   HCT 25.8* 23.6*   * 343     CMP:   Recent Labs   Lab 01/25/19  0623   *   K 4.8   CL 99   CO2 26   GLU 89   BUN 22   CREATININE 0.9   CALCIUM 9.8   ANIONGAP 6*   EGFRNONAA 57.3*     Lipid Panel: No results for input(s): CHOL, HDL, LDLCALC, TRIG, CHOLHDL in the last 48 hours.  TSH:   Recent Labs   Lab 01/08/19  0957   TSH 1.942     Urine Culture: No results for input(s): LABURIN in the last 48 hours.  Urine Studies: No results for input(s): COLORU, APPEARANCEUA, PHUR, SPECGRAV, PROTEINUA, GLUCUA, KETONESU, BILIRUBINUA, OCCULTUA, NITRITE, UROBILINOGEN, LEUKOCYTESUR, RBCUA, WBCUA, BACTERIA, SQUAMEPITHEL, HYALINECASTS in the last 48 hours.    Invalid input(s):  WRIGHTSUR  All pertinent labs within the past 24 hours have been reviewed.    Significant Imaging: I have reviewed all pertinent imaging results/findings within the past 24 hours.     US LE 1/24/19  Thrombus in the left peroneal vein.    Complex heterogeneous area near the sapheno-femoral junction.  Adjacent collaterals.  No internal vascularity.    Complex left Baker's cyst.  Simple right Yin's cyst.    CXR 1/24/19  The cardiac silhouette is prominent in size, unchanged.  Atherosclerotic calcification is present within the aortic arch.  There is prominence of the central pulmonary vasculature.  There is increased density within the bases of both hemithoraces likely due to a combination of layering bilateral pleural effusions with bibasilar atelectasis/consolidation.  Mild pulmonary edema is a consideration.  Overall, findings appear similar to the prior examination.  There is no evidence for pneumothorax.  Bony structures are grossly intact.      Assessment/Plan:      * Acute deep vein thrombosis (DVT) of left peroneal vein    D dimer positive and US RLE confirms thrombus in left peroneal vein  - etiology likely from venous stasis from decreased mobility  - given lovenox at OSH.  Will start with apixaban 10 mg PO BID x7 days (this AM)and transition to 5 mg PO BID  - monitor for any bleeding, no previous DVTs, PE     Debility    PT/OT consult     SOB (shortness of breath)    - likely multifactorial from heart failure, pericardial effusion, deconditioning  - although patient with GUERIN and DVT concerning for PE, do not feel imaging indicated at this time as patient started on AC   - Patient does not wish for pericardiocentesis (see previous documentation)  - oxygen PRN, but currently stable at 92% on RA     Essential hypertension    Uncontrolled in setting of volume overload and missed BP medication doses  - c/w home meds  - monitor closely with diuresis  - Cardiology following      Acute on chronic diastolic heart  failure    Aortic valve insufficiency  - recently taken off of hctz for hyponatremia, cardiology recommending restart HCTZ 25 on dc with 20 PO Lasix PRN with weight gain    - hypervolemic on exam with evidence of pulmonary edema on CXR and elevated BNP  - Diurese with lasix, but monitor closely as lasix naive and sodium levels tenuous  - cardiology consulted and appreciate recs (Dr. Triana to see patient)  - recent TTE (1/12) shows EF 65, indeterminate diast fxn, and moderately sized pericardial effusion (no tamponade)  - c/w BB, ARB  - strict I/O, daily weights, fluid restriction     Pericardial effusion    Pt does not want a pericardiocentesis  - monitor for now       VTE Risk Mitigation (From admission, onward)        Ordered     apixaban tablet 5 mg  2 times daily      01/25/19 0806     apixaban tablet 10 mg  2 times daily      01/25/19 0806     IP VTE HIGH RISK PATIENT  Once      01/25/19 0406              Pierce García MD  Department of Hospital Medicine   [facility]

## 2019-01-26 NOTE — PT/OT/SLP EVAL
Physical Therapy Evaluation    Patient Name:  Kristin Quintanilla   MRN:  [id number]    Recommendations:     Discharge Recommendations:  (HHPT)   Discharge Equipment Recommendations: none   Barriers to discharge: None    Assessment:     Kristin Quintanilla is a 88 y.o. female admitted with a medical diagnosis of Acute deep vein thrombosis (DVT) of left peroneal vein.  She presents with the following impairments/functional limitations:  impaired endurance, impaired functional mobilty.    -PT eval complete. Pt able to perform all functional tasks during evaluation with no LOB or instability using RW for support. Pt strength WFL and was receiving HHPT PTA and will resume once she returns home. Pt safe to d/c home with HHPT at this time.    Rehab Prognosis: Good; patient would benefit from acute skilled PT services to address these deficits and reach maximum level of function.    Recent Surgery: * No surgery found *      Plan:     During this hospitalization, patient to be seen   to address the identified rehab impairments via   and progress toward the following goals:    · Plan of Care Expires:  02/26/19    Subjective     Chief Complaint: impaired mobility  Patient/Family Comments/goals: return home to PLOF  Pain/Comfort:  · Pain Rating 1: 0/10    Patients cultural, spiritual, [employer]ism conflicts given the current situation:      Living Environment:  -Pt lives alone in a [facility] with 1 TH step to enter and no rails. Pt has a walk-in shower with a seat. Pt used a cane for community ambulation and occasional (A) x1.   Prior to admission, patients level of function was mod (I).  Equipment used at home: walker, rolling, rollator, shower chair, nebulizer.  DME owned (not currently used): .  Upon discharge, patient will have assistance from family.    Objective:     Communicated with nsg prior to session.  Patient found all lines intact telemetry  upon PT entry to room.    General Precautions: Standard, fall   Orthopedic Precautions:N/A  "  Braces: N/A     Exams:  · Sensation:    · -       Intact  light/touch (B) LE  · RLE ROM: WFL  · RLE Strength: WFL  · LLE ROM: WFL  · LLE Strength: WFL    Functional Mobility:  · Transfers:     · Sit to Stand:  supervision with rolling walker  · Bed to Chair: supervision with  rolling walker  using  Stand Pivot  · Gait: 150' using RW with (S)      Therapeutic Activities and Exercises:   -Pt educated on:  A. PT POC and role of PT  B. Importance of OOB activity to improve functional outcomes   C. DME mgmt and t/f sequencing  D. Performing HEP to reduce the risk of developing blood clots  E. Gait sequencing   F. D/c planning      AM-PAC 6 CLICK MOBILITY  Total Score:24     Patient left up in chair with all lines intact, call button in reach and nsg notified.    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      Arthritis     ASCVD (arteriosclerotic cardiovascular disease) 7/8/2014    Atherosclerosis of aorta 1/7/2016    "There is atherosclerosis of the thoracic aorta" - Xray Chest 7-     Back pain     Basal cell carcinoma 10/7/2013    Right nasal columellar, right lower eyelid, left medial eyelid    Basal cell carcinoma 2/2015    mid back    Chronic diastolic heart failure 11/11/2014    Coronary artery disease     Gastric ulcer     Fosamax related.    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     Hypothyroidism (acquired) 9/27/2016 "    Joint pain     Lymphedema of Neck 3/17/2016    Occlusion and stenosis of carotid artery without mention of cerebral infarction     Pelvic mass in female 7/21/2015    Polyneuropathy in other diseases classified elsewhere 1/7/2016 6-     Squamous cell carcinoma of scalp 5/2014    scalp vertex with + LN met 10/14 s/p radiation    Ulcer        Past Surgical History:   Procedure Laterality Date    ADJACENT TISSUE TRANSFER/REARRANGEMENT N/A 5/14/2014    Performed by [facility]allos [facility] at [facility] OR 2ND FLR    APPENDECTOMY  8.14.2015    mucinous cystadenoma with low grade dysplasia.     APPENDECTOMY-LAPAROSCOPIC N/A 8/14/2015    Performed by Feroz Corado MD at [facility] OR 2ND FLR    BIOPSY-LYMPH NODE Right 10/27/2014    Performed by [facility]allos [facility] at [facility] OR 2ND FLR    CATARACT EXTRACTION      ou dr morales    EYE SURGERY      HYSTERECTOMY  1970     NIC/BSO. took HRT immediatiely after wards.     LAPAROSCOPY-DIAGNOSTIC  8/14/2015    Performed by Gianfranco Crenshaw MD at [facility] OR 2ND FLR    LYMPH NODE BIOPSY Right 10/27/2014    posterior triangle    Mohs excision of scalp SCC N/A 5/13/2014    Scalp flap N/A 5/14/204    posterior advancement-rotation    SKIN CANCER EXCISION      THYROIDECTOMY  1960's    hyperthyroidism       Clinical Decision Making:     History  Co-morbidities and personal factors that may impact the plan of care Examination  Body Structures and Functions, activity limitations and participation restrictions that may impact the plan of care Clinical Presentation   Decision Making/ Complexity Score   Co-morbidities:   [] Time since onset of injury / illness / exacerbation  [] Status of current condition  []Patient's cognitive status and safety concerns    [] Multiple Medical Problems (see med hx)  Personal Factors:   [] Patient's age  [] Prior Level of function   [] Patient's home situation (environment and family support)  [] Patient's level of motivation  [] Expected  progression of patient      HISTORY:(criteria)    [] 96075 - no personal factors/history    [] 08029 - has 1-2 personal factor/comorbidity     [] 19618 - has >3 personal factor/comorbidity     Body Regions:  [] Objective examination findings  [] Head     []  Neck  [] Trunk   [] Upper Extremity  [] Lower Extremity    Body Systems:  [] For communication ability, affect, cognition, language, and learning style: the assessment of the ability to make needs known, consciousness, orientation (person, place, and time), expected emotional /behavioral responses, and learning preferences (eg, learning barriers, education  needs)  [] For the neuromuscular system: a general assessment of gross coordinated movement (eg, balance, gait, locomotion, transfers, and transitions) and motor function  (motor control and motor learning)  [] For the musculoskeletal system: the assessment of gross symmetry, gross range of motion, gross strength, height, and weight  [] For the integumentary system: the assessment of pliability(texture), presence of scar formation, skin color, and skin integrity  [] For cardiovascular/pulmonary system: the assessment of heart rate, respiratory rate, blood pressure, and edema     Activity limitations:    [] Patient's cognitive status and saf ety concerns          [] Status of current condition      [] Weight bearing restriction  [] Cardiopulmunary Restriction    Participation Restrictions:   [] Goals and goal agreement with the patient     [] Rehab potential (prognosis) and probable outcome      Examination of Body System: (criteria)    [] 29319 - addressing 1-2 elements    [] 01940 - addressing a total of 3 or more elements     [] 19598 -  Addressing a total of 4 or more elements         Clinical Presentation: (criteria)  Choose one     On examination of body system using standardized tests and measures patient presents with (CHOOSE ONE) elements from any of the following: body structures and functions,  activity limitations, and/or participation restrictions.  Leading to a clinical presentation that is considered (CHOOSE ONE)                              Clinical Decision Making  (Eval Complexity):  Choose One     Time Tracking:     PT Received On: 01/26/19  PT Start Time: 0955     PT Stop Time: 1010  PT Total Time (min): 15 min     Billable Minutes: Evaluation 15      Chas Solorio, PT  01/26/2019

## 2019-01-26 NOTE — SUBJECTIVE & OBJECTIVE
Interval History: NAEON. Continue IV Lasix until euvolemic. Still +8 lbs this am from baseline. Will continue to monitor during dieresis. Cardiology following, appreciate recs. LE edema improved and pt stating that she is back at her baseline breathing.     Review of Systems   Constitutional: Positive for unexpected weight change (wt gain2-3 lbs in one day). Negative for appetite change, chills and fever.   HENT: Negative for congestion and rhinorrhea.    Eyes: Negative for photophobia and visual disturbance.   Respiratory: Positive for cough (dry), shortness of breath and wheezing. Negative for chest tightness.         + orthopnea   Cardiovascular: Positive for leg swelling (BLE). Negative for chest pain and palpitations.   Gastrointestinal: Negative for abdominal pain, diarrhea, nausea and vomiting.   Genitourinary: Negative for difficulty urinating and dysuria.   Musculoskeletal: Negative for back pain and gait problem.   Skin: Negative for rash and wound.   Neurological: Negative for dizziness and headaches.   Psychiatric/Behavioral: Negative for agitation and confusion.     Objective:     Vital Signs (Most Recent):  Temp: 97.8 °F (36.6 °C) (01/26/19 1135)  Pulse: (!) 57 (01/26/19 1135)  Resp: 18 (01/26/19 1135)  BP: (!) 155/65 (01/26/19 1135)  SpO2: (!) 93 % (01/26/19 1135) Vital Signs (24h Range):  Temp:  [97.8 °F (36.6 °C)-99.5 °F (37.5 °C)] 97.8 °F (36.6 °C)  Pulse:  [57-73] 57  Resp:  [16-20] 18  SpO2:  [92 %-98 %] 93 %  BP: (149-185)/(65-85) 155/65     Weight: 56.5 kg (124 lb 9 oz)  Body mass index is 24.33 kg/m².    Intake/Output Summary (Last 24 hours) at 1/26/2019 1222  Last data filed at 1/26/2019 0958  Gross per 24 hour   Intake 800 ml   Output 2550 ml   Net -1750 ml      Physical Exam   Constitutional: She is oriented to person, place, and time. She appears well-developed and well-nourished.   Pleasant elderly female dyspneic while talking but able to hold conversation without abrupt changes in  oxygenation   HENT:   Head: Normocephalic and atraumatic.   Eyes: EOM are normal. Pupils are equal, round, and reactive to light.   Neck: Normal range of motion. Neck supple. No JVD present.   Cardiovascular: Normal rate, regular rhythm and intact distal pulses.   Murmur (systolic) heard.  Pulmonary/Chest: Effort normal. No respiratory distress. She has no wheezes. She exhibits no tenderness.   Fine crackles, improved from yesterday   Abdominal: Soft. Bowel sounds are normal. She exhibits distension.   Musculoskeletal: Normal range of motion. She exhibits no edema.   Neurological: She is alert and oriented to person, place, and time.   Skin: Skin is warm and dry.   Psychiatric: She has a normal mood and affect. Her behavior is normal. Judgment and thought content normal.   Nursing note and vitals reviewed.      Significant Labs:   BMP:   Recent Labs   Lab 01/25/19  0623   GLU 89   *   K 4.8   CL 99   CO2 26   BUN 22   CREATININE 0.9   CALCIUM 9.8   MG 2.4     CBC:   Recent Labs   Lab 01/25/19  0624 01/26/19  0410   WBC 6.74 5.04   HGB 8.0* 7.6*   HCT 25.8* 23.6*   * 343     CMP:   Recent Labs   Lab 01/25/19  0623   *   K 4.8   CL 99   CO2 26   GLU 89   BUN 22   CREATININE 0.9   CALCIUM 9.8   ANIONGAP 6*   EGFRNONAA 57.3*     Lipid Panel: No results for input(s): CHOL, HDL, LDLCALC, TRIG, CHOLHDL in the last 48 hours.  TSH:   Recent Labs   Lab 01/08/19  0957   TSH 1.942     Urine Culture: No results for input(s): LABURIN in the last 48 hours.  Urine Studies: No results for input(s): COLORU, APPEARANCEUA, PHUR, SPECGRAV, PROTEINUA, GLUCUA, KETONESU, BILIRUBINUA, OCCULTUA, NITRITE, UROBILINOGEN, LEUKOCYTESUR, RBCUA, WBCUA, BACTERIA, SQUAMEPITHEL, HYALINECASTS in the last 48 hours.    Invalid input(s): WRIGHTSUR  All pertinent labs within the past 24 hours have been reviewed.    Significant Imaging: I have reviewed all pertinent imaging results/findings within the past 24 hours.     US NOLASCO  1/24/19  Thrombus in the left peroneal vein.    Complex heterogeneous area near the sapheno-femoral junction.  Adjacent collaterals.  No internal vascularity.    Complex left Baker's cyst.  Simple right Yin's cyst.    CXR 1/24/19  The cardiac silhouette is prominent in size, unchanged.  Atherosclerotic calcification is present within the aortic arch.  There is prominence of the central pulmonary vasculature.  There is increased density within the bases of both hemithoraces likely due to a combination of layering bilateral pleural effusions with bibasilar atelectasis/consolidation.  Mild pulmonary edema is a consideration.  Overall, findings appear similar to the prior examination.  There is no evidence for pneumothorax.  Bony structures are grossly intact.

## 2019-01-26 NOTE — PLAN OF CARE
Problem: Hypertension Comorbidity  Goal: Blood Pressure in Desired Range  Outcome: Ongoing (interventions implemented as appropriate)  Pt medicated with scheduled blood pressure medications. Vital signs monitored per orders and as needed

## 2019-01-26 NOTE — SUBJECTIVE & OBJECTIVE
Interval History: Dyspnea and LE edema improving but still not back to baseline. No new complaints today.    Review of Systems   HENT: Negative for congestion and ear discharge.    Eyes: Negative for blurred vision and discharge.   Cardiovascular: Positive for dyspnea on exertion and leg swelling. Negative for chest pain and claudication.   Respiratory: Negative for cough and hemoptysis.    Endocrine: Negative for cold intolerance and heat intolerance.     Objective:     Vital Signs (Most Recent):  Temp: 97.8 °F (36.6 °C) (01/26/19 1135)  Pulse: (!) 57 (01/26/19 1135)  Resp: 18 (01/26/19 1135)  BP: (!) 155/65 (01/26/19 1135)  SpO2: (!) 93 % (01/26/19 1135) Vital Signs (24h Range):  Temp:  [97.8 °F (36.6 °C)-99.5 °F (37.5 °C)] 97.8 °F (36.6 °C)  Pulse:  [57-73] 57  Resp:  [16-20] 18  SpO2:  [92 %-98 %] 93 %  BP: (149-185)/(65-85) 155/65     Weight: 56.5 kg (124 lb 9 oz)  Body mass index is 24.33 kg/m².     SpO2: (!) 93 %  O2 Device (Oxygen Therapy): room air      Intake/Output Summary (Last 24 hours) at 1/26/2019 1447  Last data filed at 1/26/2019 0958  Gross per 24 hour   Intake 800 ml   Output 2550 ml   Net -1750 ml       Lines/Drains/Airways     Peripheral Intravenous Line                 Peripheral IV - Single Lumen 01/25/19 0501 Posterior;Right Forearm 1 day                Physical Exam   Constitutional: She is oriented to person, place, and time. She appears well-developed and well-nourished.   HENT:   Head: Normocephalic and atraumatic.   Nose: Nose normal.   Mouth/Throat: No oropharyngeal exudate.   Eyes: Right eye exhibits no discharge. Left eye exhibits no discharge. No scleral icterus.   Neck: Normal range of motion. Neck supple. No JVD present.   Cardiovascular: Normal rate, regular rhythm, S1 normal and S2 normal. Exam reveals no gallop, no S3, no S4, no distant heart sounds, no friction rub, no midsystolic click and no opening snap.   No murmur heard.  Pulses:       Radial pulses are 2+ on the right  side, and 2+ on the left side.        Femoral pulses are 2+ on the right side, and 2+ on the left side.  Pulmonary/Chest: Effort normal and breath sounds normal. No respiratory distress. She has no wheezes. She has no rales. She exhibits no tenderness.   Abdominal: Soft. Bowel sounds are normal. She exhibits no distension. There is no tenderness. There is no rebound.   Musculoskeletal: Normal range of motion. She exhibits edema. She exhibits no tenderness or deformity.   Lymphadenopathy:     She has no cervical adenopathy.   Neurological: She is alert and oriented to person, place, and time. No cranial nerve deficit.   Skin: Skin is warm and dry.   Psychiatric: She has a normal mood and affect. Her behavior is normal.       Significant Labs: All pertinent lab results from the last 24 hours have been reviewed.    Significant Imaging: All pertinent images from the last 24h have been reviewed.

## 2019-01-26 NOTE — PT/OT/SLP DISCHARGE
Physical Therapy Discharge Summary    Name: Kristin Quintanilla  MRN: [id number]   Principal Problem: Acute deep vein thrombosis (DVT) of left peroneal vein     Patient Discharged from acute Physical Therapy on 1/26/19.  Please refer to prior PT noted date on 1/26/19 for functional status.     Assessment:     Patient has met all goals and is not appropriate for therapy.    Objective:     GOALS:   Multidisciplinary Problems     Physical Therapy Goals     Not on file          Multidisciplinary Problems (Resolved)        Problem: Physical Therapy Goal    Goal Priority Disciplines Outcome Goal Variances Interventions   Physical Therapy Goal   (Resolved)     PT, PT/OT Outcome(s) achieved                     Reasons for Discontinuation of Therapy Services  Satisfactory goal achievement.      Plan:     Patient Discharged to: Home with Home Health Service.    Chas Solorio, PT  1/26/2019

## 2019-01-26 NOTE — NURSING
Pt in bed watching TV with sitter at bedside. Plan for the night, meds, and orders discussed. Pt instructed on voiding in the measuring hat for accurate measurement of urine. Pt instructed to call for any shortness of breath. Fall precautions discussed. Call light in reach. No distress noted. Reassurance given

## 2019-01-26 NOTE — ASSESSMENT & PLAN NOTE
Uncontrolled in setting of volume overload and missed BP medication doses  - c/w home meds  - monitor closely with diuresis  - Cardiology following

## 2019-01-26 NOTE — PLAN OF CARE
Problem: Heart Failure Comorbidity  Goal: Maintenance of Heart Failure Symptom Control  Outcome: Ongoing (interventions implemented as appropriate)  I/Os monitored. Daily weights obtained. Pt monitored for shortness of breath. Prn breathing treatments ordered

## 2019-01-26 NOTE — PLAN OF CARE
Problem: Fall Injury Risk  Goal: Absence of Fall and Fall-Related Injury  Outcome: Ongoing (interventions implemented as appropriate)  Personal sitter at bedside. No falls noted. Pt ambulates with assist of staff/sitter using walker

## 2019-01-26 NOTE — PROGRESS NOTES
[facility]  Cardiology  Progress Note    Patient Name: Kristin Quintanilla  MRN: [id number]  Admission Date: 1/24/2019  Hospital Length of Stay: 0 days  Code Status: DNR   Attending Physician: Mary Anne Swanson MD   Primary Care Physician: Jairo Schmidt MD  Expected Discharge Date:   Principal Problem:Acute deep vein thrombosis (DVT) of left peroneal vein    Subjective:     Hospital Course:   No notes on file    Interval History: Dyspnea and LE edema improving but still not back to baseline. No new complaints today.    Review of Systems   HENT: Negative for congestion and ear discharge.    Eyes: Negative for blurred vision and discharge.   Cardiovascular: Positive for dyspnea on exertion and leg swelling. Negative for chest pain and claudication.   Respiratory: Negative for cough and hemoptysis.    Endocrine: Negative for cold intolerance and heat intolerance.     Objective:     Vital Signs (Most Recent):  Temp: 97.8 °F (36.6 °C) (01/26/19 1135)  Pulse: (!) 57 (01/26/19 1135)  Resp: 18 (01/26/19 1135)  BP: (!) 155/65 (01/26/19 1135)  SpO2: (!) 93 % (01/26/19 1135) Vital Signs (24h Range):  Temp:  [97.8 °F (36.6 °C)-99.5 °F (37.5 °C)] 97.8 °F (36.6 °C)  Pulse:  [57-73] 57  Resp:  [16-20] 18  SpO2:  [92 %-98 %] 93 %  BP: (149-185)/(65-85) 155/65     Weight: 56.5 kg (124 lb 9 oz)  Body mass index is 24.33 kg/m².     SpO2: (!) 93 %  O2 Device (Oxygen Therapy): room air      Intake/Output Summary (Last 24 hours) at 1/26/2019 1447  Last data filed at 1/26/2019 0958  Gross per 24 hour   Intake 800 ml   Output 2550 ml   Net -1750 ml       Lines/Drains/Airways     Peripheral Intravenous Line                 Peripheral IV - Single Lumen 01/25/19 0501 Posterior;Right Forearm 1 day                Physical Exam   Constitutional: She is oriented to person, place, and time. She appears well-developed and well-nourished.   HENT:   Head: Normocephalic and atraumatic.   Nose: Nose normal.   Mouth/Throat: No  oropharyngeal exudate.   Eyes: Right eye exhibits no discharge. Left eye exhibits no discharge. No scleral icterus.   Neck: Normal range of motion. Neck supple. No JVD present.   Cardiovascular: Normal rate, regular rhythm, S1 normal and S2 normal. Exam reveals no gallop, no S3, no S4, no distant heart sounds, no friction rub, no midsystolic click and no opening snap.   No murmur heard.  Pulses:       Radial pulses are 2+ on the right side, and 2+ on the left side.        Femoral pulses are 2+ on the right side, and 2+ on the left side.  Pulmonary/Chest: Effort normal and breath sounds normal. No respiratory distress. She has no wheezes. She has no rales. She exhibits no tenderness.   Abdominal: Soft. Bowel sounds are normal. She exhibits no distension. There is no tenderness. There is no rebound.   Musculoskeletal: Normal range of motion. She exhibits edema. She exhibits no tenderness or deformity.   Lymphadenopathy:     She has no cervical adenopathy.   Neurological: She is alert and oriented to person, place, and time. No cranial nerve deficit.   Skin: Skin is warm and dry.   Psychiatric: She has a normal mood and affect. Her behavior is normal.       Significant Labs: All pertinent lab results from the last 24 hours have been reviewed.    Significant Imaging: All pertinent images from the last 24h have been reviewed.      Assessment and Plan:       * Acute deep vein thrombosis (DVT) of left peroneal vein    Agree with eliquis, continue loading dosage with transition to maintenance per protocol     Acute on chronic diastolic heart failure    Volume overloaded on exam with significant peripheral edema  Continue lasix 40mg IV BID.   Once euvolemic, transition to oral regimen may be appropriate  HTN appears to be uncontrolled and there is likely an appropriate role for a thiazide.  Would recommend at discharge, that the patient is started back on home HCTZ 25 mg with daily weights, and using lasix 20mg daily PRN  weight gain (3-4 pounds in 2-3 days or 4+ lbs in 5 days) until the weight is off.    Discussed w Dr Arreola         VTE Risk Mitigation (From admission, onward)        Ordered     apixaban tablet 5 mg  2 times daily      01/25/19 0806     apixaban tablet 10 mg  2 times daily      01/25/19 0806     IP VTE HIGH RISK PATIENT  Once      01/25/19 0406          Jared Coon MD  Cardiology  [facility]

## 2019-01-26 NOTE — PHARMACY MED REC
"Admission Medication Reconciliation - Pharmacy Consult Note    The home medication history was taken by Janet Rodriguez, Pharmacy Tech.     You may go to "Admission" then "Reconcile Home Medications" tabs to review and/or act upon these items.      No issues noted with the medication reconciliation.      Potential issues to be addressed PRIOR TO DISCHARGE  o Patient request for refills  o Gabapentin      Kenisha Bowen, PharmD, BCPS  [id number]        .  .          "

## 2019-01-27 ENCOUNTER — PATIENT MESSAGE (OUTPATIENT)
Dept: CARDIOLOGY | Facility: CLINIC | Age: 84
End: 2019-01-27

## 2019-01-27 LAB
ALBUMIN SERPL BCP-MCNC: 2.2 G/DL
ALP SERPL-CCNC: 138 U/L
ALT SERPL W/O P-5'-P-CCNC: 35 U/L
ANION GAP SERPL CALC-SCNC: 8 MMOL/L
ANION GAP SERPL CALC-SCNC: 8 MMOL/L
AST SERPL-CCNC: 31 U/L
BASOPHILS # BLD AUTO: 0.04 K/UL
BASOPHILS NFR BLD: 0.8 %
BILIRUB SERPL-MCNC: 0.5 MG/DL
BUN SERPL-MCNC: 23 MG/DL
BUN SERPL-MCNC: 33 MG/DL
CALCIUM SERPL-MCNC: 9.3 MG/DL
CALCIUM SERPL-MCNC: 9.9 MG/DL
CHLORIDE SERPL-SCNC: 95 MMOL/L
CHLORIDE SERPL-SCNC: 96 MMOL/L
CO2 SERPL-SCNC: 30 MMOL/L
CO2 SERPL-SCNC: 33 MMOL/L
CREAT SERPL-MCNC: 1 MG/DL
CREAT SERPL-MCNC: 1.5 MG/DL
DIFFERENTIAL METHOD: ABNORMAL
EOSINOPHIL # BLD AUTO: 0.4 K/UL
EOSINOPHIL NFR BLD: 7.3 %
ERYTHROCYTE [DISTWIDTH] IN BLOOD BY AUTOMATED COUNT: 14.6 %
EST. GFR  (AFRICAN AMERICAN): 35.6 ML/MIN/1.73 M^2
EST. GFR  (AFRICAN AMERICAN): 58.1 ML/MIN/1.73 M^2
EST. GFR  (NON AFRICAN AMERICAN): 30.9 ML/MIN/1.73 M^2
EST. GFR  (NON AFRICAN AMERICAN): 50.4 ML/MIN/1.73 M^2
GLUCOSE SERPL-MCNC: 101 MG/DL
GLUCOSE SERPL-MCNC: 140 MG/DL
HCT VFR BLD AUTO: 26.1 %
HGB BLD-MCNC: 8.4 G/DL
IMM GRANULOCYTES # BLD AUTO: 0.01 K/UL
IMM GRANULOCYTES NFR BLD AUTO: 0.2 %
LYMPHOCYTES # BLD AUTO: 0.6 K/UL
LYMPHOCYTES NFR BLD: 12.7 %
MCH RBC QN AUTO: 30 PG
MCHC RBC AUTO-ENTMCNC: 32.2 G/DL
MCV RBC AUTO: 93 FL
MONOCYTES # BLD AUTO: 0.5 K/UL
MONOCYTES NFR BLD: 10.4 %
NEUTROPHILS # BLD AUTO: 3.3 K/UL
NEUTROPHILS NFR BLD: 68.6 %
NRBC BLD-RTO: 0 /100 WBC
PLATELET # BLD AUTO: 362 K/UL
PMV BLD AUTO: 9.7 FL
POTASSIUM SERPL-SCNC: 4 MMOL/L
POTASSIUM SERPL-SCNC: 4.3 MMOL/L
PROT SERPL-MCNC: 4.9 G/DL
RBC # BLD AUTO: 2.8 M/UL
SODIUM SERPL-SCNC: 134 MMOL/L
SODIUM SERPL-SCNC: 136 MMOL/L
WBC # BLD AUTO: 4.8 K/UL

## 2019-01-27 PROCEDURE — 80048 BASIC METABOLIC PNL TOTAL CA: CPT

## 2019-01-27 PROCEDURE — 99225 PR SUBSEQUENT OBSERVATION CARE,LEVEL II: CPT | Mod: GC,,, | Performed by: HOSPITALIST

## 2019-01-27 PROCEDURE — G0378 HOSPITAL OBSERVATION PER HR: HCPCS

## 2019-01-27 PROCEDURE — 85025 COMPLETE CBC W/AUTO DIFF WBC: CPT

## 2019-01-27 PROCEDURE — 63600175 PHARM REV CODE 636 W HCPCS: Performed by: STUDENT IN AN ORGANIZED HEALTH CARE EDUCATION/TRAINING PROGRAM

## 2019-01-27 PROCEDURE — 25000003 PHARM REV CODE 250: Performed by: STUDENT IN AN ORGANIZED HEALTH CARE EDUCATION/TRAINING PROGRAM

## 2019-01-27 PROCEDURE — 36415 COLL VENOUS BLD VENIPUNCTURE: CPT

## 2019-01-27 PROCEDURE — 25000003 PHARM REV CODE 250: Performed by: PHYSICIAN ASSISTANT

## 2019-01-27 PROCEDURE — 99225 PR SUBSEQUENT OBSERVATION CARE,LEVEL II: ICD-10-PCS | Mod: GC,,, | Performed by: HOSPITALIST

## 2019-01-27 PROCEDURE — 80053 COMPREHEN METABOLIC PANEL: CPT

## 2019-01-27 PROCEDURE — 25000003 PHARM REV CODE 250: Performed by: HOSPITALIST

## 2019-01-27 RX ORDER — HYDROCHLOROTHIAZIDE 25 MG/1
25 TABLET ORAL DAILY
Status: DISCONTINUED | OUTPATIENT
Start: 2019-01-27 | End: 2019-01-28 | Stop reason: HOSPADM

## 2019-01-27 RX ORDER — FUROSEMIDE 10 MG/ML
40 INJECTION INTRAMUSCULAR; INTRAVENOUS ONCE
Status: COMPLETED | OUTPATIENT
Start: 2019-01-27 | End: 2019-01-27

## 2019-01-27 RX ORDER — HYDROCHLOROTHIAZIDE 25 MG/1
25 TABLET ORAL DAILY
Qty: 90 TABLET | Refills: 3 | Status: ON HOLD | OUTPATIENT
Start: 2019-01-28 | End: 2019-04-15 | Stop reason: HOSPADM

## 2019-01-27 RX ORDER — HYDROCHLOROTHIAZIDE 25 MG/1
25 TABLET ORAL DAILY
Status: DISCONTINUED | OUTPATIENT
Start: 2019-01-27 | End: 2019-01-27

## 2019-01-27 RX ADMIN — AMLODIPINE BESYLATE 10 MG: 10 TABLET ORAL at 09:01

## 2019-01-27 RX ADMIN — APIXABAN 10 MG: 5 TABLET, FILM COATED ORAL at 09:01

## 2019-01-27 RX ADMIN — VITAMIN D, TAB 1000IU (100/BT) 1000 UNITS: 25 TAB at 09:01

## 2019-01-27 RX ADMIN — HYDROCHLOROTHIAZIDE 25 MG: 25 TABLET ORAL at 12:01

## 2019-01-27 RX ADMIN — FUROSEMIDE 40 MG: 10 INJECTION, SOLUTION INTRAVENOUS at 06:01

## 2019-01-27 RX ADMIN — ASPIRIN 81 MG: 81 TABLET, COATED ORAL at 09:01

## 2019-01-27 RX ADMIN — ATORVASTATIN CALCIUM 20 MG: 20 TABLET, FILM COATED ORAL at 09:01

## 2019-01-27 RX ADMIN — LABETALOL HCL 300 MG: 300 TABLET, FILM COATED ORAL at 09:01

## 2019-01-27 RX ADMIN — FUROSEMIDE 40 MG: 10 INJECTION, SOLUTION INTRAVENOUS at 10:01

## 2019-01-27 RX ADMIN — LOSARTAN POTASSIUM 100 MG: 50 TABLET, FILM COATED ORAL at 09:01

## 2019-01-27 RX ADMIN — APIXABAN 10 MG: 5 TABLET, FILM COATED ORAL at 08:01

## 2019-01-27 RX ADMIN — GABAPENTIN 200 MG: 100 CAPSULE ORAL at 09:01

## 2019-01-27 RX ADMIN — LEVOTHYROXINE SODIUM 75 MCG: 25 TABLET ORAL at 05:01

## 2019-01-27 NOTE — PLAN OF CARE
Problem: Adult Inpatient Plan of Care  Goal: Plan of Care Review  Outcome: Ongoing (interventions implemented as appropriate)  Will continue to monitor pt's safety, pct also at bedside.  Will continue to monitor for s/s of infection, and will also monitor pt for pain.

## 2019-01-27 NOTE — ASSESSMENT & PLAN NOTE
Aortic valve insufficiency  - recently taken off of hctz for hyponatremia, cardiology recommending restart HCTZ 25 on dc with 20 PO Lasix PRN with weight gain    - hypervolemic on exam with evidence of pulmonary edema on CXR and elevated BNP  - Diurese with lasix, but monitor closely as lasix naive and sodium levels tenuous - STABLE  - cardiology consulted and appreciate recs (Dr. Triana to see patient)  - recent TTE (1/12) shows EF 65, indeterminate diast fxn, and moderately sized pericardial effusion (no tamponade)  - c/w BB, ARB  - strict I/O, daily STANDING weights, fluid restriction  - Restart HCTZ 25, continue to monitor daily STANDING weights and BP. Still appears fluid overloaded on exam (BLE pitting edema), will give IV Lasix 40 this afternoon and continue to monitor fluid status

## 2019-01-27 NOTE — PROGRESS NOTES
[facility]  Cardiology  Progress Note    Patient Name: Kristin Quintanilla  MRN: [id number]  Admission Date: 1/24/2019  Hospital Length of Stay: 0 days  Code Status: DNR   Attending Physician: Mary Anne Swanson MD   Primary Care Physician: Jairo Schmidt MD  Expected Discharge Date:   Principal Problem:Acute deep vein thrombosis (DVT) of left peroneal vein    Subjective:     Hospital Course:   No notes on file    Interval History: No events overnight, diuresed 2.5L, ins not tracked.     Tele: SR 60-75    ROS  Objective:     Vital Signs (Most Recent):  Temp: 97.9 °F (36.6 °C) (01/27/19 0802)  Pulse: 64 (01/27/19 0802)  Resp: 17 (01/27/19 0802)  BP: (!) 181/71 (01/27/19 0802)  SpO2: (!) 93 % (01/27/19 0802) Vital Signs (24h Range):  Temp:  [96.6 °F (35.9 °C)-98.7 °F (37.1 °C)] 97.9 °F (36.6 °C)  Pulse:  [57-68] 64  Resp:  [16-18] 17  SpO2:  [90 %-94 %] 93 %  BP: (148-189)/(65-78) 181/71     Weight: 56.5 kg (124 lb 9 oz)  Body mass index is 24.33 kg/m².     SpO2: (!) 93 %  O2 Device (Oxygen Therapy): room air      Intake/Output Summary (Last 24 hours) at 1/27/2019 0832  Last data filed at 1/27/2019 0129  Gross per 24 hour   Intake --   Output 2550 ml   Net -2550 ml       Lines/Drains/Airways     Peripheral Intravenous Line                 Peripheral IV - Single Lumen 01/25/19 0501 Posterior;Right Forearm 2 days                Physical Exam   Constitutional: She is oriented to person, place, and time. She appears well-developed.   skinny   HENT:   Head: Normocephalic and atraumatic.   Nose: Nose normal.   Mouth/Throat: No oropharyngeal exudate.   Eyes: Right eye exhibits no discharge. Left eye exhibits no discharge. No scleral icterus.   Neck: Normal range of motion. Neck supple. No JVD present.   Cardiovascular: Normal rate, regular rhythm, S1 normal and S2 normal. Exam reveals no gallop, no S3, no S4, no distant heart sounds, no friction rub, no midsystolic click and no opening snap.   No murmur  heard.  Pulses:       Radial pulses are 2+ on the right side, and 2+ on the left side.        Femoral pulses are 2+ on the right side, and 2+ on the left side.  Pulmonary/Chest: Effort normal and breath sounds normal. No respiratory distress. She has no wheezes. She has no rales. She exhibits no tenderness.   Abdominal: Soft. Bowel sounds are normal. She exhibits no distension. There is no tenderness. There is no rebound.   Musculoskeletal: Normal range of motion. She exhibits edema. She exhibits no tenderness (trace) or deformity.   Lymphadenopathy:     She has no cervical adenopathy.   Neurological: She is alert and oriented to person, place, and time. No cranial nerve deficit.   Skin: Skin is warm and dry.   Psychiatric: She has a normal mood and affect. Her behavior is normal.       Significant Labs:   BMP: No results for input(s): GLU, NA, K, CL, CO2, BUN, CREATININE, CALCIUM, MG in the last 48 hours. and CBC   Recent Labs   Lab 01/26/19  0410   WBC 5.04   HGB 7.6*   HCT 23.6*            Assessment and Plan:       * Acute deep vein thrombosis (DVT) of left peroneal vein    Agree with eliquis, continue loading dosage with transition to maintenance per protocol     Acute on chronic diastolic heart failure    Improving edema, JVP is mid-neck,     Can start HCTZ today 25mg, monitor ins and outs  Please weigh on floor scale, can walk to it   Daily weights  If BP still elevated, can start another medication tomorrow  Continue Eliquis 7-day load, then transition to 2.5mg BID         VTE Risk Mitigation (From admission, onward)        Ordered     apixaban tablet 5 mg  2 times daily      01/25/19 0806     apixaban tablet 10 mg  2 times daily      01/25/19 0806     IP VTE HIGH RISK PATIENT  Once      01/25/19 0406          Jose Antonio Reeves MD  Cardiology  [facility]

## 2019-01-27 NOTE — PROGRESS NOTES
[facility] Hospital Medicine  Progress Note    Patient Name: Kristin Quintanilla  MRN: [id number]  Patient Class: OP- Observation   Admission Date: 1/24/2019  Length of Stay: 0 days  Attending Physician: Mary Anne Swanson MD  Primary Care Provider: Jairo Schmidt MD    [facility] Medicine Team: [facility] HOSP MED 2 Pierce García MD    Subjective:     Principal Problem:Acute deep vein thrombosis (DVT) of left peroneal vein    HPI:  Kristin Quintanilla is an 88 year old female with PMH of CAD, Diastolic HF, HTN, HLD, hypothyroidism, chronic anemia (Hgb 8-9), and spinal stenosis, DNR who was admitted to [facility] 01/07 - 01/14 for diuretic induced hyponatremia and ADHF. Her hyponatremia was treated with fluid restriction and discontinuation of HCTZ. CHF exacerbation was treated with IV lasix. 2D Echo showed large pericardial effusion w/o tamponade. Cardiology was consulted and no pericardiocentesis planned in conjunction with patient's goals of care to avoid invasive procedures. Patient was discharged home with home health and follow up scheduled at cardiology clinic with Dr. Triana on 01/25/19. Repeat 2D echo on 01/21 showed similar moderate sized pericardial effusion without tamponade.      She was sent to [place] ED today by home health nurse for evaluation of Leg swelling L > R and worsening GUERIN with associated 3 lb wt gain, orthopnea. She reports daily SOB but has increased over the last few days.  She has been mostly non-ambulatory over the last 10 days and uses a walker to ambulate. She has elevated D-dimer and BNP at OSH.  US doppler lower ext in ED showed left peroneal vein DVT and complex left Baker's cyst. CXR showed mild pulmonary edema and small bilateral pleural effusions R >L. No evidence of bleeding and patient received lovenox 60 mg sq x 1 in ED for DVT. Family requesting transfer to [facility] and Dr. Chun spoke with patient's cardiologist Dr. Triana who promised to see patient tomorrow while  she is in the hospital (patient has clinic appointment tomorrow afternoon).     Hospital Course:  01/26/2019 - IV Lasix, cardiology following. Continue IV Lasix today, still net positive 8 lbs  01/27/2019 - Restart HCTZ per cards recs, Monitor STANDING weights and BP, IV Lasix 40 PM    Interval History: NAEON. Will restart HCTZ this morning (morning labs show normal renal function and electrolytes). Will continue to monitor daily STANDING weights and BP. Still appears fluid overloaded on exam (BLE pitting edema), will give IV Lasix 40 this afternoon and continue to monitor fluid status. Continue Eliquis.    Review of Systems   Constitutional: Positive for unexpected weight change (wt gain2-3 lbs in one day). Negative for appetite change, chills and fever.   HENT: Negative for congestion and rhinorrhea.    Eyes: Negative for photophobia and visual disturbance.   Respiratory: Positive for cough (dry), shortness of breath and wheezing. Negative for chest tightness.         + orthopnea   Cardiovascular: Positive for leg swelling (BLE). Negative for chest pain and palpitations.   Gastrointestinal: Negative for abdominal pain, diarrhea, nausea and vomiting.   Genitourinary: Negative for difficulty urinating and dysuria.   Musculoskeletal: Negative for back pain and gait problem.   Skin: Negative for rash and wound.   Neurological: Negative for dizziness and headaches.   Psychiatric/Behavioral: Negative for agitation and confusion.     Objective:     Vital Signs (Most Recent):  Temp: 97.9 °F (36.6 °C) (01/27/19 0802)  Pulse: 64 (01/27/19 0802)  Resp: 17 (01/27/19 0802)  BP: (!) 181/71 (01/27/19 0802)  SpO2: (!) 93 % (01/27/19 0802) Vital Signs (24h Range):  Temp:  [96.6 °F (35.9 °C)-98.7 °F (37.1 °C)] 97.9 °F (36.6 °C)  Pulse:  [57-68] 64  Resp:  [16-18] 17  SpO2:  [90 %-94 %] 93 %  BP: (148-189)/(65-78) 181/71     Weight: 56.5 kg (124 lb 9 oz)  Body mass index is 24.33 kg/m².    Intake/Output Summary (Last 24 hours) at  1/27/2019 1035  Last data filed at 1/27/2019 0900  Gross per 24 hour   Intake --   Output 2600 ml   Net -2600 ml      Physical Exam   Constitutional: She is oriented to person, place, and time. She appears well-developed and well-nourished.   Pleasant elderly female dyspneic while talking but able to hold conversation without abrupt changes in oxygenation   HENT:   Head: Normocephalic and atraumatic.   Eyes: EOM are normal. Pupils are equal, round, and reactive to light.   Neck: Normal range of motion. Neck supple. No JVD present.   Cardiovascular: Normal rate, regular rhythm and intact distal pulses.   Murmur (systolic) heard.  Pulmonary/Chest: Effort normal. No respiratory distress. She has no wheezes. She exhibits no tenderness.   Fine crackles, improved from yesterday   Abdominal: Soft. Bowel sounds are normal. She exhibits distension.   Musculoskeletal: Normal range of motion. She exhibits no edema.   Neurological: She is alert and oriented to person, place, and time.   Skin: Skin is warm and dry.   Psychiatric: She has a normal mood and affect. Her behavior is normal. Judgment and thought content normal.   Nursing note and vitals reviewed.      Significant Labs:   BMP:   Recent Labs   Lab 01/27/19  1001   *   *   K 4.0   CL 96   CO2 30*   BUN 23   CREATININE 1.0   CALCIUM 9.3     CBC:   Recent Labs   Lab 01/26/19  0410 01/27/19  1001   WBC 5.04 4.80   HGB 7.6* 8.4*   HCT 23.6* 26.1*    362*     CMP:   Recent Labs   Lab 01/27/19  1001   *   K 4.0   CL 96   CO2 30*   *   BUN 23   CREATININE 1.0   CALCIUM 9.3   PROT 4.9*   ALBUMIN 2.2*   BILITOT 0.5   ALKPHOS 138*   AST 31   ALT 35   ANIONGAP 8   EGFRNONAA 50.4*     Lipid Panel: No results for input(s): CHOL, HDL, LDLCALC, TRIG, CHOLHDL in the last 48 hours.  TSH:   Recent Labs   Lab 01/08/19  0957   TSH 1.942     Urine Culture: No results for input(s): LABURIN in the last 48 hours.  Urine Studies: No results for input(s): COLORU,  APPEARANCEUA, PHUR, SPECGRAV, PROTEINUA, GLUCUA, KETONESU, BILIRUBINUA, OCCULTUA, NITRITE, UROBILINOGEN, LEUKOCYTESUR, RBCUA, WBCUA, BACTERIA, SQUAMEPITHEL, HYALINECASTS in the last 48 hours.    Invalid input(s): COLIN  All pertinent labs within the past 24 hours have been reviewed.    Significant Imaging: I have reviewed all pertinent imaging results/findings within the past 24 hours.     US LE 1/24/19  Thrombus in the left peroneal vein.    Complex heterogeneous area near the sapheno-femoral junction.  Adjacent collaterals.  No internal vascularity.    Complex left Baker's cyst.  Simple right Yin's cyst.    CXR 1/24/19  The cardiac silhouette is prominent in size, unchanged.  Atherosclerotic calcification is present within the aortic arch.  There is prominence of the central pulmonary vasculature.  There is increased density within the bases of both hemithoraces likely due to a combination of layering bilateral pleural effusions with bibasilar atelectasis/consolidation.  Mild pulmonary edema is a consideration.  Overall, findings appear similar to the prior examination.  There is no evidence for pneumothorax.  Bony structures are grossly intact.      Assessment/Plan:      * Acute deep vein thrombosis (DVT) of left peroneal vein    D dimer positive and US RLE confirms thrombus in left peroneal vein  - etiology likely from venous stasis from decreased mobility  - given lovenox at OSH.  Will start with apixaban 10 mg PO BID x7 days (this AM)and transition to 5 mg PO BID  - monitor for any bleeding, no previous DVTs, PE     Debility    PT/OT consult     SOB (shortness of breath)    - likely multifactorial from heart failure, pericardial effusion, deconditioning  - although patient with GUERIN and DVT concerning for PE, do not feel imaging indicated at this time as patient started on AC   - Patient does not wish for pericardiocentesis (see previous documentation)  - oxygen PRN, but currently stable at 92% on RA      Essential hypertension    Uncontrolled in setting of volume overload and missed BP medication doses  - c/w home meds  - monitor closely with diuresis  - Cardiology following      Acute on chronic diastolic heart failure    Aortic valve insufficiency  - recently taken off of hctz for hyponatremia, cardiology recommending restart HCTZ 25 on dc with 20 PO Lasix PRN with weight gain    - hypervolemic on exam with evidence of pulmonary edema on CXR and elevated BNP  - Diurese with lasix, but monitor closely as lasix naive and sodium levels tenuous - STABLE  - cardiology consulted and appreciate recs (Dr. Triana to see patient)  - recent TTE (1/12) shows EF 65, indeterminate diast fxn, and moderately sized pericardial effusion (no tamponade)  - c/w BB, ARB  - strict I/O, daily STANDING weights, fluid restriction  - Restart HCTZ 25, continue to monitor daily STANDING weights and BP. Still appears fluid overloaded on exam (BLE pitting edema), will give IV Lasix 40 this afternoon and continue to monitor fluid status     Pericardial effusion    Pt does not want a pericardiocentesis  - monitor for now       VTE Risk Mitigation (From admission, onward)        Ordered     apixaban tablet 5 mg  2 times daily      01/25/19 0806     apixaban tablet 10 mg  2 times daily      01/25/19 0806     IP VTE HIGH RISK PATIENT  Once      01/25/19 0406              Pierce García MD  Department of Hospital Medicine   [facility]

## 2019-01-27 NOTE — PLAN OF CARE
[facility]    HOME HEALTH ORDERS  FACE TO FACE ENCOUNTER    Patient Name: Kristin Quintanilla  YOB: 1930    PCP: Jairo Schmidt MD   PCP Address: [address] [facility] / [place] [address]  PCP Phone Number: [phone]  PCP Fax: [phone]    Encounter Date: 01/28/2019    Admit to Home Health    Diagnoses:  Active Hospital Problems    Diagnosis  POA    *Acute deep vein thrombosis (DVT) of left peroneal vein [I82.492]  Yes    Debility [R53.81]  Yes    SOB (shortness of breath) [R06.02]  Yes    Essential hypertension [I10]  Yes    Pericardial effusion [I31.3]  Yes     Chronic    Acute on chronic diastolic heart failure [I50.33]  Yes    Aortic valve insufficiency [I35.1]  Yes     Chronic      Resolved Hospital Problems   No resolved problems to display.       Future Appointments   Date Time Provider Department Center   1/28/2019 10:00 AM Lenka Triana MD [facility] CARDIO [place]   1/31/2019  2:20 PM Jairo Schmidt MD [facility] MED [place]   3/27/2019 10:00 AM LAB, [place] [facility] LAB [place]   4/3/2019 11:30 AM Lenka Triana MD [facility] CARDIO [place]           I have seen and examined this patient face to face today. My clinical findings that support the need for the home health skilled services and home bound status are the following:  Weakness/numbness causing balance and gait disturbance due to Weakness/Debility and DVT making it taxing to leave home.  Requiring assistive device to leave home due to unsteady gait caused by  Weakness/Debility and DVT.    Allergies:  Review of patient's allergies indicates:   Allergen Reactions    Codeine     Darvocet a500 [propoxyphene n-acetaminophen] Nausea Only    Sulfa (sulfonamide antibiotics) Rash    Fosamax [alendronate]      Gastric ulcer       Diet: cardiac diet and 2 gram sodium diet    Activities: activity as tolerated    Nursing:   SN to complete comprehensive assessment including routine vital signs. Instruct on  disease process and s/s of complications to report to MD. Review/verify medication list sent home with the patient at time of discharge  and instruct patient/caregiver as needed. Frequency may be adjusted depending on start of care date.    Notify MD if SBP > 160 or < 90; DBP > 90 or < 50; HR > 120 or < 50; Temp > 101    CONSULTS:    Physical Therapy to evaluate and treat. Evaluate for home safety and equipment needs; Establish/upgrade home exercise program. Perform / instruct on therapeutic exercises, gait training, transfer training, and Range of Motion.  Occupational Therapy to evaluate and treat. Evaluate home environment for safety and equipment needs. Perform/Instruct on transfers, ADL training, ROM, and therapeutic exercises.    MISCELLANEOUS CARE:  Heart Failure:      SN to instruct on the following:    Instruct on the definition of CHF.   Instruct on the signs/sympoms of CHF to be reported.   Instruct on and monitor daily weights.   Instruct on factors that cause exacerbation.   Instruct on action, dose, schedule, and side effects of medications.   Instruct on diet as prescribed.   Instruct on activity allowed.   Instruct on life-style modifications for life long management of CHF   SN to assess compliance with daily weights, diet, medications, fluid retention,    safety precautions, activities permitted and life-style modifications.   Additional 1-2 SN visits per week as needed for signs and symptoms of CHF exacerbation.    For Weight Gain > 2-3 lbs in 1 day or 4-6 lbs over 1 week notify PCP for possible start different diuretics medication.     Medications: Review discharge medications with patient and family and provide education.      Current Discharge Medication List      START taking these medications    Details   apixaban (ELIQUIS) 5 mg (74 tabs) DsPk Take 2 tablets (10 mg) by mouth twice daily for 7 days, then take 1 tablet (5 mg) by mouth twice daily.  This is starter pack. Begin first  Qty: 74  tablet, Refills: 0    Continue the dose from 10 mg by mouth twice daily until 1/31/2019, then change the dose to 5 mg by mouth twice daily.       apixaban (ELIQUIS) 5 mg Tab Take 1 tablet (5 mg total) by mouth 2 (two) times daily. Start after using all of starter pack  Qty: 60 tablet, Refills: 11      furosemide (LASIX) 20 MG tablet Take 1 tablet (20 mg total) by mouth daily as needed (when weight gain of 3 lb in three days or 5 lb in one week).  Qty: 30 tablet, Refills: 0      hydroCHLOROthiazide (HYDRODIURIL) 25 MG tablet Take 1 tablet (25 mg total) by mouth once daily.  Qty: 90 tablet, Refills: 3         CONTINUE these medications which have NOT CHANGED    Details   acetaminophen (TYLENOL ARTHRITIS) 650 MG TbSR Take 650 mg by mouth nightly as needed (pain).       albuterol (PROVENTIL) 2.5 mg /3 mL (0.083 %) nebulizer solution Take 3 mLs (2.5 mg total) by nebulization every 4 (four) hours as needed for Wheezing or Shortness of Breath (chest tightness).  Qty: 1 Box, Refills: 6    Associated Diagnoses: Acute bronchitis, unspecified organism      albuterol (PROVENTIL/VENTOLIN HFA) 90 mcg/actuation inhaler Inhale 2 puffs into the lungs every 6 (six) hours as needed for Wheezing. Rescue  Qty: 6 g, Refills: 0    Associated Diagnoses: Acute bronchitis, unspecified organism      amLODIPine (NORVASC) 10 MG tablet TAKE 1 TABLET EVERY DAY  Qty: 90 tablet, Refills: 3      aspirin (ECOTRIN) 81 MG EC tablet Take 81 mg by mouth every other day. Pt taking one pill every other day.      atorvastatin (LIPITOR) 20 MG tablet TAKE 1 TABLET EVERY DAY  Qty: 90 tablet, Refills: 3    Associated Diagnoses: Essential hypertension      calcium carbonate/vitamin D3 (CALTRATE 600 + D ORAL) Take by mouth once daily.      chlorpheniramine maleate (CHLOR-TRIMETON REPETABS ORAL) Take 1 tablet by mouth daily as needed (hayfever).       docusate sodium (COLACE) 100 MG capsule Take 100 mg by mouth daily as needed for Constipation.       FLAXSEED-OMEGA3,6,9-FATTY ACID ORAL Take 1 capsule by mouth once daily.        gabapentin (NEURONTIN) 100 MG capsule Take 2 capsules (200 mg total) by mouth 2 (two) times daily.  Qty: 120 capsule, Refills: 11      guaifenesin 100 mg/5 ml (ROBITUSSIN) 100 mg/5 mL syrup Take 200 mg by mouth 3 (three) times daily as needed for Cough.      labetalol (NORMODYNE) 300 MG tablet Take 1 tablet (300 mg total) by mouth 2 (two) times daily.  Qty: 180 tablet, Refills: 3      levothyroxine (SYNTHROID) 75 MCG tablet TAKE 1 TABLET ONE TIME DAILY  Qty: 90 tablet, Refills: 3    Associated Diagnoses: Acquired hypothyroidism      losartan (COZAAR) 100 MG tablet TAKE 1 TABLET EVERY DAY  Qty: 90 tablet, Refills: 0      magnesium 250 mg Tab Take 1 tablet by mouth once daily.        multivitamin (ONE DAILY MULTIVITAMIN) per tablet Take 1 tablet by mouth once daily.      traMADol (ULTRAM) 50 mg tablet Take 1 tablet (50 mg total) by mouth every 12 (twelve) hours as needed for Pain.  Qty: 15 tablet, Refills: 0      triamcinolone acetonide 0.1% (KENALOG) 0.1 % cream APPLY TO THE AFFECTED AREA OF lower legs TWICE DAILY  Qty: 453.6 g, Refills: 1    Associated Diagnoses: Asteatotic eczema      vitamin D 1000 units Tab Take 1,000 Units by mouth once daily.       LUL BANKS LG MASK Spcr U UTD  Refills: 0             I certify that this patient is confined to her home and needs intermittent skilled nursing care, physical therapy and occupational therapy.

## 2019-01-27 NOTE — SUBJECTIVE & OBJECTIVE
Interval History: NAEON. Will restart HCTZ this morning (morning labs show normal renal function and electrolytes). Will continue to monitor daily STANDING weights and BP. Still appears fluid overloaded on exam (BLE pitting edema), will give IV Lasix 40 this afternoon and continue to monitor fluid status. Continue Eliquis.    Review of Systems   Constitutional: Positive for unexpected weight change (wt gain2-3 lbs in one day). Negative for appetite change, chills and fever.   HENT: Negative for congestion and rhinorrhea.    Eyes: Negative for photophobia and visual disturbance.   Respiratory: Positive for cough (dry), shortness of breath and wheezing. Negative for chest tightness.         + orthopnea   Cardiovascular: Positive for leg swelling (BLE). Negative for chest pain and palpitations.   Gastrointestinal: Negative for abdominal pain, diarrhea, nausea and vomiting.   Genitourinary: Negative for difficulty urinating and dysuria.   Musculoskeletal: Negative for back pain and gait problem.   Skin: Negative for rash and wound.   Neurological: Negative for dizziness and headaches.   Psychiatric/Behavioral: Negative for agitation and confusion.     Objective:     Vital Signs (Most Recent):  Temp: 97.9 °F (36.6 °C) (01/27/19 0802)  Pulse: 64 (01/27/19 0802)  Resp: 17 (01/27/19 0802)  BP: (!) 181/71 (01/27/19 0802)  SpO2: (!) 93 % (01/27/19 0802) Vital Signs (24h Range):  Temp:  [96.6 °F (35.9 °C)-98.7 °F (37.1 °C)] 97.9 °F (36.6 °C)  Pulse:  [57-68] 64  Resp:  [16-18] 17  SpO2:  [90 %-94 %] 93 %  BP: (148-189)/(65-78) 181/71     Weight: 56.5 kg (124 lb 9 oz)  Body mass index is 24.33 kg/m².    Intake/Output Summary (Last 24 hours) at 1/27/2019 1035  Last data filed at 1/27/2019 0900  Gross per 24 hour   Intake --   Output 2600 ml   Net -2600 ml      Physical Exam   Constitutional: She is oriented to person, place, and time. She appears well-developed and well-nourished.   Pleasant elderly female dyspneic while talking  but able to hold conversation without abrupt changes in oxygenation   HENT:   Head: Normocephalic and atraumatic.   Eyes: EOM are normal. Pupils are equal, round, and reactive to light.   Neck: Normal range of motion. Neck supple. No JVD present.   Cardiovascular: Normal rate, regular rhythm and intact distal pulses.   Murmur (systolic) heard.  Pulmonary/Chest: Effort normal. No respiratory distress. She has no wheezes. She exhibits no tenderness.   Fine crackles, improved from yesterday   Abdominal: Soft. Bowel sounds are normal. She exhibits distension.   Musculoskeletal: Normal range of motion. She exhibits no edema.   Neurological: She is alert and oriented to person, place, and time.   Skin: Skin is warm and dry.   Psychiatric: She has a normal mood and affect. Her behavior is normal. Judgment and thought content normal.   Nursing note and vitals reviewed.      Significant Labs:   BMP:   Recent Labs   Lab 01/27/19  1001   *   *   K 4.0   CL 96   CO2 30*   BUN 23   CREATININE 1.0   CALCIUM 9.3     CBC:   Recent Labs   Lab 01/26/19  0410 01/27/19  1001   WBC 5.04 4.80   HGB 7.6* 8.4*   HCT 23.6* 26.1*    362*     CMP:   Recent Labs   Lab 01/27/19  1001   *   K 4.0   CL 96   CO2 30*   *   BUN 23   CREATININE 1.0   CALCIUM 9.3   PROT 4.9*   ALBUMIN 2.2*   BILITOT 0.5   ALKPHOS 138*   AST 31   ALT 35   ANIONGAP 8   EGFRNONAA 50.4*     Lipid Panel: No results for input(s): CHOL, HDL, LDLCALC, TRIG, CHOLHDL in the last 48 hours.  TSH:   Recent Labs   Lab 01/08/19  0957   TSH 1.942     Urine Culture: No results for input(s): LABURIN in the last 48 hours.  Urine Studies: No results for input(s): COLORU, APPEARANCEUA, PHUR, SPECGRAV, PROTEINUA, GLUCUA, KETONESU, BILIRUBINUA, OCCULTUA, NITRITE, UROBILINOGEN, LEUKOCYTESUR, RBCUA, WBCUA, BACTERIA, SQUAMEPITHEL, HYALINECASTS in the last 48 hours.    Invalid input(s): WRIGHTSUR  All pertinent labs within the past 24 hours have been  reviewed.    Significant Imaging: I have reviewed all pertinent imaging results/findings within the past 24 hours.     US LE 1/24/19  Thrombus in the left peroneal vein.    Complex heterogeneous area near the sapheno-femoral junction.  Adjacent collaterals.  No internal vascularity.    Complex left Baker's cyst.  Simple right Yin's cyst.    CXR 1/24/19  The cardiac silhouette is prominent in size, unchanged.  Atherosclerotic calcification is present within the aortic arch.  There is prominence of the central pulmonary vasculature.  There is increased density within the bases of both hemithoraces likely due to a combination of layering bilateral pleural effusions with bibasilar atelectasis/consolidation.  Mild pulmonary edema is a consideration.  Overall, findings appear similar to the prior examination.  There is no evidence for pneumothorax.  Bony structures are grossly intact.

## 2019-01-27 NOTE — PLAN OF CARE
Problem: Adult Inpatient Plan of Care  Goal: Plan of Care Review  Outcome: Ongoing (interventions implemented as appropriate)  Patient  AAOX4 Vital signs stable. Safety and infection precautions taken. Patient denies pain/comfort at this time,chair is in lock position with call light in reach. Will cont to monitor.

## 2019-01-27 NOTE — ASSESSMENT & PLAN NOTE
Improving edema, JVP is mid-neck,     Can start HCTZ today 25mg, monitor ins and outs  Please weigh on floor scale, can walk to it   Daily weights  If BP still elevated, can start another medication tomorrow  Continue Eliquis 7-day load, then transition to 2.5mg BID

## 2019-01-27 NOTE — SUBJECTIVE & OBJECTIVE
Interval History: No events overnight, diuresed 2.5L, ins not tracked.     Tele: SR 60-75    ROS  Objective:     Vital Signs (Most Recent):  Temp: 97.9 °F (36.6 °C) (01/27/19 0802)  Pulse: 64 (01/27/19 0802)  Resp: 17 (01/27/19 0802)  BP: (!) 181/71 (01/27/19 0802)  SpO2: (!) 93 % (01/27/19 0802) Vital Signs (24h Range):  Temp:  [96.6 °F (35.9 °C)-98.7 °F (37.1 °C)] 97.9 °F (36.6 °C)  Pulse:  [57-68] 64  Resp:  [16-18] 17  SpO2:  [90 %-94 %] 93 %  BP: (148-189)/(65-78) 181/71     Weight: 56.5 kg (124 lb 9 oz)  Body mass index is 24.33 kg/m².     SpO2: (!) 93 %  O2 Device (Oxygen Therapy): room air      Intake/Output Summary (Last 24 hours) at 1/27/2019 0832  Last data filed at 1/27/2019 0129  Gross per 24 hour   Intake --   Output 2550 ml   Net -2550 ml       Lines/Drains/Airways     Peripheral Intravenous Line                 Peripheral IV - Single Lumen 01/25/19 0501 Posterior;Right Forearm 2 days                Physical Exam   Constitutional: She is oriented to person, place, and time. She appears well-developed.   skinny   HENT:   Head: Normocephalic and atraumatic.   Nose: Nose normal.   Mouth/Throat: No oropharyngeal exudate.   Eyes: Right eye exhibits no discharge. Left eye exhibits no discharge. No scleral icterus.   Neck: Normal range of motion. Neck supple. No JVD present.   Cardiovascular: Normal rate, regular rhythm, S1 normal and S2 normal. Exam reveals no gallop, no S3, no S4, no distant heart sounds, no friction rub, no midsystolic click and no opening snap.   No murmur heard.  Pulses:       Radial pulses are 2+ on the right side, and 2+ on the left side.        Femoral pulses are 2+ on the right side, and 2+ on the left side.  Pulmonary/Chest: Effort normal and breath sounds normal. No respiratory distress. She has no wheezes. She has no rales. She exhibits no tenderness.   Abdominal: Soft. Bowel sounds are normal. She exhibits no distension. There is no tenderness. There is no rebound.    Musculoskeletal: Normal range of motion. She exhibits edema. She exhibits no tenderness (trace) or deformity.   Lymphadenopathy:     She has no cervical adenopathy.   Neurological: She is alert and oriented to person, place, and time. No cranial nerve deficit.   Skin: Skin is warm and dry.   Psychiatric: She has a normal mood and affect. Her behavior is normal.       Significant Labs:   BMP: No results for input(s): GLU, NA, K, CL, CO2, BUN, CREATININE, CALCIUM, MG in the last 48 hours. and CBC   Recent Labs   Lab 01/26/19  0410   WBC 5.04   HGB 7.6*   HCT 23.6*

## 2019-01-28 VITALS
OXYGEN SATURATION: 94 % | SYSTOLIC BLOOD PRESSURE: 119 MMHG | HEIGHT: 60 IN | DIASTOLIC BLOOD PRESSURE: 56 MMHG | BODY MASS INDEX: 23.85 KG/M2 | WEIGHT: 121.5 LBS | RESPIRATION RATE: 18 BRPM | TEMPERATURE: 99 F | HEART RATE: 61 BPM

## 2019-01-28 LAB
ALBUMIN SERPL BCP-MCNC: 2.3 G/DL
ALP SERPL-CCNC: 129 U/L
ALT SERPL W/O P-5'-P-CCNC: 40 U/L
ANION GAP SERPL CALC-SCNC: 6 MMOL/L
AST SERPL-CCNC: 34 U/L
BASOPHILS # BLD AUTO: 0.05 K/UL
BASOPHILS NFR BLD: 0.9 %
BILIRUB SERPL-MCNC: 0.4 MG/DL
BUN SERPL-MCNC: 31 MG/DL
CALCIUM SERPL-MCNC: 9.4 MG/DL
CHLORIDE SERPL-SCNC: 97 MMOL/L
CO2 SERPL-SCNC: 33 MMOL/L
CREAT SERPL-MCNC: 1.3 MG/DL
DIFFERENTIAL METHOD: ABNORMAL
EOSINOPHIL # BLD AUTO: 0.5 K/UL
EOSINOPHIL NFR BLD: 8.5 %
ERYTHROCYTE [DISTWIDTH] IN BLOOD BY AUTOMATED COUNT: 14.8 %
EST. GFR  (AFRICAN AMERICAN): 42.3 ML/MIN/1.73 M^2
EST. GFR  (NON AFRICAN AMERICAN): 36.7 ML/MIN/1.73 M^2
GLUCOSE SERPL-MCNC: 87 MG/DL
HCT VFR BLD AUTO: 25.9 %
HGB BLD-MCNC: 8.3 G/DL
IMM GRANULOCYTES # BLD AUTO: 0.02 K/UL
IMM GRANULOCYTES NFR BLD AUTO: 0.4 %
LYMPHOCYTES # BLD AUTO: 1.1 K/UL
LYMPHOCYTES NFR BLD: 19.4 %
MAGNESIUM SERPL-MCNC: 1.8 MG/DL
MCH RBC QN AUTO: 30.4 PG
MCHC RBC AUTO-ENTMCNC: 32 G/DL
MCV RBC AUTO: 95 FL
MONOCYTES # BLD AUTO: 0.8 K/UL
MONOCYTES NFR BLD: 14.3 %
NEUTROPHILS # BLD AUTO: 3.2 K/UL
NEUTROPHILS NFR BLD: 56.5 %
NRBC BLD-RTO: 0 /100 WBC
PLATELET # BLD AUTO: 384 K/UL
PMV BLD AUTO: 9.9 FL
POTASSIUM SERPL-SCNC: 4.5 MMOL/L
PROT SERPL-MCNC: 5.1 G/DL
RBC # BLD AUTO: 2.73 M/UL
SODIUM SERPL-SCNC: 136 MMOL/L
WBC # BLD AUTO: 5.67 K/UL

## 2019-01-28 PROCEDURE — 25000003 PHARM REV CODE 250: Performed by: HOSPITALIST

## 2019-01-28 PROCEDURE — 94640 AIRWAY INHALATION TREATMENT: CPT

## 2019-01-28 PROCEDURE — 80053 COMPREHEN METABOLIC PANEL: CPT

## 2019-01-28 PROCEDURE — 99217 PR OBSERVATION CARE DISCHARGE: ICD-10-PCS | Mod: GC,,, | Performed by: HOSPITALIST

## 2019-01-28 PROCEDURE — 99217 PR OBSERVATION CARE DISCHARGE: CPT | Mod: GC,,, | Performed by: HOSPITALIST

## 2019-01-28 PROCEDURE — 25000242 PHARM REV CODE 250 ALT 637 W/ HCPCS: Performed by: HOSPITALIST

## 2019-01-28 PROCEDURE — 36415 COLL VENOUS BLD VENIPUNCTURE: CPT

## 2019-01-28 PROCEDURE — G0378 HOSPITAL OBSERVATION PER HR: HCPCS

## 2019-01-28 PROCEDURE — 25000003 PHARM REV CODE 250: Performed by: STUDENT IN AN ORGANIZED HEALTH CARE EDUCATION/TRAINING PROGRAM

## 2019-01-28 PROCEDURE — 25000003 PHARM REV CODE 250: Performed by: PHYSICIAN ASSISTANT

## 2019-01-28 PROCEDURE — 83735 ASSAY OF MAGNESIUM: CPT

## 2019-01-28 PROCEDURE — 85025 COMPLETE CBC W/AUTO DIFF WBC: CPT

## 2019-01-28 RX ORDER — FUROSEMIDE 20 MG/1
20 TABLET ORAL DAILY PRN
Qty: 30 TABLET | Refills: 0 | Status: SHIPPED | OUTPATIENT
Start: 2019-01-28 | End: 2019-03-18 | Stop reason: SDUPTHER

## 2019-01-28 RX ADMIN — VITAMIN D, TAB 1000IU (100/BT) 1000 UNITS: 25 TAB at 08:01

## 2019-01-28 RX ADMIN — AMLODIPINE BESYLATE 10 MG: 10 TABLET ORAL at 08:01

## 2019-01-28 RX ADMIN — HYDROCHLOROTHIAZIDE 25 MG: 25 TABLET ORAL at 08:01

## 2019-01-28 RX ADMIN — ATORVASTATIN CALCIUM 20 MG: 20 TABLET, FILM COATED ORAL at 08:01

## 2019-01-28 RX ADMIN — IPRATROPIUM BROMIDE AND ALBUTEROL SULFATE 3 ML: .5; 3 SOLUTION RESPIRATORY (INHALATION) at 01:01

## 2019-01-28 RX ADMIN — GABAPENTIN 200 MG: 100 CAPSULE ORAL at 08:01

## 2019-01-28 RX ADMIN — LOSARTAN POTASSIUM 100 MG: 50 TABLET, FILM COATED ORAL at 08:01

## 2019-01-28 RX ADMIN — APIXABAN 10 MG: 5 TABLET, FILM COATED ORAL at 08:01

## 2019-01-28 RX ADMIN — LEVOTHYROXINE SODIUM 75 MCG: 25 TABLET ORAL at 05:01

## 2019-01-28 RX ADMIN — LABETALOL HCL 300 MG: 300 TABLET, FILM COATED ORAL at 08:01

## 2019-01-28 NOTE — NURSING
Pt and family given discharge instructions/paperwork, verbalized understanding. Awaiting cardiology/primary team to give clearance then patient will be sent home with family via wheelchair escort. Margy Alexis RN

## 2019-01-28 NOTE — TELEPHONE ENCOUNTER
Spoke to Ty and the stated that he can bring pt in on 1/30/19 at 11am. Dr. Triana made aware and stated that it's ok for pt to be seen on 1/30/19 at 11am.

## 2019-01-28 NOTE — PT/OT/SLP DISCHARGE
Occupational Therapy Discharge Summary    Kristin Quintanilla  MRN: [id number]   Principal Problem: Hyponatremia      Patient Discharged from acute Occupational Therapy on 1/28/19.  Please refer to prior OT note dated 1/14/19 for functional status.    Assessment:      Patient has not met goals.    Objective:     GOALS:   Multidisciplinary Problems     Occupational Therapy Goals     Not on file          Multidisciplinary Problems (Resolved)        Problem: Occupational Therapy Goal    Goal Priority Disciplines Outcome Interventions   Occupational Therapy Goal   (Resolved)     OT, PT/OT Outcome(s) achieved    Description:  Goals to be met by: 1/24    Patient will increase functional independence with ADLs by performing:    UE Dressing with Supervision. - not met  LE Dressing with Supervision. - not met  Grooming while standing with Supervision. - not met  Toileting from toilet with Supervision for hygiene and clothing management. - not met  Toilet transfer to toilet with Supervision. - not met                        Reasons for Discontinuation of Therapy Services  Transfer to alternate level of care.      Plan:     Patient Discharged to: Home with Home Health Service    NOE Jama  1/28/2019

## 2019-01-28 NOTE — NURSING
Pt okay to leave by primary team, IV removed, pt left via wheelchair with family at 1340. Margy Alexis RN

## 2019-01-28 NOTE — DISCHARGE SUMMARY
[facility] Hospital Medicine  Discharge Summary      Patient Name: Kristin Quintanilla  MRN: [id number]  Admission Date: 1/24/2019  Hospital Length of Stay: 0 days  Discharge Date and Time:  01/28/2019 11:36 AM  Attending Physician: Mary Anne Swanson MD   Discharging Provider: Rip Guidry MD  Primary Care Provider: Jairo Schmidt MD  Hospital Medicine Team: [facility] HOSP MED 2 Rip Guidry MD    HPI:   Kristin Quintanilla is an 88 year old female with PMH of CAD, Diastolic HF, HTN, HLD, hypothyroidism, chronic anemia (Hgb 8-9), and spinal stenosis, DNR who was admitted to [facility] 01/07 - 01/14 for diuretic induced hyponatremia and ADHF. Her hyponatremia was treated with fluid restriction and discontinuation of HCTZ. CHF exacerbation was treated with IV lasix. 2D Echo showed large pericardial effusion w/o tamponade. Cardiology was consulted and no pericardiocentesis planned in conjunction with patient's goals of care to avoid invasive procedures. Patient was discharged home with home health and follow up scheduled at cardiology clinic with Dr. Triana on 01/25/19. Repeat 2D echo on 01/21 showed similar moderate sized pericardial effusion without tamponade.      She was sent to [place] ED today by home health nurse for evaluation of Leg swelling L > R and worsening GUERIN with associated 3 lb wt gain, orthopnea. She reports daily SOB but has increased over the last few days.  She has been mostly non-ambulatory over the last 10 days and uses a walker to ambulate. She has elevated D-dimer and BNP at OSH.  US doppler lower ext in ED showed left peroneal vein DVT and complex left Baker's cyst. CXR showed mild pulmonary edema and small bilateral pleural effusions R >L. No evidence of bleeding and patient received lovenox 60 mg sq x 1 in ED for DVT. Family requesting transfer to [facility] and Dr. Chun spoke with patient's cardiologist Dr. Triana who promised to see patient tomorrow while she is in the hospital  (patient has clinic appointment tomorrow afternoon).     * No surgery found *      Hospital Course:   01/26/2019 - IV Lasix, cardiology following. Continue IV Lasix today, still net positive 8 lbs  01/27/2019 - Restart HCTZ per cards recs, Monitor STANDING weights and BP, IV Lasix 40 PM     Patient admitted to obs for acute heart failure exacerbation as well as treatment for a DVT found in in the left peroneal vein. Patient was diuresed with IV furosemide with good urine output and improvement of her symptoms. She was started on apixaban for her DVT. Will need to follow up with PCP and cardiology. Discharged  Hemodynamically stable, afebrile on RA with greatly improved symptoms with minimal dyspnea on exertion.     Review of Systems   Constitutional: Negative for appetite change, chills and fever.   Eyes: Negative for photophobia and visual disturbance.   Respiratory: Positive for cough (dry) improving, shortness of breath (significantly improved). Negative for chest tightness.    Cardiovascular: Positive for leg swelling (BLE) improved. Negative for chest pain and palpitations.   Gastrointestinal: Negative for abdominal pain, diarrhea, nausea and vomiting.   Genitourinary: Negative for difficulty urinating and dysuria.   Musculoskeletal: Negative for back pain and gait problem.   Skin: Negative for rash and wound.   Neurological: Negative for dizziness and headaches.   Psychiatric/Behavioral: Negative for agitation and confusion.     Physical Exam   Constitutional: She is oriented to person, place, and time. She appears well-developed and well-nourished.   HENT:    Head: Normocephalic and atraumatic.   Eyes: EOM are normal.   Neck: Normal range of motion. Neck supple. No JVD  appreciated.  Cardiovascular: Normal rate, regular rhythm and intact distal pulses.  1+ pitting edema to midcalf.  Murmur (systolic) heard.  Pulmonary/Chest: Effort normal. No respiratory distress. She has no wheezes. She exhibits no  tenderness.   No rales appreciated   Abdominal: Soft. Bowel sounds are normal.  Musculoskeletal: Normal range of motion. =  Neurological: She is alert and oriented to person, place, and time.   Skin: Skin is warm and dry.   Psychiatric: She has a normal mood and affect. Her behavior is normal. Judgment and thought content normal.    Vitals reviewed.       Consults:   Consults (From admission, onward)        Status Ordering Provider     Inpatient consult to Cardiology  Once     Provider:  (Not yet assigned)    Completed SUZANNE HORN     IP consult to case management  Once     Provider:  (Not yet assigned)    Acknowledged SUZANNE HORN          No new Assessment & Plan notes have been filed under this hospital service since the last note was generated.  Service: Hospital Medicine    Final Active Diagnoses:    Diagnosis Date Noted POA    PRINCIPAL PROBLEM:  Acute deep vein thrombosis (DVT) of left peroneal vein [I82.492] 01/25/2019 Yes    Debility [R53.81] 01/25/2019 Yes    SOB (shortness of breath) [R06.02] 01/07/2019 Yes    Essential hypertension [I10] 01/07/2019 Yes    Pericardial effusion [I31.3] 08/15/2015 Yes     Chronic    Acute on chronic diastolic heart failure [I50.33] 11/11/2014 Yes    Aortic valve insufficiency [I35.1] 01/02/2013 Yes     Chronic      Problems Resolved During this Admission:       Discharged Condition: fair    Disposition: Home or Self Care    Follow Up:    Patient Instructions:      Diet Adult Regular     Notify your health care provider if you experience any of the following:  temperature >100.4     Notify your health care provider if you experience any of the following:  difficulty breathing or increased cough     Activity as tolerated       Significant Diagnostic Studies: Labs:   CMP   Recent Labs   Lab 01/27/19  1001 01/27/19  2115 01/28/19  0454   * 136 136   K 4.0 4.3 4.5   CL 96 95 97   CO2 30* 33* 33*   * 101 87   BUN 23 33* 31*   CREATININE 1.0 1.5* 1.3    CALCIUM 9.3 9.9 9.4   PROT 4.9*  --  5.1*   ALBUMIN 2.2*  --  2.3*   BILITOT 0.5  --  0.4   ALKPHOS 138*  --  129   AST 31  --  34   ALT 35  --  40   ANIONGAP 8 8 6*   ESTGFRAFRICA 58.1* 35.6* 42.3*   EGFRNONAA 50.4* 30.9* 36.7*    and CBC   Recent Labs   Lab 01/27/19  1001 01/28/19  0454   WBC 4.80 5.67   HGB 8.4* 8.3*   HCT 26.1* 25.9*   * 384*       Pending Diagnostic Studies:     None         Medications:  Reconciled Home Medications:      Medication List      START taking these medications    * ELIQUIS 5 mg (74 tabs) Dspk  Generic drug:  apixaban  Take 2 tablets (10 mg) by mouth twice daily for 7 days, then take 1 tablet (5 mg) by mouth twice daily.  This is starter pack. Begin first     * ELIQUIS 5 mg Tab  Generic drug:  apixaban  Take 1 tablet (5 mg total) by mouth 2 (two) times daily. Start after using all of starter pack  Start taking on:  2/1/2019     furosemide 20 MG tablet  Commonly known as:  LASIX  Take 1 tablet (20 mg total) by mouth daily as needed (when weight gain of 3 lb in three days or 5 lb in one week).     hydroCHLOROthiazide 25 MG tablet  Commonly known as:  HYDRODIURIL  Take 1 tablet (25 mg total) by mouth once daily.         * This list has 2 medication(s) that are the same as other medications prescribed for you. Read the directions carefully, and ask your doctor or other care provider to review them with you.            CONTINUE taking these medications    * albuterol 90 mcg/actuation inhaler  Commonly known as:  PROVENTIL/VENTOLIN HFA  Inhale 2 puffs into the lungs every 6 (six) hours as needed for Wheezing. Rescue     * albuterol 2.5 mg /3 mL (0.083 %) nebulizer solution  Commonly known as:  PROVENTIL  Take 3 mLs (2.5 mg total) by nebulization every 4 (four) hours as needed for Wheezing or Shortness of Breath (chest tightness).     amLODIPine 10 MG tablet  Commonly known as:  NORVASC  TAKE 1 TABLET EVERY DAY     aspirin 81 MG EC tablet  Commonly known as:  ECOTRIN  Take 81 mg  by mouth every other day. Pt taking one pill every other day.     atorvastatin 20 MG tablet  Commonly known as:  LIPITOR  TAKE 1 TABLET EVERY DAY     CALTRATE 600 + D ORAL  Take by mouth once daily.     CHLOR-TRIMETON REPETABS ORAL  Take 1 tablet by mouth daily as needed (hayfever).     docusate sodium 100 MG capsule  Commonly known as:  COLACE  Take 100 mg by mouth daily as needed for Constipation.     FLAXSEED-OMEGA3,6,9-FATTY ACID ORAL  Take 1 capsule by mouth once daily.     gabapentin 100 MG capsule  Commonly known as:  NEURONTIN  Take 2 capsules (200 mg total) by mouth 2 (two) times daily.     guaifenesin 100 mg/5 ml 100 mg/5 mL syrup  Commonly known as:  ROBITUSSIN  Take 200 mg by mouth 3 (three) times daily as needed for Cough.     labetalol 300 MG tablet  Commonly known as:  NORMODYNE  Take 1 tablet (300 mg total) by mouth 2 (two) times daily.     levothyroxine 75 MCG tablet  Commonly known as:  SYNTHROID  TAKE 1 TABLET ONE TIME DAILY     losartan 100 MG tablet  Commonly known as:  COZAAR  TAKE 1 TABLET EVERY DAY     magnesium 250 mg Tab  Take 1 tablet by mouth once daily.     ONE DAILY MULTIVITAMIN per tablet  Generic drug:  multivitamin  Take 1 tablet by mouth once daily.     OPTICHAMBER DARREN LG MASK Spcr  Generic drug:  inhalat.spacing dev,large mask  U UTD     traMADol 50 mg tablet  Commonly known as:  ULTRAM  Take 1 tablet (50 mg total) by mouth every 12 (twelve) hours as needed for Pain.     triamcinolone acetonide 0.1% 0.1 % cream  Commonly known as:  KENALOG  APPLY TO THE AFFECTED AREA OF lower legs TWICE DAILY     TYLENOL ARTHRITIS 650 MG Tbsr  Generic drug:  acetaminophen  Take 650 mg by mouth nightly as needed (pain).     vitamin D 1000 units Tab  Commonly known as:  VITAMIN D3  Take 1,000 Units by mouth once daily.         * This list has 2 medication(s) that are the same as other medications prescribed for you. Read the directions carefully, and ask your doctor or other care provider to  review them with you.                Indwelling Lines/Drains at time of discharge:   Lines/Drains/Airways          None          Time spent on the discharge of patient: 31 minutes  Patient was seen and examined on the date of discharge and determined to be suitable for discharge.         Rip Guidry MD  Department of Hospital Medicine  [facility]

## 2019-01-28 NOTE — PLAN OF CARE
Problem: Occupational Therapy Goal  Goal: Occupational Therapy Goal  Goals to be met by: 1/24    Patient will increase functional independence with ADLs by performing:    UE Dressing with Supervision. - not met  LE Dressing with Supervision. - not met  Grooming while standing with Supervision. - not met  Toileting from toilet with Supervision for hygiene and clothing management. - not met  Toilet transfer to toilet with Supervision. - not met      Outcome: Outcome(s) achieved Date Met: 01/28/19  No goals met.  Hospital discharge.

## 2019-01-28 NOTE — PLAN OF CARE
Problem: Fall Injury Risk  Goal: Absence of Fall and Fall-Related Injury  Outcome: Ongoing (interventions implemented as appropriate)  Pt encouraged to use call bell when needing assistance with ambulation. Pt using walker to ambulate in room and use restroom in room. Pt denies dizziness/denies unsteadiness.    Problem: Adult Inpatient Plan of Care  Goal: Absence of Hospital-Acquired Illness or Injury    Intervention: Prevent VTE (venous thromboembolism)  Pt has +1 edema to bilateral lower legs and feet. Pt stated this is a significant improvement (decrease in swelling) from the last few days.

## 2019-01-28 NOTE — PLAN OF CARE
01/28/19 0950   Final Note   Assessment Type Final Discharge Note   Anticipated Discharge Disposition Home-Health   What phone number can be called within the next 1-3 days to see how you are doing after discharge? [phone]   Hospital Follow Up  Appt(s) scheduled? Yes   Discharge plans and expectations educations in teach back method with documentation complete? Yes   Right Care Referral Info   Post Acute Recommendation Home-care     Future Appointments   Date Time Provider Department Center   1/31/2019  2:20 PM Jairo Schmidt MD [facility] MED [place]   3/27/2019 10:00 AM LAB, [place] [facility] [place]   4/3/2019 11:30 AM Lenka Triana MD [facility] CARDIO [place]

## 2019-01-28 NOTE — PLAN OF CARE
Problem: Adult Inpatient Plan of Care  Goal: Plan of Care Review  Outcome: Ongoing (interventions implemented as appropriate)  Will continue to monitor pt's safety, give medication to prevent DVT.

## 2019-01-28 NOTE — NURSING
Pt awaiting home oxygen delivery tank ordered by case management Carie YARBROUGH Reported it would be approximately 2-3 hours before delivery then patient will be discharged home. Margy Alexis RN

## 2019-01-30 ENCOUNTER — OFFICE VISIT (OUTPATIENT)
Dept: CARDIOLOGY | Facility: CLINIC | Age: 84
End: 2019-01-30
Payer: MEDICARE

## 2019-01-30 VITALS
HEIGHT: 64 IN | DIASTOLIC BLOOD PRESSURE: 63 MMHG | WEIGHT: 120.5 LBS | BODY MASS INDEX: 20.57 KG/M2 | SYSTOLIC BLOOD PRESSURE: 141 MMHG | HEART RATE: 61 BPM

## 2019-01-30 DIAGNOSIS — I31.39 PERICARDIAL EFFUSION: Chronic | ICD-10-CM

## 2019-01-30 DIAGNOSIS — I25.10 ASCVD (ARTERIOSCLEROTIC CARDIOVASCULAR DISEASE): Chronic | ICD-10-CM

## 2019-01-30 DIAGNOSIS — R53.81 DEBILITY: ICD-10-CM

## 2019-01-30 DIAGNOSIS — E78.2 MIXED HYPERLIPIDEMIA: Chronic | ICD-10-CM

## 2019-01-30 DIAGNOSIS — I82.452 ACUTE DEEP VEIN THROMBOSIS (DVT) OF LEFT PERONEAL VEIN: ICD-10-CM

## 2019-01-30 DIAGNOSIS — D64.9 ANEMIA, UNSPECIFIED TYPE: ICD-10-CM

## 2019-01-30 DIAGNOSIS — E03.9 HYPOTHYROIDISM (ACQUIRED): ICD-10-CM

## 2019-01-30 DIAGNOSIS — I10 ESSENTIAL HYPERTENSION: ICD-10-CM

## 2019-01-30 DIAGNOSIS — I50.33 ACUTE ON CHRONIC DIASTOLIC HEART FAILURE: Primary | ICD-10-CM

## 2019-01-30 DIAGNOSIS — I70.0 ATHEROSCLEROSIS OF AORTA: Chronic | ICD-10-CM

## 2019-01-30 DIAGNOSIS — I65.23 BILATERAL CAROTID ARTERY STENOSIS: Chronic | ICD-10-CM

## 2019-01-30 DIAGNOSIS — I35.1 NONRHEUMATIC AORTIC VALVE INSUFFICIENCY: Chronic | ICD-10-CM

## 2019-01-30 PROCEDURE — 1101F PT FALLS ASSESS-DOCD LE1/YR: CPT | Mod: CPTII,S$GLB,, | Performed by: INTERNAL MEDICINE

## 2019-01-30 PROCEDURE — 99999 PR PBB SHADOW E&M-EST. PATIENT-LVL III: CPT | Mod: PBBFAC,,, | Performed by: INTERNAL MEDICINE

## 2019-01-30 PROCEDURE — 99999 PR PBB SHADOW E&M-EST. PATIENT-LVL III: ICD-10-PCS | Mod: PBBFAC,,, | Performed by: INTERNAL MEDICINE

## 2019-01-30 PROCEDURE — 99215 OFFICE O/P EST HI 40 MIN: CPT | Mod: S$GLB,,, | Performed by: INTERNAL MEDICINE

## 2019-01-30 PROCEDURE — 1101F PR PT FALLS ASSESS DOC 0-1 FALLS W/OUT INJ PAST YR: ICD-10-PCS | Mod: CPTII,S$GLB,, | Performed by: INTERNAL MEDICINE

## 2019-01-30 PROCEDURE — 99215 PR OFFICE/OUTPT VISIT, EST, LEVL V, 40-54 MIN: ICD-10-PCS | Mod: S$GLB,,, | Performed by: INTERNAL MEDICINE

## 2019-01-30 NOTE — PROGRESS NOTES
Subjective:   Patient ID:  Kristin Quintanilla is a 88 y.o. female who presents for follow-up of Hospital Follow Up      HPI: Patient presents for a post -hospital discharge follow up.  In a recent past she was hospitalized twice; once with hyponatremia and bronchitis and another one with HFpEF and DVT.  She was diuresed and blood pressure medications were adjusted. She was discharged back on Hctz and Lasix as needed ( instructions for the weight gain). Eliquis was started for DVT.    She was discharged with HHN, physical and occupational therapy.  Blood work was scheduled for Monday.    She is slowly recuperating and gaining strength. She feels better.     Lab Results   Component Value Date     01/28/2019    K 4.5 01/28/2019    CL 97 01/28/2019    CO2 33 (H) 01/28/2019    BUN 31 (H) 01/28/2019    CREATININE 1.3 01/28/2019    GLU 87 01/28/2019    HGBA1C 6.2 07/16/2013    MG 1.8 01/28/2019    AST 34 01/28/2019    ALT 40 01/28/2019    ALBUMIN 2.3 (L) 01/28/2019    PROT 5.1 (L) 01/28/2019    BILITOT 0.4 01/28/2019    WBC 5.67 01/28/2019    HGB 8.3 (L) 01/28/2019    HCT 25.9 (L) 01/28/2019    HCT 26 (L) 01/07/2019    MCV 95 01/28/2019     (H) 01/28/2019    INR 1.0 04/25/2011    TSH 1.942 01/08/2019    CHOL 150 09/04/2018    HDL 61 09/04/2018    LDLCALC 77.0 09/04/2018    TRIG 60 09/04/2018       Review of Systems   Constitution: Positive for weakness, malaise/fatigue and weight loss.   HENT: Negative.    Eyes: Negative.    Cardiovascular: Positive for dyspnea on exertion and leg swelling. Negative for chest pain, claudication, irregular heartbeat, near-syncope, palpitations and syncope.   Respiratory: Positive for cough. Negative for shortness of breath, snoring and wheezing.    Endocrine: Negative.  Negative for cold intolerance, heat intolerance, polydipsia, polyphagia and polyuria.   Skin: Negative.    Musculoskeletal: Positive for arthritis, back pain, muscle cramps and muscle weakness.   Gastrointestinal:  "Negative.    Genitourinary: Negative.    Neurological: Positive for dizziness, light-headedness, loss of balance and numbness.   Psychiatric/Behavioral: Negative.        Objective:   Physical Exam   Constitutional: She is oriented to person, place, and time. She appears well-developed and well-nourished.   BP (!) 141/63   Pulse 61   Ht 5' 4" (1.626 m)   Wt 54.6 kg (120 lb 7.7 oz)   LMP  (LMP Unknown)   BMI 20.68 kg/m² .    Frail, elderly     HENT:   Head: Normocephalic.   Eyes: Pupils are equal, round, and reactive to light.   Neck: Normal range of motion. Neck supple. Carotid bruit is not present. No thyromegaly present.   Cardiovascular: Normal rate, regular rhythm and intact distal pulses. Exam reveals no gallop and no friction rub.   Murmur heard.   Early systolic murmur is present with a grade of 1/6 at the upper right sternal border.  High-pitched blowing decrescendo early diastolic murmur is present with a grade of 1/6 at the upper right sternal border.  Pulses:       Carotid pulses are 2+ on the right side, and 2+ on the left side.       Radial pulses are 2+ on the right side, and 2+ on the left side.        Femoral pulses are 2+ on the right side, and 2+ on the left side.       Popliteal pulses are 2+ on the right side, and 2+ on the left side.        Dorsalis pedis pulses are 2+ on the right side, and 2+ on the left side.        Posterior tibial pulses are 2+ on the right side, and 2+ on the left side.   Pulmonary/Chest: Effort normal and breath sounds normal. No respiratory distress. She has no wheezes. She has no rales. She exhibits no tenderness.   Abdominal: Soft. Bowel sounds are normal.   Musculoskeletal: Normal range of motion. She exhibits no edema.   Neurological: She is alert and oriented to person, place, and time.   Skin: Skin is warm and dry.   Psychiatric: She has a normal mood and affect.   Nursing note and vitals reviewed.        Assessment and Plan:     Problem List Items Addressed " This Visit        Cardiology Problems    Hyperlipidemia (Chronic)    Aortic valve insufficiency (Chronic)    Pericardial effusion (Chronic)    Bilateral carotid artery stenosis (Chronic)    ASCVD (arteriosclerotic cardiovascular disease) (Chronic)    Atherosclerosis of aorta (Chronic)    Acute on chronic diastolic heart failure - Primary    Essential hypertension    Acute deep vein thrombosis (DVT) of left peroneal vein       Other    Hypothyroidism (acquired)    Anemia    Debility             Medication List           Accurate as of 1/30/19 12:37 PM. If you have any questions, ask your nurse or doctor.               CHANGE how you take these medications    albuterol 2.5 mg /3 mL (0.083 %) nebulizer solution  Commonly known as:  PROVENTIL  Take 3 mLs (2.5 mg total) by nebulization every 4 (four) hours as needed for Wheezing or Shortness of Breath (chest tightness).  What changed:  Another medication with the same name was removed. Continue taking this medication, and follow the directions you see here.  Changed by:  Lenka Triana MD        CONTINUE taking these medications    amLODIPine 10 MG tablet  Commonly known as:  NORVASC  TAKE 1 TABLET EVERY DAY     atorvastatin 20 MG tablet  Commonly known as:  LIPITOR  TAKE 1 TABLET EVERY DAY     CALTRATE 600 + D ORAL     CHLOR-TRIMETON REPETABS ORAL     * ELIQUIS 5 mg (74 tabs) Dspk  Generic drug:  apixaban  Take 2 tablets (10 mg) by mouth twice daily for 7 days, then take 1 tablet (5 mg) by mouth twice daily.  This is starter pack. Begin first     * ELIQUIS 5 mg Tab  Generic drug:  apixaban  Take 1 tablet (5 mg total) by mouth 2 (two) times daily. Start after using all of starter pack  Start taking on:  2/1/2019     FLAXSEED-OMEGA3,6,9-FATTY ACID ORAL     furosemide 20 MG tablet  Commonly known as:  LASIX  Take 1 tablet (20 mg total) by mouth daily as needed (when weight gain of 3 lb in three days or 5 lb in one week).     gabapentin 100 MG capsule  Commonly known  as:  NEURONTIN  Take 2 capsules (200 mg total) by mouth 2 (two) times daily.     hydroCHLOROthiazide 25 MG tablet  Commonly known as:  HYDRODIURIL  Take 1 tablet (25 mg total) by mouth once daily.     labetalol 300 MG tablet  Commonly known as:  NORMODYNE  Take 1 tablet (300 mg total) by mouth 2 (two) times daily.     levothyroxine 75 MCG tablet  Commonly known as:  SYNTHROID  TAKE 1 TABLET ONE TIME DAILY     losartan 100 MG tablet  Commonly known as:  COZAAR  TAKE 1 TABLET EVERY DAY     magnesium 250 mg Tab     ONE DAILY MULTIVITAMIN per tablet  Generic drug:  multivitamin     OPTICHAMBER DARREN LG MASK Spcr  Generic drug:  inhalat.spacing dev,large mask     traMADol 50 mg tablet  Commonly known as:  ULTRAM  Take 1 tablet (50 mg total) by mouth every 12 (twelve) hours as needed for Pain.     triamcinolone acetonide 0.1% 0.1 % cream  Commonly known as:  KENALOG  APPLY TO THE AFFECTED AREA OF lower legs TWICE DAILY     TYLENOL ARTHRITIS 650 MG Tbsr  Generic drug:  acetaminophen     vitamin D 1000 units Tab  Commonly known as:  VITAMIN D3         * This list has 2 medication(s) that are the same as other medications prescribed for you. Read the directions carefully, and ask your doctor or other care provider to review them with you.            STOP taking these medications    aspirin 81 MG EC tablet  Commonly known as:  ECOTRIN  Stopped by:  Lenka Triana MD     docusate sodium 100 MG capsule  Commonly known as:  COLACE  Stopped by:  Lenka Triana MD     guaifenesin 100 mg/5 ml 100 mg/5 mL syrup  Commonly known as:  ROBITUSSIN  Stopped by:  Lenka Triana MD        Patient is quite frail and debilitated with poor nutritional status.  For now continue on the present regimen. Await blood work results on Monday.  Recommended continuing physical and occupational therapy.  May need nutritional assessment . For now protein shakes may be of some help. Defer to Dr. Rojas.  We had a long discussion with  patient and her son regarding assisted living place versus 24/7 home assistance.  We discussed BP and weight monitoring and diuretic therapy.  We discussed DVT long term management.  Follow-up in about 3 months (around 4/30/2019).

## 2019-01-31 ENCOUNTER — OFFICE VISIT (OUTPATIENT)
Dept: FAMILY MEDICINE | Facility: CLINIC | Age: 84
End: 2019-01-31
Payer: MEDICARE

## 2019-01-31 VITALS
DIASTOLIC BLOOD PRESSURE: 54 MMHG | WEIGHT: 121.25 LBS | HEIGHT: 64 IN | TEMPERATURE: 98 F | SYSTOLIC BLOOD PRESSURE: 140 MMHG | HEART RATE: 56 BPM | OXYGEN SATURATION: 97 % | BODY MASS INDEX: 20.7 KG/M2

## 2019-01-31 DIAGNOSIS — I50.33 ACUTE ON CHRONIC DIASTOLIC (CONGESTIVE) HEART FAILURE: Primary | ICD-10-CM

## 2019-01-31 DIAGNOSIS — I77.9 BILATERAL CAROTID ARTERY DISEASE, UNSPECIFIED TYPE: ICD-10-CM

## 2019-01-31 DIAGNOSIS — E87.1 HYPONATREMIA: ICD-10-CM

## 2019-01-31 DIAGNOSIS — I82.452 ACUTE DEEP VEIN THROMBOSIS (DVT) OF LEFT PERONEAL VEIN: ICD-10-CM

## 2019-01-31 DIAGNOSIS — I25.10 ASCVD (ARTERIOSCLEROTIC CARDIOVASCULAR DISEASE): ICD-10-CM

## 2019-01-31 DIAGNOSIS — I10 ESSENTIAL HYPERTENSION: ICD-10-CM

## 2019-01-31 DIAGNOSIS — I31.39 PERICARDIAL EFFUSION: ICD-10-CM

## 2019-01-31 DIAGNOSIS — R53.81 DEBILITY: ICD-10-CM

## 2019-01-31 DIAGNOSIS — C77.0 SECONDARY MALIGNANT NEOPLASM OF LYMPH NODES OF HEAD, FACE, OR NECK: ICD-10-CM

## 2019-01-31 DIAGNOSIS — D64.9 ANEMIA, UNSPECIFIED TYPE: ICD-10-CM

## 2019-01-31 DIAGNOSIS — I70.0 ATHEROSCLEROSIS OF AORTA: ICD-10-CM

## 2019-01-31 DIAGNOSIS — M48.061 SPINAL STENOSIS OF LUMBAR REGION, UNSPECIFIED WHETHER NEUROGENIC CLAUDICATION PRESENT: ICD-10-CM

## 2019-01-31 DIAGNOSIS — G63 POLYNEUROPATHY IN OTHER DISEASES CLASSIFIED ELSEWHERE: ICD-10-CM

## 2019-01-31 PROCEDURE — 99215 PR OFFICE/OUTPT VISIT, EST, LEVL V, 40-54 MIN: ICD-10-PCS | Mod: S$GLB,,, | Performed by: INTERNAL MEDICINE

## 2019-01-31 PROCEDURE — 1101F PR PT FALLS ASSESS DOC 0-1 FALLS W/OUT INJ PAST YR: ICD-10-PCS | Mod: CPTII,S$GLB,, | Performed by: INTERNAL MEDICINE

## 2019-01-31 PROCEDURE — 99215 OFFICE O/P EST HI 40 MIN: CPT | Mod: S$GLB,,, | Performed by: INTERNAL MEDICINE

## 2019-01-31 PROCEDURE — 99499 RISK ADDL DX/OHS AUDIT: ICD-10-PCS | Mod: S$GLB,,, | Performed by: INTERNAL MEDICINE

## 2019-01-31 PROCEDURE — 99999 PR PBB SHADOW E&M-EST. PATIENT-LVL IV: ICD-10-PCS | Mod: PBBFAC,,, | Performed by: INTERNAL MEDICINE

## 2019-01-31 PROCEDURE — 99499 UNLISTED E&M SERVICE: CPT | Mod: S$GLB,,, | Performed by: INTERNAL MEDICINE

## 2019-01-31 PROCEDURE — 1101F PT FALLS ASSESS-DOCD LE1/YR: CPT | Mod: CPTII,S$GLB,, | Performed by: INTERNAL MEDICINE

## 2019-01-31 PROCEDURE — 99999 PR PBB SHADOW E&M-EST. PATIENT-LVL IV: CPT | Mod: PBBFAC,,, | Performed by: INTERNAL MEDICINE

## 2019-01-31 NOTE — PROGRESS NOTES
Chief complaint:  Follow-up from the hospital    88-year-old female whose PCP retired.  . here with her son who is a patient of mine.    Patient has been into the hospital at least twice.  We discussed at great length the series of events and the son went over them in detail.  At some point she was felt to be volume overload.  She did have some respiratory infectious symptoms prior.  She did have pericardial effusion which was documented to be reducing.  She then after developed some edema worse on the left and was found to have a DVT.  She had some significant problems with hyponatremia with a sodium down to 116 with weakness and mental status changes as would be expected.  She was taken off the HCTZ.  While in the hospital her blood pressures were over 200 and it would respond to the HCTZ when given back.  She has been back on the HCTZ after the last admission only about 3 days.  We reviewed labs.  Son and family are properly concerned about lab follow-up and we will arrange appropriate labs about 1 week after initiating the HCTZ.  I gave a prescription to bring to home health since they said they could draw it and have drawn blood already.    Concerned regarding anemia which was chronic but her hemoglobin now has been around 8 without any obvious bleeding.  We did discuss the greater concern now that she is on anticoagulant.    Long discussion regarding her independence.  Currently they have 24 hr sitters.  Today was the 1st day she was out using the walker on her own instead of the wheelchair.  Her son think she is improving very much so.  We discussed keeping the sitters and family but allowing patient to function around the home as if they are not present so that we can assess her true abilities, independence and so forth.  They do have monitoring devices and so forth.  One of her sons lives across the street.  They are looking into assisted living.  Our conversation regarding all these detailed issues with  "extensive today.Total time over 45 minutes with over 50% counseling.      Seen cards this week:  she was hospitalized twice; once with hyponatremia and bronchitis and another one with HFpEF and DVT.  She was diuresed and blood pressure medications were adjusted. She was discharged back on Hctz and Lasix as needed ( instructions for the weight gain). Eliquis was started for DVT.  Patient is quite frail and debilitated with poor nutritional status.  For now continue on the present regimen. Await blood work results on Monday.  Recommended continuing physical and occupational therapy.  May need nutritional assessment . For now protein shakes may be of some help. Defer to Dr. Rojas.  We had a long discussion with patient and her son regarding assisted living place versus 24/7 home assistance.  We discussed BP and weight monitoring and diuretic therapy.  We discussed DVT long term management.      Pericardial effusion large to then moderate on last echo 1/21/19    ROS:   CONST: weight stable. EYES: no vision change. ENT: no sore throat. CV: no chest pain w/ exertion. RESP: no shortness of breath. GI: no nausea, vomiting, diarrhea. No dysphagia. : no urinary issues. MUSCULOSKELETAL: no new myalgias or arthralgias. SKIN: no new changes. NEURO: no focal deficits. PSYCH: no new issues. ENDOCRINE: no polyuria. HEME: no lymph nodes. ALLERGY: no general pruritis.      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      Arthritis     ASCVD (arteriosclerotic cardiovascular disease) 7/8/2014    Atherosclerosis of aorta 1/7/2016    "There is atherosclerosis of the thoracic aorta" - Xray Chest 7-     Back pain     Basal cell carcinoma 10/7/2013    Right nasal columellar, right lower eyelid, left medial eyelid    Basal cell carcinoma 2/2015    mid back    Chronic diastolic " heart failure 11/11/2014    Coronary artery disease     Gastric ulcer     Fosamax related.    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 suspicious lesion on the scalp that proved to be SCC with perineural invasion. She then manifest a posterior cervical lymph node that was actually metastatic cutaneous SCC. She declined a neck dissection, opting instead for RT, which she completed in January of 2015    Ulcer    CHF w nl EF 2019  DVT 2019    Past Surgical History:   Procedure Laterality Date    ADJACENT TISSUE TRANSFER/REARRANGEMENT N/A 5/14/2014    Performed by [facility]vin [facility]allos [facility] at [facility] OR 2ND FLR    APPENDECTOMY  8.14.2015    mucinous cystadenoma with low grade dysplasia.     APPENDECTOMY-LAPAROSCOPIC N/A 8/14/2015    Performed by Feroz Corado MD at [facility] OR 2ND FLR    BIOPSY-LYMPH NODE Right 10/27/2014    Performed by [facility]allos [facility] at [facility] OR 2ND FLR    CATARACT EXTRACTION      ou dr morales    EYE SURGERY      HYSTERECTOMY  1970     NIC/BSO. took HRT immediatiely after wards.     LAPAROSCOPY-DIAGNOSTIC  8/14/2015    Performed by Gianfranco Crenshaw MD at [facility] OR 2ND FLR    LYMPH NODE BIOPSY Right 10/27/2014    posterior triangle    Mohs excision of scalp SCC N/A 5/13/2014    Scalp flap N/A 5/14/204    posterior advancement-rotation    SKIN CANCER EXCISION      THYROIDECTOMY  1960's    hyperthyroidism     Social History     Social History    Marital status: ---lives alone and still drives     Spouse name: N/A    Number of children: N/A    Years of education: N/A     Occupational  History    Homemaker      Social History Main Topics    Smoking status: Former Smoker    Smokeless tobacco: Never Used      Comment: Quit 35 years ago    Alcohol use No    Drug use: No    Sexual activity: No     Other Topics Concern    Are You Pregnant Or Think You May Be? No    Breast-Feeding No     Social History Narrative    She is active and independent.      family history includes thyroid Cancer in her sister; Clotting disorder in her father; Eczema in her mother; Heart attack in her mother; Heart disease in her mother; Heart failure in her father and mother; Hypertension in her father and mother; No Known Problems in her brother, maternal aunt, maternal grandfather, maternal grandmother, maternal uncle, paternal aunt, paternal grandfather, paternal grandmother, and paternal uncle; Thyroid disease in her sister.       Gen: no distress  EYES: conjunctiva clear, non-icteric, PERRL  ENT: nose clear, nasal mucosa normal, oropharynx clear and moist, teeth good  NECK:supple, thyroid non-palpable  RESP: effort is good, lungs clear  CV: heart RRR w/o murmur, gallops or rubs; bilateral carotid bruits, trace pretibial edema, more so around the ankles  GI: abdomen soft, non-distended, non-tender, no hepatosplenomegaly  MS: gait normal, no clubbing or cyanosis of the digits  SKIN: no rashes, warm to touch    Kyleigh was seen today for hospital f/u.    Diagnoses and all orders for this visit:    Acute on chronic diastolic (congestive) heart failure, explained to them heart failure with preserved ejection fraction, blood pressure appears to be under good control but apparently was significantly elevated in the hospital.  She apparently does respond from a blood pressure standpoint well to the HCTZ.  They are well aware of the issues regarding hyponatremia but she has been on this medication chronically without such issues.  Perhaps she had a respiratory illness or infection, possibly viral leading to all of this,  "the pericardial effusions, hyponatremia and so forth.  We will probably never know.  Will need to monitor for hyponatremia now that she is back on HCTZ which apparently she is tolerating.  The worsening anemia may also been the affect of an infection, blood draws, illness and so forth and we will watch clinically, watching for bleeding now that she is on anticoagulants so forth.    Essential hypertension  -     Comprehensive metabolic panel; Future    Acute deep vein thrombosis (DVT) of left peroneal vein, no precipitating cause but does appear to have occurred after the initial admission so may have been related to her illness and the sedentary activity at that time.  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, face, or neck, noted    Hyponatremia, follow closely on HCTZ  -     Comprehensive metabolic panel; Future    Debility, discussed    Spinal stenosis of lumbar region, unspecified whether neurogenic claudication present, does not appear to be limiting her at this time    ASCVD (arteriosclerotic cardiovascular disease)    Pericardial effusion, follows with Cardiology and documented to be resolving    Anemia, unspecified type, acute on chronic without any suspected loss  -     CBC auto differential; Future    Other orders  -     Cancel: Pneumococcal Polysaccharide Vaccine (23 Valent) (SQ/IM)             clinical note will be sensitive since patient herself it is not the one with direct access."This note will not be shared with the patient."  "

## 2019-02-04 ENCOUNTER — PATIENT MESSAGE (OUTPATIENT)
Dept: FAMILY MEDICINE | Facility: CLINIC | Age: 84
End: 2019-02-04

## 2019-02-04 NOTE — TELEPHONE ENCOUNTER
Son had question about resuming the previous low-dose aspirin that she was on now that she is on Eliquis.  I would presume that without any active anginal symptoms or otherwise, we could stop the aspirin    ThanksJairo

## 2019-02-05 ENCOUNTER — PES CALL (OUTPATIENT)
Dept: ADMINISTRATIVE | Facility: CLINIC | Age: 84
End: 2019-02-05

## 2019-02-06 RX ORDER — LOSARTAN POTASSIUM 100 MG/1
TABLET ORAL
Qty: 90 TABLET | Refills: 1 | Status: ON HOLD | OUTPATIENT
Start: 2019-02-06 | End: 2019-04-15 | Stop reason: HOSPADM

## 2019-02-10 ENCOUNTER — PATIENT MESSAGE (OUTPATIENT)
Dept: FAMILY MEDICINE | Facility: CLINIC | Age: 84
End: 2019-02-10

## 2019-02-11 ENCOUNTER — TELEPHONE (OUTPATIENT)
Dept: FAMILY MEDICINE | Facility: CLINIC | Age: 84
End: 2019-02-11

## 2019-02-11 ENCOUNTER — PATIENT MESSAGE (OUTPATIENT)
Dept: FAMILY MEDICINE | Facility: CLINIC | Age: 84
End: 2019-02-11

## 2019-02-11 DIAGNOSIS — I10 ESSENTIAL HYPERTENSION: Primary | ICD-10-CM

## 2019-02-11 DIAGNOSIS — D64.9 ANEMIA, UNSPECIFIED TYPE: ICD-10-CM

## 2019-02-11 NOTE — TELEPHONE ENCOUNTER
----- Message from Emerald Geiger sent at 2/11/2019  3:05 PM CST -----  Contact: Mihaela/Vinay /[phone]/fax [phone]  Mihaela called to clarify it the patient needs labs ordered. Thank you.

## 2019-02-11 NOTE — TELEPHONE ENCOUNTER
Do you want labs redone; I see a message from today that her proxy sent stating you wanted to do repeat labs in 2 weeks.  Please advise.

## 2019-02-12 NOTE — TELEPHONE ENCOUNTER
Labs in for a good 2 weeks or a little bit more from her last lab on 02/04    Contact her son to get it set up

## 2019-02-13 ENCOUNTER — LAB VISIT (OUTPATIENT)
Dept: LAB | Facility: HOSPITAL | Age: 84
End: 2019-02-13
Attending: INTERNAL MEDICINE
Payer: MEDICARE

## 2019-02-13 ENCOUNTER — TELEPHONE (OUTPATIENT)
Dept: ADMINISTRATIVE | Facility: CLINIC | Age: 84
End: 2019-02-13

## 2019-02-13 DIAGNOSIS — I50.33 ACUTE ON CHRONIC DIASTOLIC HEART FAILURE: Primary | ICD-10-CM

## 2019-02-13 LAB
ANION GAP SERPL CALC-SCNC: 6 MMOL/L
BASOPHILS # BLD AUTO: 0.02 K/UL
BASOPHILS NFR BLD: 0.3 %
BUN SERPL-MCNC: 29 MG/DL
CALCIUM SERPL-MCNC: 9.6 MG/DL
CHLORIDE SERPL-SCNC: 104 MMOL/L
CO2 SERPL-SCNC: 28 MMOL/L
CREAT SERPL-MCNC: 1 MG/DL
DIFFERENTIAL METHOD: ABNORMAL
EOSINOPHIL # BLD AUTO: 0.3 K/UL
EOSINOPHIL NFR BLD: 5.1 %
ERYTHROCYTE [DISTWIDTH] IN BLOOD BY AUTOMATED COUNT: 15.8 %
EST. GFR  (AFRICAN AMERICAN): 58.1 ML/MIN/1.73 M^2
EST. GFR  (NON AFRICAN AMERICAN): 50.4 ML/MIN/1.73 M^2
GLUCOSE SERPL-MCNC: 124 MG/DL
HCT VFR BLD AUTO: 26.1 %
HGB BLD-MCNC: 8 G/DL
IMM GRANULOCYTES # BLD AUTO: 0.02 K/UL
IMM GRANULOCYTES NFR BLD AUTO: 0.3 %
LYMPHOCYTES # BLD AUTO: 1 K/UL
LYMPHOCYTES NFR BLD: 17.6 %
MCH RBC QN AUTO: 30.2 PG
MCHC RBC AUTO-ENTMCNC: 30.7 G/DL
MCV RBC AUTO: 99 FL
MONOCYTES # BLD AUTO: 0.7 K/UL
MONOCYTES NFR BLD: 11.3 %
NEUTROPHILS # BLD AUTO: 3.7 K/UL
NEUTROPHILS NFR BLD: 65.4 %
NRBC BLD-RTO: 0 /100 WBC
PLATELET # BLD AUTO: 239 K/UL
PMV BLD AUTO: 10.9 FL
POTASSIUM SERPL-SCNC: 4.1 MMOL/L
RBC # BLD AUTO: 2.65 M/UL
SODIUM SERPL-SCNC: 138 MMOL/L
WBC # BLD AUTO: 5.73 K/UL

## 2019-02-13 PROCEDURE — 85025 COMPLETE CBC W/AUTO DIFF WBC: CPT

## 2019-02-13 PROCEDURE — 80048 BASIC METABOLIC PNL TOTAL CA: CPT

## 2019-02-13 NOTE — TELEPHONE ENCOUNTER
[phone] for patient to have labs drawn next week

## 2019-02-13 NOTE — TELEPHONE ENCOUNTER
Home Health SOC 01/15/2019 - 03/15/2019 with [facility] - Dr. Denice Morales. Patient received SN, PT, OT and HHA services.

## 2019-02-14 ENCOUNTER — PATIENT MESSAGE (OUTPATIENT)
Dept: FAMILY MEDICINE | Facility: CLINIC | Age: 84
End: 2019-02-14

## 2019-02-14 DIAGNOSIS — Z12.11 COLON CANCER SCREENING: Primary | ICD-10-CM

## 2019-02-15 NOTE — TELEPHONE ENCOUNTER
Order for fit kit put in.  Please get ready and then contact family via my [facility] that is ready for pickup

## 2019-02-20 ENCOUNTER — LAB VISIT (OUTPATIENT)
Dept: LAB | Facility: HOSPITAL | Age: 84
End: 2019-02-20
Attending: INTERNAL MEDICINE
Payer: MEDICARE

## 2019-02-20 DIAGNOSIS — Z12.11 COLON CANCER SCREENING: ICD-10-CM

## 2019-02-20 PROCEDURE — 82274 ASSAY TEST FOR BLOOD FECAL: CPT

## 2019-02-21 LAB — HEMOCCULT STL QL IA: NEGATIVE

## 2019-02-25 ENCOUNTER — PATIENT MESSAGE (OUTPATIENT)
Dept: FAMILY MEDICINE | Facility: CLINIC | Age: 84
End: 2019-02-25

## 2019-02-25 DIAGNOSIS — D64.9 ANEMIA, UNSPECIFIED TYPE: Primary | ICD-10-CM

## 2019-02-28 ENCOUNTER — LAB VISIT (OUTPATIENT)
Dept: LAB | Facility: HOSPITAL | Age: 84
End: 2019-02-28
Attending: INTERNAL MEDICINE
Payer: MEDICARE

## 2019-02-28 DIAGNOSIS — D64.9 ANEMIA, UNSPECIFIED: Primary | ICD-10-CM

## 2019-02-28 LAB
BASOPHILS # BLD AUTO: 0.03 K/UL
BASOPHILS NFR BLD: 0.5 %
DIFFERENTIAL METHOD: ABNORMAL
EOSINOPHIL # BLD AUTO: 0.2 K/UL
EOSINOPHIL NFR BLD: 4 %
ERYTHROCYTE [DISTWIDTH] IN BLOOD BY AUTOMATED COUNT: 15.9 %
FERRITIN SERPL-MCNC: 50 NG/ML
HCT VFR BLD AUTO: 25.8 %
HGB BLD-MCNC: 8 G/DL
IMM GRANULOCYTES # BLD AUTO: 0.01 K/UL
IMM GRANULOCYTES NFR BLD AUTO: 0.2 %
LYMPHOCYTES # BLD AUTO: 1.1 K/UL
LYMPHOCYTES NFR BLD: 20.4 %
MCH RBC QN AUTO: 30 PG
MCHC RBC AUTO-ENTMCNC: 31 G/DL
MCV RBC AUTO: 97 FL
MONOCYTES # BLD AUTO: 0.6 K/UL
MONOCYTES NFR BLD: 10.1 %
NEUTROPHILS # BLD AUTO: 3.6 K/UL
NEUTROPHILS NFR BLD: 64.8 %
NRBC BLD-RTO: 0 /100 WBC
PLATELET # BLD AUTO: 208 K/UL
PMV BLD AUTO: 10.8 FL
RBC # BLD AUTO: 2.67 M/UL
WBC # BLD AUTO: 5.53 K/UL

## 2019-02-28 PROCEDURE — 85025 COMPLETE CBC W/AUTO DIFF WBC: CPT

## 2019-02-28 PROCEDURE — 82728 ASSAY OF FERRITIN: CPT

## 2019-03-04 ENCOUNTER — INITIAL CONSULT (OUTPATIENT)
Dept: OPHTHALMOLOGY | Facility: CLINIC | Age: 84
End: 2019-03-04
Payer: MEDICARE

## 2019-03-04 DIAGNOSIS — H02.059 INGROWING EYELASH OF UPPER LID: ICD-10-CM

## 2019-03-04 DIAGNOSIS — Z96.1 PSEUDOPHAKIA: ICD-10-CM

## 2019-03-04 DIAGNOSIS — H10.13 ALLERGIC CONJUNCTIVITIS OF BOTH EYES: Primary | ICD-10-CM

## 2019-03-04 DIAGNOSIS — H52.7 REFRACTIVE ERROR: ICD-10-CM

## 2019-03-04 PROCEDURE — 99999 PR PBB SHADOW E&M-EST. PATIENT-LVL I: ICD-10-PCS | Mod: PBBFAC,,, | Performed by: OPHTHALMOLOGY

## 2019-03-04 PROCEDURE — 92012 INTRM OPH EXAM EST PATIENT: CPT | Mod: S$GLB,,, | Performed by: OPHTHALMOLOGY

## 2019-03-04 PROCEDURE — 92012 PR EYE EXAM, EST PATIENT,INTERMED: ICD-10-PCS | Mod: S$GLB,,, | Performed by: OPHTHALMOLOGY

## 2019-03-04 PROCEDURE — 99999 PR PBB SHADOW E&M-EST. PATIENT-LVL I: CPT | Mod: PBBFAC,,, | Performed by: OPHTHALMOLOGY

## 2019-03-04 NOTE — PROGRESS NOTES
Subjective:       Patient ID: Kristin Quintanilla is a 88 y.o. female.    Chief Complaint: No chief complaint on file.    HPI     DSL- 7/3/18 Dr. Naqvi    89 y/o is here for blurry Va and eye allergies. 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 Pt eyes   has been itching. Pt denies floaters and flashes. Pt has a grow on RUL. Pt   would like a glasses recheck.     Eyemeds  Zaditor OU PRN  Soothe OU PRN     Last edited by Gracia Noland on 3/4/2019  3:12 PM. (History)             Assessment:       1. Allergic conjunctivitis of both eyes    2. Ingrowing eyelash of upper lid    3. Refractive error    4. Pseudophakia        Plan:       Allergic conj OU-Pt wants to try Pazeo.  Ingrowing RUL eyelash-Pt wants it removed.  RE-Pt wants to change MRx OD.      Trial of Pazeo OU.  Give MRx OD.  RTC 1-2 wks for removal of ingrowing eyelash of RUL.

## 2019-03-14 ENCOUNTER — PROCEDURE VISIT (OUTPATIENT)
Dept: OPHTHALMOLOGY | Facility: CLINIC | Age: 84
End: 2019-03-14
Payer: MEDICARE

## 2019-03-14 VITALS — SYSTOLIC BLOOD PRESSURE: 127 MMHG | HEART RATE: 64 BPM | DIASTOLIC BLOOD PRESSURE: 59 MMHG

## 2019-03-14 DIAGNOSIS — H02.059 INGROWING EYELASH OF UPPER LID: Primary | ICD-10-CM

## 2019-03-14 PROCEDURE — 67825 PR REVISE EYELASHES: ICD-10-PCS | Mod: E3,S$GLB,, | Performed by: OPHTHALMOLOGY

## 2019-03-14 PROCEDURE — 99499 NO LOS: ICD-10-PCS | Mod: S$GLB,,, | Performed by: OPHTHALMOLOGY

## 2019-03-14 PROCEDURE — 99499 UNLISTED E&M SERVICE: CPT | Mod: S$GLB,,, | Performed by: OPHTHALMOLOGY

## 2019-03-14 PROCEDURE — 67825 REVISE EYELASHES: CPT | Mod: E3,S$GLB,, | Performed by: OPHTHALMOLOGY

## 2019-03-14 NOTE — PROGRESS NOTES
Subjective:       Patient ID: Kristin Quintanilla is a 89 y.o. female.    Chief Complaint: Follow-up (10 day f/u for removal if ingrown eyelash RUL.)    HPI     Follow-up      Additional comments: 10 day f/u for removal if ingrown eyelash RUL.              Comments     10 day f/u for removal if ingrown eyelash RUL. Denies eye pain and no   noticeable VA changes since last visit.     Eye Meds: Sooth BID OU           Last edited by SANDY Alarcon on 3/14/2019  3:32 PM. (History)             Assessment:       1. Ingrowing eyelash of upper lid        Plan:       RUL ingrowing eyelash-Pt is here to have it removed today.      Removal of RUL ingrowing eyelash.  Maxitrol shelbi bid to RUL x 5-7 days.  RTC me prn.      Procedure note: 2% xylocaine injected into RUL. Betadine prep to RUL & RLL. 30g needle & forceps were used to remove ingrowing eyelash. Cautery used for hemostasis. Maxitrol shelbi applied to excision site. Pt tolerated procedure well.

## 2019-03-18 RX ORDER — FUROSEMIDE 20 MG/1
20 TABLET ORAL DAILY PRN
Qty: 30 TABLET | Refills: 12 | Status: SHIPPED | OUTPATIENT
Start: 2019-03-18 | End: 2019-04-05 | Stop reason: SDUPTHER

## 2019-03-19 ENCOUNTER — PATIENT MESSAGE (OUTPATIENT)
Dept: FAMILY MEDICINE | Facility: CLINIC | Age: 84
End: 2019-03-19

## 2019-03-19 DIAGNOSIS — I10 ESSENTIAL HYPERTENSION: ICD-10-CM

## 2019-03-19 DIAGNOSIS — E03.9 ACQUIRED HYPOTHYROIDISM: ICD-10-CM

## 2019-03-19 DIAGNOSIS — D64.9 ANEMIA, UNSPECIFIED TYPE: Primary | ICD-10-CM

## 2019-03-19 DIAGNOSIS — I50.33 ACUTE ON CHRONIC DIASTOLIC (CONGESTIVE) HEART FAILURE: ICD-10-CM

## 2019-03-19 RX ORDER — LEVOTHYROXINE SODIUM 75 UG/1
TABLET ORAL
Qty: 90 TABLET | Refills: 12 | Status: SHIPPED | OUTPATIENT
Start: 2019-03-19

## 2019-03-25 DIAGNOSIS — I10 ESSENTIAL HYPERTENSION: ICD-10-CM

## 2019-03-25 RX ORDER — ATORVASTATIN CALCIUM 20 MG/1
TABLET, FILM COATED ORAL
Qty: 90 TABLET | Refills: 3 | Status: SHIPPED | OUTPATIENT
Start: 2019-03-25

## 2019-03-25 RX ORDER — AMLODIPINE BESYLATE 10 MG/1
TABLET ORAL
Qty: 90 TABLET | Refills: 3 | Status: SHIPPED | OUTPATIENT
Start: 2019-03-25

## 2019-03-27 ENCOUNTER — TELEPHONE (OUTPATIENT)
Dept: CARDIOLOGY | Facility: CLINIC | Age: 84
End: 2019-03-27

## 2019-03-27 ENCOUNTER — LAB VISIT (OUTPATIENT)
Dept: LAB | Facility: HOSPITAL | Age: 84
End: 2019-03-27
Attending: INTERNAL MEDICINE
Payer: MEDICARE

## 2019-03-27 DIAGNOSIS — G63 POLYNEUROPATHY IN OTHER DISEASES CLASSIFIED ELSEWHERE: ICD-10-CM

## 2019-03-27 DIAGNOSIS — E78.2 MIXED HYPERLIPIDEMIA: ICD-10-CM

## 2019-03-27 DIAGNOSIS — D64.9 ANEMIA, UNSPECIFIED TYPE: ICD-10-CM

## 2019-03-27 DIAGNOSIS — E03.9 HYPOTHYROIDISM (ACQUIRED): ICD-10-CM

## 2019-03-27 DIAGNOSIS — I10 ESSENTIAL HYPERTENSION: ICD-10-CM

## 2019-03-27 DIAGNOSIS — I50.33 ACUTE ON CHRONIC DIASTOLIC (CONGESTIVE) HEART FAILURE: ICD-10-CM

## 2019-03-27 LAB
ALBUMIN SERPL BCP-MCNC: 2.8 G/DL (ref 3.5–5.2)
ALP SERPL-CCNC: 91 U/L (ref 55–135)
ALT SERPL W/O P-5'-P-CCNC: 25 U/L (ref 10–44)
ANION GAP SERPL CALC-SCNC: 5 MMOL/L (ref 8–16)
AST SERPL-CCNC: 30 U/L (ref 10–40)
BASOPHILS # BLD AUTO: 0.03 K/UL (ref 0–0.2)
BASOPHILS NFR BLD: 0.4 % (ref 0–1.9)
BILIRUB SERPL-MCNC: 0.4 MG/DL (ref 0.1–1)
BNP SERPL-MCNC: 425 PG/ML (ref 0–99)
BUN SERPL-MCNC: 42 MG/DL (ref 8–23)
CALCIUM SERPL-MCNC: 9.1 MG/DL (ref 8.7–10.5)
CHLORIDE SERPL-SCNC: 106 MMOL/L (ref 95–110)
CHOLEST SERPL-MCNC: 163 MG/DL (ref 120–199)
CHOLEST/HDLC SERPL: 2.4 {RATIO} (ref 2–5)
CO2 SERPL-SCNC: 28 MMOL/L (ref 23–29)
CREAT SERPL-MCNC: 1.4 MG/DL (ref 0.5–1.4)
DIFFERENTIAL METHOD: ABNORMAL
EOSINOPHIL # BLD AUTO: 0.3 K/UL (ref 0–0.5)
EOSINOPHIL NFR BLD: 4.1 % (ref 0–8)
ERYTHROCYTE [DISTWIDTH] IN BLOOD BY AUTOMATED COUNT: 15.4 % (ref 11.5–14.5)
EST. GFR  (AFRICAN AMERICAN): 38.4 ML/MIN/1.73 M^2
EST. GFR  (NON AFRICAN AMERICAN): 33.3 ML/MIN/1.73 M^2
FERRITIN SERPL-MCNC: 49 NG/ML (ref 20–300)
FERRITIN SERPL-MCNC: 49 NG/ML (ref 20–300)
GLUCOSE SERPL-MCNC: 85 MG/DL (ref 70–110)
HCT VFR BLD AUTO: 27.6 % (ref 37–48.5)
HDLC SERPL-MCNC: 67 MG/DL (ref 40–75)
HDLC SERPL: 41.1 % (ref 20–50)
HGB BLD-MCNC: 8.6 G/DL (ref 12–16)
IMM GRANULOCYTES # BLD AUTO: 0.03 K/UL (ref 0–0.04)
IMM GRANULOCYTES NFR BLD AUTO: 0.4 % (ref 0–0.5)
IRON SERPL-MCNC: 27 UG/DL (ref 30–160)
LDLC SERPL CALC-MCNC: 84.6 MG/DL (ref 63–159)
LYMPHOCYTES # BLD AUTO: 1.3 K/UL (ref 1–4.8)
LYMPHOCYTES NFR BLD: 18.2 % (ref 18–48)
MCH RBC QN AUTO: 29.6 PG (ref 27–31)
MCHC RBC AUTO-ENTMCNC: 31.2 G/DL (ref 32–36)
MCV RBC AUTO: 95 FL (ref 82–98)
MONOCYTES # BLD AUTO: 0.8 K/UL (ref 0.3–1)
MONOCYTES NFR BLD: 11.2 % (ref 4–15)
NEUTROPHILS # BLD AUTO: 4.5 K/UL (ref 1.8–7.7)
NEUTROPHILS NFR BLD: 65.7 % (ref 38–73)
NONHDLC SERPL-MCNC: 96 MG/DL
NRBC BLD-RTO: 0 /100 WBC
PLATELET # BLD AUTO: 226 K/UL (ref 150–350)
PMV BLD AUTO: 11.4 FL (ref 9.2–12.9)
POTASSIUM SERPL-SCNC: 4.3 MMOL/L (ref 3.5–5.1)
PROT SERPL-MCNC: 5.5 G/DL (ref 6–8.4)
RBC # BLD AUTO: 2.91 M/UL (ref 4–5.4)
SATURATED IRON: 8 % (ref 20–50)
SODIUM SERPL-SCNC: 139 MMOL/L (ref 136–145)
T4 FREE SERPL-MCNC: 1.03 NG/DL (ref 0.71–1.51)
TOTAL IRON BINDING CAPACITY: 324 UG/DL (ref 250–450)
TRANSFERRIN SERPL-MCNC: 219 MG/DL (ref 200–375)
TRIGL SERPL-MCNC: 57 MG/DL (ref 30–150)
TSH SERPL DL<=0.005 MIU/L-ACNC: 6.57 UIU/ML (ref 0.4–4)
WBC # BLD AUTO: 6.86 K/UL (ref 3.9–12.7)

## 2019-03-27 PROCEDURE — 82728 ASSAY OF FERRITIN: CPT | Mod: HCNC

## 2019-03-27 PROCEDURE — 84439 ASSAY OF FREE THYROXINE: CPT | Mod: HCNC

## 2019-03-27 PROCEDURE — 80053 COMPREHEN METABOLIC PANEL: CPT | Mod: HCNC

## 2019-03-27 PROCEDURE — 85025 COMPLETE CBC W/AUTO DIFF WBC: CPT | Mod: HCNC

## 2019-03-27 PROCEDURE — 36415 COLL VENOUS BLD VENIPUNCTURE: CPT | Mod: HCNC,PO

## 2019-03-27 PROCEDURE — 83880 ASSAY OF NATRIURETIC PEPTIDE: CPT | Mod: HCNC

## 2019-03-27 PROCEDURE — 84443 ASSAY THYROID STIM HORMONE: CPT | Mod: HCNC

## 2019-03-27 PROCEDURE — 83540 ASSAY OF IRON: CPT | Mod: HCNC

## 2019-03-27 PROCEDURE — 80061 LIPID PANEL: CPT | Mod: HCNC

## 2019-03-27 NOTE — TELEPHONE ENCOUNTER
----- Message from Lenka Triana MD sent at 3/27/2019  3:43 PM CDT -----  Please review.  We will discuss the results during your upcoming visit with me. Except for slightly elevated Thyroid function test no significant changes.

## 2019-03-28 ENCOUNTER — PATIENT MESSAGE (OUTPATIENT)
Dept: FAMILY MEDICINE | Facility: CLINIC | Age: 84
End: 2019-03-28

## 2019-04-03 ENCOUNTER — OFFICE VISIT (OUTPATIENT)
Dept: CARDIOLOGY | Facility: CLINIC | Age: 84
End: 2019-04-03
Payer: MEDICARE

## 2019-04-03 VITALS
BODY MASS INDEX: 21.89 KG/M2 | DIASTOLIC BLOOD PRESSURE: 72 MMHG | WEIGHT: 128.19 LBS | HEART RATE: 57 BPM | HEIGHT: 64 IN | SYSTOLIC BLOOD PRESSURE: 161 MMHG

## 2019-04-03 DIAGNOSIS — I25.10 ASCVD (ARTERIOSCLEROTIC CARDIOVASCULAR DISEASE): Primary | Chronic | ICD-10-CM

## 2019-04-03 DIAGNOSIS — I10 ESSENTIAL HYPERTENSION: ICD-10-CM

## 2019-04-03 DIAGNOSIS — I70.0 ATHEROSCLEROSIS OF AORTA: Chronic | ICD-10-CM

## 2019-04-03 DIAGNOSIS — I65.23 BILATERAL CAROTID ARTERY STENOSIS: Chronic | ICD-10-CM

## 2019-04-03 DIAGNOSIS — E78.2 MIXED HYPERLIPIDEMIA: Chronic | ICD-10-CM

## 2019-04-03 DIAGNOSIS — I31.39 PERICARDIAL EFFUSION: Chronic | ICD-10-CM

## 2019-04-03 DIAGNOSIS — I82.509 CHRONIC DEEP VEIN THROMBOSIS (DVT) OF LOWER EXTREMITY, UNSPECIFIED LATERALITY, UNSPECIFIED VEIN: ICD-10-CM

## 2019-04-03 DIAGNOSIS — I35.1 NONRHEUMATIC AORTIC VALVE INSUFFICIENCY: Chronic | ICD-10-CM

## 2019-04-03 DIAGNOSIS — G63 POLYNEUROPATHY IN OTHER DISEASES CLASSIFIED ELSEWHERE: ICD-10-CM

## 2019-04-03 DIAGNOSIS — I50.33 ACUTE ON CHRONIC DIASTOLIC HEART FAILURE: ICD-10-CM

## 2019-04-03 PROBLEM — E87.1 HYPONATREMIA: Status: RESOLVED | Noted: 2019-01-07 | Resolved: 2019-04-03

## 2019-04-03 PROCEDURE — 99214 OFFICE O/P EST MOD 30 MIN: CPT | Mod: HCNC,S$GLB,, | Performed by: INTERNAL MEDICINE

## 2019-04-03 PROCEDURE — 99214 PR OFFICE/OUTPT VISIT, EST, LEVL IV, 30-39 MIN: ICD-10-PCS | Mod: HCNC,S$GLB,, | Performed by: INTERNAL MEDICINE

## 2019-04-03 PROCEDURE — 99999 PR PBB SHADOW E&M-EST. PATIENT-LVL III: CPT | Mod: PBBFAC,HCNC,, | Performed by: INTERNAL MEDICINE

## 2019-04-03 PROCEDURE — 1101F PR PT FALLS ASSESS DOC 0-1 FALLS W/OUT INJ PAST YR: ICD-10-PCS | Mod: HCNC,CPTII,S$GLB, | Performed by: INTERNAL MEDICINE

## 2019-04-03 PROCEDURE — 1101F PT FALLS ASSESS-DOCD LE1/YR: CPT | Mod: HCNC,CPTII,S$GLB, | Performed by: INTERNAL MEDICINE

## 2019-04-03 PROCEDURE — 99999 PR PBB SHADOW E&M-EST. PATIENT-LVL III: ICD-10-PCS | Mod: PBBFAC,HCNC,, | Performed by: INTERNAL MEDICINE

## 2019-04-03 NOTE — PROGRESS NOTES
Subjective:   Patient ID:  Kristin Quintanilla is a 89 y.o. female who presents for follow-up of Hypertension and Shortness of Breath      HPI: Slowly recovering from last hospitalization for DVT and HFpEF.    Doing well, but not as well as she would like to be.  Blood work indicates slow improvement in H/H and protein/albumin. TSH slightly elevated.  She was recommended to take 40 mg LAsix for 3 days as she has gained 5 lbs in 1 week.  She has chronic peripheral edema.    Lab Results   Component Value Date     03/27/2019     03/27/2019     03/27/2019    K 4.3 03/27/2019    K 4.3 03/27/2019    K 4.3 03/27/2019     03/27/2019     03/27/2019     03/27/2019    CO2 28 03/27/2019    CO2 28 03/27/2019    CO2 28 03/27/2019    BUN 42 (H) 03/27/2019    BUN 42 (H) 03/27/2019    BUN 42 (H) 03/27/2019    CREATININE 1.4 03/27/2019    CREATININE 1.4 03/27/2019    CREATININE 1.4 03/27/2019    GLU 85 03/27/2019    GLU 85 03/27/2019    GLU 85 03/27/2019    HGBA1C 6.2 07/16/2013    MG 1.8 01/28/2019    AST 30 03/27/2019    ALT 25 03/27/2019    ALBUMIN 2.8 (L) 03/27/2019    PROT 5.5 (L) 03/27/2019    BILITOT 0.4 03/27/2019    WBC 6.86 03/27/2019    WBC 6.86 03/27/2019    WBC 6.86 03/27/2019    HGB 8.6 (L) 03/27/2019    HGB 8.6 (L) 03/27/2019    HGB 8.6 (L) 03/27/2019    HCT 27.6 (L) 03/27/2019    HCT 27.6 (L) 03/27/2019    HCT 27.6 (L) 03/27/2019    HCT 26 (L) 01/07/2019    MCV 95 03/27/2019    MCV 95 03/27/2019    MCV 95 03/27/2019     03/27/2019     03/27/2019     03/27/2019    INR 1.0 04/25/2011    TSH 6.570 (H) 03/27/2019    CHOL 163 03/27/2019    HDL 67 03/27/2019    LDLCALC 84.6 03/27/2019    TRIG 57 03/27/2019       Review of Systems   Constitution: Positive for malaise/fatigue.   HENT: Negative.    Eyes: Negative.    Cardiovascular: Positive for dyspnea on exertion and leg swelling. Negative for chest pain, claudication, irregular heartbeat, near-syncope, palpitations and  "syncope.   Respiratory: Positive for cough and wheezing. Negative for shortness of breath and snoring.    Endocrine: Negative.  Negative for cold intolerance, heat intolerance, polydipsia, polyphagia and polyuria.   Skin: Negative.    Musculoskeletal: Positive for arthritis, back pain, muscle cramps and muscle weakness.   Gastrointestinal: Negative.    Genitourinary: Negative.    Neurological: Positive for numbness.   Psychiatric/Behavioral: Negative.        Objective:   Physical Exam   Constitutional: She is oriented to person, place, and time. She appears well-developed and well-nourished.   BP (!) 161/72   Pulse (!) 57   Ht 5' 4" (1.626 m)   Wt 58.2 kg (128 lb 3.2 oz)   LMP  (LMP Unknown)   BMI 22.01 kg/m²   Frail.   HENT:   Head: Normocephalic.   Eyes: Pupils are equal, round, and reactive to light.   Neck: Normal range of motion. Neck supple. Carotid bruit is not present. No thyromegaly present.   Cardiovascular: Normal rate, regular rhythm and intact distal pulses. Exam reveals no gallop and no friction rub.   Murmur heard.   Midsystolic murmur is present with a grade of 1/6 at the upper right sternal border.   Decrescendo early diastolic murmur is present with a grade of 2/6 radiating to the apex.  Pulses:       Carotid pulses are 2+ on the right side, and 2+ on the left side.       Radial pulses are 2+ on the right side, and 2+ on the left side.        Femoral pulses are 2+ on the right side, and 2+ on the left side.       Popliteal pulses are 2+ on the right side, and 2+ on the left side.        Dorsalis pedis pulses are 2+ on the right side, and 2+ on the left side.        Posterior tibial pulses are 2+ on the right side, and 2+ on the left side.   Pulmonary/Chest: Effort normal and breath sounds normal. No respiratory distress. She has no wheezes. She has no rales. She exhibits no tenderness.   Abdominal: Soft. Bowel sounds are normal.   Musculoskeletal: Normal range of motion. She exhibits edema. "   1 + bilaterally   Neurological: She is alert and oriented to person, place, and time.   Skin: Skin is warm and dry.   Psychiatric: She has a normal mood and affect.   Nursing note and vitals reviewed.        Assessment and Plan:     Problem List Items Addressed This Visit        Cardiology Problems    Hyperlipidemia (Chronic)    Aortic valve insufficiency (Chronic)    Pericardial effusion (Chronic)    Bilateral carotid artery stenosis (Chronic)    ASCVD (arteriosclerotic cardiovascular disease) - Primary (Chronic)    Atherosclerosis of aorta (Chronic)    Acute on chronic diastolic heart failure    Essential hypertension    DVT (deep venous thrombosis)       Other    Polyneuropathy in other diseases classified elsewhere          Patient's Medications   New Prescriptions    No medications on file   Previous Medications    ACETAMINOPHEN (TYLENOL ARTHRITIS) 650 MG TBSR    Take 650 mg by mouth nightly as needed (pain).     ALBUTEROL (PROVENTIL) 2.5 MG /3 ML (0.083 %) NEBULIZER SOLUTION    Take 3 mLs (2.5 mg total) by nebulization every 4 (four) hours as needed for Wheezing or Shortness of Breath (chest tightness).    AMLODIPINE (NORVASC) 10 MG TABLET    TAKE 1 TABLET EVERY DAY    APIXABAN (ELIQUIS) 5 MG (74 TABS) DSPK    Take 2 tablets (10 mg) by mouth twice daily for 7 days, then take 1 tablet (5 mg) by mouth twice daily.  This is starter pack. Begin first    ATORVASTATIN (LIPITOR) 20 MG TABLET    TAKE 1 TABLET EVERY DAY    CALCIUM CARBONATE/VITAMIN D3 (CALTRATE 600 + D ORAL)    Take by mouth once daily.    CHLORPHENIRAMINE MALEATE (CHLOR-TRIMETON REPETABS ORAL)    Take 1 tablet by mouth daily as needed (hayfever).     FLAXSEED-OMEGA3,6,9-FATTY ACID ORAL    Take 1 capsule by mouth once daily.      FUROSEMIDE (LASIX) 20 MG TABLET    Take 1 tablet (20 mg total) by mouth daily as needed (when weight gain of 3 lb in three days or 5 lb in one week).    GABAPENTIN (NEURONTIN) 100 MG CAPSULE    Take 2 capsules (200 mg total)  by mouth 2 (two) times daily.    HYDROCHLOROTHIAZIDE (HYDRODIURIL) 25 MG TABLET    Take 1 tablet (25 mg total) by mouth once daily.    LABETALOL (NORMODYNE) 300 MG TABLET    Take 1 tablet (300 mg total) by mouth 2 (two) times daily.    LEVOTHYROXINE (SYNTHROID) 75 MCG TABLET    TAKE 1 TABLET EVERY DAY    LOSARTAN (COZAAR) 100 MG TABLET    TAKE 1 TABLET EVERY DAY    MAGNESIUM 250 MG TAB    Take 1 tablet by mouth once daily.      OPTICHAMBER DARREN LG MASK SPCR    U UTD    TRAMADOL (ULTRAM) 50 MG TABLET    Take 1 tablet (50 mg total) by mouth every 12 (twelve) hours as needed for Pain.    TRIAMCINOLONE ACETONIDE 0.1% (KENALOG) 0.1 % CREAM    APPLY TO THE AFFECTED AREA OF lower legs TWICE DAILY   Modified Medications    No medications on file   Discontinued Medications    MULTIVITAMIN (ONE DAILY MULTIVITAMIN) PER TABLET    Take 1 tablet by mouth once daily.    VITAMIN D 1000 UNITS TAB    Take 1,000 Units by mouth once daily.      40 minutes face to face time with Counseling More Than 50% of the Appointment Time was discussing multifactorial nature of peripheral edema and we spent time discussing anemia, low protein/albumin and high TSH possibly contributing to peripheral edema. Her blood work is improving including NPA.  I would recommend protein shakes and balanced diet, elevate legs, support hoses and avoiding salt in diet.  She can supplement Lasix if weight gain >5 lbs in 1 week, but only for couple of days.  Long discussion about assisted living place.  Agree with repeating TSH before changing any therapy.

## 2019-04-05 ENCOUNTER — PATIENT MESSAGE (OUTPATIENT)
Dept: FAMILY MEDICINE | Facility: CLINIC | Age: 84
End: 2019-04-05

## 2019-04-05 RX ORDER — FUROSEMIDE 20 MG/1
20 TABLET ORAL DAILY PRN
Qty: 90 TABLET | Refills: 3 | Status: ON HOLD | OUTPATIENT
Start: 2019-04-05 | End: 2019-04-15 | Stop reason: HOSPADM

## 2019-04-09 ENCOUNTER — PATIENT MESSAGE (OUTPATIENT)
Dept: FAMILY MEDICINE | Facility: CLINIC | Age: 84
End: 2019-04-09

## 2019-04-10 ENCOUNTER — PATIENT MESSAGE (OUTPATIENT)
Dept: FAMILY MEDICINE | Facility: CLINIC | Age: 84
End: 2019-04-10

## 2019-04-10 ENCOUNTER — OFFICE VISIT (OUTPATIENT)
Dept: FAMILY MEDICINE | Facility: CLINIC | Age: 84
DRG: 811 | End: 2019-04-10
Payer: MEDICARE

## 2019-04-10 VITALS
SYSTOLIC BLOOD PRESSURE: 120 MMHG | BODY MASS INDEX: 22.62 KG/M2 | WEIGHT: 132.5 LBS | HEIGHT: 64 IN | OXYGEN SATURATION: 96 % | HEART RATE: 61 BPM | TEMPERATURE: 99 F | DIASTOLIC BLOOD PRESSURE: 38 MMHG

## 2019-04-10 DIAGNOSIS — N18.30 CKD (CHRONIC KIDNEY DISEASE) STAGE 3, GFR 30-59 ML/MIN: ICD-10-CM

## 2019-04-10 DIAGNOSIS — M46.96 INFLAMMATORY SPONDYLOPATHY OF LUMBAR REGION: ICD-10-CM

## 2019-04-10 DIAGNOSIS — I50.33 ACUTE ON CHRONIC DIASTOLIC HEART FAILURE: ICD-10-CM

## 2019-04-10 DIAGNOSIS — I10 ESSENTIAL HYPERTENSION: ICD-10-CM

## 2019-04-10 DIAGNOSIS — I95.9 HYPOTENSIVE EPISODE: Primary | ICD-10-CM

## 2019-04-10 DIAGNOSIS — Z23 NEED FOR VACCINATION AGAINST STREPTOCOCCUS PNEUMONIAE USING PNEUMOCOCCAL CONJUGATE VACCINE 13: Primary | ICD-10-CM

## 2019-04-10 DIAGNOSIS — R60.0 LOWER EXTREMITY EDEMA: ICD-10-CM

## 2019-04-10 PROCEDURE — 1101F PT FALLS ASSESS-DOCD LE1/YR: CPT | Mod: HCNC,CPTII,S$GLB, | Performed by: PHYSICIAN ASSISTANT

## 2019-04-10 PROCEDURE — 99499 UNLISTED E&M SERVICE: CPT | Mod: S$GLB,,, | Performed by: PHYSICIAN ASSISTANT

## 2019-04-10 PROCEDURE — 99999 PR PBB SHADOW E&M-EST. PATIENT-LVL IV: CPT | Mod: PBBFAC,HCNC,, | Performed by: PHYSICIAN ASSISTANT

## 2019-04-10 PROCEDURE — 99999 PR PBB SHADOW E&M-EST. PATIENT-LVL IV: ICD-10-PCS | Mod: PBBFAC,HCNC,, | Performed by: PHYSICIAN ASSISTANT

## 2019-04-10 PROCEDURE — 99499 RISK ADDL DX/OHS AUDIT: ICD-10-PCS | Mod: S$GLB,,, | Performed by: PHYSICIAN ASSISTANT

## 2019-04-10 PROCEDURE — 1101F PR PT FALLS ASSESS DOC 0-1 FALLS W/OUT INJ PAST YR: ICD-10-PCS | Mod: HCNC,CPTII,S$GLB, | Performed by: PHYSICIAN ASSISTANT

## 2019-04-10 PROCEDURE — 99215 OFFICE O/P EST HI 40 MIN: CPT | Mod: HCNC,S$GLB,, | Performed by: PHYSICIAN ASSISTANT

## 2019-04-10 PROCEDURE — 99215 PR OFFICE/OUTPT VISIT, EST, LEVL V, 40-54 MIN: ICD-10-PCS | Mod: HCNC,S$GLB,, | Performed by: PHYSICIAN ASSISTANT

## 2019-04-10 RX ORDER — BUMETANIDE 1 MG/1
1 TABLET ORAL DAILY
Qty: 30 TABLET | Refills: 11 | Status: CANCELLED | OUTPATIENT
Start: 2019-04-10 | End: 2020-04-09

## 2019-04-10 NOTE — ASSESSMENT & PLAN NOTE
Hypotensive today, reviewed medication and will hold all except the beta blocker. Patient will monitor pressures with understanding that if it gets high again we will add back to the regimen one at a time. Will check kidney function again today. Patient will keep us informed of home pressures. Patient and son are capable of monitoring. Gave ER precautions.

## 2019-04-10 NOTE — ASSESSMENT & PLAN NOTE
Bilateral leg edema and weight gain of 13 lbs in 3 weeks. Took 40 mg of lasix last night. Will assess kidney function and address hypotension before increasing lasix anymore given decrease in urine and signs of low volume. Stable and no signs of overload or congestion on exam. CTA bilaterally with normal heart sounds. O2 sat 96%

## 2019-04-10 NOTE — PROGRESS NOTES
Patient Name: Kristin Quintanilla    : 3/12/1930  MRN: [id number]    Subjective:  Kristin is a 89 y.o. female who presents today for:    Chief Complaint   Patient presents with    Leg Swelling       HPI  Patient has multiple medical diagnoses as listed below in the history. She complains of leg swelling and weight gain for the last three weeks. She has a history of CHF and had a recent hospitalization () with acute on chronic CHF exacerbation. Her medications were adjusted upon discharge. She was taken off of the HCTZ and the amlodipine was increased. Her cardiologist recently placed her back on the HCTZ due to fluid overload. She is on four pressure lowering medications. She is on 20 mg lasix PRN, but has been instructed by cardiologist and PCP to double up on the lasix should she start having weight gain of a couple pounds per day. She was at 119 lbs in January and is now 131 lbs this morning with an increase of 13 lbs the last three weeks. She took 40 mg of lasix last night. She reports not urinating all day since 10 am this morning. She reports trying to drink 2 bottles of water a day, but is unsure about today. She does have some intermittent lightheadedness. She says she drinks a glass with her medication at least twice a day. She reports a baseline dyspnea that has not intensified. She reports having an increase in dry mouth and feeling dehydrated. I have reviewed her last BMP and CBC from one week ago, monitored by PCP. She has been anemic at 8.6 hemoglobin, a fecal stool sample was negative.  We discussed checking her blood counts and BMP again today to monitor kidneys given all the diuretics. She denies syncope, cough, increased dyspnea, orthopnea, chest pain or PND.     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    "   Arthritis     ASCVD (arteriosclerotic cardiovascular disease) 7/8/2014    Atherosclerosis of aorta 1/7/2016    "There is atherosclerosis of the thoracic aorta" - Xray Chest 7-     Back pain     Basal cell carcinoma 10/7/2013    Right nasal columellar, right lower eyelid, left medial eyelid    Basal cell carcinoma 2/2015    mid back    Chronic diastolic heart failure 11/11/2014    Coronary artery disease     Gastric ulcer     Fosamax related.    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     Hypothyroidism (acquired) 9/27/2016    Joint pain     Lymphedema of Neck 3/17/2016    Occlusion and stenosis of carotid artery without mention of cerebral infarction     Pelvic mass in female 7/21/2015    Polyneuropathy in other diseases classified elsewhere 1/7/2016 6-     Squamous cell carcinoma of scalp 5/2014    scalp vertex with + LN met 10/14 s/p radiation    Ulcer        Past Surgical History  Past Surgical History:   Procedure Laterality Date    ADJACENT TISSUE TRANSFER/REARRANGEMENT N/A 5/14/2014    Performed by Olvin Cevallos [facility] at [facility] OR 2ND FLR    APPENDECTOMY  8.14.2015    mucinous cystadenoma with low grade dysplasia.     APPENDECTOMY-LAPAROSCOPIC N/A 8/14/2015    Performed by Feroz Corado MD at [facility] OR 2ND FLR    BIOPSY-LYMPH NODE Right 10/27/2014    Performed by Olvin Cevallos MD at [facility] OR 2ND FLR    CATARACT EXTRACTION      ou dr morales    EYE SURGERY      HYSTERECTOMY  1970     NIC/BSO. took HRT immediatiely after wards.     LAPAROSCOPY-DIAGNOSTIC  8/14/2015    Performed by Gianfranco Crenshaw MD at [facility] OR 2ND FLR    LYMPH NODE BIOPSY Right 10/27/2014    posterior triangle    Mohs excision of scalp SCC N/A 5/13/2014    Scalp flap N/A 5/14/204    posterior advancement-rotation    SKIN CANCER EXCISION      THYROIDECTOMY  1960's    hyperthyroidism       Family " History  Family History   Problem Relation Age of Onset    Heart attack Mother     Heart disease Mother     Hypertension Mother     Eczema Mother     Heart failure Mother     Clotting disorder Father     Hypertension Father     Heart failure Father     Thyroid disease Sister     Cancer Sister         thyroid CA     No Known Problems Brother     No Known Problems Maternal Aunt     No Known Problems Maternal Uncle     No Known Problems Paternal Aunt     No Known Problems Paternal Uncle     No Known Problems Maternal Grandmother     No Known Problems Maternal Grandfather     No Known Problems Paternal Grandmother     No Known Problems Paternal Grandfather     Amblyopia Neg Hx     Blindness Neg Hx     Cataracts Neg Hx     Glaucoma Neg Hx     Macular degeneration Neg Hx     Retinal detachment Neg Hx     Strabismus Neg Hx     Stroke Neg Hx     Melanoma Neg Hx     Psoriasis Neg Hx     Lupus Neg Hx     Acne Neg Hx     Ovarian cancer Neg Hx     Uterine cancer Neg Hx     Breast cancer Neg Hx     Colon cancer Neg Hx     Diabetes Neg Hx     Hyperlipidemia Neg Hx        Social History  Social History     Socioeconomic History    Marital status:      Spouse name: Not on file    Number of children: Not on file    Years of education: Not on file    Highest education level: Not on file   Occupational History    Occupation: Homemaker   Social Needs    Financial resource strain: Not on file    Food insecurity:     Worry: Not on file     Inability: Not on file    Transportation needs:     Medical: Not on file     Non-medical: Not on file   Tobacco Use    Smoking status: Former Smoker    Smokeless tobacco: Never Used    Tobacco comment: Quit 35 years ago   Substance and Sexual Activity    Alcohol use: No    Drug use: No    Sexual activity: Never   Lifestyle    Physical activity:     Days per week: Not on file     Minutes per session: Not on file    Stress: Not on file    Relationships    Social connections:     Talks on phone: Not on file     Gets together: Not on file     Attends Sikh service: Not on file     Active member of club or organization: Not on file     Attends meetings of clubs or organizations: Not on file     Relationship status: Not on file   Other Topics Concern    Are you pregnant or think you may be? No    Breast-feeding No   Social History Narrative    She is active and independent.        Current Medications  Current Outpatient Medications on File Prior to Visit   Medication Sig Dispense Refill    acetaminophen (TYLENOL ARTHRITIS) 650 MG TbSR Take 650 mg by mouth nightly as needed (pain).       albuterol (PROVENTIL) 2.5 mg /3 mL (0.083 %) nebulizer solution Take 3 mLs (2.5 mg total) by nebulization every 4 (four) hours as needed for Wheezing or Shortness of Breath (chest tightness). 1 Box 6    amLODIPine (NORVASC) 10 MG tablet TAKE 1 TABLET EVERY DAY 90 tablet 3    apixaban (ELIQUIS) 5 mg (74 tabs) DsPk Take 2 tablets (10 mg) by mouth twice daily for 7 days, then take 1 tablet (5 mg) by mouth twice daily.  This is starter pack. Begin first 74 tablet 12    atorvastatin (LIPITOR) 20 MG tablet TAKE 1 TABLET EVERY DAY 90 tablet 3    calcium carbonate/vitamin D3 (CALTRATE 600 + D ORAL) Take by mouth once daily.      chlorpheniramine maleate (CHLOR-TRIMETON REPETABS ORAL) Take 1 tablet by mouth daily as needed (hayfever).       FLAXSEED-OMEGA3,6,9-FATTY ACID ORAL Take 1 capsule by mouth once daily.        furosemide (LASIX) 20 MG tablet Take 1 tablet (20 mg total) by mouth daily as needed (when weight gain of 3 lb in three days or 5 lb in one week). 90 tablet 3    gabapentin (NEURONTIN) 100 MG capsule Take 2 capsules (200 mg total) by mouth 2 (two) times daily. (Patient taking differently: Take 200 mg by mouth 2 (two) times daily. Pt taking one pill once a day.) 120 capsule 11    hydroCHLOROthiazide (HYDRODIURIL) 25 MG tablet Take 1 tablet (25 mg  "total) by mouth once daily. 90 tablet 3    labetalol (NORMODYNE) 300 MG tablet Take 1 tablet (300 mg total) by mouth 2 (two) times daily. 180 tablet 3    levothyroxine (SYNTHROID) 75 MCG tablet TAKE 1 TABLET EVERY DAY 90 tablet 12    losartan (COZAAR) 100 MG tablet TAKE 1 TABLET EVERY DAY 90 tablet 1    magnesium 250 mg Tab Take 1 tablet by mouth once daily.        OPTICHAMBER DARREN LG MASK Spcr U UTD  0    triamcinolone acetonide 0.1% (KENALOG) 0.1 % cream APPLY TO THE AFFECTED AREA OF lower legs TWICE DAILY (Patient taking differently: Pt using prn.) 453.6 g 1    traMADol (ULTRAM) 50 mg tablet Take 1 tablet (50 mg total) by mouth every 12 (twelve) hours as needed for Pain. 15 tablet 0     No current facility-administered medications on file prior to visit.        Allergies   Review of patient's allergies indicates:   Allergen Reactions    Codeine     Darvocet a500 [propoxyphene n-acetaminophen] Nausea Only    Sulfa (sulfonamide antibiotics) Rash    Fosamax [alendronate]      Gastric ulcer         ROS  Review of Systems   Constitutional: Negative for chills, fatigue and fever.   HENT: Negative for congestion.    Respiratory: Negative for cough, chest tightness, shortness of breath and wheezing.    Cardiovascular: Positive for leg swelling. Negative for chest pain and palpitations.   Gastrointestinal: Negative for abdominal pain and nausea.   Genitourinary: Positive for decreased urine volume. Negative for difficulty urinating.   Musculoskeletal: Negative for arthralgias and myalgias.   Skin: Positive for pallor. Negative for rash.   Neurological: Positive for weakness and light-headedness. Negative for syncope and headaches.   Hematological: Negative for adenopathy.         Objective:    BP (!) 120/38 (BP Location: Right arm)   Pulse 61   Temp 98.7 °F (37.1 °C) (Oral)   Ht 5' 4" (1.626 m)   Wt 60.1 kg (132 lb 7.9 oz)   LMP  (LMP Unknown)   SpO2 96%   BMI 22.74 kg/m²     Physical Exam "   Constitutional:   Vitals reviewed, hypotensive today   HENT:   Head: Normocephalic.   Nose: Nose normal.   Mouth/Throat: Mucous membranes are dry.   Eyes: Pupils are equal, round, and reactive to light. EOM are normal.   Neck: Neck supple. Carotid bruit is not present. No thyroid mass present.   Cardiovascular: Normal rate, regular rhythm, S1 normal, S2 normal and intact distal pulses. Exam reveals no gallop and no friction rub.   No murmur heard.  Pulses:       Dorsalis pedis pulses are 2+ on the right side, and 2+ on the left side.   Pulmonary/Chest: Effort normal and breath sounds normal. She has no wheezes. She has no rales.   Lymphadenopathy:     She has no cervical adenopathy.        Right: No supraclavicular adenopathy present.        Left: No supraclavicular adenopathy present.   Neurological: She is alert.   Skin: Skin is warm, dry and intact. No rash noted. There is pallor.   Psychiatric: She has a normal mood and affect. Judgment normal.       Assessment/Plan:  Kristin Quintanilla is a 89 y.o. female who presents today for :    Kristin was seen today for leg swelling.    Diagnoses and all orders for this visit:    Hypotensive episode, likely over diuresed/ volume low, currently on 4 antihypertensive medications  I reviewed medication and will hold all except the beta blocker. Patient will monitor pressures with understanding  if it gets high  we will add back to the regimen one at a time.   Increase fluid and water intake to approximately at 1-2 liters daily.   Will check kidney function again today.   Patient will keep us informed of home pressures. Patient and son are capable of monitoring.   Gave ER precautions.    -     Basic metabolic panel; Future  -     CBC auto differential; Future    Lower extremity edema  Will reassess kidney function before increasing diuretic, it is likely that the fluid build up is distributing in the legs given the weight gain, no increase in dyspnea, CTA bilaterally and  normal heart sounds. Gave ER precautions  Instructed to elevate legs during the day and continue wearing compression stockings.   Discussed increasing diuretics, but will hold off for now given likelihood of low volume.     I have discussed the case and current issues with the patient's PCP who has been following patient diligently and was advised based on above plans and orders.       Problem list issues addressed during this visit    Essential hypertension  Hypotensive today, reviewed medication and will hold all except the beta blocker. Patient will monitor pressures with understanding that if it gets high again we will add back to the regimen one at a time. Will check kidney function again today. Patient will keep us informed of home pressures. Patient and son are capable of monitoring. Gave ER precautions.    Acute on chronic diastolic heart failure  Bilateral leg edema and weight gain of 13 lbs in 3 weeks. Took 40 mg of lasix last night. Will assess kidney function and address hypotension before increasing lasix anymore given decrease in urine and signs of low volume. Stable and no signs of overload or congestion on exam. CTA bilaterally with normal heart sounds. O2 sat 96%    CKD (chronic kidney disease) stage 3, GFR 30-59 ml/min  Will assess today with BMP      Inflammatory spondylopathy of lumbar region  Chronic and stable as reviewed in record, controlled with medication   followed by PCP  Continue current treatment plan             Health maintenance reviewed and disussed, deferred at this time due to acute illness      I spent >50% of this 45 minute encounter counseling the patient on diagnoses, risk factors, and treatment.           Encouraged to call/return to clinic if symptoms persist or worsen    Barbara Frazier PA-C  [facility]/ Peds

## 2019-04-11 ENCOUNTER — HOSPITAL ENCOUNTER (INPATIENT)
Facility: HOSPITAL | Age: 84
LOS: 4 days | Discharge: HOME HEALTH | DRG: 811 | End: 2019-04-15
Attending: EMERGENCY MEDICINE | Admitting: INTERNAL MEDICINE
Payer: MEDICARE

## 2019-04-11 DIAGNOSIS — I31.39 PERICARDIAL EFFUSION: ICD-10-CM

## 2019-04-11 DIAGNOSIS — R07.9 CHEST PAIN: ICD-10-CM

## 2019-04-11 DIAGNOSIS — G63 POLYNEUROPATHY IN OTHER DISEASES CLASSIFIED ELSEWHERE: ICD-10-CM

## 2019-04-11 DIAGNOSIS — I50.33 ACUTE ON CHRONIC DIASTOLIC (CONGESTIVE) HEART FAILURE: ICD-10-CM

## 2019-04-11 DIAGNOSIS — E87.5 HYPERKALEMIA: ICD-10-CM

## 2019-04-11 DIAGNOSIS — K92.2 GASTROINTESTINAL HEMORRHAGE, UNSPECIFIED GASTROINTESTINAL HEMORRHAGE TYPE: ICD-10-CM

## 2019-04-11 DIAGNOSIS — I50.33 ACUTE ON CHRONIC DIASTOLIC HEART FAILURE: ICD-10-CM

## 2019-04-11 DIAGNOSIS — E03.9 HYPOTHYROIDISM (ACQUIRED): ICD-10-CM

## 2019-04-11 DIAGNOSIS — I82.452 ACUTE DEEP VEIN THROMBOSIS (DVT) OF LEFT PERONEAL VEIN: ICD-10-CM

## 2019-04-11 DIAGNOSIS — E78.2 MIXED HYPERLIPIDEMIA: Chronic | ICD-10-CM

## 2019-04-11 DIAGNOSIS — D64.9 SYMPTOMATIC ANEMIA: Primary | ICD-10-CM

## 2019-04-11 DIAGNOSIS — R06.02 SHORTNESS OF BREATH: ICD-10-CM

## 2019-04-11 DIAGNOSIS — R19.5 HEME POSITIVE STOOL: ICD-10-CM

## 2019-04-11 DIAGNOSIS — I10 ESSENTIAL HYPERTENSION: ICD-10-CM

## 2019-04-11 DIAGNOSIS — N18.30 CKD (CHRONIC KIDNEY DISEASE) STAGE 3, GFR 30-59 ML/MIN: ICD-10-CM

## 2019-04-11 LAB
ABO + RH BLD: NORMAL
ALBUMIN SERPL BCP-MCNC: 2.4 G/DL (ref 3.5–5.2)
ALP SERPL-CCNC: 122 U/L (ref 55–135)
ALT SERPL W/O P-5'-P-CCNC: 35 U/L (ref 10–44)
ANION GAP SERPL CALC-SCNC: 8 MMOL/L (ref 8–16)
AST SERPL-CCNC: 34 U/L (ref 10–40)
BACTERIA #/AREA URNS AUTO: ABNORMAL /HPF
BASOPHILS # BLD AUTO: 0.04 K/UL (ref 0–0.2)
BASOPHILS NFR BLD: 0.6 % (ref 0–1.9)
BILIRUB SERPL-MCNC: 0.5 MG/DL (ref 0.1–1)
BILIRUB UR QL STRIP: NEGATIVE
BLD GP AB SCN CELLS X3 SERPL QL: NORMAL
BLD PROD TYP BPU: NORMAL
BLOOD UNIT EXPIRATION DATE: NORMAL
BLOOD UNIT TYPE CODE: 6200
BLOOD UNIT TYPE: NORMAL
BNP SERPL-MCNC: 810 PG/ML (ref 0–99)
BUN SERPL-MCNC: 74 MG/DL (ref 8–23)
CALCIUM SERPL-MCNC: 8.6 MG/DL (ref 8.7–10.5)
CHLORIDE SERPL-SCNC: 98 MMOL/L (ref 95–110)
CLARITY UR REFRACT.AUTO: CLEAR
CO2 SERPL-SCNC: 27 MMOL/L (ref 23–29)
CODING SYSTEM: NORMAL
COLOR UR AUTO: YELLOW
CREAT SERPL-MCNC: 1.7 MG/DL (ref 0.5–1.4)
DIFFERENTIAL METHOD: ABNORMAL
DISPENSE STATUS: NORMAL
EOSINOPHIL # BLD AUTO: 0.3 K/UL (ref 0–0.5)
EOSINOPHIL NFR BLD: 4.3 % (ref 0–8)
ERYTHROCYTE [DISTWIDTH] IN BLOOD BY AUTOMATED COUNT: 15 % (ref 11.5–14.5)
EST. GFR  (AFRICAN AMERICAN): 30.4 ML/MIN/1.73 M^2
EST. GFR  (NON AFRICAN AMERICAN): 26.4 ML/MIN/1.73 M^2
ESTIMATED AVG GLUCOSE: 97 MG/DL (ref 68–131)
FERRITIN SERPL-MCNC: 54 NG/ML (ref 20–300)
GLUCOSE SERPL-MCNC: 102 MG/DL (ref 70–110)
GLUCOSE UR QL STRIP: ABNORMAL
HBA1C MFR BLD HPLC: 5 % (ref 4–5.6)
HCT VFR BLD AUTO: 21.5 % (ref 37–48.5)
HCT VFR BLD AUTO: 26.7 % (ref 37–48.5)
HGB BLD-MCNC: 6.9 G/DL (ref 12–16)
HGB BLD-MCNC: 8.6 G/DL (ref 12–16)
HGB UR QL STRIP: NEGATIVE
HYALINE CASTS UR QL AUTO: 1 /LPF
IMM GRANULOCYTES # BLD AUTO: 0.04 K/UL (ref 0–0.04)
IMM GRANULOCYTES NFR BLD AUTO: 0.6 % (ref 0–0.5)
KETONES UR QL STRIP: NEGATIVE
LACTATE SERPL-SCNC: 0.7 MMOL/L (ref 0.5–2.2)
LEUKOCYTE ESTERASE UR QL STRIP: NEGATIVE
LYMPHOCYTES # BLD AUTO: 0.8 K/UL (ref 1–4.8)
LYMPHOCYTES NFR BLD: 11.3 % (ref 18–48)
MAGNESIUM SERPL-MCNC: 2.8 MG/DL (ref 1.6–2.6)
MCH RBC QN AUTO: 29.2 PG (ref 27–31)
MCHC RBC AUTO-ENTMCNC: 32.1 G/DL (ref 32–36)
MCV RBC AUTO: 91 FL (ref 82–98)
MICROSCOPIC COMMENT: ABNORMAL
MONOCYTES # BLD AUTO: 0.7 K/UL (ref 0.3–1)
MONOCYTES NFR BLD: 9.5 % (ref 4–15)
NEUTROPHILS # BLD AUTO: 5.3 K/UL (ref 1.8–7.7)
NEUTROPHILS NFR BLD: 73.7 % (ref 38–73)
NITRITE UR QL STRIP: NEGATIVE
NRBC BLD-RTO: 0 /100 WBC
PH UR STRIP: 6 [PH] (ref 5–8)
PHOSPHATE SERPL-MCNC: 5.1 MG/DL (ref 2.7–4.5)
PLATELET # BLD AUTO: 219 K/UL (ref 150–350)
PMV BLD AUTO: 10.3 FL (ref 9.2–12.9)
POTASSIUM SERPL-SCNC: 4.2 MMOL/L (ref 3.5–5.1)
PROT SERPL-MCNC: 5.2 G/DL (ref 6–8.4)
PROT UR QL STRIP: ABNORMAL
RBC # BLD AUTO: 2.36 M/UL (ref 4–5.4)
RBC #/AREA URNS AUTO: 1 /HPF (ref 0–4)
SODIUM SERPL-SCNC: 133 MMOL/L (ref 136–145)
SP GR UR STRIP: 1.01 (ref 1–1.03)
T4 FREE SERPL-MCNC: 0.85 NG/DL (ref 0.71–1.51)
TRANS ERYTHROCYTES VOL PATIENT: NORMAL ML
TROPONIN I SERPL DL<=0.01 NG/ML-MCNC: 0.01 NG/ML (ref 0–0.03)
TROPONIN I SERPL DL<=0.01 NG/ML-MCNC: 0.01 NG/ML (ref 0–0.03)
TSH SERPL DL<=0.005 MIU/L-ACNC: 5.59 UIU/ML (ref 0.4–4)
URN SPEC COLLECT METH UR: ABNORMAL
WBC # BLD AUTO: 7.17 K/UL (ref 3.9–12.7)
WBC #/AREA URNS AUTO: 1 /HPF (ref 0–5)

## 2019-04-11 PROCEDURE — 25000003 PHARM REV CODE 250: Mod: HCNC | Performed by: PHYSICIAN ASSISTANT

## 2019-04-11 PROCEDURE — 93010 ELECTROCARDIOGRAM REPORT: CPT | Mod: HCNC,76,, | Performed by: INTERNAL MEDICINE

## 2019-04-11 PROCEDURE — 96375 TX/PRO/DX INJ NEW DRUG ADDON: CPT | Mod: HCNC

## 2019-04-11 PROCEDURE — 93005 ELECTROCARDIOGRAM TRACING: CPT | Mod: HCNC

## 2019-04-11 PROCEDURE — 99291 PR CRITICAL CARE, E/M 30-74 MINUTES: ICD-10-PCS | Mod: HCNC,,, | Performed by: EMERGENCY MEDICINE

## 2019-04-11 PROCEDURE — 84439 ASSAY OF FREE THYROXINE: CPT | Mod: HCNC

## 2019-04-11 PROCEDURE — C9113 INJ PANTOPRAZOLE SODIUM, VIA: HCPCS | Mod: HCNC | Performed by: INTERNAL MEDICINE

## 2019-04-11 PROCEDURE — 99223 1ST HOSP IP/OBS HIGH 75: CPT | Mod: AI,HCNC,, | Performed by: INTERNAL MEDICINE

## 2019-04-11 PROCEDURE — 84484 ASSAY OF TROPONIN QUANT: CPT | Mod: HCNC

## 2019-04-11 PROCEDURE — P9021 RED BLOOD CELLS UNIT: HCPCS | Mod: HCNC

## 2019-04-11 PROCEDURE — 84443 ASSAY THYROID STIM HORMONE: CPT | Mod: HCNC

## 2019-04-11 PROCEDURE — 85014 HEMATOCRIT: CPT | Mod: HCNC

## 2019-04-11 PROCEDURE — 83605 ASSAY OF LACTIC ACID: CPT | Mod: HCNC

## 2019-04-11 PROCEDURE — 80053 COMPREHEN METABOLIC PANEL: CPT | Mod: HCNC

## 2019-04-11 PROCEDURE — 85018 HEMOGLOBIN: CPT | Mod: HCNC

## 2019-04-11 PROCEDURE — 85025 COMPLETE CBC W/AUTO DIFF WBC: CPT | Mod: HCNC

## 2019-04-11 PROCEDURE — 96376 TX/PRO/DX INJ SAME DRUG ADON: CPT | Mod: HCNC

## 2019-04-11 PROCEDURE — 99291 CRITICAL CARE FIRST HOUR: CPT | Mod: HCNC

## 2019-04-11 PROCEDURE — 36430 TRANSFUSION BLD/BLD COMPNT: CPT | Mod: HCNC

## 2019-04-11 PROCEDURE — 83036 HEMOGLOBIN GLYCOSYLATED A1C: CPT | Mod: HCNC

## 2019-04-11 PROCEDURE — 84100 ASSAY OF PHOSPHORUS: CPT | Mod: HCNC

## 2019-04-11 PROCEDURE — 86850 RBC ANTIBODY SCREEN: CPT | Mod: HCNC

## 2019-04-11 PROCEDURE — 99291 CRITICAL CARE FIRST HOUR: CPT | Mod: HCNC,,, | Performed by: EMERGENCY MEDICINE

## 2019-04-11 PROCEDURE — 96374 THER/PROPH/DIAG INJ IV PUSH: CPT | Mod: HCNC

## 2019-04-11 PROCEDURE — 86920 COMPATIBILITY TEST SPIN: CPT | Mod: HCNC

## 2019-04-11 PROCEDURE — 93010 ELECTROCARDIOGRAM REPORT: CPT | Mod: HCNC,,, | Performed by: INTERNAL MEDICINE

## 2019-04-11 PROCEDURE — 63600175 PHARM REV CODE 636 W HCPCS: Mod: HCNC | Performed by: INTERNAL MEDICINE

## 2019-04-11 PROCEDURE — 99223 PR INITIAL HOSPITAL CARE,LEVL III: ICD-10-PCS | Mod: AI,HCNC,, | Performed by: INTERNAL MEDICINE

## 2019-04-11 PROCEDURE — 82728 ASSAY OF FERRITIN: CPT | Mod: HCNC

## 2019-04-11 PROCEDURE — 83735 ASSAY OF MAGNESIUM: CPT | Mod: HCNC

## 2019-04-11 PROCEDURE — 63600175 PHARM REV CODE 636 W HCPCS: Mod: HCNC | Performed by: PHYSICIAN ASSISTANT

## 2019-04-11 PROCEDURE — 20600001 HC STEP DOWN PRIVATE ROOM: Mod: HCNC

## 2019-04-11 PROCEDURE — 25000003 PHARM REV CODE 250: Mod: HCNC | Performed by: INTERNAL MEDICINE

## 2019-04-11 PROCEDURE — 81001 URINALYSIS AUTO W/SCOPE: CPT | Mod: HCNC

## 2019-04-11 PROCEDURE — 83880 ASSAY OF NATRIURETIC PEPTIDE: CPT | Mod: HCNC

## 2019-04-11 PROCEDURE — 84484 ASSAY OF TROPONIN QUANT: CPT | Mod: 91,HCNC

## 2019-04-11 PROCEDURE — 93010 EKG 12-LEAD: ICD-10-PCS | Mod: HCNC,,, | Performed by: INTERNAL MEDICINE

## 2019-04-11 PROCEDURE — C9113 INJ PANTOPRAZOLE SODIUM, VIA: HCPCS | Mod: HCNC | Performed by: PHYSICIAN ASSISTANT

## 2019-04-11 RX ORDER — IBUPROFEN 200 MG
16 TABLET ORAL
Status: DISCONTINUED | OUTPATIENT
Start: 2019-04-11 | End: 2019-04-15 | Stop reason: HOSPADM

## 2019-04-11 RX ORDER — SODIUM CHLORIDE 0.9 % (FLUSH) 0.9 %
10 SYRINGE (ML) INJECTION
Status: DISCONTINUED | OUTPATIENT
Start: 2019-04-11 | End: 2019-04-15 | Stop reason: HOSPADM

## 2019-04-11 RX ORDER — ACETAMINOPHEN 325 MG/1
650 TABLET ORAL EVERY 4 HOURS PRN
Status: DISCONTINUED | OUTPATIENT
Start: 2019-04-11 | End: 2019-04-15 | Stop reason: HOSPADM

## 2019-04-11 RX ORDER — GLUCAGON 1 MG
1 KIT INJECTION
Status: DISCONTINUED | OUTPATIENT
Start: 2019-04-11 | End: 2019-04-15 | Stop reason: HOSPADM

## 2019-04-11 RX ORDER — LOSARTAN POTASSIUM AND HYDROCHLOROTHIAZIDE 25; 100 MG/1; MG/1
1 TABLET ORAL DAILY
Status: DISCONTINUED | OUTPATIENT
Start: 2019-04-11 | End: 2019-04-11

## 2019-04-11 RX ORDER — HYDRALAZINE HYDROCHLORIDE 25 MG/1
25 TABLET, FILM COATED ORAL EVERY 8 HOURS PRN
Status: DISCONTINUED | OUTPATIENT
Start: 2019-04-11 | End: 2019-04-15 | Stop reason: HOSPADM

## 2019-04-11 RX ORDER — HYDROCODONE BITARTRATE AND ACETAMINOPHEN 500; 5 MG/1; MG/1
TABLET ORAL
Status: DISCONTINUED | OUTPATIENT
Start: 2019-04-11 | End: 2019-04-11

## 2019-04-11 RX ORDER — PANTOPRAZOLE SODIUM 40 MG/10ML
40 INJECTION, POWDER, LYOPHILIZED, FOR SOLUTION INTRAVENOUS 2 TIMES DAILY
Status: DISCONTINUED | OUTPATIENT
Start: 2019-04-11 | End: 2019-04-11

## 2019-04-11 RX ORDER — GABAPENTIN 100 MG/1
100 CAPSULE ORAL NIGHTLY
Status: DISCONTINUED | OUTPATIENT
Start: 2019-04-11 | End: 2019-04-15 | Stop reason: HOSPADM

## 2019-04-11 RX ORDER — AMLODIPINE BESYLATE 10 MG/1
10 TABLET ORAL DAILY
Status: DISCONTINUED | OUTPATIENT
Start: 2019-04-12 | End: 2019-04-15 | Stop reason: HOSPADM

## 2019-04-11 RX ORDER — ALBUTEROL SULFATE 2.5 MG/.5ML
2.5 SOLUTION RESPIRATORY (INHALATION) EVERY 4 HOURS PRN
Status: DISCONTINUED | OUTPATIENT
Start: 2019-04-11 | End: 2019-04-15 | Stop reason: HOSPADM

## 2019-04-11 RX ORDER — LOSARTAN POTASSIUM 50 MG/1
100 TABLET ORAL DAILY
Status: DISCONTINUED | OUTPATIENT
Start: 2019-04-12 | End: 2019-04-11

## 2019-04-11 RX ORDER — PANTOPRAZOLE SODIUM 40 MG/10ML
40 INJECTION, POWDER, LYOPHILIZED, FOR SOLUTION INTRAVENOUS
Status: COMPLETED | OUTPATIENT
Start: 2019-04-11 | End: 2019-04-11

## 2019-04-11 RX ORDER — ATORVASTATIN CALCIUM 20 MG/1
20 TABLET, FILM COATED ORAL DAILY
Status: DISCONTINUED | OUTPATIENT
Start: 2019-04-12 | End: 2019-04-15 | Stop reason: HOSPADM

## 2019-04-11 RX ORDER — ISOSORBIDE DINITRATE AND HYDRALAZINE HYDROCHLORIDE 37.5; 2 MG/1; MG/1
1 TABLET ORAL 3 TIMES DAILY
Status: DISCONTINUED | OUTPATIENT
Start: 2019-04-11 | End: 2019-04-12

## 2019-04-11 RX ORDER — FUROSEMIDE 10 MG/ML
40 INJECTION INTRAMUSCULAR; INTRAVENOUS 2 TIMES DAILY
Status: DISCONTINUED | OUTPATIENT
Start: 2019-04-11 | End: 2019-04-12

## 2019-04-11 RX ORDER — IBUPROFEN 200 MG
24 TABLET ORAL
Status: DISCONTINUED | OUTPATIENT
Start: 2019-04-11 | End: 2019-04-15 | Stop reason: HOSPADM

## 2019-04-11 RX ORDER — ONDANSETRON 2 MG/ML
4 INJECTION INTRAMUSCULAR; INTRAVENOUS EVERY 8 HOURS PRN
Status: DISCONTINUED | OUTPATIENT
Start: 2019-04-11 | End: 2019-04-15 | Stop reason: HOSPADM

## 2019-04-11 RX ORDER — FUROSEMIDE 10 MG/ML
80 INJECTION INTRAMUSCULAR; INTRAVENOUS
Status: COMPLETED | OUTPATIENT
Start: 2019-04-11 | End: 2019-04-11

## 2019-04-11 RX ORDER — LEVOTHYROXINE SODIUM 75 UG/1
75 TABLET ORAL
Status: DISCONTINUED | OUTPATIENT
Start: 2019-04-12 | End: 2019-04-15 | Stop reason: HOSPADM

## 2019-04-11 RX ORDER — AMOXICILLIN 250 MG
1 CAPSULE ORAL DAILY PRN
Status: DISCONTINUED | OUTPATIENT
Start: 2019-04-11 | End: 2019-04-15 | Stop reason: HOSPADM

## 2019-04-11 RX ORDER — PANTOPRAZOLE SODIUM 40 MG/10ML
40 INJECTION, POWDER, LYOPHILIZED, FOR SOLUTION INTRAVENOUS 2 TIMES DAILY
Status: DISCONTINUED | OUTPATIENT
Start: 2019-04-11 | End: 2019-04-15 | Stop reason: HOSPADM

## 2019-04-11 RX ORDER — AMLODIPINE BESYLATE 10 MG/1
10 TABLET ORAL
Status: COMPLETED | OUTPATIENT
Start: 2019-04-11 | End: 2019-04-11

## 2019-04-11 RX ORDER — HYDRALAZINE HYDROCHLORIDE 25 MG/1
25 TABLET, FILM COATED ORAL ONCE
Status: COMPLETED | OUTPATIENT
Start: 2019-04-11 | End: 2019-04-11

## 2019-04-11 RX ADMIN — AMLODIPINE BESYLATE 10 MG: 10 TABLET ORAL at 03:04

## 2019-04-11 RX ADMIN — HYDRALAZINE HYDROCHLORIDE AND ISOSORBIDE DINITRATE 1 TABLET: 37.5; 2 TABLET, FILM COATED ORAL at 08:04

## 2019-04-11 RX ADMIN — LOSARTAN POTASSIUM AND HYDROCHLOROTHIAZIDE 1 TABLET: 25; 100 TABLET ORAL at 03:04

## 2019-04-11 RX ADMIN — HYDRALAZINE HYDROCHLORIDE 25 MG: 25 TABLET, FILM COATED ORAL at 05:04

## 2019-04-11 RX ADMIN — PANTOPRAZOLE SODIUM 40 MG: 40 INJECTION, POWDER, FOR SOLUTION INTRAVENOUS at 08:04

## 2019-04-11 RX ADMIN — FUROSEMIDE 40 MG: 10 INJECTION, SOLUTION INTRAMUSCULAR; INTRAVENOUS at 08:04

## 2019-04-11 RX ADMIN — LABETALOL HYDROCHLORIDE 300 MG: 100 TABLET, FILM COATED ORAL at 08:04

## 2019-04-11 RX ADMIN — FUROSEMIDE 80 MG: 10 INJECTION, SOLUTION INTRAMUSCULAR; INTRAVENOUS at 02:04

## 2019-04-11 RX ADMIN — PANTOPRAZOLE SODIUM 40 MG: 40 INJECTION, POWDER, FOR SOLUTION INTRAVENOUS at 12:04

## 2019-04-11 RX ADMIN — GABAPENTIN 100 MG: 100 CAPSULE ORAL at 08:04

## 2019-04-11 NOTE — MEDICAL/APP STUDENT
Hospital Medicine  History and Physical Exam    Team: [facility] MED D Jazmyn Mathews MD  Admit Date: 4/11/2019  LAMAR TBD  Principal Problem:  Symptomatic anemia   Patient information was obtained from patient, past medical records and ER records.   Primary care Physician: Jairo Schmidt MD  Code status: Full Code    HPI:   87 yo female with a history of CAD, diastolic heart failure, HTN, HLD, hypothyroidism and spinal stenosis who presents after referral from PCP for anemia.  The patient has been having increased weakness with leg swelling and dyspnea. Also associated with weight gain. Worsening for three weeks. Recently hospitalized for an acute on chronic CHF exacerbation (1/2019). HCTZ was discontinued for hyponatremia, but restarted by her outpt cardiologist for fluid overload. Her cardiologist and PCP also instructed her to double her home lasix 20 mg prn for significant weight gain. Pt has increased in weight from 119 lbs in January to 131 lbs. Took 40 mg of lasix last night without noticeable increased UOP. No chest pain, + chronic orthopnea ( has mechanical bed) without PND.  Of note the patient was diagnosed with a pericardial effusion without evidence of tamponade when she was admitted in January for her CHF exacerbation.  She declined a pericardiocentesis.  In the emergency room the patient's blood pressure was initially 129/64 but gradually increased to a systolic of 200 especially after her blood transfusion was completed.  She has noted her blood pressure to be very variable home.  It had been low yesterday and she was instructed to hold all of her blood pressure medicines including  Amlodipine, losartan and hydrochlorothiazide but did take her labetalol this morning.  She did receive her amlodipine losartan and hydrochlorothiazide in the emergency room with minimal change her blood pressure.  Hydralazine 25 mg orally was also given.    She has been compliant with apixaban since January of 2019 when  she was diagnosed with a DVT to the left peroneal vein.  She denies having melena or bright red blood per rectum at home.  In the emergency room: Hemoccult positive and rectal exam revealed dark stool.  She was transfused 1 unit packed RBCs for hemoglobin of 6.9.  She was also started on pantoprazole IV.    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    Past Surgical History: Patient has a past surgical history that includes Skin cancer excision; Cataract extraction; Thyroidectomy (1960's); Mohs excision of scalp SCC (N/A, 5/13/2014); Scalp flap (N/A, 5/14/204); Lymph node biopsy (Right, 10/27/2014); Hysterectomy (1970 ); Appendectomy (8.14.2015); Eye surgery; and Tonsillectomy.    Social History: Patient reports that she has quit smoking. She has never used smokeless tobacco. She reports that she does not drink alcohol or use drugs.    Family History: family history includes Cancer in her sister; Clotting disorder in her father; Eczema in her mother; Heart attack in her mother; Heart disease in her mother; Heart failure in her father and mother; Hypertension in her father and mother; No Known  Problems in her brother, maternal aunt, maternal grandfather, maternal grandmother, maternal uncle, paternal aunt, paternal grandfather, paternal grandmother, and paternal uncle; Thyroid disease in her sister.    Medications:   Prior to Admission medications    Medication Sig Start Date End Date Taking? Authorizing Provider   acetaminophen (TYLENOL ARTHRITIS) 650 MG TbSR Take 650 mg by mouth nightly as needed (pain).    Yes Historical Provider, MD   albuterol (PROVENTIL) 2.5 mg /3 mL (0.083 %) nebulizer solution Take 3 mLs (2.5 mg total) by nebulization every 4 (four) hours as needed for Wheezing or Shortness of Breath (chest tightness). 1/7/19  Yes Kaushik Rodrigues MD   amLODIPine (NORVASC) 10 MG tablet TAKE 1 TABLET EVERY DAY 3/25/19  Yes Lenka Triana MD   apixaban (ELIQUIS) 5 mg (74 tabs) DsPk Take 2 tablets (10 mg) by mouth twice daily for 7 days, then take 1 tablet (5 mg) by mouth twice daily.  This is starter pack. Begin first 2/12/19  Yes Jairo Schmidt MD   atorvastatin (LIPITOR) 20 MG tablet TAKE 1 TABLET EVERY DAY 3/25/19  Yes Lenka Triana MD   calcium carbonate/vitamin D3 (CALTRATE 600 + D ORAL) Take by mouth once daily.   Yes Historical Provider, MD   FLAXSEED-OMEGA3,6,9-FATTY ACID ORAL Take 1 capsule by mouth once daily.     Yes Historical Provider, MD   furosemide (LASIX) 20 MG tablet Take 1 tablet (20 mg total) by mouth daily as needed (when weight gain of 3 lb in three days or 5 lb in one week). 4/5/19 5/5/19 Yes Jairo Schmidt MD   gabapentin (NEURONTIN) 100 MG capsule Take 2 capsules (200 mg total) by mouth 2 (two) times daily.  Patient taking differently: Take 200 mg by mouth 2 (two) times daily. Pt taking one pill once a day. 1/14/19 1/14/20 Yes Denice Morales MD   hydroCHLOROthiazide (HYDRODIURIL) 25 MG tablet Take 1 tablet (25 mg total) by mouth once daily. 1/28/19 1/28/20 Yes Fernando Brown MD   labetalol (NORMODYNE) 300 MG tablet Take 1 tablet (300 mg total) by  mouth 2 (two) times daily. 1/14/19 1/14/20 Yes Denice Morales MD   levothyroxine (SYNTHROID) 75 MCG tablet TAKE 1 TABLET EVERY DAY 3/19/19  Yes Jairo Schmidt MD   losartan (COZAAR) 100 MG tablet TAKE 1 TABLET EVERY DAY 2/6/19  Yes Jeanine Guzmán, FNP-C   magnesium 250 mg Tab Take 1 tablet by mouth once daily.     Yes Historical Provider, MD   triamcinolone acetonide 0.1% (KENALOG) 0.1 % cream APPLY TO THE AFFECTED AREA OF lower legs TWICE DAILY  Patient taking differently: Pt using prn. 7/19/18  Yes Ella Mohamud MD   chlorpheniramine maleate (CHLOR-TRIMETON REPETABS ORAL) Take 1 tablet by mouth daily as needed (hayfever).     Historical Provider, MD LUL BANKS LG MASK Spcr U UTD 1/5/19   Historical Provider, MD   traMADol (ULTRAM) 50 mg tablet Take 1 tablet (50 mg total) by mouth every 12 (twelve) hours as needed for Pain. 1/14/19   Denice Morales MD       Allergies: Patient is allergic to codeine; darvocet a500 [propoxyphene n-acetaminophen]; sulfa (sulfonamide antibiotics); and fosamax [alendronate].    Review of Systems   Constitutional: Negative for chills, fever and malaise/fatigue.   HENT: Negative for congestion and sore throat.    Eyes: Negative for blurred vision and double vision.   Respiratory: Positive for shortness of breath. Negative for cough and hemoptysis.    Cardiovascular: Positive for chest pain (intermittent at home), orthopnea and leg swelling. Negative for palpitations, claudication and PND.   Gastrointestinal: Negative for abdominal pain, diarrhea, nausea and vomiting.   Genitourinary: Negative for dysuria and hematuria.   Musculoskeletal: Positive for joint pain (legs). Negative for falls.   Neurological: Positive for dizziness (when BP low) and weakness. Negative for focal weakness.   Psychiatric/Behavioral: Negative for depression. The patient is not nervous/anxious.        PEx  Temp:  [97.3 °F (36.3 °C)-98.4 °F (36.9 °C)]   Pulse:  [58-68]   Resp:  [18-20]    BP: (120-191)/(38-83)   SpO2:  [93 %-98 %]   Body mass index is 22.49 kg/m².     Physical Exam   Constitutional: She is oriented to person, place, and time and well-developed, well-nourished, and in no distress. No distress.   HENT:   Head: Normocephalic and atraumatic.   Mouth/Throat: No oropharyngeal exudate.   Eyes: Pupils are equal, round, and reactive to light. EOM are normal. No scleral icterus.   Neck: Normal range of motion. Neck supple. JVD present.   Cardiovascular: Normal rate and regular rhythm. Exam reveals no friction rub.   No murmur heard.  Pulmonary/Chest: Effort normal and breath sounds normal. No respiratory distress. She has no wheezes. She has no rales.   Abdominal: Soft. She exhibits no distension. There is no tenderness.   Genitourinary: Rectal exam shows guaiac positive stool (per ED).   Musculoskeletal: Normal range of motion. She exhibits edema (3+ BLE).   Neurological: She is alert and oriented to person, place, and time.       No intake or output data in the 24 hours ending 04/11/19 1446  Recent Labs   Lab 04/10/19  1520 04/11/19  1142   WBC 6.63 7.17   HGB 6.9* 6.9*   HCT 22.0* 21.5*    219     Recent Labs   Lab 04/10/19  1520 04/11/19  1142   * 133*   K 4.5 4.2   CL 96 98   CO2 26 27   BUN 80* 74*   CREATININE 2.2* 1.7*    102   CALCIUM 8.8 8.6*   MG  --  2.8*   PHOS  --  5.1*     Recent Labs   Lab 04/11/19  1142   ALKPHOS 122   ALT 35   AST 34   ALBUMIN 2.4*   PROT 5.2*   BILITOT 0.5      No results for input(s): POCTGLUCOSE in the last 168 hours.  Recent Labs     04/11/19  1142   TROPONINI 0.011       Recent Labs     04/11/19  1200   LACTATE 0.7      Recent Results (from the past 24 hour(s))   CBC auto differential    Collection Time: 04/11/19 11:42 AM   Result Value Ref Range    WBC 7.17 3.90 - 12.70 K/uL    RBC 2.36 (L) 4.00 - 5.40 M/uL    Hemoglobin 6.9 (L) 12.0 - 16.0 g/dL    Hematocrit 21.5 (L) 37.0 - 48.5 %    MCV 91 82 - 98 fL    MCH 29.2 27.0 - 31.0 pg     MCHC 32.1 32.0 - 36.0 g/dL    RDW 15.0 (H) 11.5 - 14.5 %    Platelets 219 150 - 350 K/uL    MPV 10.3 9.2 - 12.9 fL    Immature Granulocytes 0.6 (H) 0.0 - 0.5 %    Gran # (ANC) 5.3 1.8 - 7.7 K/uL    Immature Grans (Abs) 0.04 0.00 - 0.04 K/uL    Lymph # 0.8 (L) 1.0 - 4.8 K/uL    Mono # 0.7 0.3 - 1.0 K/uL    Eos # 0.3 0.0 - 0.5 K/uL    Baso # 0.04 0.00 - 0.20 K/uL    nRBC 0 0 /100 WBC    Gran% 73.7 (H) 38.0 - 73.0 %    Lymph% 11.3 (L) 18.0 - 48.0 %    Mono% 9.5 4.0 - 15.0 %    Eosinophil% 4.3 0.0 - 8.0 %    Basophil% 0.6 0.0 - 1.9 %    Differential Method Automated    Comprehensive metabolic panel    Collection Time: 04/11/19 11:42 AM   Result Value Ref Range    Sodium 133 (L) 136 - 145 mmol/L    Potassium 4.2 3.5 - 5.1 mmol/L    Chloride 98 95 - 110 mmol/L    CO2 27 23 - 29 mmol/L    Glucose 102 70 - 110 mg/dL    BUN, Bld 74 (H) 8 - 23 mg/dL    Creatinine 1.7 (H) 0.5 - 1.4 mg/dL    Calcium 8.6 (L) 8.7 - 10.5 mg/dL    Total Protein 5.2 (L) 6.0 - 8.4 g/dL    Albumin 2.4 (L) 3.5 - 5.2 g/dL    Total Bilirubin 0.5 0.1 - 1.0 mg/dL    Alkaline Phosphatase 122 55 - 135 U/L    AST 34 10 - 40 U/L    ALT 35 10 - 44 U/L    Anion Gap 8 8 - 16 mmol/L    eGFR if African American 30.4 (A) >60 mL/min/1.73 m^2    eGFR if non  26.4 (A) >60 mL/min/1.73 m^2   Type & Screen    Collection Time: 04/11/19 11:42 AM   Result Value Ref Range    Group & Rh A POS     Indirect Patricia NEG    Magnesium    Collection Time: 04/11/19 11:42 AM   Result Value Ref Range    Magnesium 2.8 (H) 1.6 - 2.6 mg/dL   Phosphorus    Collection Time: 04/11/19 11:42 AM   Result Value Ref Range    Phosphorus 5.1 (H) 2.7 - 4.5 mg/dL   Troponin I    Collection Time: 04/11/19 11:42 AM   Result Value Ref Range    Troponin I 0.011 0.000 - 0.026 ng/mL   Brain natriuretic peptide    Collection Time: 04/11/19 11:42 AM   Result Value Ref Range     (H) 0 - 99 pg/mL   Prepare RBC 1 Unit    Collection Time: 04/11/19 11:42 AM   Result Value Ref Range    UNIT  NUMBER [id number]     PRODUCT CODE A2054J83     DISPENSE STATUS ISSUED     CODING SYSTEM WOAA393     Unit Blood Type Code 6200     Unit Blood Type A POS     Unit Expiration [id number]    Lactic acid, plasma    Collection Time: 04/11/19 12:00 PM   Result Value Ref Range    Lactate (Lactic Acid) 0.7 0.5 - 2.2 mmol/L   Urinalysis, Reflex to Urine Culture Urine, Catheterized    Collection Time: 04/11/19  1:01 PM   Result Value Ref Range    Specimen UA Urine, Catheterized     Color, UA Yellow Yellow, Straw, Michelle    Appearance, UA Clear Clear    pH, UA 6.0 5.0 - 8.0    Specific Gravity, UA 1.010 1.005 - 1.030    Protein, UA 2+ (A) Negative    Glucose, UA 1+ (A) Negative    Ketones, UA Negative Negative    Bilirubin (UA) Negative Negative    Occult Blood UA Negative Negative    Nitrite, UA Negative Negative    Leukocytes, UA Negative Negative   Urinalysis Microscopic    Collection Time: 04/11/19  1:01 PM   Result Value Ref Range    RBC, UA 1 0 - 4 /hpf    WBC, UA 1 0 - 5 /hpf    Bacteria, UA Few (A) None-Occ /hpf    Hyaline Casts, UA 1 0-1/lpf /lpf    Microscopic Comment SEE COMMENT    Hemoglobin    Collection Time: 04/11/19  6:31 PM   Result Value Ref Range    Hemoglobin 8.6 (L) 12.0 - 16.0 g/dL   Hematocrit    Collection Time: 04/11/19  6:31 PM   Result Value Ref Range    Hematocrit 26.7 (L) 37.0 - 48.5 %         Active Hospital Problems    Diagnosis  POA    *Symptomatic anemia [D64.9]  Yes    Hyperkalemia [E87.5]  Yes    CKD (chronic kidney disease) stage 3, GFR 30-59 ml/min [N18.3]  Yes    Acute deep vein thrombosis (DVT) of left peroneal vein [I82.492]  Yes    DVT (deep venous thrombosis) [I82.409]  Yes    Essential hypertension [I10]  Yes    Hypothyroidism (acquired) [E03.9]  Yes    Polyneuropathy in other diseases classified elsewhere [G63]  Yes     6-      Pericardial effusion [I31.3]  Yes     Chronic    Acute on chronic diastolic heart failure [I50.33]  Yes    Hyperlipidemia [E78.5]  Yes      Chronic      Resolved Hospital Problems   No resolved problems to display.       Overview  87 yo female with a history of CAD, diastolic HF, HLD,and hypothyroidism admitted with symptomatic anemia, heme + stool on chronic anticoagulation and CHF exacerbation.     Assessment and Plan for Problems addressed today:    Acute on chronic diastolic heart failure  Pericardial effusion  Essential hypertension  · Echo (1/21/2019): EF 65%, indeterminate diastolic function, severe LAE, moderated pericardial effusion, no evidence of tamponade.   · Continue lasix at 40 mg BID after 80 mg IV given in ED.  · Patient has been stopped on HCTZ in the past due to hyponatremia  · Pericardial effusion likely persistent as evidenced by cardiac silhouette on CXR; Echo ordered  · Cardiac diet  · Monitoring with telemetry, daily weights and I/O's  · Continue therapy with amlodipine (consider nifedipine if ineffective and worsened LE edema), losartan; hydralazine prn SBP > 180  · Cardiology consult due to difficult home control HTN/CHF and pericardial effusion  · Monitor troponins due to intermittent chest pain at home and T-wave abnormality on admit EKG    Chronic kidney disease stage 3  Hyperkalemia  · Estimated Creatinine Clearance: 19.4 mL/min (A) (based on SCr of 1.7 mg/dL (H)).  · Cr increased from baseline of 1.4 previously; probable cardiorenal syndrome  · Monitor, avoid nephrotoxins and renally adjust meds  · Monitor K+; hold magnesium supplement     Hypothyroidism  · Continue home levothyroxine    Normocytic anemia  Heme + stool  · Baseline Hgb 8-9, 6.9 on admit and back to baseline after 1 u PRBC  · Stool heme + on chronic anticoagulation; GI consulted  · + hx of PUD in the past but no longer on GI meds and denies OTC meds; + hx of EGD but not C-scope  · Continue protonix IV  · Hold apixaban and resume if no signs of worsened bleeding  · Ferritin ordered; patient reluctant to take iron supplement due to constipation    Left  peroneal vein DVT  · Diagnosed on January 24, 2019 and patient has been taking apixaban therapy  · Hold apixaban currently to ensure no active bleeding or worsening of anemia    Hyperlipidemia  · Continue atorvastatin therapy daily    Peripheral neuropathy    · chronic and stable  · Continue gabapentin at home dose    Diet:  Diet Cardiac  Diet NPO in am  GI PPx:   pantoprazole  DVT PPx:   VTE Risk Mitigation (From admission, onward)        Ordered     Place VALERIA hose  Until discontinued      04/11/19 1746     Place sequential compression device  Until discontinued      04/11/19 1746     IP VTE HIGH RISK PATIENT  Once      04/11/19 1746        Goals of care:  Evaluation of anemia and resolution of symptomatic CHF  Lines/Drains/Airways:  Peripheral IV  Wounds:  none  Discharge plan and follow up:     Provider   Jazmyn Mathews MD  Staff Hospitalist   Spectra [phone]    I personally scribed for Jazmyn Mathews MD on 04/11/2019 at 2:46 PM. Electronically signed by nick Juares on 04/11/2019 at 2:46 PM

## 2019-04-11 NOTE — ED TRIAGE NOTES
Sent from doctor for abnormal labs. Pt reports increased SOB with exertion. Denies chest pain, n/v/d.

## 2019-04-11 NOTE — ED NOTES
"LOC: The patient is awake and alert; oriented x 3 and speaking appropriately.  APPEARANCE: Patient resting comfortably, patient is clean and well groomed.  SKIN: pale, warm, and dry; moist but pale mucous membranes; no breakdown noted.  MUSCULOSKELETAL: Patient moving all extremities well, no obvious swelling or deformities noted.  RESPIRATORY: Airway is open and patent; respirations are spontaneous, increased effort and rate. Pt reports increased SOB on exertion over "past few days". Placed on 2L NC O2 per PA request (see earlier note).  CARDIAC: Patient has a normal rate, no peripheral edema noted, capillary refill < 3 seconds; No complaints of chest pain.   ABDOMEN: Soft and non tender to palpation, no distention noted. Denies n/v/d.  "

## 2019-04-11 NOTE — ED PROVIDER NOTES
Encounter Date: 4/11/2019    SCRIBE #1 NOTE: I, Zev Villela, am scribing for, and in the presence of,  Dr. Vazquez. I have scribed the following portions of the note - the EKG reading.       History     Chief Complaint   Patient presents with    Abnormal Lab     Pt had labs drawn yesterday-states they were sent here for blood transfusion and admission.      89-year-old female with medical comorbidities significant for chronic diastolic heart failure, DVT on Eliquis, aortic valve disorder, carotid artery stenosis, CAD, HTN, HLD, thyroid disorder presents the ED for management of abnormal labs.  Patient had blood work yesterday that showed a decreased hemoglobin at 6.9 from 8.6 approximately 2 weeks ago.  Patient's son is present and provides majority of the history. Pt and family were notified of abnormal labs this morning and advised to present to the ER.  Patient complains of a worsened shortness of breath and dyspnea on exertion. Son also reports that the patient has been more confused and forgetful over the past 2-3 days.  She has had decreased urine output over the past couple of days despite treatment with Lasix.  Pt seen in primary clinic yesterday where she was found to be hypotensive.  Blood work was obtained. Son denies any recent dark or bloody stool.  Patient's stool was evaluated for blood about 3 weeks ago and was negative. Pt denies abdominal pain, nausea, vomiting, fevers.   Of note, pt was told recently to hold BP meds if BP continues to run low. They were advised to add 1 med at a time if her BP began to elevate.  Per son, patient took only labetalol this morning.    The history is provided by the patient and medical records.     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      Arthritis     ASCVD (arteriosclerotic cardiovascular disease) 7/8/2014    Atherosclerosis of aorta 1/7/2016    "There is atherosclerosis of the thoracic aorta" - Xray Chest 7-     Back pain     Basal cell carcinoma 10/7/2013    Right nasal columellar, right lower eyelid, left medial eyelid    Basal cell carcinoma 2/2015    mid back    Chronic diastolic heart failure 11/11/2014    Coronary artery disease     Gastric ulcer     Fosamax related.    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     Hypothyroidism (acquired) 9/27/2016    Joint pain     Lymphedema of Neck 3/17/2016    Occlusion and stenosis of carotid artery without mention of cerebral infarction     Pelvic mass in female 7/21/2015    Polyneuropathy in other diseases classified elsewhere 1/7/2016 6-     Squamous cell carcinoma of scalp 5/2014    scalp vertex with + LN met 10/14 s/p radiation    Ulcer      Past Surgical History:   Procedure Laterality Date    ADJACENT TISSUE TRANSFER/REARRANGEMENT N/A 5/14/2014    Performed by [facility] at [facility] 2ND FLR    APPENDECTOMY  8.14.2015    mucinous cystadenoma with low grade dysplasia.     APPENDECTOMY-LAPAROSCOPIC N/A 8/14/2015    Performed by Feroz Corado MD at [facility] OR 2ND FLR    BIOPSY-LYMPH NODE Right 10/27/2014    Performed by [facility]os [facility] at [facility] OR 2ND FLR    CATARACT EXTRACTION      ou dr morales    EYE SURGERY      HYSTERECTOMY  1970     NIC/BSO. took HRT immediatiely after wards.     LAPAROSCOPY-DIAGNOSTIC  8/14/2015    Performed by Gianfranco Crenshaw MD at [facility] OR 2ND FLR    LYMPH NODE BIOPSY Right 10/27/2014    posterior triangle    Mohs excision of scalp SCC N/A 5/13/2014    Scalp flap N/A " 5/14/204    posterior advancement-rotation    SKIN CANCER EXCISION      THYROIDECTOMY  1960's    hyperthyroidism    TONSILLECTOMY       Family History   Problem Relation Age of Onset    Heart attack Mother     Heart disease Mother     Hypertension Mother     Eczema Mother     Heart failure Mother     Clotting disorder Father     Hypertension Father     Heart failure Father     Thyroid disease Sister     Cancer Sister         thyroid CA     No Known Problems Brother     No Known Problems Maternal Aunt     No Known Problems Maternal Uncle     No Known Problems Paternal Aunt     No Known Problems Paternal Uncle     No Known Problems Maternal Grandmother     No Known Problems Maternal Grandfather     No Known Problems Paternal Grandmother     No Known Problems Paternal Grandfather     Amblyopia Neg Hx     Blindness Neg Hx     Cataracts Neg Hx     Glaucoma Neg Hx     Macular degeneration Neg Hx     Retinal detachment Neg Hx     Strabismus Neg Hx     Stroke Neg Hx     Melanoma Neg Hx     Psoriasis Neg Hx     Lupus Neg Hx     Acne Neg Hx     Ovarian cancer Neg Hx     Uterine cancer Neg Hx     Breast cancer Neg Hx     Colon cancer Neg Hx     Diabetes Neg Hx     Hyperlipidemia Neg Hx      Social History     Tobacco Use    Smoking status: Former Smoker    Smokeless tobacco: Never Used    Tobacco comment: Quit 65 years ago   Substance Use Topics    Alcohol use: No    Drug use: No     Review of Systems   Constitutional: Negative for fever.   HENT: Negative for sore throat.    Respiratory: Positive for shortness of breath.    Cardiovascular: Positive for chest pain (Chronic.) and leg swelling.   Gastrointestinal: Negative for abdominal pain, blood in stool, nausea and vomiting.   Genitourinary: Positive for decreased urine volume. Negative for dysuria.   Musculoskeletal: Negative for back pain.   Skin: Positive for pallor. Negative for rash.   Neurological: Positive for weakness.  Negative for syncope and light-headedness.   Hematological: Does not bruise/bleed easily.   Psychiatric/Behavioral: Positive for confusion.       Physical Exam     Initial Vitals [04/11/19 1111]   BP Pulse Resp Temp SpO2   129/64 (!) 58 20 97.3 °F (36.3 °C) 96 %      MAP       --         Physical Exam    Nursing note and vitals reviewed.  Constitutional: She appears well-developed and well-nourished. She is not diaphoretic.  Non-toxic appearance. She appears ill. No distress.   Appears pale   HENT:   Head: Normocephalic and atraumatic.   Mouth/Throat: Mucous membranes are dry.   Neck: Neck supple.   Cardiovascular: Normal rate and regular rhythm. Exam reveals distant heart sounds. Exam reveals no gallop and no friction rub.    No murmur heard.  3+ pitting edema to BLE extending above the knees.   Pulmonary/Chest: Effort normal and breath sounds normal. No accessory muscle usage. No tachypnea. No respiratory distress. She has no decreased breath sounds. She has no wheezes. She has no rhonchi. She has no rales.   Abdominal: Soft. She exhibits no distension. There is no tenderness.   Genitourinary:   Genitourinary Comments: Scant dark stool on CHRIS - guaiac positive.    Neurological: She is alert.   Skin: No rash noted. There is pallor.   Psychiatric: She has a normal mood and affect. Her behavior is normal.         ED Course   Critical Care  Date/Time: 4/11/2019 3:16 PM  Performed by: Jazmyn Palomino PA-C  Authorized by: Morales Vazquez MD   Direct patient critical care time: 5 minutes  Additional history critical care time: 5 minutes  Ordering / reviewing critical care time: 5 minutes  Documentation critical care time: 5 minutes  Consulting other physicians critical care time: 5 minutes  Consult with family critical care time: 5 minutes  Total critical care time (exclusive of procedural time) : 30 minutes  Critical care was necessary to treat or prevent imminent or life-threatening deterioration of the following  conditions: cardiac failure and circulatory failure.  Critical care was time spent personally by me on the following activities: development of treatment plan with patient or surrogate, discussions with consultants, interpretation of cardiac output measurements, evaluation of patient's response to treatment, examination of patient, obtaining history from patient or surrogate, ordering and performing treatments and interventions, ordering and review of laboratory studies, ordering and review of radiographic studies, pulse oximetry, re-evaluation of patient's condition and review of old charts.        Labs Reviewed   CBC W/ AUTO DIFFERENTIAL - Abnormal; Notable for the following components:       Result Value    RBC 2.36 (*)     Hemoglobin 6.9 (*)     Hematocrit 21.5 (*)     RDW 15.0 (*)     Immature Granulocytes 0.6 (*)     Lymph # 0.8 (*)     Gran% 73.7 (*)     Lymph% 11.3 (*)     All other components within normal limits   COMPREHENSIVE METABOLIC PANEL - Abnormal; Notable for the following components:    Sodium 133 (*)     BUN, Bld 74 (*)     Creatinine 1.7 (*)     Calcium 8.6 (*)     Total Protein 5.2 (*)     Albumin 2.4 (*)     eGFR if  30.4 (*)     eGFR if non  26.4 (*)     All other components within normal limits   MAGNESIUM - Abnormal; Notable for the following components:    Magnesium 2.8 (*)     All other components within normal limits   PHOSPHORUS - Abnormal; Notable for the following components:    Phosphorus 5.1 (*)     All other components within normal limits   URINALYSIS, REFLEX TO URINE CULTURE - Abnormal; Notable for the following components:    Protein, UA 2+ (*)     Glucose, UA 1+ (*)     All other components within normal limits    Narrative:     Preferred Collection Type->Urine, Catheterized   B-TYPE NATRIURETIC PEPTIDE - Abnormal; Notable for the following components:     (*)     All other components within normal limits    Narrative:     ADD-ON BNP  #[id number] PER SALVADOR BENSON MD 13:17  04/11/2019    URINALYSIS MICROSCOPIC - Abnormal; Notable for the following components:    Bacteria, UA Few (*)     All other components within normal limits    Narrative:     Preferred Collection Type->Urine, Catheterized   LACTIC ACID, PLASMA   B-TYPE NATRIURETIC PEPTIDE   MAGNESIUM   TROPONIN I   PHOSPHORUS   TROPONIN I   HEMOGLOBIN A1C   TSH   FERRITIN   HEMOGLOBIN   HEMATOCRIT   TROPONIN I   TYPE & SCREEN   PREPARE RBC SOFT     EKG Readings: (Independently Interpreted)   Initial Reading: No STEMI. Rhythm: Junctional Rhythm. Heart Rate: 62.   Junctional rhythm. Rate of 62.  QRS and QTC WNL.  Same since January of 2019.       ECG Results          EKG 12-lead (Final result)  Result time 04/11/19 15:54:35    Final result by Interface, Lab In [facility] (04/11/19 15:54:35)                 Narrative:    Test Reason : R06.02,    Vent. Rate : 062 BPM     Atrial Rate : 267 BPM     P-R Int : 000 ms          QRS Dur : 088 ms      QT Int : 424 ms       P-R-T Axes : 000 003 090 degrees     QTc Int : 430 ms      Unusual P axis, possible ectopic atrial rhythm  with 1st degree A-V block  Nonspecific T wave abnormality  Abnormal ECG  When compared with ECG of 24-JAN-2019 18:29,  Nonspecific T wave abnormality, worse in Lateral leads  Confirmed by JOSE MARIA DURAN MD ([id number]) on 4/11/2019 3:54:26 PM    Referred By: MICAELA   SELF           Confirmed By:JOSE MARIA DURAN MD                            Imaging Results          X-Ray Chest AP Portable (Final result)  Result time 04/11/19 12:15:49    Final result by Milo Gordillo MD (04/11/19 12:15:49)                 Impression:      1. Moderate enlargement of the cardiac silhouette, pulmonary interstitial edema and small bilateral layering pleural effusions with overlying atelectasis and/or pneumonia.      Electronically signed by: Milo Gordillo  Date:    04/11/2019  Time:    12:15             Narrative:    EXAMINATION:  XR CHEST AP PORTABLE    CLINICAL  HISTORY:  Shortness of breath    TECHNIQUE:  Single frontal portable view of the chest was performed.    COMPARISON:  Chest radiograph 01/24/2019 and 01/07/2019    FINDINGS:  Moderate enlargement of the cardiac silhouette which may reflect cardiomegaly and/or pericardial effusion, not significantly changed.  Mediastinal silhouette is within normal limits given patient positioning and levo rotation.    Pulmonary interstitial edema and small bilateral layering pleural effusions with overlying atelectasis and/or pneumonia.  No pneumothorax.    Multilevel degenerative changes of the imaged spine.                              X-Rays:   Independently Interpreted Readings:   Chest X-Ray: Cardiomegaly present.  Increased vascular markings consistent with CHF are present. Bilateral pleural effusions present.      Medical Decision Making:   History:   Old Medical Records: I decided to obtain old medical records.  Differential Diagnosis:   My differential diagnosis includes but is not limited to:  CHF exacerbation, anemia, electrolyte derangements, dehydration, MARIAM  Independently Interpreted Test(s):   I have ordered and independently interpreted X-rays - see prior notes.  I have ordered and independently interpreted EKG Reading(s) - see prior notes  Clinical Tests:   Lab Tests: Ordered and Reviewed  Radiological Study: Ordered and Reviewed  Medical Tests: Ordered and Reviewed  Other:   I have discussed this case with another health care provider.       <> Summary of the Discussion: Cardiology, Hospital Medicine       APC / Resident Notes:   89-year-old female presents with low H&H as well as complaints of shortness of breath and weakness. On initial evaluation, patient's BP is 120s systolic.  O2 sats on room air were 91-92%.  She was placed on 1 L of oxygen via nasal cannula with improvement to 94-95%.  Mild bradycardia.  Patient appears pale.  CHRIS reveals scant dark stool that is Hemoccult positive. Abdomen is soft and  nontender. Lungs are clear to auscultation. She has 3+ pitting edema to the lower extremities.    Workup was remarkable for hemoglobin of 6.9.  Creatinine is elevated from patient's baseline of 1.4-1.7 today.  BNP is elevated at 810.  Troponin within normal limits. There is pleural effusions and pulmonary edema noted on chest x-ray.  Discussed the above findings with Cardiology.  They recommend Lasix 80 mg.  Patient also given 1 unit of blood in the ED.  Blood pressure began to increase.  She was given her additional home meds.  Pt will be admitted Hospital Medicine for further treatment and management of symptomatic anemia, suspected GI bleed, and acute on chronic heart failure.    I have reviewed the patient's records and discussed this case with my supervising physician.         Scribe Attestation:   Scribe #1: I performed the above scribed service and the documentation accurately describes the services I performed. I attest to the accuracy of the note.               Clinical Impression:       ICD-10-CM ICD-9-CM   1. Symptomatic anemia D64.9 285.9   2. Shortness of breath R06.02 786.05   3. Gastrointestinal hemorrhage, unspecified gastrointestinal hemorrhage type K92.2 578.9   4. Acute on chronic diastolic (congestive) heart failure I50.33 428.33     428.0   5. Pericardial effusion I31.3 423.9   6. Chest pain R07.9 786.50         Disposition:   Disposition: Admitted  Condition: Baudilio Palomino PA-C  04/11/19 1411

## 2019-04-11 NOTE — H&P
Physician Attestation for Scribe:  I, Jazmyn Mathews MD, personally performed the services described in this documentation. All medical record entries made by the scribe were at my direction and in my presence.  I have reviewed this note and agree that the record reflects my personal performance and is accurate and complete.     [facility]  History and Physical Exam    Team: [facility] [place] [facility] MED D Jazmyn Mathews MD  Admit Date: 4/11/2019  LAMAR TBD  Principal Problem:  Symptomatic anemia   Patient information was obtained from patient, past medical records and ER records.   Primary care Physician: Jairo Schmidt MD  Code status: Full Code    HPI:   87 yo female with a history of CAD, diastolic heart failure, HTN, HLD, hypothyroidism and spinal stenosis who presents after referral from PCP for anemia.  The patient has been having increased weakness with leg swelling and dyspnea. Also associated with weight gain. Worsening for three weeks. Recently hospitalized for an acute on chronic CHF exacerbation (1/2019). HCTZ was discontinued for hyponatremia, but restarted by her outpt cardiologist for fluid overload. Her cardiologist and PCP also instructed her to double her home lasix 20 mg prn for significant weight gain. Pt has increased in weight from 119 lbs in January to 131 lbs. Took 40 mg of lasix last night without noticeable increased UOP. No chest pain, + chronic orthopnea ( has mechanical bed) without PND.  Of note the patient was diagnosed with a pericardial effusion without evidence of tamponade when she was admitted in January for her CHF exacerbation.  She declined a pericardiocentesis.  In the emergency room the patient's blood pressure was initially 129/64 but gradually increased to a systolic of 200 especially after her blood transfusion was completed.  She has noted her blood pressure to be very variable home.  It had been low yesterday and she was instructed to hold all of her blood pressure medicines  including  Amlodipine, losartan and hydrochlorothiazide but did take her labetalol this morning.  She did receive her amlodipine losartan and hydrochlorothiazide in the emergency room with minimal change her blood pressure.  Hydralazine 25 mg orally was also given.    She has been compliant with apixaban since January of 2019 when she was diagnosed with a DVT to the left peroneal vein.  She denies having melena or bright red blood per rectum at home.  In the emergency room: Hemoccult positive and rectal exam revealed dark stool.  She was transfused 1 unit packed RBCs for hemoglobin of 6.9.  She was also started on pantoprazole IV.    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    Past Surgical History: Patient has a past surgical history that includes Skin cancer excision; Cataract extraction; Thyroidectomy (1960's); Mohs excision of scalp SCC (N/A, 5/13/2014); Scalp flap (N/A, 5/14/204); Lymph node biopsy (Right, 10/27/2014); Hysterectomy (1970 ); Appendectomy (8.14.2015); Eye surgery; and Tonsillectomy.    Social History: Patient reports that she has quit smoking. She has never used smokeless  tobacco. She reports that she does not drink alcohol or use drugs.    Family History: family history includes Cancer in her sister; Clotting disorder in her father; Eczema in her mother; Heart attack in her mother; Heart disease in her mother; Heart failure in her father and mother; Hypertension in her father and mother; No Known Problems in her brother, maternal aunt, maternal grandfather, maternal grandmother, maternal uncle, paternal aunt, paternal grandfather, paternal grandmother, and paternal uncle; Thyroid disease in her sister.    Medications:   Prior to Admission medications    Medication Sig Start Date End Date Taking? Authorizing Provider   acetaminophen (TYLENOL ARTHRITIS) 650 MG TbSR Take 650 mg by mouth nightly as needed (pain).    Yes Historical Provider, MD   albuterol (PROVENTIL) 2.5 mg /3 mL (0.083 %) nebulizer solution Take 3 mLs (2.5 mg total) by nebulization every 4 (four) hours as needed for Wheezing or Shortness of Breath (chest tightness). 1/7/19  Yes Kaushik Rodrigues MD   amLODIPine (NORVASC) 10 MG tablet TAKE 1 TABLET EVERY DAY 3/25/19  Yes Lenka Triana MD   apixaban (ELIQUIS) 5 mg (74 tabs) DsPk Take 2 tablets (10 mg) by mouth twice daily for 7 days, then take 1 tablet (5 mg) by mouth twice daily.  This is starter pack. Begin first 2/12/19  Yes Jairo Schmidt MD   atorvastatin (LIPITOR) 20 MG tablet TAKE 1 TABLET EVERY DAY 3/25/19  Yes Lenka Triana MD   calcium carbonate/vitamin D3 (CALTRATE 600 + D ORAL) Take by mouth once daily.   Yes Historical Provider, MD   FLAXSEED-OMEGA3,6,9-FATTY ACID ORAL Take 1 capsule by mouth once daily.     Yes Historical Provider, MD   furosemide (LASIX) 20 MG tablet Take 1 tablet (20 mg total) by mouth daily as needed (when weight gain of 3 lb in three days or 5 lb in one week). 4/5/19 5/5/19 Yes Jairo Schmidt MD   gabapentin (NEURONTIN) 100 MG capsule Take 2 capsules (200 mg total) by mouth 2 (two) times daily.  Patient taking  differently: Take 200 mg by mouth 2 (two) times daily. Pt taking one pill once a day. 1/14/19 1/14/20 Yes Denice Morales MD   hydroCHLOROthiazide (HYDRODIURIL) 25 MG tablet Take 1 tablet (25 mg total) by mouth once daily. 1/28/19 1/28/20 Yes Fernando Brown MD   labetalol (NORMODYNE) 300 MG tablet Take 1 tablet (300 mg total) by mouth 2 (two) times daily. 1/14/19 1/14/20 Yes Denice Morales MD   levothyroxine (SYNTHROID) 75 MCG tablet TAKE 1 TABLET EVERY DAY 3/19/19  Yes Jairo Schmidt MD   losartan (COZAAR) 100 MG tablet TAKE 1 TABLET EVERY DAY 2/6/19  Yes Jeanine Guzmán, FNP-C   magnesium 250 mg Tab Take 1 tablet by mouth once daily.     Yes Historical Provider, MD   triamcinolone acetonide 0.1% (KENALOG) 0.1 % cream APPLY TO THE AFFECTED AREA OF lower legs TWICE DAILY  Patient taking differently: Pt using prn. 7/19/18  Yes Ella Mohamud MD   chlorpheniramine maleate (CHLOR-TRIMETON REPETABS ORAL) Take 1 tablet by mouth daily as needed (hayfever).     Historical Provider, MD LUL BANKS LG MASK Spcr U UTD 1/5/19   Historical Provider, MD   traMADol (ULTRAM) 50 mg tablet Take 1 tablet (50 mg total) by mouth every 12 (twelve) hours as needed for Pain. 1/14/19   Denice Morales MD       Allergies: Patient is allergic to codeine; darvocet a500 [propoxyphene n-acetaminophen]; sulfa (sulfonamide antibiotics); and fosamax [alendronate].    Review of Systems   Constitutional: Negative for chills, fever and malaise/fatigue.   HENT: Negative for congestion and sore throat.    Eyes: Negative for blurred vision and double vision.   Respiratory: Positive for shortness of breath. Negative for cough and hemoptysis.    Cardiovascular: Positive for chest pain (intermittent at home), orthopnea and leg swelling. Negative for palpitations, claudication and PND.   Gastrointestinal: Negative for abdominal pain, diarrhea, nausea and vomiting.   Genitourinary: Negative for dysuria and hematuria.    Musculoskeletal: Positive for joint pain (legs). Negative for falls.   Neurological: Positive for dizziness (when BP low) and weakness. Negative for focal weakness.   Psychiatric/Behavioral: Negative for depression. The patient is not nervous/anxious.        PEx  Temp:  [97.3 °F (36.3 °C)-98.4 °F (36.9 °C)]   Pulse:  [58-68]   Resp:  [18-20]   BP: (120-191)/(38-83)   SpO2:  [93 %-98 %]   Body mass index is 22.49 kg/m².     Physical Exam   Constitutional: She is oriented to person, place, and time and well-developed, well-nourished, and in no distress. No distress.   HENT:   Head: Normocephalic and atraumatic.   Mouth/Throat: No oropharyngeal exudate.   Eyes: Pupils are equal, round, and reactive to light. EOM are normal. No scleral icterus.   Neck: Normal range of motion. Neck supple. JVD present.   Cardiovascular: Normal rate and regular rhythm. Exam reveals no friction rub.   No murmur heard.  Pulmonary/Chest: Effort normal and breath sounds normal. No respiratory distress. She has no wheezes. She has no rales.   Abdominal: Soft. She exhibits no distension. There is no tenderness.   Genitourinary: Rectal exam shows guaiac positive stool (per ED).   Musculoskeletal: Normal range of motion. She exhibits edema (3+ BLE).   Neurological: She is alert and oriented to person, place, and time.       No intake or output data in the 24 hours ending 04/11/19 1446  Recent Labs   Lab 04/10/19  1520 04/11/19  1142   WBC 6.63 7.17   HGB 6.9* 6.9*   HCT 22.0* 21.5*    219     Recent Labs   Lab 04/10/19  1520 04/11/19  1142   * 133*   K 4.5 4.2   CL 96 98   CO2 26 27   BUN 80* 74*   CREATININE 2.2* 1.7*    102   CALCIUM 8.8 8.6*   MG  --  2.8*   PHOS  --  5.1*     Recent Labs   Lab 04/11/19  1142   ALKPHOS 122   ALT 35   AST 34   ALBUMIN 2.4*   PROT 5.2*   BILITOT 0.5      No results for input(s): POCTGLUCOSE in the last 168 hours.  Recent Labs     04/11/19  1142   TROPONINI 0.011       Recent Labs      04/11/19  1200   LACTATE 0.7      Recent Results (from the past 24 hour(s))   CBC auto differential    Collection Time: 04/11/19 11:42 AM   Result Value Ref Range    WBC 7.17 3.90 - 12.70 K/uL    RBC 2.36 (L) 4.00 - 5.40 M/uL    Hemoglobin 6.9 (L) 12.0 - 16.0 g/dL    Hematocrit 21.5 (L) 37.0 - 48.5 %    MCV 91 82 - 98 fL    MCH 29.2 27.0 - 31.0 pg    MCHC 32.1 32.0 - 36.0 g/dL    RDW 15.0 (H) 11.5 - 14.5 %    Platelets 219 150 - 350 K/uL    MPV 10.3 9.2 - 12.9 fL    Immature Granulocytes 0.6 (H) 0.0 - 0.5 %    Gran # (ANC) 5.3 1.8 - 7.7 K/uL    Immature Grans (Abs) 0.04 0.00 - 0.04 K/uL    Lymph # 0.8 (L) 1.0 - 4.8 K/uL    Mono # 0.7 0.3 - 1.0 K/uL    Eos # 0.3 0.0 - 0.5 K/uL    Baso # 0.04 0.00 - 0.20 K/uL    nRBC 0 0 /100 WBC    Gran% 73.7 (H) 38.0 - 73.0 %    Lymph% 11.3 (L) 18.0 - 48.0 %    Mono% 9.5 4.0 - 15.0 %    Eosinophil% 4.3 0.0 - 8.0 %    Basophil% 0.6 0.0 - 1.9 %    Differential Method Automated    Comprehensive metabolic panel    Collection Time: 04/11/19 11:42 AM   Result Value Ref Range    Sodium 133 (L) 136 - 145 mmol/L    Potassium 4.2 3.5 - 5.1 mmol/L    Chloride 98 95 - 110 mmol/L    CO2 27 23 - 29 mmol/L    Glucose 102 70 - 110 mg/dL    BUN, Bld 74 (H) 8 - 23 mg/dL    Creatinine 1.7 (H) 0.5 - 1.4 mg/dL    Calcium 8.6 (L) 8.7 - 10.5 mg/dL    Total Protein 5.2 (L) 6.0 - 8.4 g/dL    Albumin 2.4 (L) 3.5 - 5.2 g/dL    Total Bilirubin 0.5 0.1 - 1.0 mg/dL    Alkaline Phosphatase 122 55 - 135 U/L    AST 34 10 - 40 U/L    ALT 35 10 - 44 U/L    Anion Gap 8 8 - 16 mmol/L    eGFR if African American 30.4 (A) >60 mL/min/1.73 m^2    eGFR if non  26.4 (A) >60 mL/min/1.73 m^2   Type & Screen    Collection Time: 04/11/19 11:42 AM   Result Value Ref Range    Group & Rh A POS     Indirect Patricia NEG    Magnesium    Collection Time: 04/11/19 11:42 AM   Result Value Ref Range    Magnesium 2.8 (H) 1.6 - 2.6 mg/dL   Phosphorus    Collection Time: 04/11/19 11:42 AM   Result Value Ref Range    Phosphorus  5.1 (H) 2.7 - 4.5 mg/dL   Troponin I    Collection Time: 04/11/19 11:42 AM   Result Value Ref Range    Troponin I 0.011 0.000 - 0.026 ng/mL   Brain natriuretic peptide    Collection Time: 04/11/19 11:42 AM   Result Value Ref Range     (H) 0 - 99 pg/mL   Prepare RBC 1 Unit    Collection Time: 04/11/19 11:42 AM   Result Value Ref Range    UNIT NUMBER [id number]     PRODUCT CODE G1923R76     DISPENSE STATUS ISSUED     CODING SYSTEM PFJW010     Unit Blood Type Code 6200     Unit Blood Type A POS     Unit Expiration [id number]    Lactic acid, plasma    Collection Time: 04/11/19 12:00 PM   Result Value Ref Range    Lactate (Lactic Acid) 0.7 0.5 - 2.2 mmol/L   Urinalysis, Reflex to Urine Culture Urine, Catheterized    Collection Time: 04/11/19  1:01 PM   Result Value Ref Range    Specimen UA Urine, Catheterized     Color, UA Yellow Yellow, Straw, Michelle    Appearance, UA Clear Clear    pH, UA 6.0 5.0 - 8.0    Specific Gravity, UA 1.010 1.005 - 1.030    Protein, UA 2+ (A) Negative    Glucose, UA 1+ (A) Negative    Ketones, UA Negative Negative    Bilirubin (UA) Negative Negative    Occult Blood UA Negative Negative    Nitrite, UA Negative Negative    Leukocytes, UA Negative Negative   Urinalysis Microscopic    Collection Time: 04/11/19  1:01 PM   Result Value Ref Range    RBC, UA 1 0 - 4 /hpf    WBC, UA 1 0 - 5 /hpf    Bacteria, UA Few (A) None-Occ /hpf    Hyaline Casts, UA 1 0-1/lpf /lpf    Microscopic Comment SEE COMMENT    Hemoglobin    Collection Time: 04/11/19  6:31 PM   Result Value Ref Range    Hemoglobin 8.6 (L) 12.0 - 16.0 g/dL   Hematocrit    Collection Time: 04/11/19  6:31 PM   Result Value Ref Range    Hematocrit 26.7 (L) 37.0 - 48.5 %         Active Hospital Problems    Diagnosis  POA    *Symptomatic anemia [D64.9]  Yes    Hyperkalemia [E87.5]  Yes    CKD (chronic kidney disease) stage 3, GFR 30-59 ml/min [N18.3]  Yes    Acute deep vein thrombosis (DVT) of left peroneal vein [I82.492]  Yes     DVT (deep venous thrombosis) [I82.409]  Yes    Essential hypertension [I10]  Yes    Hypothyroidism (acquired) [E03.9]  Yes    Polyneuropathy in other diseases classified elsewhere [G63]  Yes     6-      Pericardial effusion [I31.3]  Yes     Chronic    Acute on chronic diastolic heart failure [I50.33]  Yes    Hyperlipidemia [E78.5]  Yes     Chronic      Resolved Hospital Problems   No resolved problems to display.       Overview  87 yo female with a history of CAD, diastolic HF, HLD,and hypothyroidism admitted with symptomatic anemia, heme + stool on chronic anticoagulation and CHF exacerbation with uncontrolled HTN.     Assessment and Plan for Problems addressed today:    Acute on chronic diastolic heart failure  Pericardial effusion  Essential hypertension  · Echo (1/21/2019): EF 65%, indeterminate diastolic function, severe LAE, moderated pericardial effusion, no evidence of tamponade.   · Continue lasix at 40 mg BID after 80 mg IV given in ED.  · Patient has been stopped on HCTZ in the past due to hyponatremia  · Pericardial effusion likely persistent as evidenced by cardiac silhouette on CXR; Echo ordered  · Cardiac diet  · Monitoring with telemetry, daily weights and I/O's  · Continue therapy with amlodipine (consider nifedipine if ineffective and worsened LE edema), hold losartan currently due to renal and start bidil; hydralazine prn SBP > 180; check orthostatics  · Cardiology consult due to difficult home control HTN/CHF and pericardial effusion  · Monitor troponins due to intermittent chest pain at home and T-wave abnormality on admit EKG    Chronic kidney disease stage 3  Hyperkalemia  · Estimated Creatinine Clearance: 19.4 mL/min (A) (based on SCr of 1.7 mg/dL (H)).  · Cr increased from baseline of 1.4 previously; probable cardiorenal syndrome  · Monitor, avoid nephrotoxins and renally adjust meds  · Monitor K+; hold magnesium supplement     Hypothyroidism  · Continue home  levothyroxine    Normocytic anemia  Heme + stool  · Baseline Hgb 8-9, 6.9 on admit and back to baseline after 1 u PRBC  · Stool heme + on chronic anticoagulation; GI consulted  · + hx of PUD in the past but no longer on GI meds and denies OTC meds; + hx of EGD but not C-scope  · Continue protonix IV  · Hold apixaban and resume if no signs of worsened bleeding  · Ferritin ordered; patient reluctant to take iron supplement due to constipation    Left peroneal vein DVT  · Diagnosed on January 24, 2019 and patient has been taking apixaban therapy  · Hold apixaban currently to ensure no active bleeding or worsening of anemia    Hyperlipidemia  · Continue atorvastatin therapy daily    Peripheral neuropathy    · chronic and stable  · Continue gabapentin at home dose    Diet:  Diet Cardiac; Diet NPO in am  GI PPx:   pantoprazole  DVT PPx:   VTE Risk Mitigation (From admission, onward)        Ordered     Place VALERIA hose  Until discontinued      04/11/19 1746     Place sequential compression device  Until discontinued      04/11/19 1746     IP VTE HIGH RISK PATIENT  Once      04/11/19 1746        Goals of care:  Evaluation of anemia and resolution of symptomatic CHF  Lines/Drains/Airways:  Peripheral IV  Wounds:  none  Discharge plan and follow up:     Provider   Jazmyn Mathews MD  Staff Hospitalist   Spectra [phone]    I personally scribed for Jazmyn Mathews MD on 04/11/2019 at 2:46 PM. Electronically signed by nick Juares on 04/11/2019 at 2:46 PM

## 2019-04-12 LAB
ANION GAP SERPL CALC-SCNC: 9 MMOL/L (ref 8–16)
BASOPHILS # BLD AUTO: 0.03 K/UL (ref 0–0.2)
BASOPHILS NFR BLD: 0.4 % (ref 0–1.9)
BUN SERPL-MCNC: 78 MG/DL (ref 8–23)
CALCIUM SERPL-MCNC: 8.8 MG/DL (ref 8.7–10.5)
CHLORIDE SERPL-SCNC: 97 MMOL/L (ref 95–110)
CO2 SERPL-SCNC: 26 MMOL/L (ref 23–29)
CREAT SERPL-MCNC: 1.7 MG/DL (ref 0.5–1.4)
DIFFERENTIAL METHOD: ABNORMAL
EOSINOPHIL # BLD AUTO: 0.3 K/UL (ref 0–0.5)
EOSINOPHIL NFR BLD: 4.2 % (ref 0–8)
ERYTHROCYTE [DISTWIDTH] IN BLOOD BY AUTOMATED COUNT: 14.7 % (ref 11.5–14.5)
EST. GFR  (AFRICAN AMERICAN): 30.4 ML/MIN/1.73 M^2
EST. GFR  (NON AFRICAN AMERICAN): 26.4 ML/MIN/1.73 M^2
GLUCOSE SERPL-MCNC: 81 MG/DL (ref 70–110)
HCT VFR BLD AUTO: 24 % (ref 37–48.5)
HCT VFR BLD AUTO: 24.8 % (ref 37–48.5)
HGB BLD-MCNC: 7.7 G/DL (ref 12–16)
HGB BLD-MCNC: 8.1 G/DL (ref 12–16)
IMM GRANULOCYTES # BLD AUTO: 0.02 K/UL (ref 0–0.04)
IMM GRANULOCYTES NFR BLD AUTO: 0.3 % (ref 0–0.5)
LYMPHOCYTES # BLD AUTO: 1 K/UL (ref 1–4.8)
LYMPHOCYTES NFR BLD: 14.1 % (ref 18–48)
MAGNESIUM SERPL-MCNC: 2.5 MG/DL (ref 1.6–2.6)
MCH RBC QN AUTO: 29.5 PG (ref 27–31)
MCHC RBC AUTO-ENTMCNC: 32.1 G/DL (ref 32–36)
MCV RBC AUTO: 92 FL (ref 82–98)
MONOCYTES # BLD AUTO: 0.9 K/UL (ref 0.3–1)
MONOCYTES NFR BLD: 11.9 % (ref 4–15)
NEUTROPHILS # BLD AUTO: 5 K/UL (ref 1.8–7.7)
NEUTROPHILS NFR BLD: 69.1 % (ref 38–73)
NRBC BLD-RTO: 0 /100 WBC
PHOSPHATE SERPL-MCNC: 5 MG/DL (ref 2.7–4.5)
PLATELET # BLD AUTO: 221 K/UL (ref 150–350)
PMV BLD AUTO: 11 FL (ref 9.2–12.9)
POTASSIUM SERPL-SCNC: 3.6 MMOL/L (ref 3.5–5.1)
RBC # BLD AUTO: 2.61 M/UL (ref 4–5.4)
SODIUM SERPL-SCNC: 132 MMOL/L (ref 136–145)
WBC # BLD AUTO: 7.22 K/UL (ref 3.9–12.7)

## 2019-04-12 PROCEDURE — 99232 PR SUBSEQUENT HOSPITAL CARE,LEVL II: ICD-10-PCS | Mod: HCNC,,, | Performed by: INTERNAL MEDICINE

## 2019-04-12 PROCEDURE — 99232 SBSQ HOSP IP/OBS MODERATE 35: CPT | Mod: HCNC,,, | Performed by: INTERNAL MEDICINE

## 2019-04-12 PROCEDURE — C9113 INJ PANTOPRAZOLE SODIUM, VIA: HCPCS | Mod: HCNC | Performed by: INTERNAL MEDICINE

## 2019-04-12 PROCEDURE — 85014 HEMATOCRIT: CPT | Mod: HCNC

## 2019-04-12 PROCEDURE — 99222 PR INITIAL HOSPITAL CARE,LEVL II: ICD-10-PCS | Mod: HCNC,GC,, | Performed by: INTERNAL MEDICINE

## 2019-04-12 PROCEDURE — 36415 COLL VENOUS BLD VENIPUNCTURE: CPT | Mod: HCNC

## 2019-04-12 PROCEDURE — 84100 ASSAY OF PHOSPHORUS: CPT | Mod: HCNC

## 2019-04-12 PROCEDURE — 80048 BASIC METABOLIC PNL TOTAL CA: CPT | Mod: HCNC

## 2019-04-12 PROCEDURE — 25000003 PHARM REV CODE 250: Mod: HCNC | Performed by: INTERNAL MEDICINE

## 2019-04-12 PROCEDURE — 83735 ASSAY OF MAGNESIUM: CPT | Mod: HCNC

## 2019-04-12 PROCEDURE — 63600175 PHARM REV CODE 636 W HCPCS: Mod: HCNC | Performed by: INTERNAL MEDICINE

## 2019-04-12 PROCEDURE — 99222 1ST HOSP IP/OBS MODERATE 55: CPT | Mod: HCNC,GC,, | Performed by: INTERNAL MEDICINE

## 2019-04-12 PROCEDURE — 20600001 HC STEP DOWN PRIVATE ROOM: Mod: HCNC

## 2019-04-12 PROCEDURE — 85025 COMPLETE CBC W/AUTO DIFF WBC: CPT | Mod: HCNC

## 2019-04-12 PROCEDURE — 85018 HEMOGLOBIN: CPT | Mod: HCNC

## 2019-04-12 RX ORDER — FUROSEMIDE 10 MG/ML
80 INJECTION INTRAMUSCULAR; INTRAVENOUS 2 TIMES DAILY
Status: DISCONTINUED | OUTPATIENT
Start: 2019-04-12 | End: 2019-04-12

## 2019-04-12 RX ORDER — ISOSORBIDE DINITRATE AND HYDRALAZINE HYDROCHLORIDE 37.5; 2 MG/1; MG/1
1 TABLET ORAL 2 TIMES DAILY
Status: DISCONTINUED | OUTPATIENT
Start: 2019-04-12 | End: 2019-04-15 | Stop reason: HOSPADM

## 2019-04-12 RX ORDER — FUROSEMIDE 10 MG/ML
40 INJECTION INTRAMUSCULAR; INTRAVENOUS 2 TIMES DAILY
Status: DISCONTINUED | OUTPATIENT
Start: 2019-04-12 | End: 2019-04-13

## 2019-04-12 RX ORDER — POTASSIUM CHLORIDE 20 MEQ/1
20 TABLET, EXTENDED RELEASE ORAL ONCE
Status: COMPLETED | OUTPATIENT
Start: 2019-04-12 | End: 2019-04-12

## 2019-04-12 RX ADMIN — FUROSEMIDE 40 MG: 10 INJECTION, SOLUTION INTRAMUSCULAR; INTRAVENOUS at 05:04

## 2019-04-12 RX ADMIN — AMLODIPINE BESYLATE 10 MG: 10 TABLET ORAL at 08:04

## 2019-04-12 RX ADMIN — PANTOPRAZOLE SODIUM 40 MG: 40 INJECTION, POWDER, FOR SOLUTION INTRAVENOUS at 08:04

## 2019-04-12 RX ADMIN — GABAPENTIN 100 MG: 100 CAPSULE ORAL at 10:04

## 2019-04-12 RX ADMIN — LABETALOL HYDROCHLORIDE 300 MG: 100 TABLET, FILM COATED ORAL at 10:04

## 2019-04-12 RX ADMIN — ATORVASTATIN CALCIUM 20 MG: 20 TABLET, FILM COATED ORAL at 08:04

## 2019-04-12 RX ADMIN — PANTOPRAZOLE SODIUM 40 MG: 40 INJECTION, POWDER, FOR SOLUTION INTRAVENOUS at 10:04

## 2019-04-12 RX ADMIN — LEVOTHYROXINE SODIUM 75 MCG: 75 TABLET ORAL at 06:04

## 2019-04-12 RX ADMIN — HYDRALAZINE HYDROCHLORIDE AND ISOSORBIDE DINITRATE 1 TABLET: 37.5; 2 TABLET, FILM COATED ORAL at 10:04

## 2019-04-12 RX ADMIN — HYDRALAZINE HYDROCHLORIDE 25 MG: 25 TABLET, FILM COATED ORAL at 03:04

## 2019-04-12 RX ADMIN — POTASSIUM CHLORIDE 20 MEQ: 1500 TABLET, EXTENDED RELEASE ORAL at 12:04

## 2019-04-12 RX ADMIN — LABETALOL HYDROCHLORIDE 300 MG: 100 TABLET, FILM COATED ORAL at 08:04

## 2019-04-12 RX ADMIN — FUROSEMIDE 40 MG: 10 INJECTION, SOLUTION INTRAMUSCULAR; INTRAVENOUS at 08:04

## 2019-04-12 NOTE — HPI
Ms. Quintanilla is an 89 year old female with a past medical history of HTN, HLD, HFpEF, CAD (2004 LCx 40%, no PCI) who presents at the advise of her PCP who found on labs drawn yesterday that her Hgb was down to 6.9. She had seen her PCP yesterday due to SOB and weakness since Monday, however, she has had her ups and downs over the prior months due to a 2 hospital stays this year: 1 for hyponatremia and CHF and the other for DVT. Either way, she was advised to increase her lasix from 20 QOD to 40 QD for 3 days on 4/3. However, she was taking the lasix at different times instead of 40mg at the same time. On 4/9, she was advised to take them at the same time. She has had no change in her weight and thus she came to the ED. Her O2 saturations were in the low 90s. She received 1xPRBC, which made her feel better, but when internal medicine came to speak with her and told her her blood pressure was >200s, she got nervous again. She had received 80 IV lasix after the PRBC transfusion and she received her home medications as she was advised to hold all but her labetalol. Cardiology was consulted for hypertension

## 2019-04-12 NOTE — SUBJECTIVE & OBJECTIVE
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      Arthritis     ASCVD (arteriosclerotic cardiovascular disease) 7/8/2014    Atherosclerosis of aorta 1/7/2016    "There is atherosclerosis of the thoracic aorta" - Xray Chest 7-     Back pain     Basal cell carcinoma 10/7/2013    Right nasal columellar, right lower eyelid, left medial eyelid    Basal cell carcinoma 2/2015    mid back    Chronic diastolic heart failure 11/11/2014    Coronary artery disease     Gastric ulcer     Fosamax related.    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     Hypothyroidism (acquired) 9/27/2016    Joint pain     Lymphedema of Neck 3/17/2016    Occlusion and stenosis of carotid artery without mention of cerebral infarction     Pelvic mass in female 7/21/2015    Polyneuropathy in other diseases classified elsewhere 1/7/2016 6-     Squamous cell carcinoma of scalp 5/2014    scalp vertex with + LN met 10/14 s/p radiation    Ulcer        Past Surgical History:   Procedure Laterality Date    ADJACENT TISSUE TRANSFER/REARRANGEMENT N/A 5/14/2014    Performed by Olvin [facility]evallos [facility] at [facility] OR 2ND FLR    APPENDECTOMY  8.14.2015    mucinous cystadenoma with low grade dysplasia.     APPENDECTOMY-LAPAROSCOPIC N/A 8/14/2015    Performed by Feroz Corado MD at [facility] OR 2ND FLR    BIOPSY-LYMPH NODE Right 10/27/2014    Performed by Olvin Cevallos MD at [facility] OR 2ND FLR    CATARACT EXTRACTION      ou dr morales    EYE SURGERY      HYSTERECTOMY  1970     NIC/BSO. took HRT immediatiely after wards.     LAPAROSCOPY-DIAGNOSTIC  8/14/2015    Performed by Gianfranco Crenshaw MD at [facility] OR 2ND FLR    LYMPH NODE BIOPSY Right 10/27/2014    posterior triangle    Mohs excision " of scalp SCC N/A 5/13/2014    Scalp flap N/A 5/14/204    posterior advancement-rotation    SKIN CANCER EXCISION      THYROIDECTOMY  1960's    hyperthyroidism    TONSILLECTOMY         Review of patient's allergies indicates:   Allergen Reactions    Codeine     Darvocet a500 [propoxyphene n-acetaminophen] Nausea Only    Sulfa (sulfonamide antibiotics) Rash    Fosamax [alendronate]      Gastric ulcer       No current facility-administered medications on file prior to encounter.      Current Outpatient Medications on File Prior to Encounter   Medication Sig    acetaminophen (TYLENOL ARTHRITIS) 650 MG TbSR Take 650 mg by mouth nightly as needed (pain).     albuterol (PROVENTIL) 2.5 mg /3 mL (0.083 %) nebulizer solution Take 3 mLs (2.5 mg total) by nebulization every 4 (four) hours as needed for Wheezing or Shortness of Breath (chest tightness).    amLODIPine (NORVASC) 10 MG tablet TAKE 1 TABLET EVERY DAY    apixaban (ELIQUIS) 5 mg (74 tabs) DsPk Take 2 tablets (10 mg) by mouth twice daily for 7 days, then take 1 tablet (5 mg) by mouth twice daily.  This is starter pack. Begin first    atorvastatin (LIPITOR) 20 MG tablet TAKE 1 TABLET EVERY DAY    calcium carbonate/vitamin D3 (CALTRATE 600 + D ORAL) Take by mouth once daily.    FLAXSEED-OMEGA3,6,9-FATTY ACID ORAL Take 1 capsule by mouth once daily.      furosemide (LASIX) 20 MG tablet Take 1 tablet (20 mg total) by mouth daily as needed (when weight gain of 3 lb in three days or 5 lb in one week).    gabapentin (NEURONTIN) 100 MG capsule Take 2 capsules (200 mg total) by mouth 2 (two) times daily. (Patient taking differently: Take 200 mg by mouth 2 (two) times daily. Pt taking one pill once a day.)    hydroCHLOROthiazide (HYDRODIURIL) 25 MG tablet Take 1 tablet (25 mg total) by mouth once daily.    labetalol (NORMODYNE) 300 MG tablet Take 1 tablet (300 mg total) by mouth 2 (two) times daily.    levothyroxine (SYNTHROID) 75 MCG tablet TAKE 1 TABLET  EVERY DAY    losartan (COZAAR) 100 MG tablet TAKE 1 TABLET EVERY DAY    magnesium 250 mg Tab Take 1 tablet by mouth once daily.      triamcinolone acetonide 0.1% (KENALOG) 0.1 % cream APPLY TO THE AFFECTED AREA OF lower legs TWICE DAILY (Patient taking differently: Pt using prn.)    chlorpheniramine maleate (CHLOR-TRIMETON REPETABS ORAL) Take 1 tablet by mouth daily as needed (hayfever).     OPTICHAMBER DARREN LG MASK Spcr U UTD    traMADol (ULTRAM) 50 mg tablet Take 1 tablet (50 mg total) by mouth every 12 (twelve) hours as needed for Pain.     Family History     Problem Relation (Age of Onset)    Cancer Sister    Clotting disorder Father    Eczema Mother    Heart attack Mother    Heart disease Mother    Heart failure Mother, Father    Hypertension Mother, Father    No Known Problems Brother, Maternal Aunt, Maternal Uncle, Paternal Aunt, Paternal Uncle, Maternal Grandmother, Maternal Grandfather, Paternal Grandmother, Paternal Grandfather    Thyroid disease Sister        Tobacco Use    Smoking status: Former Smoker    Smokeless tobacco: Never Used    Tobacco comment: Quit 65 years ago   Substance and Sexual Activity    Alcohol use: No    Drug use: No    Sexual activity: Never     Review of Systems   Constitution: Positive for decreased appetite and malaise/fatigue. Negative for chills and fever.   HENT: Negative.    Cardiovascular: Positive for leg swelling. Negative for chest pain, dyspnea on exertion, irregular heartbeat, orthopnea, palpitations and paroxysmal nocturnal dyspnea.   Respiratory: Positive for shortness of breath. Negative for wheezing.    Skin: Negative for color change and rash.   Musculoskeletal: Negative for arthritis, back pain and myalgias.   Gastrointestinal: Negative for abdominal pain, constipation, diarrhea and nausea.   Neurological: Negative for dizziness, numbness and paresthesias.   Psychiatric/Behavioral: Negative.      Objective:     Vital Signs (Most Recent):  Temp: 98.7  °F (37.1 °C) (04/11/19 1639)  Pulse: 78 (04/11/19 1904)  Resp: 18 (04/11/19 1847)  BP: (!) 191/86 (04/11/19 1904)  SpO2: (!) 94 % (04/11/19 1847) Vital Signs (24h Range):  Temp:  [97.3 °F (36.3 °C)-98.7 °F (37.1 °C)] 98.7 °F (37.1 °C)  Pulse:  [58-78] 78  Resp:  [17-20] 18  SpO2:  [93 %-98 %] 94 %  BP: (129-206)/() 191/86     Weight: 59.4 kg (131 lb)  Body mass index is 22.49 kg/m².    SpO2: (!) 94 %         Intake/Output Summary (Last 24 hours) at 4/11/2019 2011  Last data filed at 4/11/2019 1755  Gross per 24 hour   Intake --   Output 950 ml   Net -950 ml       Lines/Drains/Airways     Peripheral Intravenous Line                 Peripheral IV - Single Lumen 04/11/19 1141 18 G Left Antecubital less than 1 day                Physical Exam   Constitutional: Vital signs are normal. She appears well-developed and well-nourished. She is cooperative.  Non-toxic appearance. No distress.   HENT:   Head: Normocephalic and atraumatic.   Neck: JVD present. No hepatojugular reflux present. Carotid bruit is not present.   Cardiovascular: Normal rate, regular rhythm, S1 normal and S2 normal. Exam reveals no gallop.   Murmur heard.   Harsh midsystolic murmur is present with a grade of 2/6 at the upper right sternal border radiating to the neck.  Pulses:       Radial pulses are 2+ on the right side, and 2+ on the left side.        Dorsalis pedis pulses are 2+ on the right side, and 2+ on the left side.        Posterior tibial pulses are 2+ on the right side, and 2+ on the left side.   No lower extremity edema   Pulmonary/Chest: Effort normal. No respiratory distress. She has no decreased breath sounds. She has no wheezes. She has no rhonchi. She has rales in the right lower field and the left lower field.   Abdominal: Soft. Normal appearance and bowel sounds are normal. She exhibits no distension and no mass. There is no tenderness.   Neurological: She is alert.   Skin: Skin is warm, dry and intact.       Significant Labs:    CMP   Recent Labs   Lab 04/10/19  1520 04/11/19  1142   * 133*   K 4.5 4.2   CL 96 98   CO2 26 27    102   BUN 80* 74*   CREATININE 2.2* 1.7*   CALCIUM 8.8 8.6*   PROT  --  5.2*   ALBUMIN  --  2.4*   BILITOT  --  0.5   ALKPHOS  --  122   AST  --  34   ALT  --  35   ANIONGAP 9 8   ESTGFRAFRICA 22.2* 30.4*   EGFRNONAA 19.3* 26.4*   , CBC   Recent Labs   Lab 04/10/19  1520 04/11/19  1142 04/11/19  1831   WBC 6.63 7.17  --    HGB 6.9* 6.9* 8.6*   HCT 22.0* 21.5* 26.7*    219  --    , INR No results for input(s): INR, PROTIME in the last 48 hours. and Troponin   Recent Labs   Lab 04/11/19  1142 04/11/19  1831   TROPONINI 0.011 0.009       Significant Imaging:   EKG (04/11/2019): NSR with 1st degree AV block with flattened T waves in the lateral leads.    2D Echo (01/21/2019):  Left Ventricle Normal ejection fraction at 65%. Mild left ventricular enlargement. Normal wall thickness observed. Indeterminate left ventricular diastolic function. No wall motion abnormalities.   Right Ventricle Normal cavity size, wall thickness and ejection fraction. Wall motion normal.   Left Atrium Severe atrial enlargement. Systolic blunting of pulmonary venous inflow.   Right Atrium Normal atrial size.   Aortic Valve Aortic valve sclerosis is mild. Mild regurgitation. Mobility is normal   Mitral Valve Normal valve structure. No stenosis. Mild regurgitation. Normal leaflet mobility.   Tricuspid Valve The tricuspid valve is normal. Trace regurgitation. Mobility is normal. The estimated PA systolic pressure is 34 mmHg.   Pulmonic Valve Normal valve structure. No stenosis. No regurgitation. Mobility is normal.   IVC/SVC Intermediate central venous pressure (8 mm Hg).   Ascending Aorta The aortic root and ascending aorta are normal in size.   Pericardium Moderate pericardial effusion. Pericardial thickening present. Respiratory variation of mitral valve inflow is less than 30%.

## 2019-04-12 NOTE — ASSESSMENT & PLAN NOTE
Patient presenting with hypervolemia. Good urine output and symptomatic improvement with 80mg IV lasix on presentation. Cr holding stable.  - Recommend continuing diuresis with 40mg IV lasix bid  --replete electrolytes to K>4, Mg>2  --check BMP BID  --salt restriction <2gm and fluid restriction <1.5L  --daily weights

## 2019-04-12 NOTE — SUBJECTIVE & OBJECTIVE
Interval History: No acute events overnight, patient endorsing good urine output. States her breathing is significantly improved and leg / abdominal edema has improved as well. She denies chest pain or palpitations. She has not ambulated since getting up to floor.    Review of Systems   Constitution: Positive for malaise/fatigue. Negative for chills and fever.   HENT: Negative.    Cardiovascular: Positive for leg swelling. Negative for chest pain, dyspnea on exertion, irregular heartbeat, orthopnea, palpitations and paroxysmal nocturnal dyspnea.   Respiratory: Positive for shortness of breath. Negative for wheezing.    Skin: Negative for color change and rash.   Musculoskeletal: Negative for arthritis, back pain and myalgias.   Gastrointestinal: Negative for abdominal pain, constipation, diarrhea and nausea.   Neurological: Negative for dizziness, numbness and paresthesias.   Psychiatric/Behavioral: Negative.      Objective:     Vital Signs (Most Recent):  Temp: 98.2 °F (36.8 °C) (04/12/19 1620)  Pulse: 70 (04/12/19 1620)  Resp: 18 (04/12/19 1620)  BP: (!) 156/72 (04/12/19 1620)  SpO2: (!) 92 % (04/12/19 1620) Vital Signs (24h Range):  Temp:  [98.1 °F (36.7 °C)-98.5 °F (36.9 °C)] 98.2 °F (36.8 °C)  Pulse:  [59-78] 70  Resp:  [16-20] 18  SpO2:  [92 %-97 %] 92 %  BP: (118-191)/(58-86) 156/72     Weight: 54.8 kg (120 lb 13 oz)  Body mass index is 20.74 kg/m².     SpO2: (!) 92 %  O2 Device (Oxygen Therapy): room air      Intake/Output Summary (Last 24 hours) at 4/12/2019 1857  Last data filed at 4/12/2019 1700  Gross per 24 hour   Intake 900 ml   Output 200 ml   Net 700 ml       Lines/Drains/Airways     Peripheral Intravenous Line                 Peripheral IV - Single Lumen 04/11/19 1141 18 G Left Antecubital 1 day                Physical Exam   Constitutional: Vital signs are normal. She appears well-developed and well-nourished. She is cooperative.  Non-toxic appearance. No distress.   HENT:   Head: Normocephalic and  atraumatic.   Neck: JVD present. No hepatojugular reflux present. Carotid bruit is not present.   Cardiovascular: Normal rate, regular rhythm, S1 normal and S2 normal. Exam reveals no gallop.   Murmur heard.   Harsh midsystolic murmur is present with a grade of 2/6 at the upper right sternal border radiating to the neck.  Pulses:       Radial pulses are 2+ on the right side, and 2+ on the left side.        Dorsalis pedis pulses are 2+ on the right side, and 2+ on the left side.        Posterior tibial pulses are 2+ on the right side, and 2+ on the left side.   No lower extremity edema   Pulmonary/Chest: Effort normal. No respiratory distress. She has no decreased breath sounds. She has no wheezes. She has no rhonchi. She has rales in the left lower field.   Abdominal: Soft. Normal appearance and bowel sounds are normal. She exhibits no distension and no mass. There is no tenderness.   Neurological: She is alert.   Skin: Skin is warm, dry and intact.       Significant Labs:   CMP   Recent Labs   Lab 04/11/19  1142 04/12/19  0455   * 132*   K 4.2 3.6   CL 98 97   CO2 27 26    81   BUN 74* 78*   CREATININE 1.7* 1.7*   CALCIUM 8.6* 8.8   PROT 5.2*  --    ALBUMIN 2.4*  --    BILITOT 0.5  --    ALKPHOS 122  --    AST 34  --    ALT 35  --    ANIONGAP 8 9   ESTGFRAFRICA 30.4* 30.4*   EGFRNONAA 26.4* 26.4*   , CBC   Recent Labs   Lab 04/11/19  1142 04/11/19  1831 04/12/19  0455 04/12/19  1439   WBC 7.17  --  7.22  --    HGB 6.9* 8.6* 7.7* 8.1*   HCT 21.5* 26.7* 24.0* 24.8*     --  221  --      BNP  Recent Labs   Lab 04/11/19  1142   *       and All pertinent lab results from the last 24 hours have been reviewed.      Significant Imaging: Echocardiogram:   Transthoracic echo (TTE) complete (Cupid Only):   Results for orders placed or performed during the hospital encounter of 01/21/19   Transthoracic echo (TTE) complete (Cupid Only)   Result Value Ref Range    Ascending aorta 3.15 cm    STJ 2.33 cm     AV mean gradient 5.83 mmHg    Ao peak babar 1.56 m/s    Ao VTI 43.97 cm    IVS 0.70 0.6 - 1.1 cm    LA size 4.48 cm    Left Atrium Major Axis 6.48 cm    Left Atrium Minor Axis 5.55 cm    LVIDD 5.37 3.5 - 6.0 cm    LVIDS 2.45 2.1 - 4.0 cm    LVOT diameter 1.89 cm    LVOT peak VTI 28.90 cm    PW 0.72 0.6 - 1.1 cm    MV Peak A Babar 0.84 m/s    E wave decelartion time 182.93 msec    MV Peak E Babar 1.27 m/s    PV Peak D Babar 0.78 m/s    PV Peak S Babar 0.29 m/s    RA Major Axis 5.00 cm    RA Width 3.02 cm    RVDD 4.03 cm    Sinus 3.05 cm    TAPSE 2.10 cm    TR Max Babar 2.54 m/s    TDI SEPTAL 0.06     LA WIDTH 5.10 cm    LV Diastolic Volume 139.32 mL    LV Systolic Volume 21.33 mL    RV S' 13.23 m/s    LVOT peak babar [id number] m/s    LV SEPTAL E/E' RATIO 21.17     FS 54 %    LA volume 116.12 cm3    LV mass 132.22 g    Left Ventricle Relative Wall Thickness 0.27 cm    AV valve area 1.84 cm2    AV Velocity Ratio 0.64     AV index (prosthetic) 0.66     E/A ratio 1.51     Pulm vein S/D ratio 0.37     LVOT area 2.80 cm2    LVOT stroke volume 81.04 cm3    AV peak gradient 9.73 mmHg    Triscuspid Valve Regurgitation Peak Gradient 25.81 mmHg    BSA 1.55 m2    LV Systolic Volume Index 13.7 mL/m2    LV Diastolic Volume Index 89.45 mL/m2    LA Volume Index 74.6 mL/m2    LV Mass Index 84.9 g/m2    Right Atrial Pressure (from IVC) 8 mmHg    TV rest pulmonary artery pressure 34 mmHg    and X-Ray:     EXAMINATION:  XR CHEST AP PORTABLE    CLINICAL HISTORY:  Shortness of breath    TECHNIQUE:  Single frontal portable view of the chest was performed.    COMPARISON:  Chest radiograph 01/24/2019 and 01/07/2019    FINDINGS:  Moderate enlargement of the cardiac silhouette which may reflect cardiomegaly and/or pericardial effusion, not significantly changed.  Mediastinal silhouette is within normal limits given patient positioning and levo rotation.    Pulmonary interstitial edema and small bilateral layering pleural effusions with overlying  atelectasis and/or pneumonia.  No pneumothorax.    Multilevel degenerative changes of the imaged spine.      Impression       1. Moderate enlargement of the cardiac silhouette, pulmonary interstitial edema and small bilateral layering pleural effusions with overlying atelectasis and/or pneumonia.      Electronically signed by: Milo Gordillo  Date: 04/11/2019  Time: 12:15

## 2019-04-12 NOTE — TREATMENT PLAN
GI Treatment Plan  04/12/2019  9:08 AM    Chart reviewed, patient seen, and case discussed with attending.  Hgb stable with brown BM overnight.  Advance diet as tolerated and keep on PPI IV BID.  Tentative EGD Monday or sooner if signs of overt bleeding.  Full consult note to follow.    Iza Berry M.D.  Gastroenterology Fellow, PGY-V  Pager: [phone]  [facility]

## 2019-04-12 NOTE — ED NOTES
Telemetry Verification   Patient placed on Telemetry Box  Verified on ED monitor  Box [id number]   Monitor Tech Amina   Rate 67   Rhythm NS

## 2019-04-12 NOTE — ASSESSMENT & PLAN NOTE
--patient with significantly elevated BP after she received PRBC, which indicates that hypervolemia is the likely culprit  --would just use PRN hydralazine for now to cover her BP until she is able to urinate all the volume off  --would hold her losartan given that she has an MARIAM at the moment.

## 2019-04-12 NOTE — PLAN OF CARE
Problem: Adult Inpatient Plan of Care  Goal: Plan of Care Review  Outcome: Ongoing (interventions implemented as appropriate)  Patient reports resting well between care overnight.  She has remarked several times that she feels much better after diuresing, describes her breathing as much deeper as well as easier.  She aroused spontaneously at 0330 this morning, disoriented and experiencing urinary urgency.  Staff responded to bed alarm and provided support as she ambulated into the bathroom.  RN assisted her with personal hygiene following the urinary incontinence episode she experienced while attempting to ambulate to restroom.  Fall precautions teaching outlined and reinforced.  Patient demonstrates improved ability to mobilized with stand-by assist, compared to needing assist x1 to move from stretcher to bed when she arrived tonight on unit.  RN appreciates that patient's breathing remains unlabored after ambulating or other types of exertion.  When awake, SpO2=94% on room air.  When asleep, SpO2=89% and patient denies feeling SOB.  RN weaned supplemental O2 to 1L via nasal cannula, and patient is currently wearing the oxygen while sleeping.  Safety maintained, and hydralazine 25mg PO given for hypertension with morning vital signs collection.  She denies SOB, pain, or nausea at this time.  VALERIA hose applied and SCDs in place and active.  Bed alarm remains activated at this time.  Will continue to monitor and give bedside report to oncoming shift.

## 2019-04-12 NOTE — SUBJECTIVE & OBJECTIVE
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      Arthritis     ASCVD (arteriosclerotic cardiovascular disease) 7/8/2014    Atherosclerosis of aorta 1/7/2016    "There is atherosclerosis of the thoracic aorta" - Xray Chest 7-     Back pain     Basal cell carcinoma 10/7/2013    Right nasal columellar, right lower eyelid, left medial eyelid    Basal cell carcinoma 2/2015    mid back    Chronic diastolic heart failure 11/11/2014    Coronary artery disease     Gastric ulcer     Fosamax related.    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     Hypothyroidism (acquired) 9/27/2016    Joint pain     Lymphedema of Neck 3/17/2016    Occlusion and stenosis of carotid artery without mention of cerebral infarction     Pelvic mass in female 7/21/2015    Polyneuropathy in other diseases classified elsewhere 1/7/2016 6-     Squamous cell carcinoma of scalp 5/2014    scalp vertex with + LN met 10/14 s/p radiation    Ulcer        Past Surgical History:   Procedure Laterality Date    ADJACENT TISSUE TRANSFER/REARRANGEMENT N/A 5/14/2014    Performed by [facility]os [facility] at [facility] 2ND FLR    APPENDECTOMY  8.14.2015    mucinous cystadenoma with low grade dysplasia.     APPENDECTOMY-LAPAROSCOPIC N/A 8/14/2015    Performed by Feroz Corado MD at [facility] OR 2ND FLR    BIOPSY-LYMPH NODE Right 10/27/2014    Performed by [facility] at [facility] OR 2ND FLR    CATARACT EXTRACTION      ou dr morales    EYE SURGERY      HYSTERECTOMY  1970     NIC/BSO. took HRT immediatiely after wards.     LAPAROSCOPY-DIAGNOSTIC  8/14/2015    Performed by Gianfranco Crenshaw MD at [facility] OR 2ND FLR    LYMPH NODE BIOPSY Right 10/27/2014    posterior triangle    Mohs excision " of scalp SCC N/A 5/13/2014    Scalp flap N/A 5/14/204    posterior advancement-rotation    SKIN CANCER EXCISION      THYROIDECTOMY  1960's    hyperthyroidism    TONSILLECTOMY         Review of patient's allergies indicates:   Allergen Reactions    Codeine     Darvocet a500 [propoxyphene n-acetaminophen] Nausea Only    Sulfa (sulfonamide antibiotics) Rash    Fosamax [alendronate]      Gastric ulcer     Family History     Problem Relation (Age of Onset)    Cancer Sister    Clotting disorder Father    Eczema Mother    Heart attack Mother    Heart disease Mother    Heart failure Mother, Father    Hypertension Mother, Father    No Known Problems Brother, Maternal Aunt, Maternal Uncle, Paternal Aunt, Paternal Uncle, Maternal Grandmother, Maternal Grandfather, Paternal Grandmother, Paternal Grandfather    Thyroid disease Sister        Tobacco Use    Smoking status: Former Smoker    Smokeless tobacco: Never Used    Tobacco comment: Quit 65 years ago   Substance and Sexual Activity    Alcohol use: No    Drug use: No    Sexual activity: Never     Review of Systems   Constitutional: Negative for activity change, appetite change, chills, diaphoresis, fatigue, fever and unexpected weight change.   HENT: Negative for trouble swallowing and voice change.    Eyes: Negative for pain, discharge, redness, itching and visual disturbance.   Respiratory: Positive for shortness of breath. Negative for cough.    Cardiovascular: Positive for leg swelling. Negative for chest pain and palpitations.   Gastrointestinal: Positive for abdominal distention. Negative for abdominal pain, anal bleeding, blood in stool, constipation, diarrhea, nausea, rectal pain and vomiting.   Endocrine: Negative for cold intolerance, heat intolerance, polydipsia, polyphagia and polyuria.   Genitourinary: Negative for dysuria, frequency, hematuria and urgency.   Musculoskeletal: Negative for back pain and neck pain.   Skin: Negative for color change,  pallor, rash and wound.   Neurological: Negative for dizziness, syncope, weakness and light-headedness.     Objective:     Vital Signs (Most Recent):  Temp: 98.2 °F (36.8 °C) (04/12/19 0815)  Pulse: 68 (04/12/19 1509)  Resp: 16 (04/12/19 1230)  BP: (!) 147/65 (04/12/19 1445)  SpO2: (!) 92 % (04/12/19 1230) Vital Signs (24h Range):  Temp:  [98.1 °F (36.7 °C)-98.7 °F (37.1 °C)] 98.2 °F (36.8 °C)  Pulse:  [59-78] 68  Resp:  [16-20] 16  SpO2:  [92 %-97 %] 92 %  BP: (118-206)/() 147/65     Weight: 54.8 kg (120 lb 13 oz) (04/11/19 2321)  Body mass index is 20.74 kg/m².      Intake/Output Summary (Last 24 hours) at 4/12/2019 1545  Last data filed at 4/12/2019 1500  Gross per 24 hour   Intake 720 ml   Output 1150 ml   Net -430 ml       Lines/Drains/Airways     Peripheral Intravenous Line                 Peripheral IV - Single Lumen 04/11/19 1141 18 G Left Antecubital 1 day                Physical Exam   Constitutional: She is oriented to person, place, and time. She appears well-developed and well-nourished.   HENT:   Head: Normocephalic and atraumatic.   Eyes: No scleral icterus.   Cardiovascular: Normal rate and regular rhythm.   Pulmonary/Chest: Effort normal and breath sounds normal.   Abdominal: Soft. Bowel sounds are normal. She exhibits distension. She exhibits no mass. There is no tenderness. There is no rebound and no guarding. No hernia.   Musculoskeletal: She exhibits edema.   Neurological: She is alert and oriented to person, place, and time.   Nursing note and vitals reviewed.      Significant Labs:  CBC:   Recent Labs   Lab 04/11/19  1142 04/11/19  1831 04/12/19  0455   WBC 7.17  --  7.22   HGB 6.9* 8.6* 7.7*   HCT 21.5* 26.7* 24.0*     --  221     CMP:   Recent Labs   Lab 04/11/19  1142 04/12/19  0455    81   CALCIUM 8.6* 8.8   ALBUMIN 2.4*  --    PROT 5.2*  --    * 132*   K 4.2 3.6   CO2 27 26   CL 98 97   BUN 74* 78*   CREATININE 1.7* 1.7*   ALKPHOS 122  --    ALT 35  --    AST 34   --    BILITOT 0.5  --      Coagulation: No results for input(s): PT, INR, APTT in the last 48 hours.    Significant Imaging:  Imaging results within the past 24 hours have been reviewed.

## 2019-04-12 NOTE — ASSESSMENT & PLAN NOTE
--re-evaluate need for anticoagulation as she has completed 3 months of eliquis.   --given her FOBT positive, her A/C has been held

## 2019-04-12 NOTE — ASSESSMENT & PLAN NOTE
--patient with significantly elevated BP after she received PRBC, which indicates that hypervolemia is the likely culprit  --would just use PRN hydralazine for now to cover her BP until she is able to urinate all the volume off  --would hold her losartan given that she has an MARIAM at the moment

## 2019-04-12 NOTE — PLAN OF CARE
CM to bedside - pt and 2 sons present; pt and sonTy provided assessment info. Pt w/ DME in place, lives alone. Pt has private pay caregivers which are w/ pt approx 5 hrs per day - family states they can increase hrs as needed. Pt will likely d/c home w/ h/h - pt has used [facility] in the past. CM spoke w/ Kely from [facility] and requested they follow pt for a possible Tuesday d/c. Pt's family is actively pursuing assisted living and have visited several in the area.     CM provided patient anticipated LAM[facility] which will be update by nursing staff. Patient verbalized understanding.     04/12/19 1334   Discharge Assessment   Assessment Type Discharge Planning Assessment   Confirmed/corrected address and phone number on facesheet? Yes   Assessment information obtained from? Patient;Caregiver   Expected Length of Stay (days) 5   Communicated expected length of stay with patient/caregiver yes   Prior to hospitilization cognitive status: Alert/Oriented   Prior to hospitalization functional status: Assistive Equipment   Current cognitive status: Alert/Oriented   Current Functional Status: Assistive Equipment;Needs Assistance   Facility Arrived From: N/A   Lives With alone   Able to Return to Prior Arrangements yes   Is patient able to care for self after discharge? Unable to determine at this time (comments)   Who are your caregiver(s) and their phone number(s)? vasyl Crawford [phone]   Patient's perception of discharge disposition home health   Readmission Within the Last 30 Days no previous admission in last 30 days   Patient currently being followed by outpatient case management? No   Patient currently receives any other outside agency services? Yes   How many hours a day does the patient receive services? 5   Name and contact number of agency or person providing outside services private pay caregiver approx 5 hrs per day - family can increase hrs as needed   Is it the patient/care giver preference to resume care  with the current outside agency? Yes   Equipment Currently Used at Home rollator;wheelchair;walker, rolling;shower chair;nebulizer   Do you have any problems affording any of your prescribed medications? No   Is the patient taking medications as prescribed? yes   Does the patient have transportation home? Yes  (pt's sonTy will take pt home at d/c)   Transportation Anticipated family or friend will provide   Dialysis Name and Scheduled days N/A   Does the patient receive services at the [facility]? No   Discharge Plan A Home;Home Health   Discharge Plan B Assisted Living;Home Health  (pt and family are attempting to transition pt to assisted living)   Patient/Family in Agreement with Plan yes

## 2019-04-12 NOTE — PLAN OF CARE
Pt is AAOx4 and afebrile. BP ranged from 111's-150's/160's-170s; all other VS stable on room air. Sons at bedside, attentive to patient. IV lasix increased to 80mh this evening. She's ambulating to the bathroom with standby assist. Purewick and bedside commode also at bedside. K trending down, oral replacement given.  Fall precautions in place. Bed alarm on, pt has urinal at bedside, bed in low position, call bell in reach, non-skid socks on when pt not in bed.

## 2019-04-12 NOTE — CONSULTS
"[facility]  Gastroenterology  Consult Note    Patient Name: Kristin Quintanilla  MRN: [id number]  Admission Date: 4/11/2019  Hospital Length of Stay: 1 days  Code Status: Full Code   Attending Provider: Jazmyn Mathews MD   Consulting Provider: Iza Berry MD  Primary Care Physician: Jairo Schmidt MD  Principal Problem:Symptomatic anemia    Inpatient consult to Gastroenterology  Consult performed by: Iza Berry MD  Consult ordered by: Jazmyn Mathews MD        Subjective:     HPI:  89 yo female with a history of HTN, HLD, Hypothyroidism, CAD, Diastolic heart failure,DVT (on Eliquis last dose on Wednesday), and Spinal stenosis on who GI is being consulted for symptomatic anemia.     On Wednesday 4/10/19 she saw her PCP for essential hypertension, acute on chronic diastolic heart failure, CKD (chronic kidney disease) stage 3, GFR 30-59 ml/min, and Inflammatory spondylopathy of lumbar region. Labs were obtained and patient was instructed to come back due to a hemoglobin of 6.9. In speaking to the patient its seems her main issues were weight gain, abdominal distention, and lower extremity edema. She denies any overt signs of bleeding. At home she is on Eliquis (last dose Wednesday night) but denies additional AC, NSAIDS, or PPI. Also denies pepto or iron supplements. Of note in 04/2011 she had an EGD with clean based ulcers and then came back in 06/2011 at which time the ulcers had healed.    This morning she feels better however is unsure if its from being on IV diuretics or from receiving blood. She reports 1 well formed BM overnight.      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      Arthritis     ASCVD (arteriosclerotic cardiovascular disease) 7/8/2014    Atherosclerosis of aorta 1/7/2016    "There is atherosclerosis of " "the thoracic aorta" - Xray Chest 7-     Back pain     Basal cell carcinoma 10/7/2013    Right nasal columellar, right lower eyelid, left medial eyelid    Basal cell carcinoma 2/2015    mid back    Chronic diastolic heart failure 11/11/2014    Coronary artery disease     Gastric ulcer     Fosamax related.    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     Hypothyroidism (acquired) 9/27/2016    Joint pain     Lymphedema of Neck 3/17/2016    Occlusion and stenosis of carotid artery without mention of cerebral infarction     Pelvic mass in female 7/21/2015    Polyneuropathy in other diseases classified elsewhere 1/7/2016 6-     Squamous cell carcinoma of scalp 5/2014    scalp vertex with + LN met 10/14 s/p radiation    Ulcer        Past Surgical History:   Procedure Laterality Date    ADJACENT TISSUE TRANSFER/REARRANGEMENT N/A 5/14/2014    Performed by [facility] at [facility] OR 2ND FLR    APPENDECTOMY  8.14.2015    mucinous cystadenoma with low grade dysplasia.     APPENDECTOMY-LAPAROSCOPIC N/A 8/14/2015    Performed by Feroz Corado MD at [facility] OR 2ND FLR    BIOPSY-LYMPH NODE Right 10/27/2014    Performed by [facility]os [facility] at [facility] OR 2ND FLR    CATARACT EXTRACTION      ou dr morales    EYE SURGERY      HYSTERECTOMY  1970     NIC/BSO. took HRT immediatiely after wards.     LAPAROSCOPY-DIAGNOSTIC  8/14/2015    Performed by [facility]fr[facility]shaw [facility] at [facility] OR 2ND FLR    LYMPH NODE BIOPSY Right 10/27/2014    posterior triangle    Mohs excision of scalp SCC N/A 5/13/2014    Scalp flap N/A 5/14/204    posterior advancement-rotation    SKIN CANCER EXCISION      THYROIDECTOMY  1960's    hyperthyroidism    TONSILLECTOMY         Review of patient's allergies indicates:   Allergen Reactions    Codeine     Darvocet a500 [propoxyphene n-acetaminophen] Nausea Only    Sulfa (sulfonamide " antibiotics) Rash    Fosamax [alendronate]      Gastric ulcer     Family History     Problem Relation (Age of Onset)    Cancer Sister    Clotting disorder Father    Eczema Mother    Heart attack Mother    Heart disease Mother    Heart failure Mother, Father    Hypertension Mother, Father    No Known Problems Brother, Maternal Aunt, Maternal Uncle, Paternal Aunt, Paternal Uncle, Maternal Grandmother, Maternal Grandfather, Paternal Grandmother, Paternal Grandfather    Thyroid disease Sister        Tobacco Use    Smoking status: Former Smoker    Smokeless tobacco: Never Used    Tobacco comment: Quit 65 years ago   Substance and Sexual Activity    Alcohol use: No    Drug use: No    Sexual activity: Never     Review of Systems   Constitutional: Negative for activity change, appetite change, chills, diaphoresis, fatigue, fever and unexpected weight change.   HENT: Negative for trouble swallowing and voice change.    Eyes: Negative for pain, discharge, redness, itching and visual disturbance.   Respiratory: Positive for shortness of breath. Negative for cough.    Cardiovascular: Positive for leg swelling. Negative for chest pain and palpitations.   Gastrointestinal: Positive for abdominal distention. Negative for abdominal pain, anal bleeding, blood in stool, constipation, diarrhea, nausea, rectal pain and vomiting.   Endocrine: Negative for cold intolerance, heat intolerance, polydipsia, polyphagia and polyuria.   Genitourinary: Negative for dysuria, frequency, hematuria and urgency.   Musculoskeletal: Negative for back pain and neck pain.   Skin: Negative for color change, pallor, rash and wound.   Neurological: Negative for dizziness, syncope, weakness and light-headedness.     Objective:     Vital Signs (Most Recent):  Temp: 98.2 °F (36.8 °C) (04/12/19 0815)  Pulse: 68 (04/12/19 1509)  Resp: 16 (04/12/19 1230)  BP: (!) 147/65 (04/12/19 1445)  SpO2: (!) 92 % (04/12/19 1230) Vital Signs (24h Range):  Temp:  [98.1  °F (36.7 °C)-98.7 °F (37.1 °C)] 98.2 °F (36.8 °C)  Pulse:  [59-78] 68  Resp:  [16-20] 16  SpO2:  [92 %-97 %] 92 %  BP: (118-206)/() 147/65     Weight: 54.8 kg (120 lb 13 oz) (04/11/19 2321)  Body mass index is 20.74 kg/m².      Intake/Output Summary (Last 24 hours) at 4/12/2019 1545  Last data filed at 4/12/2019 1500  Gross per 24 hour   Intake 720 ml   Output 1150 ml   Net -430 ml       Lines/Drains/Airways     Peripheral Intravenous Line                 Peripheral IV - Single Lumen 04/11/19 1141 18 G Left Antecubital 1 day                Physical Exam   Constitutional: She is oriented to person, place, and time. She appears well-developed and well-nourished.   HENT:   Head: Normocephalic and atraumatic.   Eyes: No scleral icterus.   Cardiovascular: Normal rate and regular rhythm.   Pulmonary/Chest: Effort normal and breath sounds normal.   Abdominal: Soft. Bowel sounds are normal. She exhibits distension. She exhibits no mass. There is no tenderness. There is no rebound and no guarding. No hernia.   Musculoskeletal: She exhibits edema.   Neurological: She is alert and oriented to person, place, and time.   Nursing note and vitals reviewed.      Significant Labs:  CBC:   Recent Labs   Lab 04/11/19  1142 04/11/19  1831 04/12/19  0455   WBC 7.17  --  7.22   HGB 6.9* 8.6* 7.7*   HCT 21.5* 26.7* 24.0*     --  221     CMP:   Recent Labs   Lab 04/11/19  1142 04/12/19  0455    81   CALCIUM 8.6* 8.8   ALBUMIN 2.4*  --    PROT 5.2*  --    * 132*   K 4.2 3.6   CO2 27 26   CL 98 97   BUN 74* 78*   CREATININE 1.7* 1.7*   ALKPHOS 122  --    ALT 35  --    AST 34  --    BILITOT 0.5  --      Coagulation: No results for input(s): PT, INR, APTT in the last 48 hours.    Significant Imaging:  Imaging results within the past 24 hours have been reviewed.    Assessment/Plan:     * Symptomatic anemia  89 yo female with a history of HTN, HLD, Hypothyroidism, CAD, Diastolic heart failure,DVT (on Eliquis last dose  on Wednesday), and Spinal stenosis on who GI is being consulted for symptomatic anemia. Patient has a history of gastric ulcer dating back to 2011. Upon arrival to [facility] reported as dark stools that were FOBT + and she had a hemoglobin of 6.9. Overnight she did well after the blood transfusion and reported 1 well formed brown BM. From our perspective we recommend continued diuresis with tentative scope on Monday or sooner if significant signs of overt bleeding.    Recommendations:  --Advance diet as tolerated  --Monitor for overt signs of bleeding  --Trend H/H and transfuse as needed  --Continue PPI IV BID  --Tentative EGD on Monday        Thank you for your consult. I will follow-up with patient. Please contact us if you have any additional questions.    Iza Berry M.D.  Gastroenterology Fellow, PGY-V  Pager: [phone]  [facility]

## 2019-04-12 NOTE — MEDICAL/APP STUDENT
Hospital Medicine  Progress note    Team: [facility] HOSP MED D Jazmyn Mathews MD  Admit Date: 4/11/2019  LAMAR   Code status: Full Code  Length of Stay: 1 day(s)    Principal Problem:  Symptomatic anemia    Interval hx:  Pt notes breathing better today. Had a brown bowel movement last night.    Review of Systems   Respiratory: Positive for shortness of breath.    Cardiovascular: Positive for orthopnea and leg swelling.   Gastrointestinal: Negative for abdominal pain, nausea and vomiting.   Musculoskeletal:        Joint pain (legs)       PEx  Temp:  [97.3 °F (36.3 °C)-98.7 °F (37.1 °C)]   Pulse:  [58-78]   Resp:  [16-20]   BP: (129-206)/()   SpO2:  [93 %-98 %]     Intake/Output Summary (Last 24 hours) at 4/12/2019 0904  Last data filed at 4/12/2019 0500  Gross per 24 hour   Intake 120 ml   Output 950 ml   Net -830 ml       Physical Exam   Constitutional: She is well-developed, well-nourished, and in no distress. No distress.   Neck: JVD present.   Cardiovascular: Normal rate and regular rhythm.   Pulmonary/Chest: No respiratory distress. She has no wheezes.   Abdominal: Soft. She exhibits no distension. There is no tenderness.   Musculoskeletal: She exhibits edema (1+ edema BLE).       Recent Labs   Lab 04/10/19  1520 04/11/19  1142 04/11/19  1831 04/12/19  0455 04/12/19  1439   WBC 6.63 7.17  --  7.22  --    HGB 6.9* 6.9* 8.6* 7.7* 8.1*   HCT 22.0* 21.5* 26.7* 24.0* 24.8*    219  --  221  --      Recent Labs   Lab 04/10/19  1520 04/11/19  1142 04/12/19  0455   * 133* 132*   K 4.5 4.2 3.6   CL 96 98 97   CO2 26 27 26   BUN 80* 74* 78*   CREATININE 2.2* 1.7* 1.7*    102 81   CALCIUM 8.8 8.6* 8.8   MG  --  2.8* 2.5   PHOS  --  5.1* 5.0*     Recent Labs   Lab 04/11/19  1142   ALKPHOS 122   ALT 35   AST 34   ALBUMIN 2.4*   PROT 5.2*   BILITOT 0.5      No results for input(s): POCTGLUCOSE in the last 168 hours.  Recent Labs     04/11/19  1142 04/11/19  1831   TROPONINI 0.011 0.009       Scheduled  Meds:   amLODIPine  10 mg Oral Daily    atorvastatin  20 mg Oral Daily    furosemide  40 mg Intravenous BID    gabapentin  100 mg Oral QHS    isosorbide-hydrALAZINE 20-37.5 mg  1 tablet Oral TID    labetalol  300 mg Oral BID    levothyroxine  75 mcg Oral Before breakfast    pantoprazole  40 mg Intravenous BID     Continuous Infusions:  As Needed:  acetaminophen, albuterol sulfate, dextrose 50%, dextrose 50%, glucagon (human recombinant), glucose, glucose, hydrALAZINE, ondansetron, senna-docusate 8.6-50 mg, sodium chloride 0.9%    Active Hospital Problems    Diagnosis  POA    *Symptomatic anemia [D64.9]  Yes    Hyperkalemia [E87.5]  Yes    CKD (chronic kidney disease) stage 3, GFR 30-59 ml/min [N18.3]  Yes    Acute deep vein thrombosis (DVT) of left peroneal vein [I82.492]  Yes    DVT (deep venous thrombosis) [I82.409]  Yes    Essential hypertension [I10]  Yes    Hypothyroidism (acquired) [E03.9]  Yes    Polyneuropathy in other diseases classified elsewhere [G63]  Yes     6-      Pericardial effusion [I31.3]  Yes     Chronic    Acute on chronic diastolic (congestive) heart failure [I50.33]  Unknown    Hyperlipidemia [E78.5]  Yes     Chronic      Resolved Hospital Problems   No resolved problems to display.       Overview  87 yo female with a history of CAD, diastolic HF, HLD,and hypothyroidism admitted with symptomatic anemia, heme (+) stool on chronic anticoagulation and CHF exacerbation with uncontrolled HTN.     Assessment and Plan for Problems addressed today:    Acute on chronic diastolic heart failure  Pericardial effusion  Essential hypertension  · Echo (1/21/2019): EF 65%, indeterminate diastolic function, severe LAE, moderated pericardial effusion, no evidence of tamponade.   · Continue lasix at 40 mg BID after 80 mg IV given in ED.  · Patient has been stopped on HCTZ in the past due to hyponatremia  · Pericardial effusion likely persistent as evidenced by cardiac silhouette on CXR;  Echo ordered  · Cardiac diet  · Monitoring with telemetry, daily weights and I/O's  · Continue therapy with amlodipine (consider nifedipine if ineffective and worsened LE edema), hold losartan currently due to renal and start bidil; hydralazine prn SBP > 180; check orthostatics  · Cardiology consulted for due to difficult home control HTN/CHF and pericardial effusion  · Cardiology notes elevated BP after PRBC, recs only hydralazine prn until sufficient diuresis to address hypervolemia  · Monitor troponins due to intermittent chest pain at home and T-wave abnormality on admit EKG  · Cards recs increasing lasix to 80 IV BID, Cr stable, continue lasix 40 IV BID  · BP improved with diuresis, bidil reduced from TID to BID, goal -160 (4/12)     Chronic kidney disease stage 3  Hyperkalemia  · Estimated Creatinine Clearance: 19.4 mL/min (A) (based on SCr of 1.7 mg/dL (H)).  · Cr increased from baseline of 1.4 previously; probable cardiorenal syndrome  · Monitor, avoid nephrotoxins and renally adjust meds  · Monitor K+; hold magnesium supplement      Hypothyroidism  · Continue home levothyroxine     Normocytic anemia  Heme + stool  · Baseline Hgb 8-9, 6.9 on admit and back to baseline after 1 u PRBC  · Stool heme + on chronic anticoagulation; GI consulted  · + hx of PUD in the past but no longer on GI meds and denies OTC meds; + hx of EGD but not C-scope  · Continue protonix IV  · Hold apixaban and resume if no signs of worsened bleeding  · Ferritin wnl; patient reluctant to take iron supplement due to constipation  · GI planning for EGD Monday or sooner if signs of overt bleeding  · Hgb fluctuant, monitoring     Left peroneal vein DVT  · Diagnosed on January 24, 2019 and patient has been taking apixaban therapy  · Hold apixaban currently to ensure no active bleeding or worsening of anemia  · Per cards, completed 3 months of apixaban, re-evaluate need for anticoagulation  · Repeat US LE shows no DVT  (4/12)     Hyperlipidemia  · Continue atorvastatin therapy daily     Peripheral neuropathy    · chronic and stable  · Continue gabapentin at home dose        Diet:  Diet NPO  GI PPx: pantoprazole  DVT PPx:   VTE Risk Mitigation (From admission, onward)        Ordered     Place VALERIA hose  Until discontinued      04/11/19 1746     Place sequential compression device  Until discontinued      04/11/19 1746     IP VTE HIGH RISK PATIENT  Once      04/11/19 1746        Goals of Care: evaluation of anemia and resolution of symptomatic CHF  Lines/ Drains/ Airways: PIV  Wounds: none  Discharge plan and follow up:     Provider   Jazmyn Mathews MD  Staff Hospitalist   Spectra [id number]    I personally scribed for Jazmyn Mathews MD on 04/12/2019 at 9:04 AM. Electronically signed by nick Juares on 04/12/2019 at 9:04 AM

## 2019-04-12 NOTE — PROGRESS NOTES
[facility]  Cardiology  Progress Note    Patient Name: Kristin Quintanilla  MRN: [id number]  Admission Date: 4/11/2019  Hospital Length of Stay: 1 days  Code Status: Full Code   Attending Physician: Jazmyn Mathews MD   Primary Care Physician: Jairo Schmidt MD  Expected Discharge Date: 4/16/2019  Principal Problem:Symptomatic anemia    Subjective:     Hospital Course:   No notes on file    Interval History: No acute events overnight, patient endorsing good urine output. States her breathing is significantly improved and leg / abdominal edema has improved as well. She denies chest pain or palpitations. She has not ambulated since getting up to floor.    Review of Systems   Constitution: Positive for malaise/fatigue. Negative for chills and fever.   HENT: Negative.    Cardiovascular: Positive for leg swelling. Negative for chest pain, dyspnea on exertion, irregular heartbeat, orthopnea, palpitations and paroxysmal nocturnal dyspnea.   Respiratory: Positive for shortness of breath. Negative for wheezing.    Skin: Negative for color change and rash.   Musculoskeletal: Negative for arthritis, back pain and myalgias.   Gastrointestinal: Negative for abdominal pain, constipation, diarrhea and nausea.   Neurological: Negative for dizziness, numbness and paresthesias.   Psychiatric/Behavioral: Negative.      Objective:     Vital Signs (Most Recent):  Temp: 98.1 °F (36.7 °C) (04/12/19 0251)  Pulse: 72 (04/12/19 0815)  Resp: 18 (04/12/19 0815)  BP: (!) 159/70 (04/12/19 0815)  SpO2: (!) 93 % (04/12/19 0815) Vital Signs (24h Range):  Temp:  [97.3 °F (36.3 °C)-98.7 °F (37.1 °C)] 98.1 °F (36.7 °C)  Pulse:  [58-78] 72  Resp:  [16-20] 18  SpO2:  [93 %-98 %] 93 %  BP: (129-206)/() 159/70     Weight: 54.8 kg (120 lb 13 oz)  Body mass index is 20.74 kg/m².     SpO2: (!) 93 %  O2 Device (Oxygen Therapy): room air      Intake/Output Summary (Last 24 hours) at 4/12/2019 0927  Last data filed at 4/12/2019  0500  Gross per 24 hour   Intake 120 ml   Output 950 ml   Net -830 ml       Lines/Drains/Airways     Peripheral Intravenous Line                 Peripheral IV - Single Lumen 04/11/19 1141 18 G Left Antecubital less than 1 day                Physical Exam   Constitutional: Vital signs are normal. She appears well-developed and well-nourished. She is cooperative.  Non-toxic appearance. No distress.   HENT:   Head: Normocephalic and atraumatic.   Neck: JVD present. No hepatojugular reflux present. Carotid bruit is not present.   Cardiovascular: Normal rate, regular rhythm, S1 normal and S2 normal. Exam reveals no gallop.   Murmur heard.   Harsh midsystolic murmur is present with a grade of 2/6 at the upper right sternal border radiating to the neck.  Pulses:       Radial pulses are 2+ on the right side, and 2+ on the left side.        Dorsalis pedis pulses are 2+ on the right side, and 2+ on the left side.        Posterior tibial pulses are 2+ on the right side, and 2+ on the left side.   No lower extremity edema   Pulmonary/Chest: Effort normal. No respiratory distress. She has no decreased breath sounds. She has no wheezes. She has no rhonchi. She has rales in the left lower field.   Abdominal: Soft. Normal appearance and bowel sounds are normal. She exhibits no distension and no mass. There is no tenderness.   Neurological: She is alert.   Skin: Skin is warm, dry and intact.       Significant Labs:   CMP   Recent Labs   Lab 04/10/19  1520 04/11/19  1142 04/12/19  0455   * 133* 132*   K 4.5 4.2 3.6   CL 96 98 97   CO2 26 27 26    102 81   BUN 80* 74* 78*   CREATININE 2.2* 1.7* 1.7*   CALCIUM 8.8 8.6* 8.8   PROT  --  5.2*  --    ALBUMIN  --  2.4*  --    BILITOT  --  0.5  --    ALKPHOS  --  122  --    AST  --  34  --    ALT  --  35  --    ANIONGAP 9 8 9   ESTGFRAFRICA 22.2* 30.4* 30.4*   EGFRNONAA 19.3* 26.4* 26.4*   , CBC   Recent Labs   Lab 04/10/19  1520 04/11/19  1142 04/11/19  1831 04/12/19  0455    WBC 6.63 7.17  --  7.22   HGB 6.9* 6.9* 8.6* 7.7*   HCT 22.0* 21.5* 26.7* 24.0*    219  --  221     BNP  Recent Labs   Lab 04/11/19  1142   *       and All pertinent lab results from the last 24 hours have been reviewed.      Significant Imaging: Echocardiogram:   Transthoracic echo (TTE) complete (Cupid Only):   Results for orders placed or performed during the hospital encounter of 01/21/19   Transthoracic echo (TTE) complete (Cupid Only)   Result Value Ref Range    Ascending aorta 3.15 cm    STJ 2.33 cm    AV mean gradient 5.83 mmHg    Ao peak babar 1.56 m/s    Ao VTI 43.97 cm    IVS 0.70 0.6 - 1.1 cm    LA size 4.48 cm    Left Atrium Major Axis 6.48 cm    Left Atrium Minor Axis 5.55 cm    LVIDD 5.37 3.5 - 6.0 cm    LVIDS 2.45 2.1 - 4.0 cm    LVOT diameter 1.89 cm    LVOT peak VTI 28.90 cm    PW 0.72 0.6 - 1.1 cm    MV Peak A Babar 0.84 m/s    E wave decelartion time 182.93 msec    MV Peak E Babar 1.27 m/s    PV Peak D Babar 0.78 m/s    PV Peak S Babar 0.29 m/s    RA Major Axis 5.00 cm    RA Width 3.02 cm    RVDD 4.03 cm    Sinus 3.05 cm    TAPSE 2.10 cm    TR Max Babar 2.54 m/s    TDI SEPTAL 0.06     LA WIDTH 5.10 cm    LV Diastolic Volume 139.32 mL    LV Systolic Volume 21.33 mL    RV S' 13.23 m/s    LVOT peak babar [id number] m/s    LV SEPTAL E/E' RATIO 21.17     FS 54 %    LA volume 116.12 cm3    LV mass 132.22 g    Left Ventricle Relative Wall Thickness 0.27 cm    AV valve area 1.84 cm2    AV Velocity Ratio 0.64     AV index (prosthetic) 0.66     E/A ratio 1.51     Pulm vein S/D ratio 0.37     LVOT area 2.80 cm2    LVOT stroke volume 81.04 cm3    AV peak gradient 9.73 mmHg    Triscuspid Valve Regurgitation Peak Gradient 25.81 mmHg    BSA 1.55 m2    LV Systolic Volume Index 13.7 mL/m2    LV Diastolic Volume Index 89.45 mL/m2    LA Volume Index 74.6 mL/m2    LV Mass Index 84.9 g/m2    Right Atrial Pressure (from IVC) 8 mmHg    TV rest pulmonary artery pressure 34 mmHg    and X-Ray:      EXAMINATION:  XR CHEST AP PORTABLE    CLINICAL HISTORY:  Shortness of breath    TECHNIQUE:  Single frontal portable view of the chest was performed.    COMPARISON:  Chest radiograph 01/24/2019 and 01/07/2019    FINDINGS:  Moderate enlargement of the cardiac silhouette which may reflect cardiomegaly and/or pericardial effusion, not significantly changed.  Mediastinal silhouette is within normal limits given patient positioning and levo rotation.    Pulmonary interstitial edema and small bilateral layering pleural effusions with overlying atelectasis and/or pneumonia.  No pneumothorax.    Multilevel degenerative changes of the imaged spine.      Impression       1. Moderate enlargement of the cardiac silhouette, pulmonary interstitial edema and small bilateral layering pleural effusions with overlying atelectasis and/or pneumonia.      Electronically signed by: Milo Gordillo  Date: 04/11/2019  Time: 12:15         Assessment and Plan:     * Symptomatic anemia  --defer to primary  - Evaluated by GI for possible EGD    Acute deep vein thrombosis (DVT) of left peroneal vein  --re-evaluate need for anticoagulation as she has completed 3 months of eliquis.   --given her FOBT positive, her A/C has been held    Essential hypertension  --patient with significantly elevated BP after she received PRBC, which indicates that hypervolemia is the likely culprit  --would just use PRN hydralazine for now to cover her BP until she is able to urinate all the volume off  --would hold her losartan given that she has an MARIAM at the moment    Acute on chronic diastolic (congestive) heart failure  Patient presenting with hypervolemia. Good urine output and symptomatic improvement with 80mg IV lasix on presentation. Cr holding stable.  - Recommend continuing diuresis with 40mg IV lasix bid  --replete electrolytes to K>4, Mg>2  --check BMP BID  --salt restriction <2gm and fluid restriction <1.5L  --daily weights        VTE Risk Mitigation  (From admission, onward)        Ordered     Place VALERIA hose  Until discontinued      04/11/19 1746     Place sequential compression device  Until discontinued      04/11/19 1746     IP VTE HIGH RISK PATIENT  Once      04/11/19 1746          Lance Hazel MD  Cardiology  [facility]

## 2019-04-12 NOTE — HPI
87 yo female with a history of HTN, HLD, Hypothyroidism, CAD, Diastolic heart failure,DVT (on Eliquis last dose on Wednesday), and Spinal stenosis on who GI is being consulted for symptomatic anemia.     On Wednesday 4/10/19 she saw her PCP for essential hypertension, acute on chronic diastolic heart failure, CKD (chronic kidney disease) stage 3, GFR 30-59 ml/min, and Inflammatory spondylopathy of lumbar region. Labs were obtained and patient was instructed to come back due to a hemoglobin of 6.9. In speaking to the patient its seems her main issues were weight gain, abdominal distention, and lower extremity edema. She denies any overt signs of bleeding. At home she is on Eliquis (last dose Wednesday night) but denies additional AC, NSAIDS, or PPI. Also denies pepto or iron supplements. Of note in 04/2011 she had an EGD with clean based ulcers and then came back in 06/2011 at which time the ulcers had healed.    This morning she feels better however is unsure if its from being on IV diuretics or from receiving blood. She reports 1 well formed BM overnight.

## 2019-04-12 NOTE — ED NOTES
Pt resting comfortably and independently repositioned in stretcher with bed locked in lowest position for safety. NAD noted at this time. Respirations even and unlabored and visible chest rise noted.  Patient offered bathroom assistance and denies need at this time. Pt instructed to call if assistance is needed. Pt on continuous cardiac, BP, and O2 monitoring. Call light within reach. Family at bedside. No needs at this time. White boards updated.  Will continue to monitor.

## 2019-04-12 NOTE — CONSULTS
[facility]  Cardiology  Consult Note    Patient Name: Kristin Quintanilla  MRN: [id number]  Admission Date: 4/11/2019  Hospital Length of Stay: 0 days  Code Status: Full Code   Attending Provider: Jazmyn Mathews MD   Consulting Provider: Donya Joseph MD  Primary Care Physician: Jairo Schmidt MD  Principal Problem:Symptomatic anemia    Patient information was obtained from patient, past medical records and ER records.     Inpatient consult to Cardiology  Consult performed by: Donya Joseph MD  Consult ordered by: Jazmyn Mathews MD  Reason for consult: HTN        Subjective:     Chief Complaint:  Anemia    HPI:   Ms. Quintanilla is an 89 year old female with a past medical history of HTN, HLD, HFpEF, CAD (2004 LCx 40%, no PCI) who presents at the advise of her PCP who found on labs drawn yesterday that her Hgb was down to 6.9. She had seen her PCP yesterday due to SOB and weakness since Monday, however, she has had her ups and downs over the prior months due to a 2 hospital stays this year: 1 for hyponatremia and CHF and the other for DVT. Either way, she was advised to increase her lasix from 20 QOD to 40 QD for 3 days on 4/3. However, she was taking the lasix at different times instead of 40mg at the same time. On 4/9, she was advised to take them at the same time. She has had no change in her weight and thus she came to the ED. Her O2 saturations were in the low 90s. She received 1xPRBC, which made her feel better, but when internal medicine came to speak with her and told her her blood pressure was >200s, she got nervous again. She had received 80 IV lasix after the PRBC transfusion and she received her home medications as she was advised to hold all but her labetalol. 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      Arthritis     ASCVD (arteriosclerotic cardiovascular disease) 7/8/2014    Atherosclerosis of aorta 1/7/2016    "There is atherosclerosis of the thoracic aorta" - Xray Chest 7-     Back pain     Basal cell carcinoma 10/7/2013    Right nasal columellar, right lower eyelid, left medial eyelid    Basal cell carcinoma 2/2015    mid back    Chronic diastolic heart failure 11/11/2014    Coronary artery disease     Gastric ulcer     Fosamax related.    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     Hypothyroidism (acquired) 9/27/2016    Joint pain     Lymphedema of Neck 3/17/2016    Occlusion and stenosis of carotid artery without mention of cerebral infarction     Pelvic mass in female 7/21/2015    Polyneuropathy in other diseases classified elsewhere 1/7/2016 6-     Squamous cell carcinoma of scalp 5/2014    scalp vertex with + LN met 10/14 s/p radiation    Ulcer        Past Surgical History:   Procedure Laterality Date    ADJACENT TISSUE TRANSFER/REARRANGEMENT N/A 5/14/2014    Performed by Olvin Cevallos MD at [facility] OR 2ND FLR    APPENDECTOMY  8.14.2015    mucinous cystadenoma with low grade dysplasia.     APPENDECTOMY-LAPAROSCOPIC N/A 8/14/2015    Performed by Feroz Corado MD at [facility] OR 2ND FLR    BIOPSY-LYMPH NODE Right 10/27/2014    Performed by Olvin Cevallos MD at [facility] OR 2ND FLR    CATARACT EXTRACTION      ou dr morales    EYE SURGERY      HYSTERECTOMY  1970     NIC/BSO. took HRT immediatiely after wards.     LAPAROSCOPY-DIAGNOSTIC  8/14/2015    Performed by Gianfranco Crenshaw MD at [facility] OR 2ND FLR    LYMPH NODE BIOPSY Right 10/27/2014    posterior triangle    Mohs excision of scalp SCC N/A 5/13/2014    Scalp flap N/A 5/14/204    posterior advancement-rotation    SKIN CANCER EXCISION      THYROIDECTOMY  1960's    hyperthyroidism    TONSILLECTOMY   "       Review of patient's allergies indicates:   Allergen Reactions    Codeine     Darvocet a500 [propoxyphene n-acetaminophen] Nausea Only    Sulfa (sulfonamide antibiotics) Rash    Fosamax [alendronate]      Gastric ulcer       No current facility-administered medications on file prior to encounter.      Current Outpatient Medications on File Prior to Encounter   Medication Sig    acetaminophen (TYLENOL ARTHRITIS) 650 MG TbSR Take 650 mg by mouth nightly as needed (pain).     albuterol (PROVENTIL) 2.5 mg /3 mL (0.083 %) nebulizer solution Take 3 mLs (2.5 mg total) by nebulization every 4 (four) hours as needed for Wheezing or Shortness of Breath (chest tightness).    amLODIPine (NORVASC) 10 MG tablet TAKE 1 TABLET EVERY DAY    apixaban (ELIQUIS) 5 mg (74 tabs) DsPk Take 2 tablets (10 mg) by mouth twice daily for 7 days, then take 1 tablet (5 mg) by mouth twice daily.  This is starter pack. Begin first    atorvastatin (LIPITOR) 20 MG tablet TAKE 1 TABLET EVERY DAY    calcium carbonate/vitamin D3 (CALTRATE 600 + D ORAL) Take by mouth once daily.    FLAXSEED-OMEGA3,6,9-FATTY ACID ORAL Take 1 capsule by mouth once daily.      furosemide (LASIX) 20 MG tablet Take 1 tablet (20 mg total) by mouth daily as needed (when weight gain of 3 lb in three days or 5 lb in one week).    gabapentin (NEURONTIN) 100 MG capsule Take 2 capsules (200 mg total) by mouth 2 (two) times daily. (Patient taking differently: Take 200 mg by mouth 2 (two) times daily. Pt taking one pill once a day.)    hydroCHLOROthiazide (HYDRODIURIL) 25 MG tablet Take 1 tablet (25 mg total) by mouth once daily.    labetalol (NORMODYNE) 300 MG tablet Take 1 tablet (300 mg total) by mouth 2 (two) times daily.    levothyroxine (SYNTHROID) 75 MCG tablet TAKE 1 TABLET EVERY DAY    losartan (COZAAR) 100 MG tablet TAKE 1 TABLET EVERY DAY    magnesium 250 mg Tab Take 1 tablet by mouth once daily.      triamcinolone acetonide 0.1% (KENALOG) 0.1 %  cream APPLY TO THE AFFECTED AREA OF lower legs TWICE DAILY (Patient taking differently: Pt using prn.)    chlorpheniramine maleate (CHLOR-TRIMETON REPETABS ORAL) Take 1 tablet by mouth daily as needed (hayfever).     OPTICHAMBER DARREN LG MASK Spcr U UTD    traMADol (ULTRAM) 50 mg tablet Take 1 tablet (50 mg total) by mouth every 12 (twelve) hours as needed for Pain.     Family History     Problem Relation (Age of Onset)    Cancer Sister    Clotting disorder Father    Eczema Mother    Heart attack Mother    Heart disease Mother    Heart failure Mother, Father    Hypertension Mother, Father    No Known Problems Brother, Maternal Aunt, Maternal Uncle, Paternal Aunt, Paternal Uncle, Maternal Grandmother, Maternal Grandfather, Paternal Grandmother, Paternal Grandfather    Thyroid disease Sister        Tobacco Use    Smoking status: Former Smoker    Smokeless tobacco: Never Used    Tobacco comment: Quit 65 years ago   Substance and Sexual Activity    Alcohol use: No    Drug use: No    Sexual activity: Never     Review of Systems   Constitution: Positive for decreased appetite and malaise/fatigue. Negative for chills and fever.   HENT: Negative.    Cardiovascular: Positive for leg swelling. Negative for chest pain, dyspnea on exertion, irregular heartbeat, orthopnea, palpitations and paroxysmal nocturnal dyspnea.   Respiratory: Positive for shortness of breath. Negative for wheezing.    Skin: Negative for color change and rash.   Musculoskeletal: Negative for arthritis, back pain and myalgias.   Gastrointestinal: Negative for abdominal pain, constipation, diarrhea and nausea.   Neurological: Negative for dizziness, numbness and paresthesias.   Psychiatric/Behavioral: Negative.      Objective:     Vital Signs (Most Recent):  Temp: 98.7 °F (37.1 °C) (04/11/19 1639)  Pulse: 78 (04/11/19 1904)  Resp: 18 (04/11/19 1847)  BP: (!) 191/86 (04/11/19 1904)  SpO2: (!) 94 % (04/11/19 1847) Vital Signs (24h Range):  Temp:   [97.3 °F (36.3 °C)-98.7 °F (37.1 °C)] 98.7 °F (37.1 °C)  Pulse:  [58-78] 78  Resp:  [17-20] 18  SpO2:  [93 %-98 %] 94 %  BP: (129-206)/() 191/86     Weight: 59.4 kg (131 lb)  Body mass index is 22.49 kg/m².    SpO2: (!) 94 %         Intake/Output Summary (Last 24 hours) at 4/11/2019 2011  Last data filed at 4/11/2019 1755  Gross per 24 hour   Intake --   Output 950 ml   Net -950 ml       Lines/Drains/Airways     Peripheral Intravenous Line                 Peripheral IV - Single Lumen 04/11/19 1141 18 G Left Antecubital less than 1 day                Physical Exam   Constitutional: Vital signs are normal. She appears well-developed and well-nourished. She is cooperative.  Non-toxic appearance. No distress.   HENT:   Head: Normocephalic and atraumatic.   Neck: JVD present. No hepatojugular reflux present. Carotid bruit is not present.   Cardiovascular: Normal rate, regular rhythm, S1 normal and S2 normal. Exam reveals no gallop.   Murmur heard.   Harsh midsystolic murmur is present with a grade of 2/6 at the upper right sternal border radiating to the neck.  Pulses:       Radial pulses are 2+ on the right side, and 2+ on the left side.        Dorsalis pedis pulses are 2+ on the right side, and 2+ on the left side.        Posterior tibial pulses are 2+ on the right side, and 2+ on the left side.   No lower extremity edema   Pulmonary/Chest: Effort normal. No respiratory distress. She has no decreased breath sounds. She has no wheezes. She has no rhonchi. She has rales in the right lower field and the left lower field.   Abdominal: Soft. Normal appearance and bowel sounds are normal. She exhibits no distension and no mass. There is no tenderness.   Neurological: She is alert.   Skin: Skin is warm, dry and intact.       Significant Labs:   CMP   Recent Labs   Lab 04/10/19  1520 04/11/19  1142   * 133*   K 4.5 4.2   CL 96 98   CO2 26 27    102   BUN 80* 74*   CREATININE 2.2* 1.7*   CALCIUM 8.8 8.6*    PROT  --  5.2*   ALBUMIN  --  2.4*   BILITOT  --  0.5   ALKPHOS  --  122   AST  --  34   ALT  --  35   ANIONGAP 9 8   ESTGFRAFRICA 22.2* 30.4*   EGFRNONAA 19.3* 26.4*   , CBC   Recent Labs   Lab 04/10/19  1520 04/11/19  1142 04/11/19  1831   WBC 6.63 7.17  --    HGB 6.9* 6.9* 8.6*   HCT 22.0* 21.5* 26.7*    219  --    , INR No results for input(s): INR, PROTIME in the last 48 hours. and Troponin   Recent Labs   Lab 04/11/19  1142 04/11/19  1831   TROPONINI 0.011 0.009       Significant Imaging:   EKG (04/11/2019): NSR with 1st degree AV block with flattened T waves in the lateral leads.    2D Echo (01/21/2019):  Left Ventricle Normal ejection fraction at 65%. Mild left ventricular enlargement. Normal wall thickness observed. Indeterminate left ventricular diastolic function. No wall motion abnormalities.   Right Ventricle Normal cavity size, wall thickness and ejection fraction. Wall motion normal.   Left Atrium Severe atrial enlargement. Systolic blunting of pulmonary venous inflow.   Right Atrium Normal atrial size.   Aortic Valve Aortic valve sclerosis is mild. Mild regurgitation. Mobility is normal   Mitral Valve Normal valve structure. No stenosis. Mild regurgitation. Normal leaflet mobility.   Tricuspid Valve The tricuspid valve is normal. Trace regurgitation. Mobility is normal. The estimated PA systolic pressure is 34 mmHg.   Pulmonic Valve Normal valve structure. No stenosis. No regurgitation. Mobility is normal.   IVC/SVC Intermediate central venous pressure (8 mm Hg).   Ascending Aorta The aortic root and ascending aorta are normal in size.   Pericardium Moderate pericardial effusion. Pericardial thickening present. Respiratory variation of mitral valve inflow is less than 30%.     Assessment and Plan:     * Symptomatic anemia  --defer to primary    Acute deep vein thrombosis (DVT) of left peroneal vein  --re-evaluate need for anticoagulation as she has completed 3 months of eliquis.   --given  her FOBT positive, her A/C has been held    Essential hypertension  --patient with significantly elevated BP after she received PRBC, which indicates that hypervolemia is the likely culprit  --would just use PRN hydralazine for now to cover her BP until she is able to urinate all the volume off  --would hold her losartan given that she has an MARIAM at the moment.    Acute on chronic diastolic (congestive) heart failure  --patient with hypervolemia  --continue diuresis, but would increase dose to 80 IV BID  --replete electrolytes to K>4, Mg>2  --check BMP BID  --salt restriction <2gm and fluid restriction <1.5L  --daily weights        VTE Risk Mitigation (From admission, onward)        Ordered     Place VALERIA hose  Until discontinued      04/11/19 1746     Place sequential compression device  Until discontinued      04/11/19 1746     IP VTE HIGH RISK PATIENT  Once      04/11/19 1746          Thank you for your consult. I will follow-up with patient. Please contact us if you have any additional questions.    Donya Joseph MD  Cardiology   [facility]

## 2019-04-12 NOTE — SUBJECTIVE & OBJECTIVE
Interval History: No acute events overnight, patient endorsing good urine output. States her breathing is significantly improved and leg / abdominal edema has improved as well. She denies chest pain or palpitations. She has not ambulated since getting up to floor.    Review of Systems   Constitution: Positive for malaise/fatigue. Negative for chills and fever.   HENT: Negative.    Cardiovascular: Positive for leg swelling. Negative for chest pain, dyspnea on exertion, irregular heartbeat, orthopnea, palpitations and paroxysmal nocturnal dyspnea.   Respiratory: Positive for shortness of breath. Negative for wheezing.    Skin: Negative for color change and rash.   Musculoskeletal: Negative for arthritis, back pain and myalgias.   Gastrointestinal: Negative for abdominal pain, constipation, diarrhea and nausea.   Neurological: Negative for dizziness, numbness and paresthesias.   Psychiatric/Behavioral: Negative.      Objective:     Vital Signs (Most Recent):  Temp: 98.1 °F (36.7 °C) (04/12/19 0251)  Pulse: 72 (04/12/19 0815)  Resp: 18 (04/12/19 0815)  BP: (!) 159/70 (04/12/19 0815)  SpO2: (!) 93 % (04/12/19 0815) Vital Signs (24h Range):  Temp:  [97.3 °F (36.3 °C)-98.7 °F (37.1 °C)] 98.1 °F (36.7 °C)  Pulse:  [58-78] 72  Resp:  [16-20] 18  SpO2:  [93 %-98 %] 93 %  BP: (129-206)/() 159/70     Weight: 54.8 kg (120 lb 13 oz)  Body mass index is 20.74 kg/m².     SpO2: (!) 93 %  O2 Device (Oxygen Therapy): room air      Intake/Output Summary (Last 24 hours) at 4/12/2019 0927  Last data filed at 4/12/2019 0500  Gross per 24 hour   Intake 120 ml   Output 950 ml   Net -830 ml       Lines/Drains/Airways     Peripheral Intravenous Line                 Peripheral IV - Single Lumen 04/11/19 1141 18 G Left Antecubital less than 1 day                Physical Exam   Constitutional: Vital signs are normal. She appears well-developed and well-nourished. She is cooperative.  Non-toxic appearance. No distress.   HENT:   Head:  Normocephalic and atraumatic.   Neck: JVD present. No hepatojugular reflux present. Carotid bruit is not present.   Cardiovascular: Normal rate, regular rhythm, S1 normal and S2 normal. Exam reveals no gallop.   Murmur heard.   Harsh midsystolic murmur is present with a grade of 2/6 at the upper right sternal border radiating to the neck.  Pulses:       Radial pulses are 2+ on the right side, and 2+ on the left side.        Dorsalis pedis pulses are 2+ on the right side, and 2+ on the left side.        Posterior tibial pulses are 2+ on the right side, and 2+ on the left side.   No lower extremity edema   Pulmonary/Chest: Effort normal. No respiratory distress. She has no decreased breath sounds. She has no wheezes. She has no rhonchi. She has rales in the left lower field.   Abdominal: Soft. Normal appearance and bowel sounds are normal. She exhibits no distension and no mass. There is no tenderness.   Neurological: She is alert.   Skin: Skin is warm, dry and intact.       Significant Labs:   CMP   Recent Labs   Lab 04/10/19  1520 04/11/19  1142 04/12/19  0455   * 133* 132*   K 4.5 4.2 3.6   CL 96 98 97   CO2 26 27 26    102 81   BUN 80* 74* 78*   CREATININE 2.2* 1.7* 1.7*   CALCIUM 8.8 8.6* 8.8   PROT  --  5.2*  --    ALBUMIN  --  2.4*  --    BILITOT  --  0.5  --    ALKPHOS  --  122  --    AST  --  34  --    ALT  --  35  --    ANIONGAP 9 8 9   ESTGFRAFRICA 22.2* 30.4* 30.4*   EGFRNONAA 19.3* 26.4* 26.4*   , CBC   Recent Labs   Lab 04/10/19  1520 04/11/19  1142 04/11/19  1831 04/12/19  0455   WBC 6.63 7.17  --  7.22   HGB 6.9* 6.9* 8.6* 7.7*   HCT 22.0* 21.5* 26.7* 24.0*    219  --  221     BNP  Recent Labs   Lab 04/11/19  1142   *       and All pertinent lab results from the last 24 hours have been reviewed.      Significant Imaging: Echocardiogram:   Transthoracic echo (TTE) complete (Cupid Only):   Results for orders placed or performed during the hospital encounter of 01/21/19    Transthoracic echo (TTE) complete (Cupid Only)   Result Value Ref Range    Ascending aorta 3.15 cm    STJ 2.33 cm    AV mean gradient 5.83 mmHg    Ao peak babar 1.56 m/s    Ao VTI 43.97 cm    IVS 0.70 0.6 - 1.1 cm    LA size 4.48 cm    Left Atrium Major Axis 6.48 cm    Left Atrium Minor Axis 5.55 cm    LVIDD 5.37 3.5 - 6.0 cm    LVIDS 2.45 2.1 - 4.0 cm    LVOT diameter 1.89 cm    LVOT peak VTI 28.90 cm    PW 0.72 0.6 - 1.1 cm    MV Peak A Babar 0.84 m/s    E wave decelartion time 182.93 msec    MV Peak E Babar 1.27 m/s    PV Peak D Babar 0.78 m/s    PV Peak S Babar 0.29 m/s    RA Major Axis 5.00 cm    RA Width 3.02 cm    RVDD 4.03 cm    Sinus 3.05 cm    TAPSE 2.10 cm    TR Max Babar 2.54 m/s    TDI SEPTAL 0.06     LA WIDTH 5.10 cm    LV Diastolic Volume 139.32 mL    LV Systolic Volume 21.33 mL    RV S' 13.23 m/s    LVOT peak babar [id number] m/s    LV SEPTAL E/E' RATIO 21.17     FS 54 %    LA volume 116.12 cm3    LV mass 132.22 g    Left Ventricle Relative Wall Thickness 0.27 cm    AV valve area 1.84 cm2    AV Velocity Ratio 0.64     AV index (prosthetic) 0.66     E/A ratio 1.51     Pulm vein S/D ratio 0.37     LVOT area 2.80 cm2    LVOT stroke volume 81.04 cm3    AV peak gradient 9.73 mmHg    Triscuspid Valve Regurgitation Peak Gradient 25.81 mmHg    BSA 1.55 m2    LV Systolic Volume Index 13.7 mL/m2    LV Diastolic Volume Index 89.45 mL/m2    LA Volume Index 74.6 mL/m2    LV Mass Index 84.9 g/m2    Right Atrial Pressure (from IVC) 8 mmHg    TV rest pulmonary artery pressure 34 mmHg    and X-Ray:     EXAMINATION:  XR CHEST AP PORTABLE    CLINICAL HISTORY:  Shortness of breath    TECHNIQUE:  Single frontal portable view of the chest was performed.    COMPARISON:  Chest radiograph 01/24/2019 and 01/07/2019    FINDINGS:  Moderate enlargement of the cardiac silhouette which may reflect cardiomegaly and/or pericardial effusion, not significantly changed.  Mediastinal silhouette is within normal limits given patient  positioning and levo rotation.    Pulmonary interstitial edema and small bilateral layering pleural effusions with overlying atelectasis and/or pneumonia.  No pneumothorax.    Multilevel degenerative changes of the imaged spine.      Impression       1. Moderate enlargement of the cardiac silhouette, pulmonary interstitial edema and small bilateral layering pleural effusions with overlying atelectasis and/or pneumonia.      Electronically signed by: Milo Gordillo  Date: 04/11/2019  Time: 12:15

## 2019-04-12 NOTE — ASSESSMENT & PLAN NOTE
--patient with hypervolemia  --continue diuresis, but would increase dose to 80 IV BID  --replete electrolytes to K>4, Mg>2  --check BMP BID  --salt restriction <2gm and fluid restriction <1.5L  --daily weights

## 2019-04-12 NOTE — NURSING
Patient arrived on unit via stretcher, accompanied by her son Ty.  She is pleasant and cooperative, anxious, and asks many questions.  Supportive environment provided, and safety maintained with transfer from stretcher to bed.  She is wearing 2L O2 via nasal cannula, and she complains about the increased work of breathing that she has not experienced prior to this event.  Patient is hypertensive, MD aware;  Patient and her son oriented to room, unit, call bell, and her personal items placed within arm's length as well.  Bed alarm activated.  Will continue to monitor and give report to oncoming shift.

## 2019-04-13 LAB
ANION GAP SERPL CALC-SCNC: 9 MMOL/L (ref 8–16)
BASOPHILS # BLD AUTO: 0.03 K/UL (ref 0–0.2)
BASOPHILS NFR BLD: 0.4 % (ref 0–1.9)
BUN SERPL-MCNC: 72 MG/DL (ref 8–23)
CALCIUM SERPL-MCNC: 8.9 MG/DL (ref 8.7–10.5)
CHLORIDE SERPL-SCNC: 97 MMOL/L (ref 95–110)
CO2 SERPL-SCNC: 29 MMOL/L (ref 23–29)
CREAT SERPL-MCNC: 1.9 MG/DL (ref 0.5–1.4)
DIFFERENTIAL METHOD: ABNORMAL
EOSINOPHIL # BLD AUTO: 0.3 K/UL (ref 0–0.5)
EOSINOPHIL NFR BLD: 4.8 % (ref 0–8)
ERYTHROCYTE [DISTWIDTH] IN BLOOD BY AUTOMATED COUNT: 14.7 % (ref 11.5–14.5)
EST. GFR  (AFRICAN AMERICAN): 26.6 ML/MIN/1.73 M^2
EST. GFR  (NON AFRICAN AMERICAN): 23 ML/MIN/1.73 M^2
GLUCOSE SERPL-MCNC: 86 MG/DL (ref 70–110)
HCT VFR BLD AUTO: 24.3 % (ref 37–48.5)
HGB BLD-MCNC: 7.9 G/DL (ref 12–16)
IMM GRANULOCYTES # BLD AUTO: 0.03 K/UL (ref 0–0.04)
IMM GRANULOCYTES NFR BLD AUTO: 0.4 % (ref 0–0.5)
LYMPHOCYTES # BLD AUTO: 1.2 K/UL (ref 1–4.8)
LYMPHOCYTES NFR BLD: 16.9 % (ref 18–48)
MAGNESIUM SERPL-MCNC: 2.4 MG/DL (ref 1.6–2.6)
MCH RBC QN AUTO: 29.2 PG (ref 27–31)
MCHC RBC AUTO-ENTMCNC: 32.5 G/DL (ref 32–36)
MCV RBC AUTO: 90 FL (ref 82–98)
MONOCYTES # BLD AUTO: 0.8 K/UL (ref 0.3–1)
MONOCYTES NFR BLD: 12.1 % (ref 4–15)
NEUTROPHILS # BLD AUTO: 4.5 K/UL (ref 1.8–7.7)
NEUTROPHILS NFR BLD: 65.4 % (ref 38–73)
NRBC BLD-RTO: 0 /100 WBC
PHOSPHATE SERPL-MCNC: 4.2 MG/DL (ref 2.7–4.5)
PLATELET # BLD AUTO: 217 K/UL (ref 150–350)
PMV BLD AUTO: 10.6 FL (ref 9.2–12.9)
POTASSIUM SERPL-SCNC: 3.4 MMOL/L (ref 3.5–5.1)
RBC # BLD AUTO: 2.71 M/UL (ref 4–5.4)
SODIUM SERPL-SCNC: 135 MMOL/L (ref 136–145)
WBC # BLD AUTO: 6.87 K/UL (ref 3.9–12.7)

## 2019-04-13 PROCEDURE — 25000003 PHARM REV CODE 250: Mod: HCNC | Performed by: INTERNAL MEDICINE

## 2019-04-13 PROCEDURE — 99232 SBSQ HOSP IP/OBS MODERATE 35: CPT | Mod: HCNC,,, | Performed by: INTERNAL MEDICINE

## 2019-04-13 PROCEDURE — 85025 COMPLETE CBC W/AUTO DIFF WBC: CPT | Mod: HCNC

## 2019-04-13 PROCEDURE — C9113 INJ PANTOPRAZOLE SODIUM, VIA: HCPCS | Mod: HCNC | Performed by: INTERNAL MEDICINE

## 2019-04-13 PROCEDURE — 20600001 HC STEP DOWN PRIVATE ROOM: Mod: HCNC

## 2019-04-13 PROCEDURE — 63600175 PHARM REV CODE 636 W HCPCS: Mod: HCNC | Performed by: INTERNAL MEDICINE

## 2019-04-13 PROCEDURE — 80048 BASIC METABOLIC PNL TOTAL CA: CPT | Mod: HCNC

## 2019-04-13 PROCEDURE — 99232 PR SUBSEQUENT HOSPITAL CARE,LEVL II: ICD-10-PCS | Mod: HCNC,,, | Performed by: INTERNAL MEDICINE

## 2019-04-13 PROCEDURE — 36415 COLL VENOUS BLD VENIPUNCTURE: CPT | Mod: HCNC

## 2019-04-13 PROCEDURE — 83735 ASSAY OF MAGNESIUM: CPT | Mod: HCNC

## 2019-04-13 PROCEDURE — 84100 ASSAY OF PHOSPHORUS: CPT | Mod: HCNC

## 2019-04-13 RX ORDER — BUMETANIDE 0.5 MG/1
1 TABLET ORAL DAILY
Status: DISCONTINUED | OUTPATIENT
Start: 2019-04-14 | End: 2019-04-15 | Stop reason: HOSPADM

## 2019-04-13 RX ORDER — BUMETANIDE 0.5 MG/1
1 TABLET ORAL DAILY
Status: DISCONTINUED | OUTPATIENT
Start: 2019-04-13 | End: 2019-04-13

## 2019-04-13 RX ORDER — POTASSIUM CHLORIDE 20 MEQ/1
40 TABLET, EXTENDED RELEASE ORAL ONCE
Status: COMPLETED | OUTPATIENT
Start: 2019-04-13 | End: 2019-04-13

## 2019-04-13 RX ADMIN — LEVOTHYROXINE SODIUM 75 MCG: 75 TABLET ORAL at 05:04

## 2019-04-13 RX ADMIN — LABETALOL HYDROCHLORIDE 300 MG: 100 TABLET, FILM COATED ORAL at 08:04

## 2019-04-13 RX ADMIN — AMLODIPINE BESYLATE 10 MG: 10 TABLET ORAL at 08:04

## 2019-04-13 RX ADMIN — FUROSEMIDE 40 MG: 10 INJECTION, SOLUTION INTRAMUSCULAR; INTRAVENOUS at 08:04

## 2019-04-13 RX ADMIN — GABAPENTIN 100 MG: 100 CAPSULE ORAL at 08:04

## 2019-04-13 RX ADMIN — HYDRALAZINE HYDROCHLORIDE AND ISOSORBIDE DINITRATE 1 TABLET: 37.5; 2 TABLET, FILM COATED ORAL at 08:04

## 2019-04-13 RX ADMIN — POTASSIUM CHLORIDE 40 MEQ: 1500 TABLET, EXTENDED RELEASE ORAL at 09:04

## 2019-04-13 RX ADMIN — PANTOPRAZOLE SODIUM 40 MG: 40 INJECTION, POWDER, FOR SOLUTION INTRAVENOUS at 08:04

## 2019-04-13 RX ADMIN — ATORVASTATIN CALCIUM 20 MG: 20 TABLET, FILM COATED ORAL at 08:04

## 2019-04-13 RX ADMIN — HYDRALAZINE HYDROCHLORIDE AND ISOSORBIDE DINITRATE 1 TABLET: 37.5; 2 TABLET, FILM COATED ORAL at 09:04

## 2019-04-13 NOTE — PROGRESS NOTES
[facility]  Cardiology  Progress Note    Patient Name: Kristin Quintanilla  MRN: [id number]  Admission Date: 4/11/2019  Hospital Length of Stay: 2 days  Code Status: Full Code   Attending Physician: Jazmyn Mathews MD   Primary Care Physician: Jairo Schmidt MD  Expected Discharge Date: 4/16/2019  Principal Problem:Symptomatic anemia    Subjective:     Interval History: NAEON. Patient clinically feeling better this morning; actually feels dry / dehydrated. Denies SOB at rest, leg swelling, cp, diaphoresis, n/v, fatigue, etc.    Review of Systems   Constitution:  Negative for chills and fever.   HENT: Negative.    Cardiovascular: Negative for chest pain, dyspnea on exertion, irregular heartbeat, orthopnea, palpitations and paroxysmal nocturnal dyspnea.   Respiratory: Positive for shortness of breath on exertion. Negative for wheezing.    Skin: Negative for color change and rash.   Musculoskeletal: Negative for arthritis, back pain and myalgias.   Gastrointestinal: Negative for abdominal pain, constipation, diarrhea and nausea.   Neurological: Negative for dizziness, numbness and paresthesias.   Psychiatric/Behavioral: Negative.      Objective:     Vital Signs (Most Recent):  Temp: 98.2 °F (36.8 °C) (04/12/19 1620)  Pulse: 70 (04/12/19 1620)  Resp: 18 (04/12/19 1620)  BP: (!) 156/72 (04/12/19 1620)  SpO2: (!) 92 % (04/12/19 1620) Vital Signs (24h Range):  Temp:  [98.1 °F (36.7 °C)-98.5 °F (36.9 °C)] 98.2 °F (36.8 °C)  Pulse:  [59-72] 70  Resp:  [16-20] 18  SpO2:  [92 %-97 %] 92 %  BP: (118-187)/(58-84) 156/72     Weight: 54.8 kg (120 lb 13 oz)  Body mass index is 20.74 kg/m².     SpO2: (!) 92 %  O2 Device (Oxygen Therapy): room air      Intake/Output Summary (Last 24 hours) at 4/12/2019 1906  Last data filed at 4/12/2019 1700  Gross per 24 hour   Intake 900 ml   Output 200 ml   Net 700 ml       Lines/Drains/Airways     Peripheral Intravenous Line                 Peripheral IV - Single Lumen 04/11/19  1141 18 G Left Antecubital 1 day                Physical Exam   Constitutional: Vital signs are normal. She appears well-developed and well-nourished. She is cooperative.  Non-toxic appearance. No distress.   HENT:   Head: Normocephalic and atraumatic.   Cardiovascular: Normal rate, regular rhythm, S1 normal and S2 normal. Exam reveals no gallop.   Murmur heard.   Harsh midsystolic murmur is present with a grade of 2/6 at the upper right sternal border radiating to the neck.  Pulses:       Radial pulses are 2+ on the right side, and 2+ on the left side.        Dorsalis pedis pulses are 2+ on the right side, and 2+ on the left side.        Posterior tibial pulses are 2+ on the right side, and 2+ on the left side.   No lower extremity edema   Pulmonary/Chest: Effort normal. No respiratory distress. She has no decreased breath sounds. She has no wheezes. She has no rhonchi. No rales.  Abdominal: Soft. Normal appearance and bowel sounds are normal. She exhibits no distension and no mass. There is no tenderness.   Neurological: She is alert.   Skin: Skin is warm, dry and intact.       Significant Labs:   CMP   Recent Labs   Lab 04/11/19  1142 04/12/19  0455   * 132*   K 4.2 3.6   CL 98 97   CO2 27 26    81   BUN 74* 78*   CREATININE 1.7* 1.7*   CALCIUM 8.6* 8.8   PROT 5.2*  --    ALBUMIN 2.4*  --    BILITOT 0.5  --    ALKPHOS 122  --    AST 34  --    ALT 35  --    ANIONGAP 8 9   ESTGFRAFRICA 30.4* 30.4*   EGFRNONAA 26.4* 26.4*   , CBC   Recent Labs   Lab 04/11/19  1142 04/11/19  1831 04/12/19  0455 04/12/19  1439   WBC 7.17  --  7.22  --    HGB 6.9* 8.6* 7.7* 8.1*   HCT 21.5* 26.7* 24.0* 24.8*     --  221  --      BNP  Recent Labs   Lab 04/11/19  1142   *       and All pertinent lab results from the last 24 hours have been reviewed.      Significant Imaging: Echocardiogram:   Transthoracic echo (TTE) complete (Cupid Only):   Results for orders placed or performed during the hospital encounter  of 01/21/19   Transthoracic echo (TTE) complete (Cupid Only)   Result Value Ref Range    Ascending aorta 3.15 cm    STJ 2.33 cm    AV mean gradient 5.83 mmHg    Ao peak babar 1.56 m/s    Ao VTI 43.97 cm    IVS 0.70 0.6 - 1.1 cm    LA size 4.48 cm    Left Atrium Major Axis 6.48 cm    Left Atrium Minor Axis 5.55 cm    LVIDD 5.37 3.5 - 6.0 cm    LVIDS 2.45 2.1 - 4.0 cm    LVOT diameter 1.89 cm    LVOT peak VTI 28.90 cm    PW 0.72 0.6 - 1.1 cm    MV Peak A Babar 0.84 m/s    E wave decelartion time 182.93 msec    MV Peak E Babar 1.27 m/s    PV Peak D Babar 0.78 m/s    PV Peak S Babar 0.29 m/s    RA Major Axis 5.00 cm    RA Width 3.02 cm    RVDD 4.03 cm    Sinus 3.05 cm    TAPSE 2.10 cm    TR Max Babar 2.54 m/s    TDI SEPTAL 0.06     LA WIDTH 5.10 cm    LV Diastolic Volume 139.32 mL    LV Systolic Volume 21.33 mL    RV S' 13.23 m/s    LVOT peak babar [id number] m/s    LV SEPTAL E/E' RATIO 21.17     FS 54 %    LA volume 116.12 cm3    LV mass 132.22 g    Left Ventricle Relative Wall Thickness 0.27 cm    AV valve area 1.84 cm2    AV Velocity Ratio 0.64     AV index (prosthetic) 0.66     E/A ratio 1.51     Pulm vein S/D ratio 0.37     LVOT area 2.80 cm2    LVOT stroke volume 81.04 cm3    AV peak gradient 9.73 mmHg    Triscuspid Valve Regurgitation Peak Gradient 25.81 mmHg    BSA 1.55 m2    LV Systolic Volume Index 13.7 mL/m2    LV Diastolic Volume Index 89.45 mL/m2    LA Volume Index 74.6 mL/m2    LV Mass Index 84.9 g/m2    Right Atrial Pressure (from IVC) 8 mmHg    TV rest pulmonary artery pressure 34 mmHg    and X-Ray:     EXAMINATION:  XR CHEST AP PORTABLE    CLINICAL HISTORY:  Shortness of breath    TECHNIQUE:  Single frontal portable view of the chest was performed.    COMPARISON:  Chest radiograph 01/24/2019 and 01/07/2019    FINDINGS:  Moderate enlargement of the cardiac silhouette which may reflect cardiomegaly and/or pericardial effusion, not significantly changed.  Mediastinal silhouette is within normal limits given  patient positioning and levo rotation.    Pulmonary interstitial edema and small bilateral layering pleural effusions with overlying atelectasis and/or pneumonia.  No pneumothorax.    Multilevel degenerative changes of the imaged spine.      Impression       1. Moderate enlargement of the cardiac silhouette, pulmonary interstitial edema and small bilateral layering pleural effusions with overlying atelectasis and/or pneumonia.      Electronically signed by: Milo Gordillo  Date: 04/11/2019  Time: 12:15         Assessment and Plan:       Acute on chronic diastolic (congestive) heart failure  Patient presenting with hypervolemia. Good urine output and symptomatic improvement with 80mg IV lasix on presentation. Cr holding stable.  - Recommend stopping IV diuresis and transitioning to Bumex 1 mg daily  - monitor response to Bumex (likely will be discharged on Bumex over Lasix)  --replete electrolytes to K>4, Mg>2  --check BMP   --salt restriction <2gm and fluid restriction <1.5L  --daily weights  -- will need close f/u with Cardiology within 1-2 weeks from discharge     Essential hypertension  -- SBps improved to 100-110s   -- continue PTA labetalol 300 mg BID and isordil-hydralzine 1 tab BID  -- continue to hold ARB due to resolving MARIAM on CKD, reinstitute once renal function improves     Symptomatic anemia  --defer to primary  - Evaluated by GI for possible EGD    Acute deep vein thrombosis (DVT) of left peroneal vein  --re-evaluate need for anticoagulation as she has completed 3 months of eliquis and US negative for DVT.   --given her FOBT positive, her A/C has been held        VTE Risk Mitigation (From admission, onward)        Ordered     Place VALERIA hose  Until discontinued      04/11/19 1746     Place sequential compression device  Until discontinued      04/11/19 1746     IP VTE HIGH RISK PATIENT  Once      04/11/19 1746          Francisca Hernández MD  Cardiology  [facility]

## 2019-04-13 NOTE — MEDICAL/APP STUDENT
Hospital Medicine  Progress note    Team: [facility] MED D Jazmyn Mathews MD  Admit Date: 4/11/2019  LAMAR 4/16/2019  Code status: DNR (Do Not Resuscitate)  Length of Stay: 2 day(s)    Principal Problem:  Symptomatic anemia    Interval hx:  Pt elects code status change to DNR. LaPOST completed.  No new complaints.    Review of Systems   Respiratory: Negative for shortness of breath.    Cardiovascular: Positive for orthopnea. Negative for leg swelling.   Gastrointestinal: Negative for abdominal pain, nausea and vomiting.   Musculoskeletal:        Joint pain (legs)       PEx  Temp:  [97.7 °F (36.5 °C)-98.4 °F (36.9 °C)]   Pulse:  [59-71]   Resp:  [16-20]   BP: (118-177)/(58-94)   SpO2:  [92 %-93 %]     Intake/Output Summary (Last 24 hours) at 4/13/2019 1144  Last data filed at 4/13/2019 0945  Gross per 24 hour   Intake 1320 ml   Output 1300 ml   Net 20 ml       Physical Exam   Constitutional: She is oriented to person, place, and time.   Eyes: EOM are normal.   Neck: JVD present.   Cardiovascular: Normal rate and regular rhythm.   Pulmonary/Chest:   Mild decrease in breath sounds, but CTAB   Musculoskeletal: She exhibits no edema.   + kyphosis   Neurological: She is alert and oriented to person, place, and time.       Recent Labs   Lab 04/11/19  1142  04/12/19  0455 04/12/19  1439 04/13/19  0345   WBC 7.17  --  7.22  --  6.87   HGB 6.9*   < > 7.7* 8.1* 7.9*   HCT 21.5*   < > 24.0* 24.8* 24.3*     --  221  --  217    < > = values in this interval not displayed.     Recent Labs   Lab 04/11/19  1142 04/12/19  0455 04/13/19  0345   * 132* 135*   K 4.2 3.6 3.4*   CL 98 97 97   CO2 27 26 29   BUN 74* 78* 72*   CREATININE 1.7* 1.7* 1.9*    81 86   CALCIUM 8.6* 8.8 8.9   MG 2.8* 2.5 2.4   PHOS 5.1* 5.0* 4.2     Recent Labs   Lab 04/11/19  1142   ALKPHOS 122   ALT 35   AST 34   ALBUMIN 2.4*   PROT 5.2*   BILITOT 0.5        Recent Labs     04/11/19  1142 04/11/19  1831   TROPONINI 0.011 0.009       Scheduled  Meds:   amLODIPine  10 mg Oral Daily    atorvastatin  20 mg Oral Daily    [START ON 4/14/2019] bumetanide  1 mg Oral Daily    gabapentin  100 mg Oral QHS    isosorbide-hydrALAZINE 20-37.5 mg  1 tablet Oral BID    labetalol  300 mg Oral BID    levothyroxine  75 mcg Oral Before breakfast    pantoprazole  40 mg Intravenous BID     Continuous Infusions:  As Needed:  acetaminophen, albuterol sulfate, dextrose 50%, dextrose 50%, glucagon (human recombinant), glucose, glucose, hydrALAZINE, ondansetron, senna-docusate 8.6-50 mg, sodium chloride 0.9%    Active Hospital Problems    Diagnosis  POA    *Symptomatic anemia [D64.9]  Yes    Hyperkalemia [E87.5]  Yes    CKD (chronic kidney disease) stage 3, GFR 30-59 ml/min [N18.3]  Yes    Acute deep vein thrombosis (DVT) of left peroneal vein [I82.492]  Yes    DVT (deep venous thrombosis) [I82.409]  Yes    Essential hypertension [I10]  Yes    Hypothyroidism (acquired) [E03.9]  Yes    Polyneuropathy in other diseases classified elsewhere [G63]  Yes     6-      Pericardial effusion [I31.3]  Yes     Chronic    Acute on chronic diastolic (congestive) heart failure [I50.33]  Unknown    Hyperlipidemia [E78.5]  Yes     Chronic      Resolved Hospital Problems   No resolved problems to display.       Overview  87 yo female with a history of CAD, diastolic HF, HLD,and hypothyroidism admitted with symptomatic anemia, heme (+) stool on chronic anticoagulation and CHF exacerbation with uncontrolled HTN.     Assessment and Plan for Problems addressed today:    Acute on chronic diastolic heart failure  Pericardial effusion  Essential hypertension  · Echo (1/21/2019): EF 65%, indeterminate diastolic function, severe LAE, moderated pericardial effusion, no evidence of tamponade.   · Continue lasix at 40 mg BID after 80 mg IV given in ED.  · Patient has been stopped on HCTZ in the past due to hyponatremia  · Pericardial effusion likely persistent as evidenced by cardiac  silhouette on CXR; Echo ordered  · Cardiac diet  · Monitoring with telemetry, daily weights and I/O's  · Continue therapy with amlodipine (consider nifedipine if ineffective and worsened LE edema), hold losartan currently due to renal and start bidil; hydralazine prn SBP > 180; check orthostatics  · Cardiology consulted for due to difficult home control HTN/CHF and pericardial effusion  · Cardiology notes elevated BP after PRBC, recs only hydralazine prn until sufficient diuresis to address hypervolemia  · Monitor troponins due to intermittent chest pain at home and T-wave abnormality on admit EKG  · Cards recs increasing lasix to 80 IV BID, Cr stable, continue lasix 40 IV BID  · BP improved with diuresis, bidil reduced from TID to BID, goal -160 (4/12)  · Started bumetanide 1mg PO, IV diuresis stopped; echo pending (4/13)     Chronic kidney disease stage 3  Hypokalemia  · Estimated Creatinine Clearance: 19.4 mL/min (A) (based on SCr of 1.7 mg/dL (H)).  · Cr increased from baseline of 1.4 previously; probable cardiorenal syndrome  · Monitor, avoid nephrotoxins and renally adjust meds  · Monitor K+; held magnesium supplement on admit as elevated  · Replete K+ for goal of 4     Hypothyroidism  · Continue home levothyroxine     Normocytic anemia  Heme + stool  · Baseline Hgb 8-9, 6.9 on admit and back to baseline after 1 u PRBC  · Stool heme + on chronic anticoagulation; GI consulted  · + hx of PUD in the past but no longer on GI meds and denies OTC meds; + hx of EGD but not C-scope  · Continue protonix IV  · Hold apixaban and resume if no signs of worsened bleeding  · Ferritin wnl; patient reluctant to take iron supplement due to constipation  · GI planning for EGD Monday or sooner if signs of overt bleeding  · Hgb stable ~ 8     Left peroneal vein DVT  · Diagnosed on January 24, 2019 and patient has been taking apixaban therapy  · Hold apixaban currently to ensure no active bleeding or worsening of  anemia  · Per cards, completed 3 months of apixaban, re-evaluate need for anticoagulation  · Repeat US LE shows no DVT (4/12)     Hyperlipidemia  · Continue atorvastatin therapy daily     Peripheral neuropathy    · chronic and stable  · Continue gabapentin at home dose      Diet:  Diet Cardiac [facility]; Low Phosphorus  GI PPx: pantoprazole  DVT PPx:   VTE Risk Mitigation (From admission, onward)        Ordered     Place VALERIA hose  Until discontinued      04/11/19 1746     Place sequential compression device  Until discontinued      04/11/19 1746     IP VTE HIGH RISK PATIENT  Once      04/11/19 1746        Goals of Care: evaluation of anemia and resolution of symptomatic CHF  Lines/ Drains/ Airways: PIV  Wounds: none  Discharge plan and follow up:     Provider   Jazmyn Mathews MD  Staff Hospitalist   Spectra [id number]    I personally scribed for Jazmyn Mathews MD on 04/13/2019 at 9:04 AM. Electronically signed by nick Juares on 04/13/2019 at 9:04 AM

## 2019-04-13 NOTE — PLAN OF CARE
Problem: Adult Inpatient Plan of Care  Goal: Plan of Care Review  Outcome: Ongoing (interventions implemented as appropriate)  Patient is alert and oriented. Able to make needs known to staff. No c/o pain or discomfort noted. No s/s of respiratory/cardiac distress noted. Telemetry monitoring continues with NSR noted. Left PIV is patent and flushes well. Patient remains on a cardiac low phosphorus diet and is tolerating it well. Patient remains on neuro checks every 4 hours and remains WNLs. VS stable. No issues or concerns at this time. Continue to monitor.

## 2019-04-13 NOTE — TREATMENT PLAN
GI Treatment Plan  04/13/2019  8:17 AM    Patient seen this morning.  She was resting comfortably in bed on room air with family at bedside.  No bowel movement overnight with stable Hgb.  EGD on Monday therefore she needs to be NPO after MN on Sunday night.  Please call with any additional questions or concerns.    Iza Berry M.D.  Gastroenterology Fellow, PGY-V  Pager: [phone]  [facility]

## 2019-04-13 NOTE — SUBJECTIVE & OBJECTIVE
Interval History: NAEON    Review of Systems   Constitution: Positive for malaise/fatigue. Negative for chills and fever.   HENT: Negative.    Cardiovascular: Positive for leg swelling. Negative for chest pain, dyspnea on exertion, irregular heartbeat, orthopnea, palpitations and paroxysmal nocturnal dyspnea.   Respiratory: Positive for shortness of breath. Negative for wheezing.    Skin: Negative for color change and rash.   Musculoskeletal: Negative for arthritis, back pain and myalgias.   Gastrointestinal: Negative for abdominal pain, constipation, diarrhea and nausea.   Neurological: Negative for dizziness, numbness and paresthesias.   Psychiatric/Behavioral: Negative.      Objective:     Vital Signs (Most Recent):  Temp: 98.2 °F (36.8 °C) (04/12/19 1620)  Pulse: 70 (04/12/19 1620)  Resp: 18 (04/12/19 1620)  BP: (!) 156/72 (04/12/19 1620)  SpO2: (!) 92 % (04/12/19 1620) Vital Signs (24h Range):  Temp:  [98.1 °F (36.7 °C)-98.5 °F (36.9 °C)] 98.2 °F (36.8 °C)  Pulse:  [59-72] 70  Resp:  [16-20] 18  SpO2:  [92 %-97 %] 92 %  BP: (118-187)/(58-84) 156/72     Weight: 54.8 kg (120 lb 13 oz)  Body mass index is 20.74 kg/m².     SpO2: (!) 92 %  O2 Device (Oxygen Therapy): room air      Intake/Output Summary (Last 24 hours) at 4/12/2019 1906  Last data filed at 4/12/2019 1700  Gross per 24 hour   Intake 900 ml   Output 200 ml   Net 700 ml       Lines/Drains/Airways     Peripheral Intravenous Line                 Peripheral IV - Single Lumen 04/11/19 1141 18 G Left Antecubital 1 day                Physical Exam   Constitutional: Vital signs are normal. She appears well-developed and well-nourished. She is cooperative.  Non-toxic appearance. No distress.   HENT:   Head: Normocephalic and atraumatic.   Neck: JVD present. No hepatojugular reflux present. Carotid bruit is not present.   Cardiovascular: Normal rate, regular rhythm, S1 normal and S2 normal. Exam reveals no gallop.   Murmur heard.   Harsh midsystolic murmur is  present with a grade of 2/6 at the upper right sternal border radiating to the neck.  Pulses:       Radial pulses are 2+ on the right side, and 2+ on the left side.        Dorsalis pedis pulses are 2+ on the right side, and 2+ on the left side.        Posterior tibial pulses are 2+ on the right side, and 2+ on the left side.   No lower extremity edema   Pulmonary/Chest: Effort normal. No respiratory distress. She has no decreased breath sounds. She has no wheezes. She has no rhonchi. She has rales in the left lower field.   Abdominal: Soft. Normal appearance and bowel sounds are normal. She exhibits no distension and no mass. There is no tenderness.   Neurological: She is alert.   Skin: Skin is warm, dry and intact.       Significant Labs:   CMP   Recent Labs   Lab 04/11/19  1142 04/12/19  0455   * 132*   K 4.2 3.6   CL 98 97   CO2 27 26    81   BUN 74* 78*   CREATININE 1.7* 1.7*   CALCIUM 8.6* 8.8   PROT 5.2*  --    ALBUMIN 2.4*  --    BILITOT 0.5  --    ALKPHOS 122  --    AST 34  --    ALT 35  --    ANIONGAP 8 9   ESTGFRAFRICA 30.4* 30.4*   EGFRNONAA 26.4* 26.4*   , CBC   Recent Labs   Lab 04/11/19  1142 04/11/19  1831 04/12/19  0455 04/12/19  1439   WBC 7.17  --  7.22  --    HGB 6.9* 8.6* 7.7* 8.1*   HCT 21.5* 26.7* 24.0* 24.8*     --  221  --      BNP  Recent Labs   Lab 04/11/19  1142   *       and All pertinent lab results from the last 24 hours have been reviewed.      Significant Imaging: Echocardiogram:   Transthoracic echo (TTE) complete (Cupid Only):   Results for orders placed or performed during the hospital encounter of 01/21/19   Transthoracic echo (TTE) complete (Cupid Only)   Result Value Ref Range    Ascending aorta 3.15 cm    STJ 2.33 cm    AV mean gradient 5.83 mmHg    Ao peak brittnee 1.56 m/s    Ao VTI 43.97 cm    IVS 0.70 0.6 - 1.1 cm    LA size 4.48 cm    Left Atrium Major Axis 6.48 cm    Left Atrium Minor Axis 5.55 cm    LVIDD 5.37 3.5 - 6.0 cm    LVIDS 2.45 2.1 - 4.0 cm     LVOT diameter 1.89 cm    LVOT peak VTI 28.90 cm    PW 0.72 0.6 - 1.1 cm    MV Peak A Babar 0.84 m/s    E wave decelartion time 182.93 msec    MV Peak E Babar 1.27 m/s    PV Peak D Babar 0.78 m/s    PV Peak S Babar 0.29 m/s    RA Major Axis 5.00 cm    RA Width 3.02 cm    RVDD 4.03 cm    Sinus 3.05 cm    TAPSE 2.10 cm    TR Max Babar 2.54 m/s    TDI SEPTAL 0.06     LA WIDTH 5.10 cm    LV Diastolic Volume 139.32 mL    LV Systolic Volume 21.33 mL    RV S' 13.23 m/s    LVOT peak babar [id number] m/s    LV SEPTAL E/E' RATIO 21.17     FS 54 %    LA volume 116.12 cm3    LV mass 132.22 g    Left Ventricle Relative Wall Thickness 0.27 cm    AV valve area 1.84 cm2    AV Velocity Ratio 0.64     AV index (prosthetic) 0.66     E/A ratio 1.51     Pulm vein S/D ratio 0.37     LVOT area 2.80 cm2    LVOT stroke volume 81.04 cm3    AV peak gradient 9.73 mmHg    Triscuspid Valve Regurgitation Peak Gradient 25.81 mmHg    BSA 1.55 m2    LV Systolic Volume Index 13.7 mL/m2    LV Diastolic Volume Index 89.45 mL/m2    LA Volume Index 74.6 mL/m2    LV Mass Index 84.9 g/m2    Right Atrial Pressure (from IVC) 8 mmHg    TV rest pulmonary artery pressure 34 mmHg    and X-Ray:     EXAMINATION:  XR CHEST AP PORTABLE    CLINICAL HISTORY:  Shortness of breath    TECHNIQUE:  Single frontal portable view of the chest was performed.    COMPARISON:  Chest radiograph 01/24/2019 and 01/07/2019    FINDINGS:  Moderate enlargement of the cardiac silhouette which may reflect cardiomegaly and/or pericardial effusion, not significantly changed.  Mediastinal silhouette is within normal limits given patient positioning and levo rotation.    Pulmonary interstitial edema and small bilateral layering pleural effusions with overlying atelectasis and/or pneumonia.  No pneumothorax.    Multilevel degenerative changes of the imaged spine.      Impression       1. Moderate enlargement of the cardiac silhouette, pulmonary interstitial edema and small bilateral layering  pleural effusions with overlying atelectasis and/or pneumonia.      Electronically signed by: Milo Gordillo  Date: 04/11/2019  Time: 12:15

## 2019-04-13 NOTE — PLAN OF CARE
Problem: Adult Inpatient Plan of Care  Goal: Plan of Care Review  Outcome: Ongoing (interventions implemented as appropriate)  VS stable, Telemetry= NSR 60s, no complaints overnight. SPO2= 92% on room air, no distress noted. Will continue to monitor.

## 2019-04-13 NOTE — PROGRESS NOTES
Physician Attestation for Scribe:  I, Jazmyn Mathews MD, personally performed the services described in this documentation. All medical record entries made by the scribe were at my direction and in my presence.  I have reviewed this note and agree that the record reflects my personal performance and is accurate and complete.       [facility] Medicine  Progress note    Team: [facility] MED D Jazmyn Mathews MD  Admit Date: 4/11/2019  LAMAR 4/16/2019  Code status: DNR (Do Not Resuscitate)  Length of Stay: 2 day(s)    Principal Problem:  Symptomatic anemia    Interval hx:  Pt elects code status change to DNR. LaPOST completed.  No new complaints.    Review of Systems   Respiratory: Negative for shortness of breath.    Cardiovascular: Positive for orthopnea. Negative for leg swelling.   Gastrointestinal: Negative for abdominal pain, nausea and vomiting.   Musculoskeletal:        Joint pain (legs)       PEx  Temp:  [97.7 °F (36.5 °C)-98.4 °F (36.9 °C)]   Pulse:  [59-71]   Resp:  [16-20]   BP: (118-177)/(58-94)   SpO2:  [92 %-93 %]     Intake/Output Summary (Last 24 hours) at 4/13/2019 1144  Last data filed at 4/13/2019 0945  Gross per 24 hour   Intake 1320 ml   Output 1300 ml   Net 20 ml       Physical Exam   Constitutional: She is oriented to person, place, and time.   Eyes: EOM are normal.   Neck: JVD present.   Cardiovascular: Normal rate and regular rhythm.   Pulmonary/Chest:   Mild decrease in breath sounds, but CTAB   Musculoskeletal: She exhibits no edema.   + kyphosis   Neurological: She is alert and oriented to person, place, and time.       Recent Labs   Lab 04/11/19  1142  04/12/19  0455 04/12/19  1439 04/13/19  0345   WBC 7.17  --  7.22  --  6.87   HGB 6.9*   < > 7.7* 8.1* 7.9*   HCT 21.5*   < > 24.0* 24.8* 24.3*     --  221  --  217    < > = values in this interval not displayed.     Recent Labs   Lab 04/11/19  1142 04/12/19  0455 04/13/19  0345   * 132* 135*   K 4.2 3.6 3.4*   CL 98 97 97   CO2 27  26 29   BUN 74* 78* 72*   CREATININE 1.7* 1.7* 1.9*    81 86   CALCIUM 8.6* 8.8 8.9   MG 2.8* 2.5 2.4   PHOS 5.1* 5.0* 4.2     Recent Labs   Lab 04/11/19  1142   ALKPHOS 122   ALT 35   AST 34   ALBUMIN 2.4*   PROT 5.2*   BILITOT 0.5        Recent Labs     04/11/19  1142 04/11/19  1831   TROPONINI 0.011 0.009       Scheduled Meds:   amLODIPine  10 mg Oral Daily    atorvastatin  20 mg Oral Daily    [START ON 4/14/2019] bumetanide  1 mg Oral Daily    gabapentin  100 mg Oral QHS    isosorbide-hydrALAZINE 20-37.5 mg  1 tablet Oral BID    labetalol  300 mg Oral BID    levothyroxine  75 mcg Oral Before breakfast    pantoprazole  40 mg Intravenous BID     Continuous Infusions:  As Needed:  acetaminophen, albuterol sulfate, dextrose 50%, dextrose 50%, glucagon (human recombinant), glucose, glucose, hydrALAZINE, ondansetron, senna-docusate 8.6-50 mg, sodium chloride 0.9%    Active Hospital Problems    Diagnosis  POA    *Symptomatic anemia [D64.9]  Yes    Hyperkalemia [E87.5]  Yes    CKD (chronic kidney disease) stage 3, GFR 30-59 ml/min [N18.3]  Yes    Acute deep vein thrombosis (DVT) of left peroneal vein [I82.492]  Yes    DVT (deep venous thrombosis) [I82.409]  Yes    Essential hypertension [I10]  Yes    Hypothyroidism (acquired) [E03.9]  Yes    Polyneuropathy in other diseases classified elsewhere [G63]  Yes     6-      Pericardial effusion [I31.3]  Yes     Chronic    Acute on chronic diastolic (congestive) heart failure [I50.33]  Unknown    Hyperlipidemia [E78.5]  Yes     Chronic      Resolved Hospital Problems   No resolved problems to display.       Overview  89 yo female with a history of CAD, diastolic HF, HLD,and hypothyroidism admitted with symptomatic anemia, heme (+) stool on chronic anticoagulation and CHF exacerbation with uncontrolled HTN.     Assessment and Plan for Problems addressed today:    Acute on chronic diastolic heart failure  Pericardial effusion  Essential  hypertension  · Echo (1/21/2019): EF 65%, indeterminate diastolic function, severe LAE, moderated pericardial effusion, no evidence of tamponade.   · Continue lasix at 40 mg BID after 80 mg IV given in ED.  · Patient has been stopped on HCTZ in the past due to hyponatremia  · Pericardial effusion likely persistent as evidenced by cardiac silhouette on CXR; Echo ordered  · Cardiac diet  · Monitoring with telemetry, daily weights and I/O's  · Continue therapy with amlodipine (consider nifedipine if ineffective and worsened LE edema), hold losartan currently due to renal and start bidil; hydralazine prn SBP > 180; check orthostatics  · Cardiology consulted for due to difficult home control HTN/CHF and pericardial effusion  · Cardiology notes elevated BP after PRBC, recs only hydralazine prn until sufficient diuresis to address hypervolemia  · Monitor troponins due to intermittent chest pain at home and T-wave abnormality on admit EKG  · Cards recs increasing lasix to 80 IV BID, Cr stable, continue lasix 40 IV BID  · BP improved with diuresis, bidil reduced from TID to BID, goal -160 (4/12)  · Started bumetanide 1mg PO, IV diuresis stopped; echo pending (4/13)     Chronic kidney disease stage 3  Hypokalemia  · Estimated Creatinine Clearance: 19.4 mL/min (A) (based on SCr of 1.7 mg/dL (H)).  · Cr increased from baseline of 1.4 previously; probable cardiorenal syndrome  · Monitor, avoid nephrotoxins and renally adjust meds  · Monitor K+; held magnesium supplement on admit as elevated  · Replete K+ for goal of 4     Hypothyroidism  · Continue home levothyroxine     Normocytic anemia  Heme + stool  · Baseline Hgb 8-9, 6.9 on admit and back to baseline after 1 u PRBC  · Stool heme + on chronic anticoagulation; GI consulted  · + hx of PUD in the past but no longer on GI meds and denies OTC meds; + hx of EGD but not C-scope  · Continue protonix IV  · Hold apixaban and resume if no signs of worsened bleeding  · Ferritin  wnl; patient reluctant to take iron supplement due to constipation  · GI planning for EGD Monday or sooner if signs of overt bleeding  · Hgb stable ~ 8     Left peroneal vein DVT  · Diagnosed on January 24, 2019 and patient has been taking apixaban therapy  · Hold apixaban currently to ensure no active bleeding or worsening of anemia  · Per cards, completed 3 months of apixaban, re-evaluate need for anticoagulation  · Repeat US LE shows no DVT (4/12)     Hyperlipidemia  · Continue atorvastatin therapy daily     Peripheral neuropathy    · chronic and stable  · Continue gabapentin at home dose    Diet:  Diet [facility]; Low Phosphorus  GI PPx: pantoprazole  DVT PPx:   VTE Risk Mitigation (From admission, onward)        Ordered     Place VALERIA hose  Until discontinued      04/11/19 1746     Place sequential compression device  Until discontinued      04/11/19 1746     IP VTE HIGH RISK PATIENT  Once      04/11/19 1746        Goals of Care: evaluation of anemia and resolution of symptomatic CHF  Lines/ Drains/ Airways: PIV  Wounds: none  Discharge plan and follow up:     Provider   Jazmyn Mathews MD  Staff Hospitalist   Spectra [phone]    I personally scribed for Jazmyn Mathews MD on 04/13/2019 at 9:04 AM. Electronically signed by nick Juares on 04/13/2019 at 9:04 AM

## 2019-04-13 NOTE — PROGRESS NOTES
Physician Attestation for Scribe:  I, Jazmyn Mathews MD, personally performed the services described in this documentation. All medical record entries made by the scribe were at my direction and in my presence.  I have reviewed this note and agree that the record reflects my personal performance and is accurate and complete.       [facility] Medicine  Progress note    Team: [facility] [place] [facility] MED D Jazmyn Mathews MD  Admit Date: 4/11/2019  LAMAR   Code status: Full Code  Length of Stay: 1 day(s)    Principal Problem:  Symptomatic anemia    Interval hx:  Pt notes breathing better today. Had a brown bowel movement last night.    Review of Systems   Respiratory: Positive for shortness of breath.    Cardiovascular: Positive for orthopnea and leg swelling.   Gastrointestinal: Negative for abdominal pain, nausea and vomiting.   Musculoskeletal:        Joint pain (legs)       PEx  Temp:  [97.3 °F (36.3 °C)-98.7 °F (37.1 °C)]   Pulse:  [58-78]   Resp:  [16-20]   BP: (129-206)/()   SpO2:  [93 %-98 %]     Intake/Output Summary (Last 24 hours) at 4/12/2019 0904  Last data filed at 4/12/2019 0500  Gross per 24 hour   Intake 120 ml   Output 950 ml   Net -830 ml       Physical Exam   Constitutional: She is well-developed, well-nourished, and in no distress. No distress.   Neck: JVD present.   Cardiovascular: Normal rate and regular rhythm.   Pulmonary/Chest: No respiratory distress. She has no wheezes.   Abdominal: Soft. She exhibits no distension. There is no tenderness.   Musculoskeletal: She exhibits edema (1+ edema BLE).       Recent Labs   Lab 04/10/19  1520 04/11/19  1142 04/11/19  1831 04/12/19  0455 04/12/19  1439   WBC 6.63 7.17  --  7.22  --    HGB 6.9* 6.9* 8.6* 7.7* 8.1*   HCT 22.0* 21.5* 26.7* 24.0* 24.8*    219  --  221  --      Recent Labs   Lab 04/10/19  1520 04/11/19  1142 04/12/19  0455   * 133* 132*   K 4.5 4.2 3.6   CL 96 98 97   CO2 26 27 26   BUN 80* 74* 78*   CREATININE 2.2* 1.7* 1.7*    102  81   CALCIUM 8.8 8.6* 8.8   MG  --  2.8* 2.5   PHOS  --  5.1* 5.0*     Recent Labs   Lab 04/11/19  1142   ALKPHOS 122   ALT 35   AST 34   ALBUMIN 2.4*   PROT 5.2*   BILITOT 0.5      No results for input(s): POCTGLUCOSE in the last 168 hours.  Recent Labs     04/11/19  1142 04/11/19  1831   TROPONINI 0.011 0.009       Scheduled Meds:   amLODIPine  10 mg Oral Daily    atorvastatin  20 mg Oral Daily    furosemide  40 mg Intravenous BID    gabapentin  100 mg Oral QHS    isosorbide-hydrALAZINE 20-37.5 mg  1 tablet Oral TID    labetalol  300 mg Oral BID    levothyroxine  75 mcg Oral Before breakfast    pantoprazole  40 mg Intravenous BID     Continuous Infusions:  As Needed:  acetaminophen, albuterol sulfate, dextrose 50%, dextrose 50%, glucagon (human recombinant), glucose, glucose, hydrALAZINE, ondansetron, senna-docusate 8.6-50 mg, sodium chloride 0.9%    Active Hospital Problems    Diagnosis  POA    *Symptomatic anemia [D64.9]  Yes    Hyperkalemia [E87.5]  Yes    CKD (chronic kidney disease) stage 3, GFR 30-59 ml/min [N18.3]  Yes    Acute deep vein thrombosis (DVT) of left peroneal vein [I82.492]  Yes    DVT (deep venous thrombosis) [I82.409]  Yes    Essential hypertension [I10]  Yes    Hypothyroidism (acquired) [E03.9]  Yes    Polyneuropathy in other diseases classified elsewhere [G63]  Yes     6-      Pericardial effusion [I31.3]  Yes     Chronic    Acute on chronic diastolic (congestive) heart failure [I50.33]  Unknown    Hyperlipidemia [E78.5]  Yes     Chronic      Resolved Hospital Problems   No resolved problems to display.       Overview  89 yo female with a history of CAD, diastolic HF, HLD,and hypothyroidism admitted with symptomatic anemia, heme (+) stool on chronic anticoagulation and CHF exacerbation with uncontrolled HTN.     Assessment and Plan for Problems addressed today:    Acute on chronic diastolic heart failure  Pericardial effusion  Essential hypertension  · Echo  (1/21/2019): EF 65%, indeterminate diastolic function, severe LAE, moderated pericardial effusion, no evidence of tamponade.   · Continue lasix at 40 mg BID after 80 mg IV given in ED.  · Patient has been stopped on HCTZ in the past due to hyponatremia  · Pericardial effusion likely persistent as evidenced by cardiac silhouette on CXR; Echo ordered  · Cardiac diet  · Monitoring with telemetry, daily weights and I/O's  · Continue therapy with amlodipine (consider nifedipine if ineffective and worsened LE edema), hold losartan currently due to renal and start bidil; hydralazine prn SBP > 180; check orthostatics  · Cardiology consulted for due to difficult home control HTN/CHF and pericardial effusion  · Cardiology notes elevated BP after PRBC, recs only hydralazine prn until sufficient diuresis to address hypervolemia  · Monitor troponins due to intermittent chest pain at home and T-wave abnormality on admit EKG  · Cards recs increasing lasix to 80 IV BID, Cr stable, continue lasix 40 IV BID  · BP improved with diuresis, bidil reduced from TID to BID, goal -160 (4/12)     Chronic kidney disease stage 3  Hyperkalemia  · Estimated Creatinine Clearance: 19.4 mL/min (A) (based on SCr of 1.7 mg/dL (H)).  · Cr increased from baseline of 1.4 previously; probable cardiorenal syndrome  · Monitor, avoid nephrotoxins and renally adjust meds  · Monitor K+; hold magnesium supplement      Hypothyroidism  · Continue home levothyroxine     Normocytic anemia  Heme + stool  · Baseline Hgb 8-9, 6.9 on admit and back to baseline after 1 u PRBC  · Stool heme + on chronic anticoagulation; GI consulted  · + hx of PUD in the past but no longer on GI meds and denies OTC meds; + hx of EGD but not C-scope  · Continue protonix IV  · Hold apixaban and resume if no signs of worsened bleeding  · Ferritin wnl; patient reluctant to take iron supplement due to constipation  · GI planning for EGD Monday or sooner if signs of overt  bleeding  · Hgb fluctuant, monitoring     Left peroneal vein DVT  · Diagnosed on January 24, 2019 and patient has been taking apixaban therapy  · Hold apixaban currently to ensure no active bleeding or worsening of anemia  · Per cards, completed 3 months of apixaban, re-evaluate need for anticoagulation  · Repeat US LE shows no DVT (4/12)     Hyperlipidemia  · Continue atorvastatin therapy daily     Peripheral neuropathy    · chronic and stable  · Continue gabapentin at home dose        Diet:  Diet NPO  GI PPx: pantoprazole  DVT PPx:   VTE Risk Mitigation (From admission, onward)        Ordered     Place VALERIA hose  Until discontinued      04/11/19 1746     Place sequential compression device  Until discontinued      04/11/19 1746     IP VTE HIGH RISK PATIENT  Once      04/11/19 1746        Goals of Care: evaluation of anemia and resolution of symptomatic CHF  Lines/ Drains/ Airways: PIV  Wounds: none  Discharge plan and follow up:     Provider   Jazmyn Mathews MD  Staff Hospitalist   Spectra [phone]    I personally scribed for Jazmyn Mathews MD on 04/12/2019 at 9:04 AM. Electronically signed by nick Juares on 04/12/2019 at 9:04 AM

## 2019-04-14 LAB
ANION GAP SERPL CALC-SCNC: 7 MMOL/L (ref 8–16)
ASCENDING AORTA: 3.29 CM
AV INDEX (PROSTH): 0.65
AV MEAN GRADIENT: 7.28 MMHG
AV PEAK GRADIENT: 12.25 MMHG
AV VALVE AREA: 2.03 CM2
AV VELOCITY RATIO: 0.65
BASOPHILS # BLD AUTO: 0.03 K/UL (ref 0–0.2)
BASOPHILS NFR BLD: 0.4 % (ref 0–1.9)
BSA FOR ECHO PROCEDURE: 1.58 M2
BUN SERPL-MCNC: 58 MG/DL (ref 8–23)
CALCIUM SERPL-MCNC: 8.6 MG/DL (ref 8.7–10.5)
CHLORIDE SERPL-SCNC: 100 MMOL/L (ref 95–110)
CO2 SERPL-SCNC: 29 MMOL/L (ref 23–29)
CREAT SERPL-MCNC: 1.8 MG/DL (ref 0.5–1.4)
CV ECHO LV RWT: 0.39 CM
DIFFERENTIAL METHOD: ABNORMAL
DOP CALC AO PEAK VEL: 1.75 M/S
DOP CALC AO VTI: 45.59 CM
DOP CALC LVOT AREA: 3.11 CM2
DOP CALC LVOT DIAMETER: 1.99 CM
DOP CALC LVOT PEAK VEL: 1.13 M/S
DOP CALC LVOT STROKE VOLUME: 92.58 CM3
DOP CALCLVOT PEAK VEL VTI: 29.78 CM
E WAVE DECELERATION TIME: 140.79 MSEC
E/A RATIO: 1.46
E/E' RATIO: 18.36
ECHO LV POSTERIOR WALL: 0.93 CM (ref 0.6–1.1)
EOSINOPHIL # BLD AUTO: 0.4 K/UL (ref 0–0.5)
EOSINOPHIL NFR BLD: 4.6 % (ref 0–8)
ERYTHROCYTE [DISTWIDTH] IN BLOOD BY AUTOMATED COUNT: 14.9 % (ref 11.5–14.5)
EST. GFR  (AFRICAN AMERICAN): 28.4 ML/MIN/1.73 M^2
EST. GFR  (NON AFRICAN AMERICAN): 24.6 ML/MIN/1.73 M^2
FRACTIONAL SHORTENING: 42 % (ref 28–44)
GLUCOSE SERPL-MCNC: 103 MG/DL (ref 70–110)
HCT VFR BLD AUTO: 25.1 % (ref 37–48.5)
HGB BLD-MCNC: 7.8 G/DL (ref 12–16)
IMM GRANULOCYTES # BLD AUTO: 0.02 K/UL (ref 0–0.04)
IMM GRANULOCYTES NFR BLD AUTO: 0.2 % (ref 0–0.5)
INTERVENTRICULAR SEPTUM: 1.07 CM (ref 0.6–1.1)
IVRT: 0.1 MSEC
LA MAJOR: 6.16 CM
LA MINOR: 6.19 CM
LA WIDTH: 4.24 CM
LEFT ATRIUM SIZE: 3.98 CM
LEFT ATRIUM VOLUME INDEX: 55.9 ML/M2
LEFT ATRIUM VOLUME: 88.57 CM3
LEFT INTERNAL DIMENSION IN SYSTOLE: 2.78 CM (ref 2.1–4)
LEFT VENTRICLE DIASTOLIC VOLUME INDEX: 67.45 ML/M2
LEFT VENTRICLE DIASTOLIC VOLUME: 106.94 ML
LEFT VENTRICLE MASS INDEX: 107 G/M2
LEFT VENTRICLE SYSTOLIC VOLUME INDEX: 18.3 ML/M2
LEFT VENTRICLE SYSTOLIC VOLUME: 29.03 ML
LEFT VENTRICULAR INTERNAL DIMENSION IN DIASTOLE: 4.79 CM (ref 3.5–6)
LEFT VENTRICULAR MASS: 169.62 G
LV LATERAL E/E' RATIO: 16.83
LV SEPTAL E/E' RATIO: 20.2
LYMPHOCYTES # BLD AUTO: 1.1 K/UL (ref 1–4.8)
LYMPHOCYTES NFR BLD: 13.6 % (ref 18–48)
MCH RBC QN AUTO: 29 PG (ref 27–31)
MCHC RBC AUTO-ENTMCNC: 31.1 G/DL (ref 32–36)
MCV RBC AUTO: 93 FL (ref 82–98)
MONOCYTES # BLD AUTO: 1 K/UL (ref 0.3–1)
MONOCYTES NFR BLD: 11.8 % (ref 4–15)
MV PEAK A VEL: 0.69 M/S
MV PEAK E VEL: 1.01 M/S
NEUTROPHILS # BLD AUTO: 5.6 K/UL (ref 1.8–7.7)
NEUTROPHILS NFR BLD: 69.4 % (ref 38–73)
NRBC BLD-RTO: 0 /100 WBC
PISA TR MAX VEL: 2.84 M/S
PLATELET # BLD AUTO: 235 K/UL (ref 150–350)
PMV BLD AUTO: 10.8 FL (ref 9.2–12.9)
POTASSIUM SERPL-SCNC: 4.3 MMOL/L (ref 3.5–5.1)
RA MAJOR: 5.14 CM
RA PRESSURE: 8 MMHG
RA WIDTH: 3.7 CM
RBC # BLD AUTO: 2.69 M/UL (ref 4–5.4)
RIGHT VENTRICULAR END-DIASTOLIC DIMENSION: 3.2 CM
RV TISSUE DOPPLER FREE WALL SYSTOLIC VELOCITY 1 (APICAL 4 CHAMBER VIEW): 9.03 M/S
SINUS: 2.86 CM
SODIUM SERPL-SCNC: 136 MMOL/L (ref 136–145)
STJ: 2.34 CM
TDI LATERAL: 0.06
TDI SEPTAL: 0.05
TDI: 0.06
TR MAX PG: 32.26 MMHG
TRICUSPID ANNULAR PLANE SYSTOLIC EXCURSION: 1.93 CM
TV REST PULMONARY ARTERY PRESSURE: 40 MMHG
WBC # BLD AUTO: 8.11 K/UL (ref 3.9–12.7)

## 2019-04-14 PROCEDURE — 20600001 HC STEP DOWN PRIVATE ROOM: Mod: HCNC

## 2019-04-14 PROCEDURE — 85025 COMPLETE CBC W/AUTO DIFF WBC: CPT | Mod: HCNC

## 2019-04-14 PROCEDURE — 80048 BASIC METABOLIC PNL TOTAL CA: CPT | Mod: HCNC

## 2019-04-14 PROCEDURE — 63600175 PHARM REV CODE 636 W HCPCS: Mod: HCNC | Performed by: INTERNAL MEDICINE

## 2019-04-14 PROCEDURE — 25000003 PHARM REV CODE 250: Mod: HCNC | Performed by: INTERNAL MEDICINE

## 2019-04-14 PROCEDURE — C9113 INJ PANTOPRAZOLE SODIUM, VIA: HCPCS | Mod: HCNC | Performed by: INTERNAL MEDICINE

## 2019-04-14 PROCEDURE — 36415 COLL VENOUS BLD VENIPUNCTURE: CPT | Mod: HCNC

## 2019-04-14 RX ADMIN — HYDRALAZINE HYDROCHLORIDE AND ISOSORBIDE DINITRATE 1 TABLET: 37.5; 2 TABLET, FILM COATED ORAL at 08:04

## 2019-04-14 RX ADMIN — PANTOPRAZOLE SODIUM 40 MG: 40 INJECTION, POWDER, FOR SOLUTION INTRAVENOUS at 08:04

## 2019-04-14 RX ADMIN — LABETALOL HYDROCHLORIDE 300 MG: 100 TABLET, FILM COATED ORAL at 08:04

## 2019-04-14 RX ADMIN — GABAPENTIN 100 MG: 100 CAPSULE ORAL at 08:04

## 2019-04-15 ENCOUNTER — PATIENT MESSAGE (OUTPATIENT)
Dept: FAMILY MEDICINE | Facility: CLINIC | Age: 84
End: 2019-04-15

## 2019-04-15 VITALS
HEART RATE: 58 BPM | HEIGHT: 64 IN | DIASTOLIC BLOOD PRESSURE: 57 MMHG | OXYGEN SATURATION: 93 % | WEIGHT: 122 LBS | BODY MASS INDEX: 20.83 KG/M2 | RESPIRATION RATE: 18 BRPM | SYSTOLIC BLOOD PRESSURE: 123 MMHG | TEMPERATURE: 98 F

## 2019-04-15 LAB
ANION GAP SERPL CALC-SCNC: 10 MMOL/L (ref 8–16)
BASOPHILS # BLD AUTO: 0.05 K/UL (ref 0–0.2)
BASOPHILS NFR BLD: 0.6 % (ref 0–1.9)
BUN SERPL-MCNC: 57 MG/DL (ref 8–23)
CALCIUM SERPL-MCNC: 8.7 MG/DL (ref 8.7–10.5)
CHLORIDE SERPL-SCNC: 100 MMOL/L (ref 95–110)
CO2 SERPL-SCNC: 27 MMOL/L (ref 23–29)
CREAT SERPL-MCNC: 1.8 MG/DL (ref 0.5–1.4)
DIFFERENTIAL METHOD: ABNORMAL
EOSINOPHIL # BLD AUTO: 0.5 K/UL (ref 0–0.5)
EOSINOPHIL NFR BLD: 5.6 % (ref 0–8)
ERYTHROCYTE [DISTWIDTH] IN BLOOD BY AUTOMATED COUNT: 15.1 % (ref 11.5–14.5)
EST. GFR  (AFRICAN AMERICAN): 28.4 ML/MIN/1.73 M^2
EST. GFR  (NON AFRICAN AMERICAN): 24.6 ML/MIN/1.73 M^2
GLUCOSE SERPL-MCNC: 94 MG/DL (ref 70–110)
HCT VFR BLD AUTO: 26.6 % (ref 37–48.5)
HGB BLD-MCNC: 8.5 G/DL (ref 12–16)
IMM GRANULOCYTES # BLD AUTO: 0.06 K/UL (ref 0–0.04)
IMM GRANULOCYTES NFR BLD AUTO: 0.8 % (ref 0–0.5)
LYMPHOCYTES # BLD AUTO: 1.3 K/UL (ref 1–4.8)
LYMPHOCYTES NFR BLD: 16.8 % (ref 18–48)
MAGNESIUM SERPL-MCNC: 2 MG/DL (ref 1.6–2.6)
MCH RBC QN AUTO: 29.1 PG (ref 27–31)
MCHC RBC AUTO-ENTMCNC: 32 G/DL (ref 32–36)
MCV RBC AUTO: 91 FL (ref 82–98)
MONOCYTES # BLD AUTO: 0.8 K/UL (ref 0.3–1)
MONOCYTES NFR BLD: 9.9 % (ref 4–15)
NEUTROPHILS # BLD AUTO: 5.3 K/UL (ref 1.8–7.7)
NEUTROPHILS NFR BLD: 66.3 % (ref 38–73)
NRBC BLD-RTO: 0 /100 WBC
PHOSPHATE SERPL-MCNC: 3.1 MG/DL (ref 2.7–4.5)
PLATELET # BLD AUTO: 245 K/UL (ref 150–350)
PMV BLD AUTO: 10.7 FL (ref 9.2–12.9)
POTASSIUM SERPL-SCNC: 4 MMOL/L (ref 3.5–5.1)
RBC # BLD AUTO: 2.92 M/UL (ref 4–5.4)
SODIUM SERPL-SCNC: 137 MMOL/L (ref 136–145)
WBC # BLD AUTO: 7.99 K/UL (ref 3.9–12.7)

## 2019-04-15 PROCEDURE — 83735 ASSAY OF MAGNESIUM: CPT | Mod: HCNC

## 2019-04-15 PROCEDURE — C9113 INJ PANTOPRAZOLE SODIUM, VIA: HCPCS | Mod: HCNC | Performed by: INTERNAL MEDICINE

## 2019-04-15 PROCEDURE — 25000003 PHARM REV CODE 250: Mod: HCNC | Performed by: INTERNAL MEDICINE

## 2019-04-15 PROCEDURE — 85025 COMPLETE CBC W/AUTO DIFF WBC: CPT | Mod: HCNC

## 2019-04-15 PROCEDURE — 99231 SBSQ HOSP IP/OBS SF/LOW 25: CPT | Mod: HCNC,,, | Performed by: INTERNAL MEDICINE

## 2019-04-15 PROCEDURE — 97165 OT EVAL LOW COMPLEX 30 MIN: CPT | Mod: HCNC

## 2019-04-15 PROCEDURE — 99239 HOSP IP/OBS DSCHRG MGMT >30: CPT | Mod: HCNC,,, | Performed by: INTERNAL MEDICINE

## 2019-04-15 PROCEDURE — 36415 COLL VENOUS BLD VENIPUNCTURE: CPT | Mod: HCNC

## 2019-04-15 PROCEDURE — 99231 PR SUBSEQUENT HOSPITAL CARE,LEVL I: ICD-10-PCS | Mod: HCNC,,, | Performed by: INTERNAL MEDICINE

## 2019-04-15 PROCEDURE — 99239 PR HOSPITAL DISCHARGE DAY,>30 MIN: ICD-10-PCS | Mod: HCNC,,, | Performed by: INTERNAL MEDICINE

## 2019-04-15 PROCEDURE — 63600175 PHARM REV CODE 636 W HCPCS: Mod: HCNC | Performed by: INTERNAL MEDICINE

## 2019-04-15 PROCEDURE — 80048 BASIC METABOLIC PNL TOTAL CA: CPT | Mod: HCNC

## 2019-04-15 PROCEDURE — 84100 ASSAY OF PHOSPHORUS: CPT | Mod: HCNC

## 2019-04-15 RX ORDER — BUMETANIDE 1 MG/1
1 TABLET ORAL DAILY
Qty: 30 TABLET | Refills: 5 | Status: SHIPPED | OUTPATIENT
Start: 2019-04-16 | End: 2019-04-24

## 2019-04-15 RX ORDER — OMEPRAZOLE 20 MG/1
20 CAPSULE, DELAYED RELEASE ORAL DAILY
Qty: 30 CAPSULE | Refills: 0 | Status: SHIPPED | OUTPATIENT
Start: 2019-04-15 | End: 2019-05-15

## 2019-04-15 RX ORDER — ISOSORBIDE DINITRATE AND HYDRALAZINE HYDROCHLORIDE 37.5; 2 MG/1; MG/1
1 TABLET ORAL 2 TIMES DAILY
Qty: 60 TABLET | Refills: 5 | Status: SHIPPED | OUTPATIENT
Start: 2019-04-15 | End: 2019-05-03 | Stop reason: SDUPTHER

## 2019-04-15 RX ORDER — OMEPRAZOLE 20 MG/1
20 CAPSULE, DELAYED RELEASE ORAL DAILY
Qty: 30 CAPSULE | Refills: 11 | Status: SHIPPED | OUTPATIENT
Start: 2019-04-15 | End: 2019-04-15 | Stop reason: SDUPTHER

## 2019-04-15 RX ADMIN — LABETALOL HYDROCHLORIDE 300 MG: 100 TABLET, FILM COATED ORAL at 09:04

## 2019-04-15 RX ADMIN — ATORVASTATIN CALCIUM 20 MG: 20 TABLET, FILM COATED ORAL at 09:04

## 2019-04-15 RX ADMIN — PANTOPRAZOLE SODIUM 40 MG: 40 INJECTION, POWDER, FOR SOLUTION INTRAVENOUS at 09:04

## 2019-04-15 RX ADMIN — AMLODIPINE BESYLATE 10 MG: 10 TABLET ORAL at 09:04

## 2019-04-15 NOTE — SUBJECTIVE & OBJECTIVE
Interval History: No acute events overnight. Patient sitting in chair, comfortable on room air. No complaints at this time. Urinating well on bumex.    Review of Systems   Constitution: Negative for chills, fever and malaise/fatigue.   HENT: Negative.    Cardiovascular: Positive for leg swelling. Negative for chest pain, dyspnea on exertion, irregular heartbeat, orthopnea, palpitations and paroxysmal nocturnal dyspnea.   Respiratory: Positive for shortness of breath. Negative for wheezing.    Skin: Negative for color change and rash.   Musculoskeletal: Negative for arthritis, back pain and myalgias.   Gastrointestinal: Negative for abdominal pain, constipation, diarrhea and nausea.   Neurological: Negative for dizziness, numbness and paresthesias.   Psychiatric/Behavioral: Negative.      Objective:     Vital Signs (Most Recent):  Temp: 98.2 °F (36.8 °C) (04/15/19 0407)  Pulse: 63 (04/15/19 0700)  Resp: 16 (04/15/19 0407)  BP: (!) 150/67 (04/15/19 0407)  SpO2: (!) 92 % (04/15/19 0407) Vital Signs (24h Range):  Temp:  [98.1 °F (36.7 °C)-98.6 °F (37 °C)] 98.2 °F (36.8 °C)  Pulse:  [63-72] 63  Resp:  [16-17] 16  SpO2:  [92 %-95 %] 92 %  BP: (118-159)/(57-68) 150/67     Weight: 55.3 kg (122 lb)  Body mass index is 20.94 kg/m².     SpO2: (!) 92 %  O2 Device (Oxygen Therapy): room air      Intake/Output Summary (Last 24 hours) at 4/15/2019 0858  Last data filed at 4/15/2019 0600  Gross per 24 hour   Intake --   Output 1250 ml   Net -1250 ml       Lines/Drains/Airways     Peripheral Intravenous Line                 Peripheral IV - Single Lumen 04/11/19 1141 18 G Left Antecubital 3 days                Physical Exam   Constitutional: Vital signs are normal. She appears well-developed and well-nourished. She is cooperative.  Non-toxic appearance. No distress.   HENT:   Head: Normocephalic and atraumatic.   Neck: No hepatojugular reflux and no JVD present. Carotid bruit is not present.   Cardiovascular: Normal rate, regular  rhythm, S1 normal and S2 normal. Exam reveals no gallop.   Murmur heard.   Harsh midsystolic murmur is present with a grade of 2/6 at the upper right sternal border radiating to the neck.  Pulses:       Radial pulses are 2+ on the right side, and 2+ on the left side.        Dorsalis pedis pulses are 2+ on the right side, and 2+ on the left side.        Posterior tibial pulses are 2+ on the right side, and 2+ on the left side.   No lower extremity edema   Pulmonary/Chest: Effort normal. No respiratory distress. She has no decreased breath sounds. She has no wheezes. She has no rhonchi. She has no rales.   Abdominal: Soft. Normal appearance and bowel sounds are normal. She exhibits no distension and no mass. There is no tenderness.   Neurological: She is alert.   Skin: Skin is warm, dry and intact.       Significant Labs:   CMP   Recent Labs   Lab 04/14/19  1052 04/15/19  0646    137   K 4.3 4.0    100   CO2 29 27    94   BUN 58* 57*   CREATININE 1.8* 1.8*   CALCIUM 8.6* 8.7   ANIONGAP 7* 10   ESTGFRAFRICA 28.4* 28.4*   EGFRNONAA 24.6* 24.6*   , CBC   Recent Labs   Lab 04/14/19  1052 04/15/19  0646   WBC 8.11 7.99   HGB 7.8* 8.5*   HCT 25.1* 26.6*    245    and All pertinent lab results from the last 24 hours have been reviewed.    Significant Imaging: Echocardiogram:   Transthoracic echo (TTE) complete (Cupid Only):   Results for orders placed or performed during the hospital encounter of 04/11/19   Transthoracic echo (TTE) complete (Cupid Only)   Result Value Ref Range    Ascending aorta 3.29 cm    STJ 2.34 cm    AV mean gradient 7.28 mmHg    Ao peak babar 1.75 m/s    Ao VTI 45.59 cm    IVRT 0.10 msec    IVS 1.07 0.6 - 1.1 cm    LA size 3.98 cm    Left Atrium Major Axis 6.16 cm    Left Atrium Minor Axis 6.19 cm    LVIDD 4.79 3.5 - 6.0 cm    LVIDS 2.78 2.1 - 4.0 cm    LVOT diameter 1.99 cm    LVOT peak VTI 29.78 cm    PW 0.93 0.6 - 1.1 cm    MV Peak A Babar 0.69 m/s    E wave decelartion time  140.79 msec    MV Peak E Babar 1.01 m/s    RA Major Axis 5.14 cm    RA Width 3.70 cm    RVDD 3.20 cm    Sinus 2.86 cm    TAPSE 1.93 cm    TR Max Babar 2.84 m/s    TDI LATERAL 0.06     TDI SEPTAL 0.05     LA WIDTH 4.24 cm    LV Diastolic Volume 106.94 mL    LV Systolic Volume 29.03 mL    RV S' 9.03 m/s    LVOT peak babar [id number] m/s    LV LATERAL E/E' RATIO 16.83     LV SEPTAL E/E' RATIO 20.20     FS 42 %    LA volume 88.57 cm3    LV mass 169.62 g    Left Ventricle Relative Wall Thickness 0.39 cm    AV valve area 2.03 cm2    AV Velocity Ratio 0.65     AV index (prosthetic) 0.65     E/A ratio 1.46     Mean e' 0.06     LVOT area 3.11 cm2    LVOT stroke volume 92.58 cm3    AV peak gradient 12.25 mmHg    E/E' ratio 18.36     LV Systolic Volume Index 18.3 mL/m2    LV Diastolic Volume Index 67.45 mL/m2    LA Volume Index 55.9 mL/m2    LV Mass Index 107.0 g/m2    Triscuspid Valve Regurgitation Peak Gradient 32.26 mmHg    BSA 1.58 m2    Right Atrial Pressure (from IVC) 8 mmHg    TV rest pulmonary artery pressure 40 mmHg

## 2019-04-15 NOTE — MEDICAL/APP STUDENT
Discharge Summary  Hospital Medicine    Attending Provider on Discharge: Jazmyn Mathews MD  [facility] Medicine Team: [facility]OSP MED D  Date of Admission:  4/11/2019     Date of Discharge:  4/15/2019  Code status: DNR (Do Not Resuscitate)    Active Hospital Problems    Diagnosis  POA    *Symptomatic anemia [D64.9]  Yes    Hyperkalemia [E87.5]  Yes    CKD (chronic kidney disease) stage 3, GFR 30-59 ml/min [N18.3]  Yes    Acute deep vein thrombosis (DVT) of left peroneal vein [I82.492]  Yes    DVT (deep venous thrombosis) [I82.409]  Yes    Essential hypertension [I10]  Yes    Hypothyroidism (acquired) [E03.9]  Yes    Polyneuropathy in other diseases classified elsewhere [G63]  Yes     6-      Pericardial effusion [I31.3]  Yes     Chronic    Acute on chronic diastolic (congestive) heart failure [I50.33]  Yes    Hyperlipidemia [E78.5]  Yes     Chronic      Resolved Hospital Problems   No resolved problems to display.        HPI  89 yo female with a history of CAD, diastolic heart failure, HTN, HLD, hypothyroidism and spinal stenosis who presents after referral from PCP for anemia.  The patient has been having increased weakness with leg swelling and dyspnea. Also associated with weight gain. Worsening for three weeks. Recently hospitalized for an acute on chronic CHF exacerbation (1/2019). HCTZ was discontinued for hyponatremia, but restarted by her outpt cardiologist for fluid overload. Her cardiologist and PCP also instructed her to double her home lasix 20 mg prn for significant weight gain. Pt has increased in weight from 119 lbs in January to 131 lbs. Took 40 mg of lasix last night without noticeable increased UOP. No chest pain, + chronic orthopnea ( has mechanical bed) without PND.  Of note the patient was diagnosed with a pericardial effusion without evidence of tamponade when she was admitted in January for her CHF exacerbation.  She declined a pericardiocentesis.  In the emergency room the patient's  blood pressure was initially 129/64 but gradually increased to a systolic of 200 especially after her blood transfusion was completed.  She has noted her blood pressure to be very variable home.  It had been low yesterday and she was instructed to hold all of her blood pressure medicines including  Amlodipine, losartan and hydrochlorothiazide but did take her labetalol this morning.  She did receive her amlodipine losartan and hydrochlorothiazide in the emergency room with minimal change her blood pressure.  Hydralazine 25 mg orally was also given.     She has been compliant with apixaban since January of 2019 when she was diagnosed with a DVT to the left peroneal vein.  She denies having melena or bright red blood per rectum at home.  In the emergency room: Hemoccult positive and rectal exam revealed dark stool.  She was transfused 1 unit packed RBCs for hemoglobin of 6.9.  She was also started on pantoprazole IV.    Hospital Course  87 yo female with a history of CAD, diastolic HF, HLD,and hypothyroidism admitted with symptomatic anemia, heme (+) stool on chronic anticoagulation and CHF exacerbation with uncontrolled HTN. Hgb 6.9, responded to 1 U PRBC. Pt on apixaban for a peroneal DVT, held. Demonstrated heme positive stool. IV pantoprazole started. Originally planned for an EGD, but GI felt GI bleed unlikely given patient's stability with no signs of overt GI bleeding and family not keen on the procedure. At admit pt had poorly controlled BP and an elevated BNP. Lasix started. Pt had an elevated Cr, likely cardiorenal. Continued amlodipine, held losartan, and started bidil. Cardiology noted elevated BP may have been due to PRBC infusion. Diuresis continued. A repeat echo showed no changes in a previously known pericardial effusion. She has refused pericardiocentesis in the past. Apixaban has been permanently discontinued given a completion of 3 months of use and a repeat US showing resolution of the thrombus.  Switched to PO bumetanide with good response. During this hospitalization, pt opted for code status change to be DNR.  Patient with improved dyspnea and appears euvolemic on discharge.    Acute on chronic diastolic heart failure  Pericardial effusion  Essential hypertension  · Echo (1/21/2019): EF 65%, indeterminate diastolic function, severe LAE, moderated pericardial effusion, no evidence of tamponade.   · Continue lasix at 40 mg BID after 80 mg IV given in ED.  · Patient has been stopped on HCTZ in the past due to hyponatremia  · Pericardial effusion likely persistent as evidenced by cardiac silhouette on CXR; Echo ordered  · Cardiac diet  · Monitoring with telemetry, daily weights and I/O's  · Continue therapy with amlodipine (consider nifedipine if ineffective and worsened LE edema), hold losartan currently due to renal and start bidil; hydralazine prn SBP > 180; check orthostatics  · Cardiology consulted for due to difficult home control HTN/CHF and pericardial effusion  · Cardiology notes elevated BP after PRBC, recs only hydralazine prn until sufficient diuresis to address hypervolemia  · Monitor troponins due to intermittent chest pain at home and T-wave abnormality on admit EKG  · Cards recs increasing lasix to 80 IV BID, Cr stable, continue lasix 40 IV BID  · BP improved with diuresis, bidil reduced from TID to BID, goal -160 (4/12)  · Started bumetanide 1 mg PO, IV diuresis stopped (4/13)  · Echo showed similar results as from 1/19, as well as grade 2 diastolic dysfunction and persistence of a moderate pericardial effusion (4/14)     Chronic kidney disease stage 3  Hypokalemia  · Estimated Creatinine Clearance: 19.4 mL/min (A) (based on SCr of 1.7 mg/dL (H)).  · Cr increased from baseline of 1.4 previously; probable cardiorenal syndrome  · Monitor, avoid nephrotoxins and renally adjust meds  · Monitor K+; held magnesium supplement on admit as elevated  · Replete K+ for goal of  4     Hypothyroidism  · Continue home levothyroxine     Normocytic anemia  Heme + stool  · Baseline Hgb 8-9, 6.9 on admit and back to baseline after 1 u PRBC  · Stool heme + on chronic anticoagulation; GI consulted  · + hx of PUD in the past but no longer on GI meds and denies OTC meds; + hx of EGD but not C-scope  · Continue protonix IV  · Hold apixaban and resume if no signs of worsened bleeding  · Ferritin wnl; patient reluctant to take iron supplement due to constipation  · No signs of GI bleed, family not keen on EGD, EGD cancelled (4/15)     Left peroneal vein DVT  · Diagnosed on January 24, 2019 and patient has been taking apixaban therapy  · Hold apixaban currently to ensure no active bleeding or worsening of anemia  · Per cards, completed 3 months of apixaban, re-evaluate need for anticoagulation  · Repeat US LE shows no DVT (4/12)     Hyperlipidemia  · Continue atorvastatin therapy daily     Peripheral neuropathy    · chronic and stable  · Continue gabapentin at home dose        Recent Labs   Lab 04/13/19  0345 04/14/19  1052 04/15/19  0646   WBC 6.87 8.11 7.99   HGB 7.9* 7.8* 8.5*   HCT 24.3* 25.1* 26.6*    235 245     Recent Labs   Lab 04/12/19  0455 04/13/19  0345 04/14/19  1052 04/15/19  0646   * 135* 136 137   K 3.6 3.4* 4.3 4.0   CL 97 97 100 100   CO2 26 29 29 27   BUN 78* 72* 58* 57*   CREATININE 1.7* 1.9* 1.8* 1.8*   GLU 81 86 103 94   CALCIUM 8.8 8.9 8.6* 8.7   MG 2.5 2.4  --  2.0   PHOS 5.0* 4.2  --  3.1     Recent Labs   Lab 04/11/19  1142   ALKPHOS 122   ALT 35   AST 34   ALBUMIN 2.4*   PROT 5.2*   BILITOT 0.5            Procedures: none    Consultants:  Consults (From admission, onward)        Status Ordering Provider     Inpatient consult to Cardiology  Once     Provider:  (Not yet assigned)    DANIELA Dueñas     Inpatient consult to Gastroenterology  Once     Provider:  (Not yet assigned)    Completed DANIELA NICOLE          Discharge Medication List as of 4/15/2019  12:42 PM      START taking these medications    Details   bumetanide (BUMEX) 1 MG tablet Take 1 tablet (1 mg total) by mouth once daily., Starting Tue 4/16/2019, Until Wed 4/15/2020, Normal      isosorbide-hydrALAZINE 20-37.5 mg (BIDIL) 20-37.5 mg Tab Take 1 tablet by mouth 2 (two) times daily., Starting Mon 4/15/2019, Until Tue 4/14/2020, Normal         CONTINUE these medications which have CHANGED    Details   omeprazole (PRILOSEC) 20 MG capsule Take 1 capsule (20 mg total) by mouth once daily., Starting Mon 4/15/2019, Until Wed 5/15/2019, Normal         CONTINUE these medications which have NOT CHANGED    Details   acetaminophen (TYLENOL ARTHRITIS) 650 MG TbSR Take 650 mg by mouth nightly as needed (pain). , Historical Med      albuterol (PROVENTIL) 2.5 mg /3 mL (0.083 %) nebulizer solution Take 3 mLs (2.5 mg total) by nebulization every 4 (four) hours as needed for Wheezing or Shortness of Breath (chest tightness)., Starting Mon 1/7/2019, Normal      amLODIPine (NORVASC) 10 MG tablet TAKE 1 TABLET EVERY DAY, Normal      atorvastatin (LIPITOR) 20 MG tablet TAKE 1 TABLET EVERY DAY, Normal      calcium carbonate/vitamin D3 (CALTRATE 600 + D ORAL) Take by mouth once daily., Historical Med      chlorpheniramine maleate (CHLOR-TRIMETON REPETABS ORAL) Take 1 tablet by mouth daily as needed (hayfever). , Historical Med      FLAXSEED-OMEGA3,6,9-FATTY ACID ORAL Take 1 capsule by mouth once daily.  , Until Discontinued, Historical Med      gabapentin (NEURONTIN) 100 MG capsule Take 2 capsules (200 mg total) by mouth 2 (two) times daily., Starting Mon 1/14/2019, Until Tue 1/14/2020, Normal      labetalol (NORMODYNE) 300 MG tablet Take 1 tablet (300 mg total) by mouth 2 (two) times daily., Starting Mon 1/14/2019, Until Tue 1/14/2020, Normal      levothyroxine (SYNTHROID) 75 MCG tablet TAKE 1 TABLET EVERY DAY, Normal      magnesium 250 mg Tab Take 1 tablet by mouth once daily.  , Until Discontinued, Historical Med       OPTICHAMBER DARREN LG MASK Spcr U UTD, Historical Med      traMADol (ULTRAM) 50 mg tablet Take 1 tablet (50 mg total) by mouth every 12 (twelve) hours as needed for Pain., Starting Mon 1/14/2019, Print      triamcinolone acetonide 0.1% (KENALOG) 0.1 % cream APPLY TO THE AFFECTED AREA OF lower legs TWICE DAILY, Normal         STOP taking these medications       apixaban (ELIQUIS) 5 mg (74 tabs) DsPk Comments:   Reason for Stopping:         furosemide (LASIX) 20 MG tablet Comments:   Reason for Stopping:         hydroCHLOROthiazide (HYDRODIURIL) 25 MG tablet Comments:   Reason for Stopping:         losartan (COZAAR) 100 MG tablet Comments:   Reason for Stopping:               Discharge Diet:cardiac diet, fluid restriction: 1500 mL and 2 gram sodium diet     Activity: activity as tolerated    Discharge Condition: Good    Disposition: Home-Health Care Svc    Tests pending at the time of discharge: none      Time spent  on the discharge of the patient including review of hospital course with the patient. reviewing discharge medications and arranging follow-up care 35 minutes    Discharge examination Patient was seen and examined on the date of discharge and determined to be suitable for discharge.    Discharge plan and follow up:  PCP    Future Appointments   Date Time Provider Department Center   5/1/2019  2:20 PM Jairo Schmidt MD [facility]       Provider  Jazmyn Mathews MD  Staff Hospitalist   [facility] [id number]      I personally scribed for Jazmyn Mathews MD on 04/15/2019 at 2:48 PM. Electronically signed by nick Juares on 04/15/2019 at 2:48 PM

## 2019-04-15 NOTE — PLAN OF CARE
POC reviewed with pt and family, stated understanding, AOX4, VSS, AMB to BR to void, X1 with walker. Adequate voids, last BM charted.  Denies any pain at this time. PT maintained NPO for possible EGD.  NO adverse events this shift, bed low, call light in reach, will cont to manage POC.

## 2019-04-15 NOTE — SIGNIFICANT EVENT
Note: Epic and backup systems were offline on 4/14/2019. Summary of clinical events below:    Patient denies dyspnea. No signs of GI bleed.  AF, VSS.  Exam unchanged.    Recent Results (from the past 24 hour(s))   Basic metabolic panel    Collection Time: 04/14/19 10:52 AM   Result Value Ref Range    Sodium 136 136 - 145 mmol/L    Potassium 4.3 3.5 - 5.1 mmol/L    Chloride 100 95 - 110 mmol/L    CO2 29 23 - 29 mmol/L    Glucose 103 70 - 110 mg/dL    BUN, Bld 58 (H) 8 - 23 mg/dL    Creatinine 1.8 (H) 0.5 - 1.4 mg/dL    Calcium 8.6 (L) 8.7 - 10.5 mg/dL    Anion Gap 7 (L) 8 - 16 mmol/L    eGFR if African American 28.4 (A) >60 mL/min/1.73 m^2    eGFR if non  24.6 (A) >60 mL/min/1.73 m^2   CBC auto differential    Collection Time: 04/14/19 10:52 AM   Result Value Ref Range    WBC 8.11 3.90 - 12.70 K/uL    RBC 2.69 (L) 4.00 - 5.40 M/uL    Hemoglobin 7.8 (L) 12.0 - 16.0 g/dL    Hematocrit 25.1 (L) 37.0 - 48.5 %    MCV 93 82 - 98 fL    MCH 29.0 27.0 - 31.0 pg    MCHC 31.1 (L) 32.0 - 36.0 g/dL    RDW 14.9 (H) 11.5 - 14.5 %    Platelets 235 150 - 350 K/uL    MPV 10.8 9.2 - 12.9 fL    Immature Granulocytes 0.2 0.0 - 0.5 %    Gran # (ANC) 5.6 1.8 - 7.7 K/uL    Immature Grans (Abs) 0.02 0.00 - 0.04 K/uL    Lymph # 1.1 1.0 - 4.8 K/uL    Mono # 1.0 0.3 - 1.0 K/uL    Eos # 0.4 0.0 - 0.5 K/uL    Baso # 0.03 0.00 - 0.20 K/uL    nRBC 0 0 /100 WBC    Gran% 69.4 38.0 - 73.0 %    Lymph% 13.6 (L) 18.0 - 48.0 %    Mono% 11.8 4.0 - 15.0 %    Eosinophil% 4.6 0.0 - 8.0 %    Basophil% 0.4 0.0 - 1.9 %    Differential Method Automated      4/14/2019 Echo  · Normal left ventricular systolic function. The estimated ejection fraction is 60%  · Eccentric left ventricular hypertrophy.  · Grade II (moderate) left ventricular diastolic dysfunction consistent with pseudonormalization.  · Elevated left atrial pressure.  · No wall motion abnormalities.  · Severe left atrial enlargement.  · Mild right atrial enlargement.  · Mild aortic  valve stenosis.  · Aortic valve area is 2.03 cm2; peak velocity is 1.75 m/s; mean gradient is 7.28 mmHg.  · Mild aortic regurgitation.  · Mild mitral regurgitation.  · Intermediate central venous pressure (8 mm Hg).  · The estimated PA systolic pressure is 40 mm Hg  · Moderate pericardial effusion. There was a moderate -large posterior and moderate anterior amd apical effusion-there was no place to tap this subcostally and there may be an apical lateral space but this would not be simple.  · There is a small left pleural effusion.     Active Hospital Problems    Diagnosis  POA    *Symptomatic anemia [D64.9]  Yes    Hyperkalemia [E87.5]  Yes    CKD (chronic kidney disease) stage 3, GFR 30-59 ml/min [N18.3]  Yes    Acute deep vein thrombosis (DVT) of left peroneal vein [I82.492]  Yes    DVT (deep venous thrombosis) [I82.409]  Yes    Essential hypertension [I10]  Yes    Hypothyroidism (acquired) [E03.9]  Yes    Polyneuropathy in other diseases classified elsewhere [G63]  Yes     6-      Pericardial effusion [I31.3]  Yes     Chronic    Acute on chronic diastolic (congestive) heart failure [I50.33]  Unknown    Hyperlipidemia [E78.5]  Yes     Chronic      Resolved Hospital Problems   No resolved problems to display.     Plan:  NPO for EGD on 4/15.  Monitor H/H.  Monitor electrolytes with bumex therapy.  PT/OT evaluation.  Goal of -160 due to frequent symptomatic hypotension.

## 2019-04-15 NOTE — ASSESSMENT & PLAN NOTE
Patient completed 3 months of eliquis. Eliquis held due to possible GIB. No sign of DVT on LE doppler ultrasound on 4/12/19    - Recommend discontinuing eliquis if not otherwise indicated

## 2019-04-15 NOTE — NURSING
Plan of care reviewed with patient and family at bedside, whom verbalized understanding, all questions answered. Pt tolerated cardiac diet well with no incidences of nausea or vomiting. IV d/c per discharged, patient tolerated well. No incidences of falls or injury on this shift. Patient left with family and transport by wheelchair.

## 2019-04-15 NOTE — PLAN OF CARE
04/15/19 1119   Post-Acute Status   Post-Acute Authorization Home Health/Hospice   Home Health/Hospice Status Referrals Sent     Sw informed Pt to d/c today, wants to use [facility], referral sent. Sw to wait acceptance, Pt accepted and will be seen by [facility] [place] in the am.

## 2019-04-15 NOTE — PLAN OF CARE
[facility] HOME HEALTH ORDERS  FACE TO FACE ENCOUNTER    Patient Name: Kristin Quintanilla  YOB: 1930    PCP: Jairo Schmidt MD   PCP Address: [address] / [address]  PCP Phone Number: [phone]  PCP Fax: [phone]    Discharging Team(s):    [facility] HOSP MED D  [facility] GASTROENTEROLOGY  [facility] CARDIOLOGY TEAM C - FELLOWS/CONSULTS    Encounter Date: 04/15/2019    Admit to Home Health    Diagnoses:  Active Hospital Problems    Diagnosis  POA    *Symptomatic anemia [D64.9]  Yes    Hyperkalemia [E87.5]  Yes    CKD (chronic kidney disease) stage 3, GFR 30-59 ml/min [N18.3]  Yes    Acute deep vein thrombosis (DVT) of left peroneal vein [I82.492]  Yes    DVT (deep venous thrombosis) [I82.409]  Yes    Essential hypertension [I10]  Yes    Hypothyroidism (acquired) [E03.9]  Yes    Polyneuropathy in other diseases classified elsewhere [G63]  Yes     6-      Pericardial effusion [I31.3]  Yes     Chronic    Acute on chronic diastolic (congestive) heart failure [I50.33]  Unknown    Hyperlipidemia [E78.5]  Yes     Chronic      Resolved Hospital Problems   No resolved problems to display.       No future appointments.  Follow-up Information     [facility] In 1 day.    Specialty:  Home Health Services  Contact information:  [address] [id number] [phone]                     I have seen and examined this patient face to face today. My clinical findings that support the need for the home health skilled services and home bound status are the following:  Weakness/numbness causing balance and gait disturbance due to Heart Failure and Anemia making it taxing to leave home.    Allergies:  Review of patient's allergies indicates:   Allergen Reactions    Codeine     Darvocet a500 [propoxyphene n-acetaminophen] Nausea Only    Sulfa (sulfonamide antibiotics) Rash    Fosamax [alendronate]      Gastric ulcer       Diet: cardiac  diet    Activities: activity as tolerated    Nursing:   SN to complete comprehensive assessment including routine vital signs. Instruct on disease process and s/s of complications to report to MD. Review/verify medication list sent home with the patient at time of discharge  and instruct patient/caregiver as needed. Frequency may be adjusted depending on start of care date.    Notify MD if SBP > 160 or < 90; DBP > 90 or < 50; HR > 120 or < 50; Temp > 101    Patient may hold amlodipine if SBP < 140.       CONSULTS:    Physical Therapy to evaluate and treat. Evaluate for home safety and equipment needs; Establish/upgrade home exercise program. Perform / instruct on therapeutic exercises, gait training, transfer training, and Range of Motion.  Occupational Therapy to evaluate and treat. Evaluate home environment for safety and equipment needs. Perform/Instruct on transfers, ADL training, ROM, and therapeutic exercises.    MISCELLANEOUS CARE:  Heart Failure:      SN to instruct on the following:    Instruct on the definition of CHF.   Instruct on the signs/sympoms of CHF to be reported.   Instruct on and monitor daily weights.   Instruct on factors that cause exacerbation.   Instruct on action, dose, schedule, and side effects of medications.   Instruct on diet as prescribed.   Instruct on activity allowed.   Instruct on life-style modifications for life long management of CHF   SN to assess compliance with daily weights, diet, medications, fluid retention,    safety precautions, activities permitted and life-style modifications.   Additional 1-2 SN visits per week as needed for signs and symptoms     of CHF exacerbation.      For Weight Gain > 2-3 lbs in 1 day or 4-6 lbs over 1 week notify PCP:  Obtain BMP lab test on 4/22/2019 and call results to PCP.          Medications: Review discharge medications with patient and family and provide education.      Current Discharge Medication List      START taking these  medications    Details   bumetanide (BUMEX) 1 MG tablet Take 1 tablet (1 mg total) by mouth once daily.  Qty: 30 tablet, Refills: 5      isosorbide-hydrALAZINE 20-37.5 mg (BIDIL) 20-37.5 mg Tab Take 1 tablet by mouth 2 (two) times daily.  Qty: 60 tablet, Refills: 5      omeprazole (PRILOSEC) 20 MG capsule Take 1 capsule (20 mg total) by mouth once daily.  Qty: 30 capsule, Refills: 11         CONTINUE these medications which have NOT CHANGED    Details   acetaminophen (TYLENOL ARTHRITIS) 650 MG TbSR Take 650 mg by mouth nightly as needed (pain).       albuterol (PROVENTIL) 2.5 mg /3 mL (0.083 %) nebulizer solution Take 3 mLs (2.5 mg total) by nebulization every 4 (four) hours as needed for Wheezing or Shortness of Breath (chest tightness).  Qty: 1 Box, Refills: 6    Associated Diagnoses: Acute bronchitis, unspecified organism      amLODIPine (NORVASC) 10 MG tablet TAKE 1 TABLET EVERY DAY  Qty: 90 tablet, Refills: 3      atorvastatin (LIPITOR) 20 MG tablet TAKE 1 TABLET EVERY DAY  Qty: 90 tablet, Refills: 3    Associated Diagnoses: Essential hypertension      calcium carbonate/vitamin D3 (CALTRATE 600 + D ORAL) Take by mouth once daily.      FLAXSEED-OMEGA3,6,9-FATTY ACID ORAL Take 1 capsule by mouth once daily.        gabapentin (NEURONTIN) 100 MG capsule Take 2 capsules (200 mg total) by mouth 2 (two) times daily.  Qty: 120 capsule, Refills: 11      labetalol (NORMODYNE) 300 MG tablet Take 1 tablet (300 mg total) by mouth 2 (two) times daily.  Qty: 180 tablet, Refills: 3      levothyroxine (SYNTHROID) 75 MCG tablet TAKE 1 TABLET EVERY DAY  Qty: 90 tablet, Refills: 12    Associated Diagnoses: Acquired hypothyroidism      magnesium 250 mg Tab Take 1 tablet by mouth once daily.        triamcinolone acetonide 0.1% (KENALOG) 0.1 % cream APPLY TO THE AFFECTED AREA OF lower legs TWICE DAILY  Qty: 453.6 g, Refills: 1    Associated Diagnoses: Asteatotic eczema      chlorpheniramine maleate (CHLOR-TRIMETON REPETABS ORAL) Take  1 tablet by mouth daily as needed (hayfever).       OPTICHAMBER DARREN LG MASK Spcr U UTD  Refills: 0      traMADol (ULTRAM) 50 mg tablet Take 1 tablet (50 mg total) by mouth every 12 (twelve) hours as needed for Pain.  Qty: 15 tablet, Refills: 0         STOP taking these medications       apixaban (ELIQUIS) 5 mg (74 tabs) DsPk Comments:   Reason for Stopping:         furosemide (LASIX) 20 MG tablet Comments:   Reason for Stopping:         hydroCHLOROthiazide (HYDRODIURIL) 25 MG tablet Comments:   Reason for Stopping:         losartan (COZAAR) 100 MG tablet Comments:   Reason for Stopping:               I certify that this patient is confined to her home and needs intermittent skilled nursing care, physical therapy and occupational therapy.

## 2019-04-15 NOTE — PLAN OF CARE
Problem: Occupational Therapy Goal  Goal: Occupational Therapy Goal  Outcome: Outcome(s) achieved Date Met: 04/15/19  Eval completed, no acute OT needs    Comments: D/C OT 4/15/2019    Maryjane Crespo OT  4/15/2019

## 2019-04-15 NOTE — TREATMENT PLAN
GI Treatment Plan    Patient's hemoglobin has been stable and she has been having brown stools. Family is not keen on having an EGD done and given patient's stability with no signs of overt GI bleeding, we will hold off on EGD.    If patient needs to be on Eliquis for her DVT (which has resolved on most recent imaging), then recommend restarting, monitoring overnight, and if stable possible d/c tomorrow.    Will defer need for Eliquis to primary team.    Case discussed with staff.    Jass Queen MD PGY-V  Gastroenterology Fellow  [facility]  P [phone]

## 2019-04-15 NOTE — PROGRESS NOTES
[facility]  Cardiology  Progress Note    Patient Name: Kristin Quintanilla  MRN: [id number]  Admission Date: 4/11/2019  Hospital Length of Stay: 4 days  Code Status: DNR   Attending Physician: Jazmyn Mathews MD   Primary Care Physician: Jairo Schmidt MD  Expected Discharge Date: 4/15/2019  Principal Problem:Symptomatic anemia    Subjective:     Hospital Course:   No notes on file    Interval History: No acute events overnight. Patient sitting in chair, comfortable on room air. No complaints at this time. Urinating well on bumex.    Review of Systems   Constitution: Negative for chills, fever and malaise/fatigue.   HENT: Negative.    Cardiovascular: Positive for leg swelling. Negative for chest pain, dyspnea on exertion, irregular heartbeat, orthopnea, palpitations and paroxysmal nocturnal dyspnea.   Respiratory: Positive for shortness of breath. Negative for wheezing.    Skin: Negative for color change and rash.   Musculoskeletal: Negative for arthritis, back pain and myalgias.   Gastrointestinal: Negative for abdominal pain, constipation, diarrhea and nausea.   Neurological: Negative for dizziness, numbness and paresthesias.   Psychiatric/Behavioral: Negative.      Objective:     Vital Signs (Most Recent):  Temp: 98.2 °F (36.8 °C) (04/15/19 0407)  Pulse: 63 (04/15/19 0700)  Resp: 16 (04/15/19 0407)  BP: (!) 150/67 (04/15/19 0407)  SpO2: (!) 92 % (04/15/19 0407) Vital Signs (24h Range):  Temp:  [98.1 °F (36.7 °C)-98.6 °F (37 °C)] 98.2 °F (36.8 °C)  Pulse:  [63-72] 63  Resp:  [16-17] 16  SpO2:  [92 %-95 %] 92 %  BP: (118-159)/(57-68) 150/67     Weight: 55.3 kg (122 lb)  Body mass index is 20.94 kg/m².     SpO2: (!) 92 %  O2 Device (Oxygen Therapy): room air      Intake/Output Summary (Last 24 hours) at 4/15/2019 0858  Last data filed at 4/15/2019 0600  Gross per 24 hour   Intake --   Output 1250 ml   Net -1250 ml       Lines/Drains/Airways     Peripheral Intravenous Line                 Peripheral  IV - Single Lumen 04/11/19 1141 18 G Left Antecubital 3 days                Physical Exam   Constitutional: Vital signs are normal. She appears well-developed and well-nourished. She is cooperative.  Non-toxic appearance. No distress.   HENT:   Head: Normocephalic and atraumatic.   Neck: No hepatojugular reflux and no JVD present. Carotid bruit is not present.   Cardiovascular: Normal rate, regular rhythm, S1 normal and S2 normal. Exam reveals no gallop.   Murmur heard.   Harsh midsystolic murmur is present with a grade of 2/6 at the upper right sternal border radiating to the neck.  Pulses:       Radial pulses are 2+ on the right side, and 2+ on the left side.        Dorsalis pedis pulses are 2+ on the right side, and 2+ on the left side.        Posterior tibial pulses are 2+ on the right side, and 2+ on the left side.   No lower extremity edema   Pulmonary/Chest: Effort normal. No respiratory distress. She has no decreased breath sounds. She has no wheezes. She has no rhonchi. She has no rales.   Abdominal: Soft. Normal appearance and bowel sounds are normal. She exhibits no distension and no mass. There is no tenderness.   Neurological: She is alert.   Skin: Skin is warm, dry and intact.       Significant Labs:   CMP   Recent Labs   Lab 04/14/19  1052 04/15/19  0646    137   K 4.3 4.0    100   CO2 29 27    94   BUN 58* 57*   CREATININE 1.8* 1.8*   CALCIUM 8.6* 8.7   ANIONGAP 7* 10   ESTGFRAFRICA 28.4* 28.4*   EGFRNONAA 24.6* 24.6*   , CBC   Recent Labs   Lab 04/14/19  1052 04/15/19  0646   WBC 8.11 7.99   HGB 7.8* 8.5*   HCT 25.1* 26.6*    245    and All pertinent lab results from the last 24 hours have been reviewed.    Significant Imaging: Echocardiogram:   Transthoracic echo (TTE) complete (Cupid Only):   Results for orders placed or performed during the hospital encounter of 04/11/19   Transthoracic echo (TTE) complete (Cupid Only)   Result Value Ref Range    Ascending aorta 3.29 cm     STJ 2.34 cm    AV mean gradient 7.28 mmHg    Ao peak babar 1.75 m/s    Ao VTI 45.59 cm    IVRT 0.10 msec    IVS 1.07 0.6 - 1.1 cm    LA size 3.98 cm    Left Atrium Major Axis 6.16 cm    Left Atrium Minor Axis 6.19 cm    LVIDD 4.79 3.5 - 6.0 cm    LVIDS 2.78 2.1 - 4.0 cm    LVOT diameter 1.99 cm    LVOT peak VTI 29.78 cm    PW 0.93 0.6 - 1.1 cm    MV Peak A Babar 0.69 m/s    E wave decelartion time 140.79 msec    MV Peak E Babar 1.01 m/s    RA Major Axis 5.14 cm    RA Width 3.70 cm    RVDD 3.20 cm    Sinus 2.86 cm    TAPSE 1.93 cm    TR Max Babar 2.84 m/s    TDI LATERAL 0.06     TDI SEPTAL 0.05     LA WIDTH 4.24 cm    LV Diastolic Volume 106.94 mL    LV Systolic Volume 29.03 mL    RV S' 9.03 m/s    LVOT peak babar [id number] m/s    LV LATERAL E/E' RATIO 16.83     LV SEPTAL E/E' RATIO 20.20     FS 42 %    LA volume 88.57 cm3    LV mass 169.62 g    Left Ventricle Relative Wall Thickness 0.39 cm    AV valve area 2.03 cm2    AV Velocity Ratio 0.65     AV index (prosthetic) 0.65     E/A ratio 1.46     Mean e' 0.06     LVOT area 3.11 cm2    LVOT stroke volume 92.58 cm3    AV peak gradient 12.25 mmHg    E/E' ratio 18.36     LV Systolic Volume Index 18.3 mL/m2    LV Diastolic Volume Index 67.45 mL/m2    LA Volume Index 55.9 mL/m2    LV Mass Index 107.0 g/m2    Triscuspid Valve Regurgitation Peak Gradient 32.26 mmHg    BSA 1.58 m2    Right Atrial Pressure (from IVC) 8 mmHg    TV rest pulmonary artery pressure 40 mmHg     Assessment and Plan:         * Symptomatic anemia  --defer to primary  - Evaluated by GI for possible EGD    Acute deep vein thrombosis (DVT) of left peroneal vein  Patient completed 3 months of eliquis. Eliquis held due to possible GIB. No sign of DVT on LE doppler ultrasound on 4/12/19    - Recommend discontinuing eliquis if not otherwise indicated    Essential hypertension  Patient initially with significantly elevated BP after she received PRBC, which indicates that hypervolemia is the likely  culprit.    --would continue to hold her losartan given that she has an MARIAM at the moment, could aim to resume upon resolution    Acute on chronic diastolic (congestive) heart failure  Patient presenting with hypervolemia. Good urine output and symptomatic improvement with 80mg IV lasix on presentation. Cr holding stable    --Continue diuresis with bumex 1mg qd  --replete electrolytes to K>4, Mg>2  --check BMP BID  --salt restriction <2gm and fluid restriction <1.5L  --daily weights    If patient to be discharged will need close follow up with PCP (within 1 week preferably) to assess volume status and possible metabolic panel to assess electrolytes given history of hyponatremia and hypokalemia. Will likely need additional titration of diuretics as an outpatient.    Cardiology will sign off, please do not hesitate to contact us with any questions or concerns regarding the care of Ms. Quintanilla.    VTE Risk Mitigation (From admission, onward)        Ordered     Place VALERIA hose  Until discontinued      04/11/19 1746     Place sequential compression device  Until discontinued      04/11/19 1746     IP VTE HIGH RISK PATIENT  Once      04/11/19 1746          Lance Hazel MD  Cardiology  [facility]

## 2019-04-15 NOTE — ASSESSMENT & PLAN NOTE
Patient presenting with hypervolemia. Good urine output and symptomatic improvement with 80mg IV lasix on presentation. Cr holding stable    --Continue diuresis with bumex 1mg qd  --replete electrolytes to K>4, Mg>2  --check BMP BID  --salt restriction <2gm and fluid restriction <1.5L  --daily weights    If patient to be discharged will need close follow up with PCP (within 1 week preferably) to assess volume status and possible metabolic panel to assess electrolytes given history of hyponatremia and hypokalemia. Will likely need additional titration of diuretics as an outpatient.

## 2019-04-15 NOTE — PHYSICIAN QUERY
PT Name: Kristin Quintanilla  MR #: [id number]     Physician Query Form - Etiology of Condition Clarification      CDS Cait Johnson RN, BSN        Contact Information:  [phone]    [email or web address]         This form is a permanent document in the medical record.     Query Date: April 15, 2019    By submitting this query, we are merely seeking further clarification of documentation.  Please utilize your independent clinical judgment when addressing the question(s) below.     The medical record contains the following:    Findings Supporting Clinical Information Location in Medical Record   Symptomatic anemia          referral from PCP for anemia.  The patient has been having increased weakness with leg swelling and dyspnea. Also associated with weight gain. Worsening for three weeks.    Acute on chronic diastolic heart failure  Pericardial effusion  Essential hypertension  Chronic kidney disease stage 3    Heme + stool  · Baseline Hgb 8-9, 6.9 on admit and back to baseline after 1 u PRBC    Left peroneal vein DVT  · Diagnosed on January 24, 2019 and patient has been taking apixaban therapy    HGB 6.9*  7.7* 8.1* 7.9*     8.5  HCT 21.5*  24.0* 24.8* 24.3*   26.6       Patient's hemoglobin has been stable and she has been having brown stools. Family is not keen on having an EGD done and given patient's stability with no signs of overt GI bleeding, we will hold off on EGD.     H&P                                      CBC  4/11-------4/15       4/15 GI Treatment Plan                                            Please document your best medical opinion regarding the etiology of            Symptomatic anemia     for which treatment is/was directed.     Provider use only      Please specify ____acute post hemorrhagic anemia_____________________________________________________           [  ] Clinically Undetermined     Please document in your progress notes daily for the duration of treatment, until resolved, and include  in your discharge summary.

## 2019-04-15 NOTE — ASSESSMENT & PLAN NOTE
Patient initially with significantly elevated BP after she received PRBC, which indicates that hypervolemia is the likely culprit.    --would continue to hold her losartan given that she has an MARIAM at the moment, could aim to resume upon resolution

## 2019-04-15 NOTE — PT/OT/SLP EVAL
Occupational Therapy   Evaluation and Discharge Note    Name: Kristin Quintanilla  MRN: [id number]  Admitting Diagnosis:  Symptomatic anemia Day of Surgery    Recommendations:     Discharge Recommendations: home health OT  Discharge Equipment Recommendations:  none  Barriers to discharge:  None    Assessment:     Kristin Quintanilla is a 89 y.o. female with a medical diagnosis of Symptomatic anemia. At this time, patient is functioning at their prior level of function and does not require further acute OT services.     Plan:     During this hospitalization, patient does not require further acute OT services.  Please re-consult if situation changes.    · Plan of Care Reviewed with: patient, son    Subjective     Chief Complaint: B hand numbness  Patient/Family Comments/goals: return to PLOF    Occupational Profile:  Living Environment: Pt lives alone in 1SH with 1STE. Home has walk-in shower with shower chair and grab bars  Previous level of function: PTA, pt reports mostly independent with ADL and IADL. Pt reports she has supervision/assist for showering but is able to complete all dressing independently. Pt has housekeeping services and receives meals from family/friends, however is able to complete light meal prep. Pt has not driven since January. She uses as RW for ambulation  Roles and Routines: mother  Equipment Used at home:  walker, rolling, shower chair, grab bar, bedside commode(does not use BSC)  Assistance upon Discharge: Family assist as needed following d/c    Pain/Comfort:  · Pain Rating 1: 0/10  · Pain Addressed 1: Reposition, Distraction, Cessation of Activity  · Pain Rating Post-Intervention 1: 0/10    Patients cultural, spiritual, Anabaptist conflicts given the current situation: no    Objective:     Communicated with: RN prior to session.  Patient found HOB elevated with telemetry upon OT entry to room.    General Precautions: Standard, fall   Orthopedic Precautions:N/A   Braces: N/A     Occupational  Performance:    Bed Mobility:    · Patient completed Supine to Sit with supervision    Functional Mobility/Transfers:  · Patient completed Sit <> Stand Transfer with supervision  with  rolling walker   · Patient completed Bed <> Chair Transfer using Step Transfer technique with supervision with rolling walker  · Functional Mobility: Pt ambulate 150 ft with supervision using RW    Activities of Daily Living:  · Upper Body Dressing: setup assistance to don robe while seated EOB  · Lower Body Dressing: independence to adjust B socks while seated EOB using figure-four position    Cognitive/Visual Perceptual:  Cognitive/Psychosocial Skills:     -       Oriented to: Person, Place, Time and Situation   -       Follows Commands/attention:Follows multistep  commands  -       Communication: clear/fluent  -       Memory: No Deficits noted  -       Safety awareness/insight to disability: intact   -       Mood/Affect/Coping skills/emotional control: Appropriate to situation  Visual/Perceptual:      -Pt wears glasses; hearing intact    Physical Exam:  Balance:    -       good sitting/standing balance  Postural examination/scapula alignment:    -       Rounded shoulders  -       Forward head  Skin integrity: Visible skin intact  Edema:  None noted  Sensation:    -       Impaired  Pt reports B hand numbness  Dominant hand:    -       R hand  Upper Extremity Range of Motion:     -       Right Upper Extremity: WFL  -       Left Upper Extremity: WFL  Upper Extremity Strength:    -       Right Upper Extremity: WFL  -       Left Upper Extremity: WFL   Strength:    -       Right Upper Extremity: WFL  -       Left Upper Extremity: WFL  Fine Motor Coordination:    -       Slight impairments due to arthritis in hands and visible arthritic deformities  Gross motor coordination:   WFL    AMPAC 6 Click ADL:  AMPAC Total Score: 24    Treatment & Education:  Pt educated on role of OT/POC  Pt educated on importance of ambulation/UIC  Pt  "educated on and in agreement with d/c from acute OT; did discuss HHOT due to multiple hospitalizations since January and pt lives alone, needs OT to improve strength and independence with ADL/IADL  White board/communication board updated  Education:    Patient left up in chair with all lines intact, call button in reach, RN notified and son present    GOALS:   Multidisciplinary Problems     Occupational Therapy Goals     Not on file          Multidisciplinary Problems (Resolved)        Problem: Occupational Therapy Goal    Goal Priority Disciplines Outcome Interventions   Occupational Therapy Goal   (Resolved)     OT, PT/OT Outcome(s) achieved                    History:     Past Medical History:   Diagnosis Date    Allergy     Seasonal    Anemia 1/10/2019    Aortic regurgitation     Aortic valve disorders     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      Arthritis     ASCVD (arteriosclerotic cardiovascular disease) 7/8/2014    Atherosclerosis of aorta 1/7/2016    "There is atherosclerosis of the thoracic aorta" - Xray Chest 7-     Back pain     Basal cell carcinoma 10/7/2013    Right nasal columellar, right lower eyelid, left medial eyelid    Basal cell carcinoma 2/2015    mid back    Chronic diastolic heart failure 11/11/2014    Coronary artery disease     Gastric ulcer     Fosamax related.    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     Hypothyroidism (acquired) 9/27/2016    Joint pain     Lymphedema of Neck 3/17/2016    Occlusion and stenosis of carotid artery without mention of cerebral infarction     Pelvic mass in female 7/21/2015    Polyneuropathy in other diseases classified elsewhere 1/7/2016 6-     Squamous cell carcinoma of scalp 5/2014    scalp vertex with + LN met 10/14 s/p radiation    Ulcer        Past Surgical History:   Procedure Laterality Date    " ADJACENT TISSUE TRANSFER/REARRANGEMENT N/A 5/14/2014    Performed by Olvin Cevallos MD at [facility] OR 2ND FLR    APPENDECTOMY  8.14.2015    mucinous cystadenoma with low grade dysplasia.     APPENDECTOMY-LAPAROSCOPIC N/A 8/14/2015    Performed by Feroz Corado MD at [facility] 2ND FLR    BIOPSY-LYMPH NODE Right 10/27/2014    [facility]    CATARACT EXTRACTION      ou dr morales    EYE SURGERY      HYSTERECTOMY  1970     NIC/BSO. took HRT immediatiely after wards.     LAPAROSCOPY-DIAGNOSTIC  8/14/2015    Performed by Gianfranco Crenshaw [facility] at [facility]    LYMPH NODE BIOPSY Right 10/27/2014    posterior triangle    Mohs excision of scalp SCC N/A 5/13/2014    Scalp flap N/A 5/14/204    posterior advancement-rotation    SKIN CANCER EXCISION      THYROIDECTOMY  1960's    hyperthyroidism    TONSILLECTOMY         Time Tracking:     OT Date of Treatment: 04/15/19  OT Start Time: 0745  OT Stop Time: 0759  OT Total Time (min): 14 min    Billable Minutes:Evaluation 14    Maryjane Crespo, OT  4/15/2019

## 2019-04-20 ENCOUNTER — NURSE TRIAGE (OUTPATIENT)
Dept: ADMINISTRATIVE | Facility: CLINIC | Age: 84
End: 2019-04-20

## 2019-04-20 NOTE — TELEPHONE ENCOUNTER
Reason for Disposition   [1] Difficulty breathing with exertion (e.g., walking) AND [2] new onset or worsening    Protocols used: LEG SWELLING AND EDEMA-A-AH  DCd from hospital Monday. Hx CHF. managing fluid pblm taking bumex 1 mg once daily. Pt having weight going up 1 lb qd. 122 Monday now 126.8. having some SOB- worse moving around. Minimal with sitting still. Some swelling around ankles. No CP. Spoke with dr bourne- ok to take 1 mg bumex now and add one addl tablet at 9pm. Can cont three tablets mitzi. if wt goes up come into ED. Follow up with MD Monday. Pt notified. Call back with questions.  nurse coming over for lab. uop about normal. ED warnings given. Call back with questions or change in sx.

## 2019-04-21 ENCOUNTER — PATIENT MESSAGE (OUTPATIENT)
Dept: FAMILY MEDICINE | Facility: CLINIC | Age: 84
End: 2019-04-21

## 2019-04-22 ENCOUNTER — TELEPHONE (OUTPATIENT)
Dept: CARDIOLOGY | Facility: CLINIC | Age: 84
End: 2019-04-22

## 2019-04-22 ENCOUNTER — LAB VISIT (OUTPATIENT)
Dept: LAB | Facility: HOSPITAL | Age: 84
End: 2019-04-22
Attending: INTERNAL MEDICINE
Payer: MEDICARE

## 2019-04-22 DIAGNOSIS — I50.33 ACUTE ON CHRONIC DIASTOLIC HEART FAILURE: Primary | ICD-10-CM

## 2019-04-22 LAB
ANION GAP SERPL CALC-SCNC: 11 MMOL/L (ref 8–16)
BUN SERPL-MCNC: 57 MG/DL (ref 8–23)
CALCIUM SERPL-MCNC: 8.5 MG/DL (ref 8.7–10.5)
CHLORIDE SERPL-SCNC: 98 MMOL/L (ref 95–110)
CO2 SERPL-SCNC: 24 MMOL/L (ref 23–29)
CREAT SERPL-MCNC: 1.7 MG/DL (ref 0.5–1.4)
EST. GFR  (AFRICAN AMERICAN): 30.4 ML/MIN/1.73 M^2
EST. GFR  (NON AFRICAN AMERICAN): 26.4 ML/MIN/1.73 M^2
GLUCOSE SERPL-MCNC: 136 MG/DL (ref 70–110)
POTASSIUM SERPL-SCNC: 3.3 MMOL/L (ref 3.5–5.1)
SODIUM SERPL-SCNC: 133 MMOL/L (ref 136–145)

## 2019-04-22 PROCEDURE — 80048 BASIC METABOLIC PNL TOTAL CA: CPT | Mod: HCNC

## 2019-04-22 NOTE — TELEPHONE ENCOUNTER
Lenka Triana MD  You; Jairo Schmidt MD 3 hours ago (8:22 AM)      We should try to see patient in a follow up as well. She should be scheduled with Dr. Schmidt to address anemia and persistent hypoalbuminemia with CKD.  I am not sure if patient wants to be worked up for nephrotic syndrome and possibly collagen vascular disease.    Routing comment       You routed conversation to Lenka Triana MD 3 hours ago (8:03 AM)      EMMANUEL Franco Staff 2 days ago      See OOC note. Thank you     Routing comment       Maggie Lafleur RN  [facility] 2 days ago      Maggie Lafleur RN 2 days ago            Reason for Disposition   [1] Difficulty breathing with exertion (e.g., walking) AND [2] new onset or worsening    Protocols used: LEG SWELLING AND EDEMA-A-AH  DCd from hospital Monday. Hx CHF. managing fluid pblm taking bumex 1 mg once daily. Pt having weight going up 1 lb qd. 122 Monday now 126.8. having some SOB- worse moving around. Minimal with sitting still. Some swelling around ankles. No CP. Spoke with dr bourne- ok to take 1 mg bumex now and add one addl tablet at 9pm. Can cont three tablets mitzi. if wt goes up come into ED. Follow up with MD Monday. Pt notified. Call back with questions.  nurse coming over for lab. uop about normal. ED warnings given. Call back with questions or change in sx.          Documentation

## 2019-04-22 NOTE — TELEPHONE ENCOUNTER
Tessy called stating that pt weight was 122lb on Monday and is 128lb today and has LE edema.  Tessy stated that she heard crackles in the pt's lungs. Pt is scheduled to see Citlalli on 4/29/19, Dr. Rojas on 5/1/19 and you on 5/13/19. Please advise.

## 2019-04-23 ENCOUNTER — PATIENT MESSAGE (OUTPATIENT)
Dept: FAMILY MEDICINE | Facility: CLINIC | Age: 84
End: 2019-04-23

## 2019-04-24 ENCOUNTER — OFFICE VISIT (OUTPATIENT)
Dept: CARDIOLOGY | Facility: CLINIC | Age: 84
End: 2019-04-24
Payer: MEDICARE

## 2019-04-24 VITALS
BODY MASS INDEX: 22.9 KG/M2 | HEART RATE: 54 BPM | HEIGHT: 64 IN | DIASTOLIC BLOOD PRESSURE: 74 MMHG | WEIGHT: 134.13 LBS | SYSTOLIC BLOOD PRESSURE: 164 MMHG

## 2019-04-24 DIAGNOSIS — I50.33 ACUTE ON CHRONIC DIASTOLIC (CONGESTIVE) HEART FAILURE: Primary | ICD-10-CM

## 2019-04-24 PROCEDURE — 99214 PR OFFICE/OUTPT VISIT, EST, LEVL IV, 30-39 MIN: ICD-10-PCS | Mod: HCNC,25,S$GLB, | Performed by: INTERNAL MEDICINE

## 2019-04-24 PROCEDURE — 99999 PR PBB SHADOW E&M-EST. PATIENT-LVL IV: CPT | Mod: PBBFAC,HCNC,, | Performed by: INTERNAL MEDICINE

## 2019-04-24 PROCEDURE — 96372 PR INJECTION,THERAP/PROPH/DIAG2ST, IM OR SUBCUT: ICD-10-PCS | Mod: HCNC,S$GLB,, | Performed by: INTERNAL MEDICINE

## 2019-04-24 PROCEDURE — 1101F PR PT FALLS ASSESS DOC 0-1 FALLS W/OUT INJ PAST YR: ICD-10-PCS | Mod: HCNC,CPTII,S$GLB, | Performed by: INTERNAL MEDICINE

## 2019-04-24 PROCEDURE — 96372 THER/PROPH/DIAG INJ SC/IM: CPT | Mod: HCNC,S$GLB,, | Performed by: INTERNAL MEDICINE

## 2019-04-24 PROCEDURE — 99214 OFFICE O/P EST MOD 30 MIN: CPT | Mod: HCNC,25,S$GLB, | Performed by: INTERNAL MEDICINE

## 2019-04-24 PROCEDURE — 1101F PT FALLS ASSESS-DOCD LE1/YR: CPT | Mod: HCNC,CPTII,S$GLB, | Performed by: INTERNAL MEDICINE

## 2019-04-24 PROCEDURE — 99999 PR PBB SHADOW E&M-EST. PATIENT-LVL IV: ICD-10-PCS | Mod: PBBFAC,HCNC,, | Performed by: INTERNAL MEDICINE

## 2019-04-24 RX ORDER — FUROSEMIDE 80 MG/1
80 TABLET ORAL
Status: DISCONTINUED | OUTPATIENT
Start: 2019-04-24 | End: 2019-04-24

## 2019-04-24 RX ORDER — FUROSEMIDE 10 MG/ML
80 INJECTION INTRAMUSCULAR; INTRAVENOUS
Status: COMPLETED | OUTPATIENT
Start: 2019-04-24 | End: 2019-04-24

## 2019-04-24 RX ORDER — METOLAZONE 5 MG/1
5 TABLET ORAL DAILY PRN
Qty: 10 TABLET | Refills: 0 | Status: SHIPPED | OUTPATIENT
Start: 2019-04-24 | End: 2019-05-13 | Stop reason: ALTCHOICE

## 2019-04-24 RX ORDER — POTASSIUM CHLORIDE 750 MG/1
10 TABLET, EXTENDED RELEASE ORAL DAILY PRN
Qty: 10 TABLET | Refills: 0 | Status: SHIPPED | OUTPATIENT
Start: 2019-04-24 | End: 2019-05-01 | Stop reason: SDUPTHER

## 2019-04-24 RX ORDER — BUMETANIDE 2 MG/1
2 TABLET ORAL 2 TIMES DAILY
Qty: 30 TABLET | Refills: 11 | Status: SHIPPED | OUTPATIENT
Start: 2019-04-24 | End: 2019-05-03 | Stop reason: SDUPTHER

## 2019-04-24 RX ADMIN — FUROSEMIDE 80 MG: 10 INJECTION INTRAMUSCULAR; INTRAVENOUS at 12:04

## 2019-04-24 NOTE — PROGRESS NOTES
Cardiology Clinic Note  Reason for Visit: CHF    HPI:     Kristin Quintanilla is a 89 y.o. F with HTN, HLD, HFpEF, non-obstructive CAD, who presents for follow up regarding heart failure. She is usually followed by Dr Triana, last seen 4/3/19.    She was hospitalized for heart failure in 1/2019. HCTZ was discontinued while hospitalized due to hyponatremia, but restarted as an outpatient.     She was recently admitted to the hospital on 4/10 for low Hb (6.9) on blood work. She also reported shortness of breath and weakness. On 4/3, she was instructed to increase lasix from 20mg qod to 40mg qd x 3d. She reported dividing the 40mg into 20mg doses, instead of taking all at once. On 4/9, she was instructed to take meds 40mg at once. This did not result in weight loss, so she presented to the ED. Upon arrival, her oxygen sat was in low 90s. She received 1 unit pRBC with symptomatic improvement. Her BP increased from normal to >200s after pRBCs. She was diuresed with lasix 80mg IV BID. She was changed to PO bumex on discharge.    To note, she was on eliquis since 1/2019 when diagnosed with DVT to L peroneal vein. In the ED this visit, heme-occult was positive. This was suspected etiology of anemia. She was started on IV PPI. Eliquis was held due to completion of 3 month course, and US showing resolution of thrombus. An EGD was planned for evaluation, but GI felt that it was unlikely to see active bleeding. Additionally, family did not wish to pursue the bleeding evaluation. Her code status was changed to DNR.    She reports that her weight is up 8lb since discharge. Her home baseline weight was 122lb (on 4/16). This morning, her weight was 130lb. She reports progressive orthopnea, PND, cough, and edema, despite use of bumex 2mg daily then 3mg daily. She reports no increase in urine output with bumex. Labs were done on 4/22 and showed Na 133, K 3.3, Cr 1.7 (baseline).    Medical:  HTN, HLD, HFpEF, non-obstructive CAD  "(40% LCx in 2004, no PCI), L peroneal vein DVT (resolved by imaging, completed 3 months of eliquis), chronic pericardial effusion, aortic atherosclerosis, bilateral carotid artery stenosis, hypothyroidism, spinal stenosis    ROS:    Constitution: Negative for fever or chills.  HENT: Negative for  headaches.  Eyes: Negative for blurred vision.   Cardiovascular: See above  Pulmonary: Positive for SOB. Negative for cough.   Gastrointestinal: Negative for nausea/vomiting.   : Negative for dysuria.   Skin: Negative for rashes.  Neurological: Negative for focal weakness.  Psychological: Negative for depression.  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      Arthritis     ASCVD (arteriosclerotic cardiovascular disease) 7/8/2014    Atherosclerosis of aorta 1/7/2016    "There is atherosclerosis of the thoracic aorta" - Xray Chest 7-     Back pain     Basal cell carcinoma 10/7/2013    Right nasal columellar, right lower eyelid, left medial eyelid    Basal cell carcinoma 2/2015    mid back    Chronic diastolic heart failure 11/11/2014    Coronary artery disease     Gastric ulcer     Fosamax related.    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14.2015    mucinous cystadenoma with low grade dysplasia.     APPENDECTOMY-LAPAROSCOPIC N/A 8/14/2015    Performed by Feroz Corado [facility] at [facility] [place] [facility]    BIOPSY-LYMPH NODE Right 10/27/2014    Performed by Olvin [facility] at [facility] [place] [facility]    CATARACT EXTRACTION      ou dr morales    EGD (ESOPHAGOGASTRODUODENOSCOPY) N/A 4/15/2019    Performed by Joni Montoya [facility] at [facility] [place] [facility] (2ND F[facility])    EYE SURGERY      HYSTERECTOMY  1970     NIC/BSO. took HRT immediatiely after wards.     LAPAROSCOPY-DIAGNOSTIC  8/14/2015    Performed by Gianfranco Crenshaw [facility] at [facility] [place] [facility]    LYMPH NODE BIOPSY Right 10/27/2014    posterior triangle    Mohs excision of scalp SCC N/A 5/13/2014    Scalp flap N/A 5/14/204    posterior advancement-rotation    SKIN CANCER EXCISION      THYROIDECTOMY  1960's    hyperthyroidism    TONSILLECTOMY       Allergies:     Review of patient's allergies indicates:   Allergen Reactions    Codeine     Darvocet a500 [propoxyphene n-acetaminophen] Nausea Only    Sulfa (sulfonamide antibiotics) Rash    Fosamax [alendronate]      Gastric ulcer     Medications:     Current Outpatient Medications on File Prior to Visit   Medication Sig Dispense Refill    acetaminophen (TYLENOL ARTHRITIS) 650 MG TbSR Take 650 mg by mouth nightly as needed (pain).       albuterol (PROVENTIL) 2.5 mg /3 mL (0.083 %) nebulizer solution Take 3 mLs (2.5 mg total) by nebulization every 4 (four) hours as needed for Wheezing or Shortness of Breath (chest tightness). 1 Box 6    amLODIPine (NORVASC) 10 MG tablet TAKE 1 TABLET EVERY DAY 90 tablet 3    atorvastatin (LIPITOR) 20 MG tablet TAKE 1 TABLET EVERY DAY 90 tablet 3    bumetanide (BUMEX) 1 MG tablet Take 1 tablet (1 mg total) by mouth once daily. 30 tablet 5    calcium carbonate/vitamin D3 (CALTRATE 600 + D ORAL) Take by mouth once daily.      chlorpheniramine maleate  (CHLOR-TRIMETON REPETABS ORAL) Take 1 tablet by mouth daily as needed (hayfever).       FLAXSEED-OMEGA3,6,9-FATTY ACID ORAL Take 1 capsule by mouth once daily.        gabapentin (NEURONTIN) 100 MG capsule Take 2 capsules (200 mg total) by mouth 2 (two) times daily. (Patient taking differently: Take 200 mg by mouth 2 (two) times daily. Pt taking one pill once a day.) 120 capsule 11    isosorbide-hydrALAZINE 20-37.5 mg (BIDIL) 20-37.5 mg Tab Take 1 tablet by mouth 2 (two) times daily. 60 tablet 5    labetalol (NORMODYNE) 300 MG tablet Take 1 tablet (300 mg total) by mouth 2 (two) times daily. 180 tablet 3    levothyroxine (SYNTHROID) 75 MCG tablet TAKE 1 TABLET EVERY DAY 90 tablet 12    magnesium 250 mg Tab Take 1 tablet by mouth once daily.        omeprazole (PRILOSEC) 20 MG capsule Take 1 capsule (20 mg total) by mouth once daily. 30 capsule 0    OPTICHAMBER DARREN LG MASK Spcr U UTD  0    traMADol (ULTRAM) 50 mg tablet Take 1 tablet (50 mg total) by mouth every 12 (twelve) hours as needed for Pain. 15 tablet 0    triamcinolone acetonide 0.1% (KENALOG) 0.1 % cream APPLY TO THE AFFECTED AREA OF lower legs TWICE DAILY (Patient taking differently: Pt using prn.) 453.6 g 1     No current facility-administered medications on file prior to visit.      Social History:     Social History     Tobacco Use    Smoking status: Former Smoker    Smokeless tobacco: Never Used    Tobacco comment: Quit 65 years ago   Substance Use Topics    Alcohol use: No     Family History:     Family History   Problem Relation Age of Onset    Heart attack Mother     Heart disease Mother     Hypertension Mother     Eczema Mother     Heart failure Mother     Clotting disorder Father     Hypertension Father     Heart failure Father     Thyroid disease Sister     Cancer Sister         thyroid CA     No Known Problems Brother     No Known Problems Maternal Aunt     No Known Problems Maternal Uncle     No Known Problems  "Paternal Aunt     No Known Problems Paternal Uncle     No Known Problems Maternal Grandmother     No Known Problems Maternal Grandfather     No Known Problems Paternal Grandmother     No Known Problems Paternal Grandfather     Amblyopia Neg Hx     Blindness Neg Hx     Cataracts Neg Hx     Glaucoma Neg Hx     Macular degeneration Neg Hx     Retinal detachment Neg Hx     Strabismus Neg Hx     Stroke Neg Hx     Melanoma Neg Hx     Psoriasis Neg Hx     Lupus Neg Hx     Acne Neg Hx     Ovarian cancer Neg Hx     Uterine cancer Neg Hx     Breast cancer Neg Hx     Colon cancer Neg Hx     Diabetes Neg Hx     Hyperlipidemia Neg Hx      Physical Exam:   BP (!) 164/74   Pulse (!) 54   Ht 5' 4" (1.626 m)   Wt 60.9 kg (134 lb 2.4 oz)   LMP  (LMP Unknown)   BMI 23.03 kg/m²      Constitutional: No apparent distress, conversant  HEENT: Sclera anicteric, extraocular movements intact  Neck: JVD to the earlobe at 30 degrees, no carotid bruits  CV: Regular rate and rhythm, no murmurs rubs or gallops, normal S1/S2  Pulm: Clear to auscultation bilaterally  GI: Abdomen soft, no palpable masses  Extremities: Pitting 2+ bilateral lower extremity edema to her hips, warm   Skin: No ecchymosis, erythema, or ulcers  Psych: Alert and oriented to person place location, appropriate affect  Neuro: No focal deficits    Labs:     Blood Tests:  Lab Results   Component Value Date     (H) 04/11/2019     (L) 04/22/2019    K 3.3 (L) 04/22/2019    CL 98 04/22/2019    CO2 24 04/22/2019    BUN 57 (H) 04/22/2019    CREATININE 1.7 (H) 04/22/2019     (H) 04/22/2019    HGBA1C 5.0 04/11/2019    MG 2.0 04/15/2019    AST 34 04/11/2019    ALT 35 04/11/2019    ALBUMIN 2.4 (L) 04/11/2019    PROT 5.2 (L) 04/11/2019    BILITOT 0.5 04/11/2019    WBC 7.99 04/15/2019    HGB 8.5 (L) 04/15/2019    HCT 26.6 (L) 04/15/2019    HCT 26 (L) 01/07/2019    MCV 91 04/15/2019     04/15/2019    INR 1.0 04/25/2011    TSH 5.592 (H) " 04/11/2019       Lab Results   Component Value Date    CHOL 163 03/27/2019    HDL 67 03/27/2019    TRIG 57 03/27/2019       Lab Results   Component Value Date    LDLCALC 84.6 03/27/2019       Urine Tests:  Lab Results   Component Value Date    COLORU Yellow 04/11/2019    APPEARANCEUA Clear 04/11/2019    PHUR 6.0 04/11/2019    SPECGRAV 1.010 04/11/2019    PROTEINUA 2+ (A) 04/11/2019    GLUCUA 1+ (A) 04/11/2019    KETONESU Negative 04/11/2019    BILIRUBINUA Negative 04/11/2019    OCCULTUA Negative 04/11/2019    NITRITE Negative 04/11/2019    UROBILINOGEN Negative 06/09/2017    LEUKOCYTESUR Negative 04/11/2019       Imaging:     Echocardiogram  TTE 4/14/19  · Normal left ventricular systolic function. The estimated ejection fraction is 60%  · Eccentric left ventricular hypertrophy.  · Grade II (moderate) left ventricular diastolic dysfunction consistent with pseudonormalization.  · Elevated left atrial pressure.  · No wall motion abnormalities.  · Severe left atrial enlargement.  · Mild right atrial enlargement.  · Mild aortic valve stenosis.  · Aortic valve area is 2.03 cm2; peak velocity is 1.75 m/s; mean gradient is 7.28 mmHg.  · Mild aortic regurgitation.  · Mild mitral regurgitation.  · Intermediate central venous pressure (8 mm Hg).  · The estimated PA systolic pressure is 40 mm Hg  · Moderate pericardial effusion. There was a moderate -large posterior and moderate anterior and apical effusion-there was no place to tap this subcostally and there may be an apical lateral space but this would not be simple.  · There is a small left pleural effusion.    TTE 1/21/19  · Mild left ventricular enlargement.  · Normal left ventricular systolic function. The estimated ejection fraction is 65%  · Normal right ventricular systolic function.  · Indeterminate left ventricular diastolic function.  · No wall motion abnormalities.  · Severe left atrial enlargement.  · Mild mitral regurgitation.  · Mild aortic  regurgitation.  · The estimated PA systolic pressure is 34 mm Hg  · Intermediate central venous pressure (8 mm Hg).  · Moderate sized pericardial effusion - there is a large pocket posteriorly, another moderate sized pocket adjacent to the RA and RV free walls, and little elsewhere. No evidence of tamponade.    TTE 1/10/19  · Large pericardial effusion, borderline 25% variation across mitral valve, right atrial systolic but not diastolic collapse, no RV collapse.  · Severe left atrial enlargement.  · Moderate left ventricular enlargement.  · Normal left ventricular systolic function. The estimated ejection fraction is 60%  · Grade II (moderate) left ventricular diastolic dysfunction consistent with pseudonormalization.  · Elevated left atrial pressure.  · Mild mitral regurgitation.  · Mild eccentric left ventricular hypertrophy.  · Mild mitral sclerosis.  · Normal right ventricular systolic function.  · Mild aortic regurgitation.  · Normal central venous pressure (3 mm Hg).    TTE 4/23/18  CONCLUSIONS     1 - Upper limit of normal left ventricular enlargement.     2 - Normal left ventricular systolic function (EF 60-65%).     3 - Eccentric hypertrophy.     4 - No wall motion abnormalities.     5 - Biatrial enlargement.     6 - Impaired LV relaxation, elevated LAP (grade 2 diastolic dysfunction).     7 - Normal right ventricular systolic function .     8 - Mild aortic regurgitation.     9 - Pericardial effusion as per text.   Pericardium: There is evidence of a moderate-large postero-lateral pericardial effusion, small amount lateral, and trivial otherwise. There is no significant respiratory variation in mitral inflow excluding significant constriction/tamponade physiology.     Stress testing  None    Cath Lab  None    Other  LE Duplex 4/12/19  No evidence of deep venous thrombosis in either lower extremity.    LE Duplex 1/24/19  Thrombus in the left peroneal vein.  Complex heterogeneous area near the  sapheno-femoral junction.  Adjacent collaterals.  No internal vascularity.  Complex left Baker's cyst.  Simple right Yin's cyst.    Carotid US 18  CONCLUSIONS   There is 40 - 49% right Internal Carotid stenosis.  There is 40 - 49% left Internal Carotid stenosis.    CT soft tissue neck 14  1.  In this patient with a history of squamous cell carcinoma of the scalp, no abnormally enlarged lymph nodes are identified within the neck.  2.  Atherosclerosis.  3.  Maxillary sinus disease.  4.  Degenerative changes of the cervical spine.     EK19  Sinus rhythm with 1st degree A-V block  Abnormal R wave progression in the precordial leads  Nonspecific T wave abnormality    19  Accelerated Junctional rhythm  Abnormal ECG    7/21/15  Sinus bradycardia with 1st degree A-V block  Otherwise normal ECG    Assessment:     1. Acute on chronic diastolic (congestive) heart failure        Plan:     Acute on chronic diastolic (congestive) heart failure  Discussed low sodium diet, 1.5L fluid restriction, and daily weights  Give lasix 80mg IV x1 in the clinic today   Change to bumex 2mg BID  Add metolazone 5mg daily x3 days with KCl supplement daily  Instructed her to call me in the upcoming days with an update    Follow up with PCP on   Follow up with Dr Triana on     Signed:  Zheng Bautista MD  Cardiology     2019 12:50 PM    Follow-up:     Future Appointments   Date Time Provider Department Center   2019 11:00 AM Neville Bautista III, MD [facility] CARDIO [place]   2019  2:20 PM Jairo Schmidt MD [facility]   2019 11:00 AM Lenka Triana MD [facility] CARD[facility] [place]

## 2019-04-25 ENCOUNTER — TELEPHONE (OUTPATIENT)
Dept: CARDIOLOGY | Facility: CLINIC | Age: 84
End: 2019-04-25

## 2019-04-25 ENCOUNTER — PATIENT MESSAGE (OUTPATIENT)
Dept: CARDIOLOGY | Facility: CLINIC | Age: 84
End: 2019-04-25

## 2019-04-25 NOTE — TELEPHONE ENCOUNTER
Son's [facility] message reviewed by Dr. Bautista, and well as message from  nurse, Kee. Per Dr. Bautista - if pt not better by tomorrow should to go to the ED for evaluation and treatment. Left a detailed VM for the pt's son with call back name and number. Called and spoke with Kee  nurse and gave him Dr. Bautista response. Son called - spoke with him and says the pt is doing somewhat better now but will bring her to the Ed if necessary.

## 2019-04-25 NOTE — TELEPHONE ENCOUNTER
----- Message from Dimple Hwang MA sent at 4/25/2019  1:30 PM CDT -----  Contact: [facility], [facility]–[phone]  He is calling to report a wt. gain overnight last night Wed.4/24 of 130.2.and today 4/25 Thurs.4/25 131.4. Baseline wt.on 4/16 1.Has sob ok @ rest, labored w activity and has swelling in both legs. Please call a family member, son Ty Quintanilla [phone].  pt.Last visit 4-24-19.

## 2019-04-26 ENCOUNTER — PATIENT MESSAGE (OUTPATIENT)
Dept: CARDIOLOGY | Facility: CLINIC | Age: 84
End: 2019-04-26

## 2019-04-26 ENCOUNTER — PATIENT MESSAGE (OUTPATIENT)
Dept: FAMILY MEDICINE | Facility: CLINIC | Age: 84
End: 2019-04-26

## 2019-04-26 NOTE — TELEPHONE ENCOUNTER
----- Message from Shae Graham sent at 4/26/2019 11:42 AM CDT -----  Contact: Ms Calix/  [facility] [phone]   Type: Need order from doctor for patient  Palliative care    Caller is requesting order from doctor for patient care.  Name of Caller:Ms Calix  When is the first available appointment?  Symptoms:Pallative care for patient  Would the patient rather a call back or a response via [facility]? call  Best Call Back Number:[phone]  Additional Information:

## 2019-04-28 NOTE — DISCHARGE SUMMARY
Physician Attestation for Scribe:  I, Jazmyn Mathews MD, personally performed the services described in this documentation. All medical record entries made by the scribe were at my direction and in my presence.  I have reviewed this note and agree that the record reflects my personal performance and is accurate and complete.     Discharge Summary  Hospital Medicine    Attending Provider on Discharge: Jazmyn Mathews MD  [facility] Medicine Team: [facility] MED D  Date of Admission:  4/11/2019     Date of Discharge:  4/15/2019  Code status: DNR (Do Not Resuscitate)    Active Hospital Problems    Diagnosis  POA    *Symptomatic anemia [D64.9]  Yes    Hyperkalemia [E87.5]  Yes    CKD (chronic kidney disease) stage 3, GFR 30-59 ml/min [N18.3]  Yes    Acute deep vein thrombosis (DVT) of left peroneal vein [I82.492]  Yes    DVT (deep venous thrombosis) [I82.409]  Yes    Essential hypertension [I10]  Yes    Hypothyroidism (acquired) [E03.9]  Yes    Polyneuropathy in other diseases classified elsewhere [G63]  Yes     6-      Pericardial effusion [I31.3]  Yes     Chronic    Acute on chronic diastolic (congestive) heart failure [I50.33]  Yes    Hyperlipidemia [E78.5]  Yes     Chronic      Resolved Hospital Problems   No resolved problems to display.        HPI  89 yo female with a history of CAD, diastolic heart failure, HTN, HLD, hypothyroidism and spinal stenosis who presents after referral from PCP for anemia.  The patient has been having increased weakness with leg swelling and dyspnea. Also associated with weight gain. Worsening for three weeks. Recently hospitalized for an acute on chronic CHF exacerbation (1/2019). HCTZ was discontinued for hyponatremia, but restarted by her outpt cardiologist for fluid overload. Her cardiologist and PCP also instructed her to double her home lasix 20 mg prn for significant weight gain. Pt has increased in weight from 119 lbs in January to 131 lbs. Took 40 mg of lasix last  night without noticeable increased UOP. No chest pain, + chronic orthopnea ( has mechanical bed) without PND.  Of note the patient was diagnosed with a pericardial effusion without evidence of tamponade when she was admitted in January for her CHF exacerbation.  She declined a pericardiocentesis.  In the emergency room the patient's blood pressure was initially 129/64 but gradually increased to a systolic of 200 especially after her blood transfusion was completed.  She has noted her blood pressure to be very variable home.  It had been low yesterday and she was instructed to hold all of her blood pressure medicines including  Amlodipine, losartan and hydrochlorothiazide but did take her labetalol this morning.  She did receive her amlodipine losartan and hydrochlorothiazide in the emergency room with minimal change her blood pressure.  Hydralazine 25 mg orally was also given.     She has been compliant with apixaban since January of 2019 when she was diagnosed with a DVT to the left peroneal vein.  She denies having melena or bright red blood per rectum at home.  In the emergency room: Hemoccult positive and rectal exam revealed dark stool.  She was transfused 1 unit packed RBCs for hemoglobin of 6.9.  She was also started on pantoprazole IV.    Hospital Course  87 yo female with a history of CAD, diastolic HF, HLD,and hypothyroidism admitted with symptomatic anemia, heme (+) stool on chronic anticoagulation and CHF exacerbation with uncontrolled HTN. Hgb 6.9, responded to 1 U PRBC. Pt on apixaban for a peroneal DVT, held. Demonstrated heme positive stool. IV pantoprazole started. Originally planned for an EGD, but GI felt GI bleed unlikely given patient's stability with no signs of overt GI bleeding and family not keen on the procedure. At admit pt had poorly controlled BP and an elevated BNP. Lasix started. Pt had an elevated Cr, likely cardiorenal. Continued amlodipine, held losartan, and started bidil.  Cardiology noted elevated BP may have been due to PRBC infusion. Diuresis continued. A repeat echo showed no changes in a previously known pericardial effusion. She has refused pericardiocentesis in the past. Apixaban has been permanently discontinued given a completion of 3 months of use and a repeat US showing resolution of the thrombus. Switched to PO bumetanide with good response. During this hospitalization, pt opted for code status change to be DNR.  Patient with improved dyspnea and appears euvolemic on discharge.    Acute on chronic diastolic heart failure  Pericardial effusion  Essential hypertension  · Echo (1/21/2019): EF 65%, indeterminate diastolic function, severe LAE, moderated pericardial effusion, no evidence of tamponade.   · Continue lasix at 40 mg BID after 80 mg IV given in ED.  · Patient has been stopped on HCTZ in the past due to hyponatremia  · Pericardial effusion likely persistent as evidenced by cardiac silhouette on CXR; Echo ordered  · Cardiac diet  · Monitoring with telemetry, daily weights and I/O's  · Continue therapy with amlodipine (consider nifedipine if ineffective and worsened LE edema), hold losartan currently due to renal and start bidil; hydralazine prn SBP > 180; check orthostatics  · Cardiology consulted for due to difficult home control HTN/CHF and pericardial effusion  · Cardiology notes elevated BP after PRBC, recs only hydralazine prn until sufficient diuresis to address hypervolemia  · Monitor troponins due to intermittent chest pain at home and T-wave abnormality on admit EKG  · Cards recs increasing lasix to 80 IV BID, Cr stable, continue lasix 40 IV BID  · BP improved with diuresis, bidil reduced from TID to BID, goal -160 (4/12)  · Started bumetanide 1 mg PO, IV diuresis stopped (4/13)  · Echo showed similar results as from 1/19, as well as grade 2 diastolic dysfunction and persistence of a moderate pericardial effusion (4/14)     Chronic kidney disease stage  3  Hypokalemia  · Estimated Creatinine Clearance: 19.4 mL/min (A) (based on SCr of 1.7 mg/dL (H)).  · Cr increased from baseline of 1.4 previously; probable cardiorenal syndrome  · Monitor, avoid nephrotoxins and renally adjust meds  · Monitor K+; held magnesium supplement on admit as elevated  · Replete K+ for goal of 4     Hypothyroidism  · Continue home levothyroxine     Normocytic anemia  Heme + stool  · Baseline Hgb 8-9, 6.9 on admit and back to baseline after 1 u PRBC  · Stool heme + on chronic anticoagulation; GI consulted  · + hx of PUD in the past but no longer on GI meds and denies OTC meds; + hx of EGD but not C-scope  · Continue protonix IV  · Hold apixaban and resume if no signs of worsened bleeding  · Ferritin wnl; patient reluctant to take iron supplement due to constipation  · No signs of GI bleed, family not keen on EGD, EGD cancelled (4/15)     Left peroneal vein DVT  · Diagnosed on January 24, 2019 and patient has been taking apixaban therapy  · Hold apixaban currently to ensure no active bleeding or worsening of anemia  · Per cards, completed 3 months of apixaban, re-evaluate need for anticoagulation  · Repeat US LE shows no DVT (4/12)     Hyperlipidemia  · Continue atorvastatin therapy daily     Peripheral neuropathy    · chronic and stable  · Continue gabapentin at home dose        Recent Labs   Lab 04/13/19  0345 04/14/19  1052 04/15/19  0646   WBC 6.87 8.11 7.99   HGB 7.9* 7.8* 8.5*   HCT 24.3* 25.1* 26.6*    235 245     Recent Labs   Lab 04/12/19  0455 04/13/19  0345 04/14/19  1052 04/15/19  0646   * 135* 136 137   K 3.6 3.4* 4.3 4.0   CL 97 97 100 100   CO2 26 29 29 27   BUN 78* 72* 58* 57*   CREATININE 1.7* 1.9* 1.8* 1.8*   GLU 81 86 103 94   CALCIUM 8.8 8.9 8.6* 8.7   MG 2.5 2.4  --  2.0   PHOS 5.0* 4.2  --  3.1     Recent Labs   Lab 04/11/19  1142   ALKPHOS 122   ALT 35   AST 34   ALBUMIN 2.4*   PROT 5.2*   BILITOT 0.5            Procedures: none    Consultants:  Consults  (From admission, onward)        Status Ordering Provider     Inpatient consult to Cardiology  Once     Provider:  (Not yet assigned)    Completed DANIELA NICOLE     Inpatient consult to Gastroenterology  Once     Provider:  (Not yet assigned)    Completed DANIELA NICOLE          Discharge Medication List as of 4/15/2019 12:42 PM      START taking these medications    Details   bumetanide (BUMEX) 1 MG tablet Take 1 tablet (1 mg total) by mouth once daily., Starting Tue 4/16/2019, Until Wed 4/15/2020, Normal      isosorbide-hydrALAZINE 20-37.5 mg (BIDIL) 20-37.5 mg Tab Take 1 tablet by mouth 2 (two) times daily., Starting Mon 4/15/2019, Until Tue 4/14/2020, Normal         CONTINUE these medications which have CHANGED    Details   omeprazole (PRILOSEC) 20 MG capsule Take 1 capsule (20 mg total) by mouth once daily., Starting Mon 4/15/2019, Until Wed 5/15/2019, Normal         CONTINUE these medications which have NOT CHANGED    Details   acetaminophen (TYLENOL ARTHRITIS) 650 MG TbSR Take 650 mg by mouth nightly as needed (pain). , Historical Med      albuterol (PROVENTIL) 2.5 mg /3 mL (0.083 %) nebulizer solution Take 3 mLs (2.5 mg total) by nebulization every 4 (four) hours as needed for Wheezing or Shortness of Breath (chest tightness)., Starting Mon 1/7/2019, Normal      amLODIPine (NORVASC) 10 MG tablet TAKE 1 TABLET EVERY DAY, Normal      atorvastatin (LIPITOR) 20 MG tablet TAKE 1 TABLET EVERY DAY, Normal      calcium carbonate/vitamin D3 (CALTRATE 600 + D ORAL) Take by mouth once daily., Historical Med      chlorpheniramine maleate (CHLOR-TRIMETON REPETABS ORAL) Take 1 tablet by mouth daily as needed (hayfever). , Historical Med      FLAXSEED-OMEGA3,6,9-FATTY ACID ORAL Take 1 capsule by mouth once daily.  , Until Discontinued, Historical Med      gabapentin (NEURONTIN) 100 MG capsule Take 2 capsules (200 mg total) by mouth 2 (two) times daily., Starting Mon 1/14/2019, Until Tue 1/14/2020, Normal      labetalol  (NORMODYNE) 300 MG tablet Take 1 tablet (300 mg total) by mouth 2 (two) times daily., Starting Mon 1/14/2019, Until Tue 1/14/2020, Normal      levothyroxine (SYNTHROID) 75 MCG tablet TAKE 1 TABLET EVERY DAY, Normal      magnesium 250 mg Tab Take 1 tablet by mouth once daily.  , Until Discontinued, Historical Med      OPTICHAMBER DARREN LG MASK Spcr U UTD, Historical Med      traMADol (ULTRAM) 50 mg tablet Take 1 tablet (50 mg total) by mouth every 12 (twelve) hours as needed for Pain., Starting Mon 1/14/2019, Print      triamcinolone acetonide 0.1% (KENALOG) 0.1 % cream APPLY TO THE AFFECTED AREA OF lower legs TWICE DAILY, Normal         STOP taking these medications       apixaban (ELIQUIS) 5 mg (74 tabs) DsPk Comments:   Reason for Stopping:         furosemide (LASIX) 20 MG tablet Comments:   Reason for Stopping:         hydroCHLOROthiazide (HYDRODIURIL) 25 MG tablet Comments:   Reason for Stopping:         losartan (COZAAR) 100 MG tablet Comments:   Reason for Stopping:               Discharge Diet:cardiac diet, fluid restriction: 1500 mL and 2 gram sodium diet     Activity: activity as tolerated    Discharge Condition: Good    Disposition: Home-Health Care Svc    Tests pending at the time of discharge: none      Time spent  on the discharge of the patient including review of hospital course with the patient. reviewing discharge medications and arranging follow-up care 35 minutes    Discharge examination Patient was seen and examined on the date of discharge and determined to be suitable for discharge.    Discharge plan and follow up:  PCP    Future Appointments   Date Time Provider Department Center   5/1/2019  2:20 PM Jairo Schmidt MD [facility]       Provider  Jazmyn Mathews MD  Staff Hospitalist   [facility] [id number]      I personally scribed for Jazmyn Mathews MD on 04/15/2019 at 2:48 PM. Electronically signed by nick Juares on 04/15/2019 at 2:48 PM

## 2019-04-29 ENCOUNTER — PATIENT MESSAGE (OUTPATIENT)
Dept: CARDIOLOGY | Facility: CLINIC | Age: 84
End: 2019-04-29

## 2019-04-29 ENCOUNTER — PATIENT MESSAGE (OUTPATIENT)
Dept: FAMILY MEDICINE | Facility: CLINIC | Age: 84
End: 2019-04-29

## 2019-04-29 DIAGNOSIS — I50.42 CHRONIC COMBINED SYSTOLIC AND DIASTOLIC HEART FAILURE: Primary | ICD-10-CM

## 2019-04-30 ENCOUNTER — LAB VISIT (OUTPATIENT)
Dept: LAB | Facility: HOSPITAL | Age: 84
End: 2019-04-30
Attending: INTERNAL MEDICINE
Payer: MEDICARE

## 2019-04-30 DIAGNOSIS — I50.42 CHRONIC COMBINED SYSTOLIC AND DIASTOLIC HEART FAILURE: Primary | ICD-10-CM

## 2019-04-30 LAB
ALBUMIN SERPL BCP-MCNC: 2.6 G/DL (ref 3.5–5.2)
ALP SERPL-CCNC: 235 U/L (ref 55–135)
ALT SERPL W/O P-5'-P-CCNC: 53 U/L (ref 10–44)
ANION GAP SERPL CALC-SCNC: 12 MMOL/L (ref 8–16)
AST SERPL-CCNC: 53 U/L (ref 10–40)
BILIRUB SERPL-MCNC: 0.7 MG/DL (ref 0.1–1)
BNP SERPL-MCNC: 660 PG/ML (ref 0–99)
BUN SERPL-MCNC: 59 MG/DL (ref 8–23)
CALCIUM SERPL-MCNC: 8.7 MG/DL (ref 8.7–10.5)
CHLORIDE SERPL-SCNC: 88 MMOL/L (ref 95–110)
CO2 SERPL-SCNC: 32 MMOL/L (ref 23–29)
CREAT SERPL-MCNC: 1.9 MG/DL (ref 0.5–1.4)
EST. GFR  (AFRICAN AMERICAN): 26.6 ML/MIN/1.73 M^2
EST. GFR  (NON AFRICAN AMERICAN): 23 ML/MIN/1.73 M^2
GLUCOSE SERPL-MCNC: 126 MG/DL (ref 70–110)
POTASSIUM SERPL-SCNC: 3.3 MMOL/L (ref 3.5–5.1)
PROT SERPL-MCNC: 5 G/DL (ref 6–8.4)
SODIUM SERPL-SCNC: 132 MMOL/L (ref 136–145)

## 2019-04-30 PROCEDURE — 83880 ASSAY OF NATRIURETIC PEPTIDE: CPT | Mod: HCNC

## 2019-04-30 PROCEDURE — 80053 COMPREHEN METABOLIC PANEL: CPT | Mod: HCNC

## 2019-05-01 ENCOUNTER — OFFICE VISIT (OUTPATIENT)
Dept: FAMILY MEDICINE | Facility: CLINIC | Age: 84
End: 2019-05-01
Payer: MEDICARE

## 2019-05-01 ENCOUNTER — PATIENT MESSAGE (OUTPATIENT)
Dept: CARDIOLOGY | Facility: CLINIC | Age: 84
End: 2019-05-01

## 2019-05-01 VITALS
TEMPERATURE: 98 F | BODY MASS INDEX: 22.02 KG/M2 | OXYGEN SATURATION: 95 % | DIASTOLIC BLOOD PRESSURE: 60 MMHG | SYSTOLIC BLOOD PRESSURE: 112 MMHG | HEART RATE: 55 BPM | HEIGHT: 64 IN | WEIGHT: 129 LBS

## 2019-05-01 DIAGNOSIS — R53.81 DEBILITY: ICD-10-CM

## 2019-05-01 DIAGNOSIS — I50.42 CHRONIC COMBINED SYSTOLIC AND DIASTOLIC HEART FAILURE: Primary | ICD-10-CM

## 2019-05-01 DIAGNOSIS — I50.33 ACUTE ON CHRONIC DIASTOLIC HEART FAILURE: Primary | ICD-10-CM

## 2019-05-01 DIAGNOSIS — R10.30 LOWER ABDOMINAL PAIN: ICD-10-CM

## 2019-05-01 DIAGNOSIS — I31.39 PERICARDIAL EFFUSION: ICD-10-CM

## 2019-05-01 DIAGNOSIS — E03.9 HYPOTHYROIDISM (ACQUIRED): ICD-10-CM

## 2019-05-01 DIAGNOSIS — N18.30 CKD (CHRONIC KIDNEY DISEASE) STAGE 3, GFR 30-59 ML/MIN: ICD-10-CM

## 2019-05-01 DIAGNOSIS — I10 ESSENTIAL HYPERTENSION: ICD-10-CM

## 2019-05-01 DIAGNOSIS — D64.9 ANEMIA, UNSPECIFIED TYPE: ICD-10-CM

## 2019-05-01 PROCEDURE — 99215 OFFICE O/P EST HI 40 MIN: CPT | Mod: HCNC,S$GLB,, | Performed by: INTERNAL MEDICINE

## 2019-05-01 PROCEDURE — 1101F PR PT FALLS ASSESS DOC 0-1 FALLS W/OUT INJ PAST YR: ICD-10-PCS | Mod: HCNC,CPTII,S$GLB, | Performed by: INTERNAL MEDICINE

## 2019-05-01 PROCEDURE — 99999 PR PBB SHADOW E&M-EST. PATIENT-LVL IV: ICD-10-PCS | Mod: PBBFAC,HCNC,, | Performed by: INTERNAL MEDICINE

## 2019-05-01 PROCEDURE — 1101F PT FALLS ASSESS-DOCD LE1/YR: CPT | Mod: HCNC,CPTII,S$GLB, | Performed by: INTERNAL MEDICINE

## 2019-05-01 PROCEDURE — 99215 PR OFFICE/OUTPT VISIT, EST, LEVL V, 40-54 MIN: ICD-10-PCS | Mod: HCNC,S$GLB,, | Performed by: INTERNAL MEDICINE

## 2019-05-01 PROCEDURE — 99999 PR PBB SHADOW E&M-EST. PATIENT-LVL IV: CPT | Mod: PBBFAC,HCNC,, | Performed by: INTERNAL MEDICINE

## 2019-05-01 PROCEDURE — 99499 UNLISTED E&M SERVICE: CPT | Mod: S$GLB,,, | Performed by: INTERNAL MEDICINE

## 2019-05-01 PROCEDURE — 99499 RISK ADDL DX/OHS AUDIT: ICD-10-PCS | Mod: S$GLB,,, | Performed by: INTERNAL MEDICINE

## 2019-05-01 RX ORDER — NITROFURANTOIN 25; 75 MG/1; MG/1
100 CAPSULE ORAL 2 TIMES DAILY
Qty: 10 CAPSULE | Refills: 0 | Status: SHIPPED | OUTPATIENT
Start: 2019-05-01 | End: 2019-05-06

## 2019-05-01 RX ORDER — POTASSIUM CHLORIDE 750 MG/1
10 TABLET, EXTENDED RELEASE ORAL DAILY
Qty: 30 TABLET | Refills: 11 | Status: SHIPPED | OUTPATIENT
Start: 2019-05-01 | End: 2019-05-03 | Stop reason: SDUPTHER

## 2019-05-01 NOTE — PROGRESS NOTES
Chief complaint:  Follow-up from the hospital    89-year-old female whose PCP retired.  . here with her son who is a patient of mine.   Long discussion today regarding interval events.  She did see Cardiology who increased her diuretics significantly.  She did lose weight and is breathing okay.  Sometimes after taking her morning medication she gets lightheaded.  We discussed checking blood pressure and possibly taking her medications for blood pressure separately.  This morning it was 157 over the 60s before taking medications with home health.  Today it is 112.  Her pulse is 55.  Today she also started with some lower abdominal pain which is described as a squeezing sensation which is somewhat relieved with urination but she has no other obvious UTI symptoms.  We discussed checking urine and taking Macrobid empirically are definitely if urine looks like a UTI.  They did receive word after today's blood work reviewed to reduce the Bumex.  They will do so.  Son is keeping up with all the medication changes.  I did encourage her to follow up with Cardiology since she does have the pericardial effusion it needs to be followed.  Blood work from yesterday reviewed which was done by home health.  I gave him written prescription to have a CBC iron and ferritin added with the next blood work in a week.  She does not clinically appear to be more anemic or she having any further anemia symptoms so I do not think we need to send her down for blood work today and she would prefer not to.  We did discuss that palliative home health is now in place we discussed this at length and all are in agreement that it is inappropriate decision at this point.  Our conversation regarding all these detailed issues with extensive today.Total time over 45 minutes with over 50% counseling.        Pericardial effusion large to then moderate on last echo 1/21/19 -still there 4/14/19 echo    Cards plan 4/24  Acute on chronic diastolic (congestive)  "heart failure  Discussed low sodium diet, 1.5L fluid restriction, and daily weights  Give lasix 80mg IV x1 in the clinic today   Change to bumex 2mg BID  Add metolazone 5mg daily x3 days with KCl supplement daily  Instructed her to call me in the upcoming days with an update      ROS:   CONST: weight stable. EYES: no vision change. ENT: no sore throat. CV: no chest pain w/ exertion. RESP: no shortness of breath. GI: no nausea, vomiting, diarrhea. No dysphagia. : no urinary issues. MUSCULOSKELETAL: no new myalgias or arthralgias. SKIN: no new changes. NEURO: no focal deficits. PSYCH: no new issues. ENDOCRINE: no polyuria. HEME: no lymph nodes. ALLERGY: no general pruritis.      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      Arthritis     ASCVD (arteriosclerotic cardiovascular disease) 7/8/2014    Atherosclerosis of aorta 1/7/2016    "There is atherosclerosis of the thoracic aorta" - Xray Chest 7-     Back pain     Basal cell carcinoma 10/7/2013    Right nasal columellar, right lower eyelid, left medial eyelid    Basal cell carcinoma 2/2015    mid back    Chronic diastolic heart failure 11/11/2014    Coronary artery disease     Gastric ulcer     Fosamax related.    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 suspicious lesion on the scalp that proved to be SCC with perineural invasion. She then manifest a posterior cervical lymph node that was actually " metastatic cutaneous SCC. She declined a neck dissection, opting instead for RT, which she completed in January of 2015    Ulcer    CHF w nl EF 2019  DVT 2019    Past Surgical History:   Procedure Laterality Date    ADJACENT TISSUE TRANSFER/REARRANGEMENT N/A 5/14/2014    Performed by [facility] at [facility] 2ND FLR    APPENDECTOMY  8.14.2015    mucinous cystadenoma with low grade dysplasia.     APPENDECTOMY-LAPAROSCOPIC N/A 8/14/2015    Performed by Feroz Corado [facility] at [facility] OR 2ND FLR    BIOPSY-LYMPH NODE Right 10/27/2014    Performed by [facility]os [facility] at [facility] OR 2ND FLR    CATARACT EXTRACTION      ou dr morales    EGD (ESOPHAGOGASTRODUODENOSCOPY) N/A 4/15/2019    Performed by Joni Montoya MD at [facility] ENDO (2ND FLR)    EYE SURGERY      HYSTERECTOMY  1970     NIC/BSO. took HRT immediatiely after wards.     LAPAROSCOPY-DIAGNOSTIC  8/14/2015    Performed by Gianfranco Crenshaw [facility] at [facility] OR 2ND FLR    LYMPH NODE BIOPSY Right 10/27/2014    posterior triangle    Mohs excision of scalp SCC N/A 5/13/2014    Scalp flap N/A 5/14/204    posterior advancement-rotation    SKIN CANCER EXCISION      THYROIDECTOMY  1960's    hyperthyroidism    TONSILLECTOMY       Social History     Social History    Marital status: ---lives alone and still drives     Spouse name: N/A    Number of children: N/A    Years of education: N/A     Occupational History    Homemaker      Social History Main Topics    Smoking status: Former Smoker    Smokeless tobacco: Never Used      Comment: Quit 35 years ago    Alcohol use No    Drug use: No    Sexual activity: No     Other Topics Concern    Are You Pregnant Or Think You May Be? No    Breast-Feeding No     Social History Narrative    She is active and independent.      family history includes thyroid Cancer in her sister; Clotting disorder in her father; Eczema in her mother; Heart attack in her mother; Heart disease in her mother; Heart failure in her  father and mother; Hypertension in her father and mother; No Known Problems in her brother, maternal aunt, maternal grandfather, maternal grandmother, maternal uncle, paternal aunt, paternal grandfather, paternal grandmother, and paternal uncle; Thyroid disease in her sister.       Gen: no distress  EYES: conjunctiva clear, non-icteric, PERRL  ENT: nose clear, nasal mucosa normal, oropharynx clear and moist, teeth good  NECK:supple, thyroid non-palpable  RESP: effort is good, lungs clear  CV: heart RRR w/o murmur, gallops or rubs; bilateral carotid bruits, no edema  GI: abdomen soft, non-distended, non-tender, no hepatosplenomegaly no abdominal pulsations or reproducible tenderness today  MS: gait normal, no clubbing or cyanosis of the digits  SKIN: no rashes, warm to touch      Kristin was seen today for transitional care and abdominal pain.    Diagnoses and all orders for this visit:    Acute on chronic diastolic heart failure, more compensated today, follow directions of diuretic management by Cardiology and agree with repeating blood work.  We discussed potassium supplementation in the diet and she will continue the low-dose 10 mEq potassium supplement.    CKD (chronic kidney disease) stage 3, GFR 30-59 ml/min, about the same with creatinine rise to 1.9 which as expected with the recent increase in diuretics.  Will have to accept a certain degree of acute renal failure in the treatment of her CHF    Essential hypertension, patient needs to check when having lightheadedness to assess if indeed blood pressure medications are overly effective, monitor pulse    Anemia, unspecified type, reassess with next labs  -     Iron and TIBC; Future  -     Ferritin; Future  -     CBC auto differential; Future    Debility    Pericardial effusion, apparently this is a new issue needs to be followed by Cardiology    Hypothyroidism (acquired)        Lower abdominal pain, squeezing sensation over the bladder area relieved with  "urination so suspect this may be bladder spasm possibly related to UTI.  Check urinalysis but likely treat with Macrobid  -     Urinalysis; Future  -     Urine culture; Future    Other orders  -     Cancel: Iron and TIBC; Future  -     Cancel: Ferritin; Future  -     Cancel: Comprehensive metabolic panel; Future  -     Cancel: Brain natriuretic peptide; Future  -     Cancel: CBC auto differential; Future  -     Cancel: Vitamin B12; Future  -     nitrofurantoin, macrocrystal-monohydrate, (MACROBID) 100 MG capsule; Take 1 capsule (100 mg total) by mouth 2 (two) times daily. for 5 days            clinical note will be sensitive since patient herself it is not the one with direct access."This note will not be shared with the patient."  "

## 2019-05-02 ENCOUNTER — PATIENT MESSAGE (OUTPATIENT)
Dept: FAMILY MEDICINE | Facility: CLINIC | Age: 84
End: 2019-05-02

## 2019-05-02 ENCOUNTER — LAB VISIT (OUTPATIENT)
Dept: LAB | Facility: HOSPITAL | Age: 84
End: 2019-05-02
Attending: INTERNAL MEDICINE
Payer: MEDICARE

## 2019-05-02 DIAGNOSIS — R10.30 LOWER ABDOMINAL PAIN: ICD-10-CM

## 2019-05-02 LAB
AMORPH CRY UR QL COMP ASSIST: ABNORMAL
BACTERIA #/AREA URNS AUTO: ABNORMAL /HPF
BILIRUB UR QL STRIP: NEGATIVE
CLARITY UR REFRACT.AUTO: ABNORMAL
COLOR UR AUTO: YELLOW
GLUCOSE UR QL STRIP: ABNORMAL
HGB UR QL STRIP: NEGATIVE
HYALINE CASTS UR QL AUTO: 1 /LPF
KETONES UR QL STRIP: NEGATIVE
LEUKOCYTE ESTERASE UR QL STRIP: NEGATIVE
MICROSCOPIC COMMENT: ABNORMAL
NITRITE UR QL STRIP: NEGATIVE
NON-SQ EPI CELLS #/AREA URNS AUTO: <1 /HPF
PH UR STRIP: 6 [PH] (ref 5–8)
PROT UR QL STRIP: ABNORMAL
RBC #/AREA URNS AUTO: 1 /HPF (ref 0–4)
SP GR UR STRIP: 1.01 (ref 1–1.03)
URN SPEC COLLECT METH UR: ABNORMAL
WBC #/AREA URNS AUTO: 2 /HPF (ref 0–5)

## 2019-05-02 PROCEDURE — 81001 URINALYSIS AUTO W/SCOPE: CPT | Mod: HCNC

## 2019-05-02 PROCEDURE — 87086 URINE CULTURE/COLONY COUNT: CPT | Mod: HCNC

## 2019-05-03 ENCOUNTER — TELEPHONE (OUTPATIENT)
Dept: CARDIOLOGY | Facility: CLINIC | Age: 84
End: 2019-05-03

## 2019-05-03 ENCOUNTER — TELEPHONE (OUTPATIENT)
Dept: FAMILY MEDICINE | Facility: CLINIC | Age: 84
End: 2019-05-03

## 2019-05-03 DIAGNOSIS — I50.42 CHRONIC COMBINED SYSTOLIC AND DIASTOLIC HEART FAILURE: Primary | ICD-10-CM

## 2019-05-03 DIAGNOSIS — E78.2 MIXED HYPERLIPIDEMIA: ICD-10-CM

## 2019-05-03 DIAGNOSIS — I50.32 CHRONIC DIASTOLIC HEART FAILURE: ICD-10-CM

## 2019-05-03 DIAGNOSIS — I13.0 HYPERTENSIVE HEART AND KIDNEY DISEASE WITH HF AND WITH CKD STAGE I: ICD-10-CM

## 2019-05-03 DIAGNOSIS — I65.23 BILATERAL CAROTID ARTERY STENOSIS: ICD-10-CM

## 2019-05-03 DIAGNOSIS — I10 ESSENTIAL HYPERTENSION: ICD-10-CM

## 2019-05-03 DIAGNOSIS — I50.33 ACUTE ON CHRONIC DIASTOLIC (CONGESTIVE) HEART FAILURE: ICD-10-CM

## 2019-05-03 DIAGNOSIS — I82.509 CHRONIC DEEP VEIN THROMBOSIS (DVT) OF LOWER EXTREMITY, UNSPECIFIED LATERALITY, UNSPECIFIED VEIN: ICD-10-CM

## 2019-05-03 DIAGNOSIS — I25.10 ASCVD (ARTERIOSCLEROTIC CARDIOVASCULAR DISEASE): ICD-10-CM

## 2019-05-03 DIAGNOSIS — I82.452 ACUTE DEEP VEIN THROMBOSIS (DVT) OF LEFT PERONEAL VEIN: ICD-10-CM

## 2019-05-03 DIAGNOSIS — I70.0 ATHEROSCLEROSIS OF AORTA: ICD-10-CM

## 2019-05-03 DIAGNOSIS — N18.1 HYPERTENSIVE HEART AND KIDNEY DISEASE WITH HF AND WITH CKD STAGE I: ICD-10-CM

## 2019-05-03 DIAGNOSIS — I50.33 ACUTE ON CHRONIC DIASTOLIC HEART FAILURE: ICD-10-CM

## 2019-05-03 DIAGNOSIS — I35.1 NONRHEUMATIC AORTIC VALVE INSUFFICIENCY: ICD-10-CM

## 2019-05-03 LAB — BACTERIA UR CULT: NORMAL

## 2019-05-03 RX ORDER — BUMETANIDE 2 MG/1
2 TABLET ORAL 2 TIMES DAILY
Qty: 180 TABLET | Refills: 3 | Status: SHIPPED | OUTPATIENT
Start: 2019-05-03 | End: 2020-05-02

## 2019-05-03 RX ORDER — POTASSIUM CHLORIDE 750 MG/1
10 TABLET, EXTENDED RELEASE ORAL DAILY
Qty: 90 TABLET | Refills: 3 | Status: SHIPPED | OUTPATIENT
Start: 2019-05-03

## 2019-05-03 RX ORDER — ISOSORBIDE DINITRATE AND HYDRALAZINE HYDROCHLORIDE 37.5; 2 MG/1; MG/1
1 TABLET ORAL 2 TIMES DAILY
Qty: 180 TABLET | Refills: 3 | Status: SHIPPED | OUTPATIENT
Start: 2019-05-03 | End: 2019-05-09

## 2019-05-03 NOTE — TELEPHONE ENCOUNTER
----- Message from Diana Hager sent at 5/2/2019  4:28 PM CDT -----  Contact: pt  [phone]  .Type:  Test Results    Who Called: pt    Name of Test (Lab/Mammo/Etc): lab    Date of Test: 5/2/2019    [facility]    Would the patient rather a call back or a response via [facility]? Call back    Best Call Back Number: [phone]    Additional Information:  Pt would a call back today

## 2019-05-03 NOTE — TELEPHONE ENCOUNTER
----- Message from Yue Gardiner MA sent at 5/3/2019  3:29 PM CDT -----  Contact: patient called  The patient would like her medication to be sent to [facility]. Please call [phone]. Thank you.

## 2019-05-03 NOTE — TELEPHONE ENCOUNTER
"Pt states she is not having any symptoms at this time. Informed of my chart message results sent "Thanks for sending a message.  The urine looks fairly clear.  No real signs of inflammation but there were some bacteria.  If having symptoms, might be good to just take the antibiotic and assess response but the good thing is it does not look like a roaring infection if at all. " verbalized understanding.  "

## 2019-05-06 ENCOUNTER — PATIENT MESSAGE (OUTPATIENT)
Dept: CARDIOLOGY | Facility: CLINIC | Age: 84
End: 2019-05-06

## 2019-05-07 ENCOUNTER — LAB VISIT (OUTPATIENT)
Dept: LAB | Facility: HOSPITAL | Age: 84
End: 2019-05-07
Attending: FAMILY MEDICINE
Payer: MEDICARE

## 2019-05-07 DIAGNOSIS — D64.9 ANEMIA, UNSPECIFIED: Primary | ICD-10-CM

## 2019-05-07 LAB
ALBUMIN SERPL BCP-MCNC: 2.6 G/DL (ref 3.5–5.2)
ALP SERPL-CCNC: 254 U/L (ref 55–135)
ALT SERPL W/O P-5'-P-CCNC: 46 U/L (ref 10–44)
ANION GAP SERPL CALC-SCNC: 12 MMOL/L (ref 8–16)
AST SERPL-CCNC: 56 U/L (ref 10–40)
BASOPHILS # BLD AUTO: 0.06 K/UL (ref 0–0.2)
BASOPHILS NFR BLD: 0.7 % (ref 0–1.9)
BILIRUB SERPL-MCNC: 0.6 MG/DL (ref 0.1–1)
BNP SERPL-MCNC: 652 PG/ML (ref 0–99)
BUN SERPL-MCNC: 55 MG/DL (ref 8–23)
CALCIUM SERPL-MCNC: 8.8 MG/DL (ref 8.7–10.5)
CHLORIDE SERPL-SCNC: 93 MMOL/L (ref 95–110)
CO2 SERPL-SCNC: 27 MMOL/L (ref 23–29)
CREAT SERPL-MCNC: 1.7 MG/DL (ref 0.5–1.4)
DIFFERENTIAL METHOD: ABNORMAL
EOSINOPHIL # BLD AUTO: 0.6 K/UL (ref 0–0.5)
EOSINOPHIL NFR BLD: 6.6 % (ref 0–8)
ERYTHROCYTE [DISTWIDTH] IN BLOOD BY AUTOMATED COUNT: 15.4 % (ref 11.5–14.5)
EST. GFR  (AFRICAN AMERICAN): 30.4 ML/MIN/1.73 M^2
EST. GFR  (NON AFRICAN AMERICAN): 26.4 ML/MIN/1.73 M^2
FERRITIN SERPL-MCNC: 151 NG/ML (ref 20–300)
GLUCOSE SERPL-MCNC: 103 MG/DL (ref 70–110)
HCT VFR BLD AUTO: 27.5 % (ref 37–48.5)
HGB BLD-MCNC: 8.5 G/DL (ref 12–16)
IMM GRANULOCYTES # BLD AUTO: 0.08 K/UL (ref 0–0.04)
IMM GRANULOCYTES NFR BLD AUTO: 0.9 % (ref 0–0.5)
IRON SERPL-MCNC: 23 UG/DL (ref 30–160)
LYMPHOCYTES # BLD AUTO: 0.8 K/UL (ref 1–4.8)
LYMPHOCYTES NFR BLD: 8.7 % (ref 18–48)
MCH RBC QN AUTO: 27.2 PG (ref 27–31)
MCHC RBC AUTO-ENTMCNC: 30.9 G/DL (ref 32–36)
MCV RBC AUTO: 88 FL (ref 82–98)
MONOCYTES # BLD AUTO: 0.8 K/UL (ref 0.3–1)
MONOCYTES NFR BLD: 8.8 % (ref 4–15)
NEUTROPHILS # BLD AUTO: 6.6 K/UL (ref 1.8–7.7)
NEUTROPHILS NFR BLD: 74.3 % (ref 38–73)
NRBC BLD-RTO: 0 /100 WBC
PLATELET # BLD AUTO: 229 K/UL (ref 150–350)
PMV BLD AUTO: 11.5 FL (ref 9.2–12.9)
POTASSIUM SERPL-SCNC: 3.8 MMOL/L (ref 3.5–5.1)
PROT SERPL-MCNC: 5.7 G/DL (ref 6–8.4)
RBC # BLD AUTO: 3.13 M/UL (ref 4–5.4)
SATURATED IRON: 7 % (ref 20–50)
SODIUM SERPL-SCNC: 132 MMOL/L (ref 136–145)
TOTAL IRON BINDING CAPACITY: 339 UG/DL (ref 250–450)
TRANSFERRIN SERPL-MCNC: 229 MG/DL (ref 200–375)
WBC # BLD AUTO: 8.82 K/UL (ref 3.9–12.7)

## 2019-05-07 PROCEDURE — 83540 ASSAY OF IRON: CPT | Mod: HCNC

## 2019-05-07 PROCEDURE — 85025 COMPLETE CBC W/AUTO DIFF WBC: CPT | Mod: HCNC

## 2019-05-07 PROCEDURE — 83880 ASSAY OF NATRIURETIC PEPTIDE: CPT | Mod: HCNC

## 2019-05-07 PROCEDURE — 82728 ASSAY OF FERRITIN: CPT | Mod: HCNC

## 2019-05-07 PROCEDURE — 80053 COMPREHEN METABOLIC PANEL: CPT | Mod: HCNC

## 2019-05-08 ENCOUNTER — TELEPHONE (OUTPATIENT)
Dept: CARDIOLOGY | Facility: CLINIC | Age: 84
End: 2019-05-08

## 2019-05-08 DIAGNOSIS — R74.8 ABNORMAL LIVER ENZYMES: Primary | ICD-10-CM

## 2019-05-08 NOTE — TELEPHONE ENCOUNTER
----- Message from Lenka Triana MD sent at 5/8/2019 10:25 AM CDT -----  Blood work is still abnormal. I am ot sure if all of this is related to the heart problems. Because of abnormal LFT's ( could be congestion?) let's schedule abdominal ultrasound before she sees me.  ----- Message -----  From: Victoria Stover MA  Sent: 5/8/2019   8:14 AM  To: Lenka Triana MD    You wanted to see pt's CMP before her upcoming appointment w/you. Dr. Bautista ordered these labs.   Thanks,   Victoria Stover MA

## 2019-05-08 NOTE — TELEPHONE ENCOUNTER
Pt's son, Ty notified. Ty verbalized understanding. Ty scheduled abdominal ultrasound for 5/13/19.

## 2019-05-09 ENCOUNTER — OFFICE VISIT (OUTPATIENT)
Dept: CARDIOLOGY | Facility: CLINIC | Age: 84
End: 2019-05-09
Payer: MEDICARE

## 2019-05-09 VITALS
SYSTOLIC BLOOD PRESSURE: 160 MMHG | HEIGHT: 64 IN | DIASTOLIC BLOOD PRESSURE: 60 MMHG | OXYGEN SATURATION: 95 % | WEIGHT: 129 LBS | HEART RATE: 65 BPM | BODY MASS INDEX: 22.02 KG/M2

## 2019-05-09 DIAGNOSIS — E78.2 MIXED HYPERLIPIDEMIA: ICD-10-CM

## 2019-05-09 DIAGNOSIS — I82.509 CHRONIC DEEP VEIN THROMBOSIS (DVT) OF LOWER EXTREMITY, UNSPECIFIED LATERALITY, UNSPECIFIED VEIN: ICD-10-CM

## 2019-05-09 DIAGNOSIS — I35.1 NONRHEUMATIC AORTIC VALVE INSUFFICIENCY: ICD-10-CM

## 2019-05-09 DIAGNOSIS — I13.0 HYPERTENSIVE HEART AND KIDNEY DISEASE WITH HF AND WITH CKD STAGE I: ICD-10-CM

## 2019-05-09 DIAGNOSIS — I10 ESSENTIAL HYPERTENSION: ICD-10-CM

## 2019-05-09 DIAGNOSIS — I50.33 ACUTE ON CHRONIC DIASTOLIC HEART FAILURE: ICD-10-CM

## 2019-05-09 DIAGNOSIS — I50.33 ACUTE ON CHRONIC DIASTOLIC (CONGESTIVE) HEART FAILURE: ICD-10-CM

## 2019-05-09 DIAGNOSIS — I50.42 CHRONIC COMBINED SYSTOLIC AND DIASTOLIC HEART FAILURE: ICD-10-CM

## 2019-05-09 DIAGNOSIS — I82.452 ACUTE DEEP VEIN THROMBOSIS (DVT) OF LEFT PERONEAL VEIN: ICD-10-CM

## 2019-05-09 DIAGNOSIS — I70.0 ATHEROSCLEROSIS OF AORTA: ICD-10-CM

## 2019-05-09 DIAGNOSIS — I65.23 BILATERAL CAROTID ARTERY STENOSIS: ICD-10-CM

## 2019-05-09 DIAGNOSIS — I50.32 CHRONIC DIASTOLIC HEART FAILURE: ICD-10-CM

## 2019-05-09 DIAGNOSIS — I25.10 ASCVD (ARTERIOSCLEROTIC CARDIOVASCULAR DISEASE): ICD-10-CM

## 2019-05-09 DIAGNOSIS — N18.1 HYPERTENSIVE HEART AND KIDNEY DISEASE WITH HF AND WITH CKD STAGE I: ICD-10-CM

## 2019-05-09 PROCEDURE — 1101F PT FALLS ASSESS-DOCD LE1/YR: CPT | Mod: HCNC,CPTII,S$GLB, | Performed by: INTERNAL MEDICINE

## 2019-05-09 PROCEDURE — 99999 PR PBB SHADOW E&M-EST. PATIENT-LVL IV: ICD-10-PCS | Mod: PBBFAC,HCNC,, | Performed by: INTERNAL MEDICINE

## 2019-05-09 PROCEDURE — 1101F PR PT FALLS ASSESS DOC 0-1 FALLS W/OUT INJ PAST YR: ICD-10-PCS | Mod: HCNC,CPTII,S$GLB, | Performed by: INTERNAL MEDICINE

## 2019-05-09 PROCEDURE — 99213 PR OFFICE/OUTPT VISIT, EST, LEVL III, 20-29 MIN: ICD-10-PCS | Mod: HCNC,25,S$GLB, | Performed by: INTERNAL MEDICINE

## 2019-05-09 PROCEDURE — 99213 OFFICE O/P EST LOW 20 MIN: CPT | Mod: HCNC,25,S$GLB, | Performed by: INTERNAL MEDICINE

## 2019-05-09 PROCEDURE — 96372 THER/PROPH/DIAG INJ SC/IM: CPT | Mod: HCNC,S$GLB,, | Performed by: INTERNAL MEDICINE

## 2019-05-09 PROCEDURE — 96372 PR INJECTION,THERAP/PROPH/DIAG2ST, IM OR SUBCUT: ICD-10-PCS | Mod: HCNC,S$GLB,, | Performed by: INTERNAL MEDICINE

## 2019-05-09 PROCEDURE — 99999 PR PBB SHADOW E&M-EST. PATIENT-LVL IV: CPT | Mod: PBBFAC,HCNC,, | Performed by: INTERNAL MEDICINE

## 2019-05-09 RX ORDER — ISOSORBIDE DINITRATE AND HYDRALAZINE HYDROCHLORIDE 37.5; 2 MG/1; MG/1
2 TABLET ORAL 2 TIMES DAILY
Qty: 120 TABLET | Refills: 11
Start: 2019-05-09 | End: 2020-05-08

## 2019-05-09 RX ORDER — FUROSEMIDE 10 MG/ML
80 INJECTION INTRAMUSCULAR; INTRAVENOUS
Status: COMPLETED | OUTPATIENT
Start: 2019-05-09 | End: 2019-05-09

## 2019-05-09 RX ADMIN — FUROSEMIDE 80 MG: 10 INJECTION INTRAMUSCULAR; INTRAVENOUS at 04:05

## 2019-05-09 NOTE — PROGRESS NOTES
Cardiology Clinic Note  Reason for Visit: CHF    HPI:     Kristin Quintanilla is a 89 y.o. F with HTN, HLD, HFpEF, non-obstructive CAD, who presents for follow up regarding heart failure. She is usually followed by Dr Triana, last seen 4/3/19.    At our last visit, she reported that her weight was 8lb following a hospitalization for CHF. Her baseline weight is 122lb, last visit was 130lb. She had orthopnea, PND, cough, and lower extremity edema. She was given lasix 80mg IV x1, continued on bumex 2mg BID, and given metolazone 5mg qd x4 days. This resulted in weight loss and improvement in symptoms. Her bumex was then changed to 2mg once daily. Her weight then increased and symptoms returned. Bumex was increased to 2mg BID but was not effective, so metolazone was restarted with weight drop of only 1lb after three days of its use. She reports having some meals from outside places that are not low sodium, but she does watch her diet/sodium intake. Her BP remains uncontrolled.    Medical:  HTN, HLD, HFpEF, non-obstructive CAD (40% LCx in 2004, no PCI), L peroneal vein DVT (resolved by imaging, completed 3 months of eliquis), chronic pericardial effusion, aortic atherosclerosis, bilateral carotid artery stenosis, hypothyroidism, spinal stenosis    ROS:    Constitution: Negative for fever or chills.  HENT: Negative for  headaches.  Eyes: Negative for blurred vision.   Cardiovascular: See above  Pulmonary: Positive for SOB. Negative for cough.   Gastrointestinal: Negative for nausea/vomiting.   : Negative for dysuria.   Skin: Negative for rashes.  Neurological: Negative for focal weakness.  Psychological: Negative for depression.  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      Arthritis     ASCVD (arteriosclerotic cardiovascular disease)  "7/8/2014    Atherosclerosis of aorta 1/7/2016    "There is atherosclerosis of the thoracic aorta" - Xray Chest 7-     Back pain     Basal cell carcinoma 10/7/2013    Right nasal columellar, right lower eyelid, left medial eyelid    Basal cell carcinoma 2/2015    mid back    Chronic diastolic heart failure 11/11/2014    Coronary artery disease     Gastric ulcer     Fosamax related.    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     Hypothyroidism (acquired) 9/27/2016    Joint pain     Lymphedema of Neck 3/17/2016    Occlusion and stenosis of carotid artery without mention of cerebral infarction     Pelvic mass in female 7/21/2015    Polyneuropathy in other diseases classified elsewhere 1/7/2016 6-     Squamous cell carcinoma of scalp 5/2014    scalp vertex with + LN met 10/14 s/p radiation    Ulcer      Past Surgical History:   Procedure Laterality Date    ADJACENT TISSUE TRANSFER/REARRANGEMENT N/A 5/14/2014    Performed by [facility]allos [facility] at [facility] OR 2ND FLR    APPENDECTOMY  8.14.2015    mucinous cystadenoma with low grade dysplasia.     APPENDECTOMY-LAPAROSCOPIC N/A 8/14/2015    Performed by Feroz Corado MD at [facility] OR 2ND FLR    BIOPSY-LYMPH NODE Right 10/27/2014    Performed by [facility]allos [facility] at [facility] OR 2ND FLR    CATARACT EXTRACTION      ou dr morales    EGD (ESOPHAGOGASTRODUODENOSCOPY) N/A 4/15/2019    Performed by Joni Montoya MD at [facility] ENDO (2ND FLR)    EYE SURGERY      HYSTERECTOMY  1970     NIC/BSO. took HRT immediatiely after wards.     LAPAROSCOPY-DIAGNOSTIC  8/14/2015    Performed by Gianfranco Crenshaw MD at [facility] OR 2ND FLR    LYMPH NODE BIOPSY Right 10/27/2014    posterior triangle    Mohs excision of scalp SCC N/A 5/13/2014    Scalp flap N/A 5/14/204    posterior advancement-rotation    SKIN CANCER EXCISION      THYROIDECTOMY  1960's    hyperthyroidism    " TONSILLECTOMY       Allergies:     Review of patient's allergies indicates:   Allergen Reactions    Codeine     Darvocet a500 [propoxyphene n-acetaminophen] Nausea Only    Sulfa (sulfonamide antibiotics) Rash    Fosamax [alendronate]      Gastric ulcer     Medications:     Current Outpatient Medications on File Prior to Visit   Medication Sig Dispense Refill    acetaminophen (TYLENOL ARTHRITIS) 650 MG TbSR Take 650 mg by mouth nightly as needed (pain).       albuterol (PROVENTIL) 2.5 mg /3 mL (0.083 %) nebulizer solution Take 3 mLs (2.5 mg total) by nebulization every 4 (four) hours as needed for Wheezing or Shortness of Breath (chest tightness). 1 Box 6    amLODIPine (NORVASC) 10 MG tablet TAKE 1 TABLET EVERY DAY 90 tablet 3    atorvastatin (LIPITOR) 20 MG tablet TAKE 1 TABLET EVERY DAY 90 tablet 3    bumetanide (BUMEX) 2 MG tablet Take 1 tablet (2 mg total) by mouth 2 (two) times daily. 180 tablet 3    calcium carbonate/vitamin D3 (CALTRATE 600 + D ORAL) Take by mouth once daily.      chlorpheniramine maleate (CHLOR-TRIMETON REPETABS ORAL) Take 1 tablet by mouth daily as needed (hayfever).       FLAXSEED-OMEGA3,6,9-FATTY ACID ORAL Take 1 capsule by mouth once daily.        gabapentin (NEURONTIN) 100 MG capsule Take 2 capsules (200 mg total) by mouth 2 (two) times daily. (Patient taking differently: Take 200 mg by mouth 2 (two) times daily. Pt taking one pill once a day.) 120 capsule 11    isosorbide-hydrALAZINE 20-37.5 mg (BIDIL) 20-37.5 mg Tab Take 1 tablet by mouth 2 (two) times daily. 180 tablet 3    labetalol (NORMODYNE) 300 MG tablet Take 1 tablet (300 mg total) by mouth 2 (two) times daily. 180 tablet 3    levothyroxine (SYNTHROID) 75 MCG tablet TAKE 1 TABLET EVERY DAY 90 tablet 12    magnesium 250 mg Tab Take 1 tablet by mouth once daily.        metOLazone (ZAROXOLYN) 5 MG tablet Take 1 tablet (5 mg total) by mouth daily as needed. 10 tablet 0    omeprazole (PRILOSEC) 20 MG capsule Take 1  capsule (20 mg total) by mouth once daily. 30 capsule 0    OPTICHAMBER DARREN LG MASK Spcr U UTD  0    potassium chloride (KLOR-CON) 10 MEQ TbSR Take 1 tablet (10 mEq total) by mouth once daily. 90 tablet 3    traMADol (ULTRAM) 50 mg tablet Take 1 tablet (50 mg total) by mouth every 12 (twelve) hours as needed for Pain. 15 tablet 0    triamcinolone acetonide 0.1% (KENALOG) 0.1 % cream APPLY TO THE AFFECTED AREA OF lower legs TWICE DAILY (Patient taking differently: Pt using prn.) 453.6 g 1     No current facility-administered medications on file prior to visit.      Social History:     Social History     Tobacco Use    Smoking status: Former Smoker    Smokeless tobacco: Never Used    Tobacco comment: Quit 65 years ago   Substance Use Topics    Alcohol use: No     Family History:     Family History   Problem Relation Age of Onset    Heart attack Mother     Heart disease Mother     Hypertension Mother     Eczema Mother     Heart failure Mother     Clotting disorder Father     Hypertension Father     Heart failure Father     Thyroid disease Sister     Cancer Sister         thyroid CA     No Known Problems Brother     No Known Problems Maternal Aunt     No Known Problems Maternal Uncle     No Known Problems Paternal Aunt     No Known Problems Paternal Uncle     No Known Problems Maternal Grandmother     No Known Problems Maternal Grandfather     No Known Problems Paternal Grandmother     No Known Problems Paternal Grandfather     Amblyopia Neg Hx     Blindness Neg Hx     Cataracts Neg Hx     Glaucoma Neg Hx     Macular degeneration Neg Hx     Retinal detachment Neg Hx     Strabismus Neg Hx     Stroke Neg Hx     Melanoma Neg Hx     Psoriasis Neg Hx     Lupus Neg Hx     Acne Neg Hx     Ovarian cancer Neg Hx     Uterine cancer Neg Hx     Breast cancer Neg Hx     Colon cancer Neg Hx     Diabetes Neg Hx     Hyperlipidemia Neg Hx      Physical Exam:   BP (!) 160/60   Pulse 65    "Ht 5' 4" (1.626 m)   Wt 58.5 kg (128 lb 15.5 oz)   LMP  (LMP Unknown)   SpO2 95%   BMI 22.14 kg/m²      Constitutional: No apparent distress, conversant  HEENT: Sclera anicteric, extraocular movements intact  Neck: JVD to the earlobe at 30 degrees, no carotid bruits  CV: Regular rate and rhythm, no murmurs rubs or gallops, normal S1/S2  Pulm: Clear to auscultation bilaterally  GI: Abdomen soft, no palpable masses  Extremities: Pitting 2+ bilateral lower extremity edema, warm   Skin: No ecchymosis, erythema, or ulcers  Psych: Alert and oriented to person place location, appropriate affect  Neuro: No focal deficits    Labs:     Blood Tests:  Lab Results   Component Value Date     (H) 05/07/2019     (L) 05/07/2019    K 3.8 05/07/2019    CL 93 (L) 05/07/2019    CO2 27 05/07/2019    BUN 55 (H) 05/07/2019    CREATININE 1.7 (H) 05/07/2019     05/07/2019    HGBA1C 5.0 04/11/2019    MG 2.0 04/15/2019    AST 56 (H) 05/07/2019    ALT 46 (H) 05/07/2019    ALBUMIN 2.6 (L) 05/07/2019    PROT 5.7 (L) 05/07/2019    BILITOT 0.6 05/07/2019    WBC 8.82 05/07/2019    HGB 8.5 (L) 05/07/2019    HCT 27.5 (L) 05/07/2019    HCT 26 (L) 01/07/2019    MCV 88 05/07/2019     05/07/2019    INR 1.0 04/25/2011    TSH 5.592 (H) 04/11/2019       Lab Results   Component Value Date    CHOL 163 03/27/2019    HDL 67 03/27/2019    TRIG 57 03/27/2019       Lab Results   Component Value Date    LDLCALC 84.6 03/27/2019       Urine Tests:  Lab Results   Component Value Date    COLORU Yellow 05/02/2019    APPEARANCEUA Hazy (A) 05/02/2019    PHUR 6.0 05/02/2019    SPECGRAV 1.010 05/02/2019    PROTEINUA 3+ (A) 05/02/2019    GLUCUA 1+ (A) 05/02/2019    KETONESU Negative 05/02/2019    BILIRUBINUA Negative 05/02/2019    OCCULTUA Negative 05/02/2019    NITRITE Negative 05/02/2019    UROBILINOGEN Negative 06/09/2017    LEUKOCYTESUR Negative 05/02/2019       Imaging:     Echocardiogram  TTE 4/14/19  · Normal left ventricular systolic " function. The estimated ejection fraction is 60%  · Eccentric left ventricular hypertrophy.  · Grade II (moderate) left ventricular diastolic dysfunction consistent with pseudonormalization.  · Elevated left atrial pressure.  · No wall motion abnormalities.  · Severe left atrial enlargement.  · Mild right atrial enlargement.  · Mild aortic valve stenosis.  · Aortic valve area is 2.03 cm2; peak velocity is 1.75 m/s; mean gradient is 7.28 mmHg.  · Mild aortic regurgitation.  · Mild mitral regurgitation.  · Intermediate central venous pressure (8 mm Hg).  · The estimated PA systolic pressure is 40 mm Hg  · Moderate pericardial effusion. There was a moderate -large posterior and moderate anterior and apical effusion-there was no place to tap this subcostally and there may be an apical lateral space but this would not be simple.  · There is a small left pleural effusion.    TTE 1/21/19  · Mild left ventricular enlargement.  · Normal left ventricular systolic function. The estimated ejection fraction is 65%  · Normal right ventricular systolic function.  · Indeterminate left ventricular diastolic function.  · No wall motion abnormalities.  · Severe left atrial enlargement.  · Mild mitral regurgitation.  · Mild aortic regurgitation.  · The estimated PA systolic pressure is 34 mm Hg  · Intermediate central venous pressure (8 mm Hg).  · Moderate sized pericardial effusion - there is a large pocket posteriorly, another moderate sized pocket adjacent to the RA and RV free walls, and little elsewhere. No evidence of tamponade.    TTE 1/10/19  · Large pericardial effusion, borderline 25% variation across mitral valve, right atrial systolic but not diastolic collapse, no RV collapse.  · Severe left atrial enlargement.  · Moderate left ventricular enlargement.  · Normal left ventricular systolic function. The estimated ejection fraction is 60%  · Grade II (moderate) left ventricular diastolic dysfunction consistent with  pseudonormalization.  · Elevated left atrial pressure.  · Mild mitral regurgitation.  · Mild eccentric left ventricular hypertrophy.  · Mild mitral sclerosis.  · Normal right ventricular systolic function.  · Mild aortic regurgitation.  · Normal central venous pressure (3 mm Hg).    TTE 18  CONCLUSIONS     1 - Upper limit of normal left ventricular enlargement.     2 - Normal left ventricular systolic function (EF 60-65%).     3 - Eccentric hypertrophy.     4 - No wall motion abnormalities.     5 - Biatrial enlargement.     6 - Impaired LV relaxation, elevated LAP (grade 2 diastolic dysfunction).     7 - Normal right ventricular systolic function .     8 - Mild aortic regurgitation.     9 - Pericardial effusion as per text.   Pericardium: There is evidence of a moderate-large postero-lateral pericardial effusion, small amount lateral, and trivial otherwise. There is no significant respiratory variation in mitral inflow excluding significant constriction/tamponade physiology.     Stress testing  None    Cath Lab  None    Other  LE Duplex 19  No evidence of deep venous thrombosis in either lower extremity.    LE Duplex 19  Thrombus in the left peroneal vein.  Complex heterogeneous area near the sapheno-femoral junction.  Adjacent collaterals.  No internal vascularity.  Complex left Baker's cyst.  Simple right Yin's cyst.    Carotid US 18  CONCLUSIONS   There is 40 - 49% right Internal Carotid stenosis.  There is 40 - 49% left Internal Carotid stenosis.    CT soft tissue neck 14  1.  In this patient with a history of squamous cell carcinoma of the scalp, no abnormally enlarged lymph nodes are identified within the neck.  2.  Atherosclerosis.  3.  Maxillary sinus disease.  4.  Degenerative changes of the cervical spine.     EK19  Sinus rhythm with 1st degree A-V block  Abnormal R wave progression in the precordial leads  Nonspecific T wave abnormality    19  Accelerated  Junctional rhythm  Abnormal ECG    7/21/15  Sinus bradycardia with 1st degree A-V block  Otherwise normal ECG    Assessment:     1. Chronic combined systolic and diastolic heart failure    2. Acute on chronic diastolic (congestive) heart failure    3. ASCVD (arteriosclerotic cardiovascular disease)    4. Atherosclerosis of aorta    5. Acute on chronic diastolic heart failure    6. Bilateral carotid artery stenosis    7. Essential hypertension    8. Mixed hyperlipidemia    9. Nonrheumatic aortic valve insufficiency    10. Chronic deep vein thrombosis (DVT) of lower extremity, unspecified laterality, unspecified vein    11. Chronic diastolic heart failure    12. Acute deep vein thrombosis (DVT) of left peroneal vein    13. 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        Plan:     Acute on chronic diastolic (congestive) heart failure  Discussed low sodium diet, 1.5L fluid restriction, and daily weights  Give lasix 80mg IV x1 in the clinic today   Continue bumex 2mg BID  Continue metolazone 5mg daily x3 days with KCl supplement daily (K 3.8 on recent labs)  Increase bidil to two tablets BID  On labetalol 300mg BID  Awaiting abdominal US to evaluate increase in LFTs    Follow up with Dr Triana on 5/13    Signed:  Zheng Bautista MD  Cardiology     5/9/2019 4:40 PM    Follow-up:     Future Appointments   Date Time Provider Department Center   5/9/2019  3:30 PM Neville Bautista III, MD [facility] CARDIO [place]   5/13/2019  7:15 AM [facility] OIC-US1 MASTER [facility] ULTR [facility]   5/13/2019 11:00 AM Lenka Triana MD [facility] CARDIO [place]

## 2019-05-13 ENCOUNTER — TELEPHONE (OUTPATIENT)
Dept: CARDIOLOGY | Facility: CLINIC | Age: 84
End: 2019-05-13

## 2019-05-13 ENCOUNTER — OFFICE VISIT (OUTPATIENT)
Dept: CARDIOLOGY | Facility: CLINIC | Age: 84
End: 2019-05-13
Payer: MEDICARE

## 2019-05-13 ENCOUNTER — TELEPHONE (OUTPATIENT)
Dept: FAMILY MEDICINE | Facility: CLINIC | Age: 84
End: 2019-05-13

## 2019-05-13 ENCOUNTER — HOSPITAL ENCOUNTER (OUTPATIENT)
Dept: RADIOLOGY | Facility: HOSPITAL | Age: 84
Discharge: HOME | End: 2019-05-13
Attending: INTERNAL MEDICINE
Payer: MEDICARE

## 2019-05-13 VITALS
DIASTOLIC BLOOD PRESSURE: 60 MMHG | BODY MASS INDEX: 21.47 KG/M2 | SYSTOLIC BLOOD PRESSURE: 143 MMHG | HEIGHT: 64 IN | HEART RATE: 58 BPM | WEIGHT: 125.75 LBS

## 2019-05-13 DIAGNOSIS — R74.8 ABNORMAL LIVER ENZYMES: ICD-10-CM

## 2019-05-13 DIAGNOSIS — E03.9 HYPOTHYROIDISM (ACQUIRED): ICD-10-CM

## 2019-05-13 DIAGNOSIS — N18.30 CKD (CHRONIC KIDNEY DISEASE) STAGE 3, GFR 30-59 ML/MIN: ICD-10-CM

## 2019-05-13 DIAGNOSIS — R53.81 DEBILITY: ICD-10-CM

## 2019-05-13 DIAGNOSIS — I50.33 ACUTE ON CHRONIC DIASTOLIC (CONGESTIVE) HEART FAILURE: Primary | ICD-10-CM

## 2019-05-13 DIAGNOSIS — I31.39 PERICARDIAL EFFUSION: Chronic | ICD-10-CM

## 2019-05-13 DIAGNOSIS — I25.10 ASCVD (ARTERIOSCLEROTIC CARDIOVASCULAR DISEASE): Chronic | ICD-10-CM

## 2019-05-13 DIAGNOSIS — E78.5 HYPERLIPIDEMIA, UNSPECIFIED HYPERLIPIDEMIA TYPE: Chronic | ICD-10-CM

## 2019-05-13 DIAGNOSIS — I35.1 NONRHEUMATIC AORTIC VALVE INSUFFICIENCY: Chronic | ICD-10-CM

## 2019-05-13 DIAGNOSIS — I70.0 ATHEROSCLEROSIS OF AORTA: Chronic | ICD-10-CM

## 2019-05-13 DIAGNOSIS — I10 ESSENTIAL HYPERTENSION: ICD-10-CM

## 2019-05-13 DIAGNOSIS — I65.23 BILATERAL CAROTID ARTERY STENOSIS: Chronic | ICD-10-CM

## 2019-05-13 DIAGNOSIS — D64.9 ANEMIA, UNSPECIFIED TYPE: ICD-10-CM

## 2019-05-13 PROBLEM — I82.452 ACUTE DEEP VEIN THROMBOSIS (DVT) OF LEFT PERONEAL VEIN: Status: RESOLVED | Noted: 2019-01-25 | Resolved: 2019-05-13

## 2019-05-13 PROCEDURE — 1101F PR PT FALLS ASSESS DOC 0-1 FALLS W/OUT INJ PAST YR: ICD-10-PCS | Mod: HCNC,CPTII,S$GLB, | Performed by: INTERNAL MEDICINE

## 2019-05-13 PROCEDURE — 99999 PR PBB SHADOW E&M-EST. PATIENT-LVL III: ICD-10-PCS | Mod: PBBFAC,HCNC,, | Performed by: INTERNAL MEDICINE

## 2019-05-13 PROCEDURE — 76700 US ABDOMEN COMPLETE: ICD-10-PCS | Mod: 26,HCNC,, | Performed by: INTERNAL MEDICINE

## 2019-05-13 PROCEDURE — 76700 US EXAM ABDOM COMPLETE: CPT | Mod: TC,HCNC

## 2019-05-13 PROCEDURE — 1101F PT FALLS ASSESS-DOCD LE1/YR: CPT | Mod: HCNC,CPTII,S$GLB, | Performed by: INTERNAL MEDICINE

## 2019-05-13 PROCEDURE — 99215 OFFICE O/P EST HI 40 MIN: CPT | Mod: HCNC,S$GLB,, | Performed by: INTERNAL MEDICINE

## 2019-05-13 PROCEDURE — 99999 PR PBB SHADOW E&M-EST. PATIENT-LVL III: CPT | Mod: PBBFAC,HCNC,, | Performed by: INTERNAL MEDICINE

## 2019-05-13 PROCEDURE — 99215 PR OFFICE/OUTPT VISIT, EST, LEVL V, 40-54 MIN: ICD-10-PCS | Mod: HCNC,S$GLB,, | Performed by: INTERNAL MEDICINE

## 2019-05-13 PROCEDURE — 76700 US EXAM ABDOM COMPLETE: CPT | Mod: 26,HCNC,, | Performed by: INTERNAL MEDICINE

## 2019-05-13 RX ORDER — METOLAZONE 5 MG/1
TABLET ORAL
Qty: 30 TABLET | Refills: 6 | Status: SHIPPED | OUTPATIENT
Start: 2019-05-13

## 2019-05-13 RX ORDER — METOLAZONE 5 MG/1
5 TABLET ORAL DAILY
COMMUNITY
End: 2019-05-13 | Stop reason: SDUPTHER

## 2019-05-13 NOTE — TELEPHONE ENCOUNTER
----- Message from Merle Erwin sent at 5/13/2019 11:17 AM CDT -----  Contact: [facility] [address] Cardio: ext: [phone]  Type: Patient Call Back    Who called:Jagruti    What is the request in detail:Discuss a mutual patient    Can the clinic reply by [facility]? No    Would the patient rather a call back or a response via [facility]? Callback    Best call back number:[phone]    Additional Information:N/A

## 2019-05-13 NOTE — PROGRESS NOTES
Subjective:   Patient ID:  Kristin Quintanilla is a 89 y.o. female who presents for follow-up of Congestive Heart Failure and Leg Swelling      HPI: Patient seen by my partner, Dr. Bautista on 4/24/19 with fluid retention and weight gain. Given iv Lasix  And increased daily BUnex to 2 mg BID with and addition of Metolazone. Also Bidil was doubled.  Persistently elevated LFT's prompted abdominal ultrasound that revealed:    Impression       1. Hepatomegaly with innumerable lesions throughout the liver concerning for metastasis.  Recommend contrast enhanced CT abdomen/pelvis for further evaluation.  2. Multiple cystic lesions within the pancreas, largest 1.5 cm, having a simple appearance by ultrasound.   follow-up in patient this age is questionable.  3. Mild volume ascites.  4. Small bilateral pleural effusions.     Patient feels weak. She had diarrhea and now valencia abdominal pain and feeling of fullness with belching.  She has no appetite.    Blood work: persistent anemia, hypoalbuminemia and elevated LFT's.    Lab Results   Component Value Date     (L) 05/07/2019    K 3.8 05/07/2019    CL 93 (L) 05/07/2019    CO2 27 05/07/2019    BUN 55 (H) 05/07/2019    CREATININE 1.7 (H) 05/07/2019     05/07/2019    HGBA1C 5.0 04/11/2019    MG 2.0 04/15/2019    AST 56 (H) 05/07/2019    ALT 46 (H) 05/07/2019    ALBUMIN 2.6 (L) 05/07/2019    PROT 5.7 (L) 05/07/2019    BILITOT 0.6 05/07/2019    WBC 8.82 05/07/2019    HGB 8.5 (L) 05/07/2019    HCT 27.5 (L) 05/07/2019    HCT 26 (L) 01/07/2019    MCV 88 05/07/2019     05/07/2019    INR 1.0 04/25/2011    TSH 5.592 (H) 04/11/2019    CHOL 163 03/27/2019    HDL 67 03/27/2019    LDLCALC 84.6 03/27/2019    TRIG 57 03/27/2019       Review of Systems   Constitution: Positive for decreased appetite, malaise/fatigue and weight loss.   HENT: Negative.    Eyes: Negative.    Cardiovascular: Positive for dyspnea on exertion. Negative for chest pain, claudication, irregular heartbeat,  "leg swelling, near-syncope, palpitations and syncope.   Respiratory: Negative.  Negative for cough, shortness of breath, snoring and wheezing.    Endocrine: Negative.  Negative for cold intolerance, heat intolerance, polydipsia, polyphagia and polyuria.   Skin: Negative.    Musculoskeletal: Positive for arthritis, muscle cramps and muscle weakness.   Gastrointestinal: Positive for abdominal pain, diarrhea and dysphagia.   Genitourinary: Negative.    Neurological: Positive for dizziness and weakness.   Psychiatric/Behavioral: Negative.        Objective:   Physical Exam   Constitutional: She is oriented to person, place, and time. She appears well-developed and well-nourished.   BP (!) 143/60   Pulse (!) 58   Ht 5' 4" (1.626 m)   Wt 57.1 kg (125 lb 12.4 oz)   LMP  (LMP Unknown)   BMI 21.59 kg/m²   Frail, thin   HENT:   Head: Normocephalic.   Eyes: Pupils are equal, round, and reactive to light.   Neck: Normal range of motion. Neck supple. Carotid bruit is not present. No thyromegaly present.   Cardiovascular: Normal rate, regular rhythm and intact distal pulses. Exam reveals no gallop and no friction rub.   Murmur heard.   Midsystolic murmur is present with a grade of 1/6 at the upper right sternal border.   Decrescendo early diastolic murmur is present with a grade of 1/6 at the upper right sternal border.  Pulses:       Carotid pulses are 2+ on the right side, and 2+ on the left side.       Radial pulses are 2+ on the right side, and 2+ on the left side.        Femoral pulses are 2+ on the right side, and 2+ on the left side.       Popliteal pulses are 2+ on the right side, and 2+ on the left side.        Dorsalis pedis pulses are 2+ on the right side, and 2+ on the left side.        Posterior tibial pulses are 2+ on the right side, and 2+ on the left side.   Pulmonary/Chest: Effort normal and breath sounds normal. No respiratory distress. She has no wheezes. She has no rales. She exhibits no tenderness. "   Abdominal: Soft. Bowel sounds are normal.   Musculoskeletal: Normal range of motion. She exhibits no edema.   Neurological: She is alert and oriented to person, place, and time.   Skin: Skin is warm and dry.   Psychiatric: She has a normal mood and affect.   Nursing note and vitals reviewed.        Assessment and Plan:     Problem List Items Addressed This Visit        Cardiology Problems    Hyperlipidemia (Chronic)    Aortic valve insufficiency (Chronic)    Pericardial effusion (Chronic)    Bilateral carotid artery stenosis (Chronic)    ASCVD (arteriosclerotic cardiovascular disease) (Chronic)    Atherosclerosis of aorta (Chronic)    Acute on chronic diastolic (congestive) heart failure - Primary    Essential hypertension       Other    Hypothyroidism (acquired)    Anemia    Debility    CKD (chronic kidney disease) stage 3, GFR 30-59 ml/min          Patient's Medications   New Prescriptions    No medications on file   Previous Medications    ACETAMINOPHEN (TYLENOL ARTHRITIS) 650 MG TBSR    Take 650 mg by mouth nightly as needed (pain).     ALBUTEROL (PROVENTIL) 2.5 MG /3 ML (0.083 %) NEBULIZER SOLUTION    Take 3 mLs (2.5 mg total) by nebulization every 4 (four) hours as needed for Wheezing or Shortness of Breath (chest tightness).    AMLODIPINE (NORVASC) 10 MG TABLET    TAKE 1 TABLET EVERY DAY    ATORVASTATIN (LIPITOR) 20 MG TABLET    TAKE 1 TABLET EVERY DAY    BUMETANIDE (BUMEX) 2 MG TABLET    Take 1 tablet (2 mg total) by mouth 2 (two) times daily.    CALCIUM CARBONATE/VITAMIN D3 (CALTRATE 600 + D ORAL)    Take by mouth once daily.    FLAXSEED-OMEGA3,6,9-FATTY ACID ORAL    Take 1 capsule by mouth once daily.      GABAPENTIN (NEURONTIN) 100 MG CAPSULE    Take 2 capsules (200 mg total) by mouth 2 (two) times daily.    ISOSORBIDE-HYDRALAZINE 20-37.5 MG (BIDIL) 20-37.5 MG TAB    Take 2 tablets by mouth 2 (two) times daily.    LABETALOL (NORMODYNE) 300 MG TABLET    Take 1 tablet (300 mg total) by mouth 2 (two) times  daily.    LEVOTHYROXINE (SYNTHROID) 75 MCG TABLET    TAKE 1 TABLET EVERY DAY    MAGNESIUM 250 MG TAB    Take 1 tablet by mouth once daily.      METOLAZONE (ZAROXOLYN) 5 MG TABLET    Take 5 mg by mouth once daily. Pt to take prn per Dr. Triana.    OMEPRAZOLE (PRILOSEC) 20 MG CAPSULE    Take 1 capsule (20 mg total) by mouth once daily.    OPTICHAMBER DARREN LG MASK SPCR    U UTD    POTASSIUM CHLORIDE (KLOR-CON) 10 MEQ TBSR    Take 1 tablet (10 mEq total) by mouth once daily.    TRAMADOL (ULTRAM) 50 MG TABLET    Take 1 tablet (50 mg total) by mouth every 12 (twelve) hours as needed for Pain.    TRIAMCINOLONE ACETONIDE 0.1% (KENALOG) 0.1 % CREAM    APPLY TO THE AFFECTED AREA OF lower legs TWICE DAILY   Modified Medications    No medications on file   Discontinued Medications    METOLAZONE (ZAROXOLYN) 5 MG TABLET    Take 1 tablet (5 mg total) by mouth daily as needed.     Possible metastatic disease discussed with patient and her son.   This information was also conveyed to Dr. Schmidt.  Patient will decide if she wishes to proceed with further diagnostic work up or Hospice care.  In the meantime continue daily weights, nutritional support and Bumex with prn Metolazone.  If BP is low would first decrease Bidil to the original dose and than decrease Amlodipine.  Follow up in about 3 months (around 8/13/2019).

## 2019-05-13 NOTE — TELEPHONE ENCOUNTER
----- Message from Lenka Triana MD sent at 5/13/2019  1:25 PM CDT -----  I have discussed results of the abdominal ultrasound during patient 's visit with me and informed Dr. ASTORGA about the findings. Patient will decide if she would like to proceed with further work up or seek Hospice care.

## 2019-05-13 NOTE — TELEPHONE ENCOUNTER
----- Message from Merle Erwin sent at 5/13/2019 11:17 AM CDT -----  Contact: [facility] Cardio: ext: [phone]  Type: Patient Call Back    Who called:Jagruti    What is the request in detail:Discuss a mutual patient    Can the clinic reply by [facility]? No    Would the patient rather a call back or a response via [facility]? Callback    Best call back number:[phone]    Additional Information:N/A

## 2019-05-14 ENCOUNTER — TELEPHONE (OUTPATIENT)
Dept: CARDIOLOGY | Facility: CLINIC | Age: 84
End: 2019-05-14

## 2019-05-14 ENCOUNTER — TELEPHONE (OUTPATIENT)
Dept: ADMINISTRATIVE | Facility: CLINIC | Age: 84
End: 2019-05-14

## 2019-05-14 ENCOUNTER — PATIENT MESSAGE (OUTPATIENT)
Dept: FAMILY MEDICINE | Facility: CLINIC | Age: 84
End: 2019-05-14

## 2019-05-14 DIAGNOSIS — R16.0 LIVER MASS: ICD-10-CM

## 2019-05-14 DIAGNOSIS — C56.9 MALIGNANT NEOPLASM OF OVARY, UNSPECIFIED LATERALITY: ICD-10-CM

## 2019-05-14 DIAGNOSIS — C78.7 LIVER METASTASES: Primary | ICD-10-CM

## 2019-05-14 DIAGNOSIS — C80.1 PRIMARY CANCER OF UNKNOWN SITE: ICD-10-CM

## 2019-05-14 DIAGNOSIS — N83.8 OVARIAN MASS: ICD-10-CM

## 2019-05-14 NOTE — TELEPHONE ENCOUNTER
I spoke w/Franklyn today and he stated that pt is doing as well as to be expected and that they spoke w/Dr. Schmidt today and he ordered some tests to see how much the cancer has spread. Franklyn stated that they have also been in touch w/hospice and thinks that they have everything in order for the pt. Dr. Triana made aware.

## 2019-05-14 NOTE — TELEPHONE ENCOUNTER
Home Health SOC 04/16/2019 - 06/14/2019 with [facility] ([place] - Dr. Jazmyn Mathews. Patient received SN, PT and OT services.

## 2019-05-14 NOTE — TELEPHONE ENCOUNTER
Long conversation with son    Please call [facility] [place] [facility] and arrange CT scan of abdomen and pelvis, order is in.  Please call the son.  They would like to probably schedule sometime next week but would like to get it scheduled rather than waiting on referral coordinators.          Mother does desire workup to try and find out where possibly the cancer has come from.  She has never had a colonoscopy.  Has not had a mammogram in years obviously.  We discussed doing CT scan of abdomen and pelvis and some tumor markers and so forth.  She has decided on not pursuing any surgery or treatment obviously.  They met with the palliative care nurse and may enter hospice in the near future but not yet ready for that.

## 2019-05-17 ENCOUNTER — LAB VISIT (OUTPATIENT)
Dept: LAB | Facility: HOSPITAL | Age: 84
End: 2019-05-17
Attending: INTERNAL MEDICINE
Payer: MEDICARE

## 2019-05-17 DIAGNOSIS — C78.7 LIVER METASTASES: ICD-10-CM

## 2019-05-17 DIAGNOSIS — C56.9 MALIGNANT NEOPLASM OF OVARY, UNSPECIFIED LATERALITY: ICD-10-CM

## 2019-05-17 DIAGNOSIS — R16.0 LIVER MASS: ICD-10-CM

## 2019-05-17 DIAGNOSIS — C80.1 PRIMARY CANCER OF UNKNOWN SITE: ICD-10-CM

## 2019-05-17 DIAGNOSIS — N83.8 OVARIAN MASS: ICD-10-CM

## 2019-05-17 DIAGNOSIS — D64.9 ANEMIA, UNSPECIFIED TYPE: ICD-10-CM

## 2019-05-17 LAB
AFP SERPL-MCNC: 5.7 NG/ML (ref 0–8.4)
ALBUMIN SERPL BCP-MCNC: 2.3 G/DL (ref 3.5–5.2)
ALP SERPL-CCNC: 313 U/L (ref 55–135)
ALT SERPL W/O P-5'-P-CCNC: 34 U/L (ref 10–44)
ANION GAP SERPL CALC-SCNC: 12 MMOL/L (ref 8–16)
AST SERPL-CCNC: 55 U/L (ref 10–40)
BASOPHILS # BLD AUTO: 0.08 K/UL (ref 0–0.2)
BASOPHILS NFR BLD: 0.7 % (ref 0–1.9)
BILIRUB SERPL-MCNC: 0.5 MG/DL (ref 0.1–1)
BUN SERPL-MCNC: 89 MG/DL (ref 8–23)
CALCIUM SERPL-MCNC: 8.4 MG/DL (ref 8.7–10.5)
CANCER AG125 SERPL-ACNC: 851 U/ML (ref 0–30)
CEA SERPL-MCNC: 356.2 NG/ML (ref 0–5)
CHLORIDE SERPL-SCNC: 92 MMOL/L (ref 95–110)
CO2 SERPL-SCNC: 30 MMOL/L (ref 23–29)
CREAT SERPL-MCNC: 2.1 MG/DL (ref 0.5–1.4)
DIFFERENTIAL METHOD: ABNORMAL
EOSINOPHIL # BLD AUTO: 0.6 K/UL (ref 0–0.5)
EOSINOPHIL NFR BLD: 5.2 % (ref 0–8)
ERYTHROCYTE [DISTWIDTH] IN BLOOD BY AUTOMATED COUNT: 15.7 % (ref 11.5–14.5)
EST. GFR  (AFRICAN AMERICAN): 23.5 ML/MIN/1.73 M^2
EST. GFR  (NON AFRICAN AMERICAN): 20.4 ML/MIN/1.73 M^2
FERRITIN SERPL-MCNC: 205 NG/ML (ref 20–300)
GLUCOSE SERPL-MCNC: 108 MG/DL (ref 70–110)
HCT VFR BLD AUTO: 27.6 % (ref 37–48.5)
HGB BLD-MCNC: 8.6 G/DL (ref 12–16)
IMM GRANULOCYTES # BLD AUTO: 0.08 K/UL (ref 0–0.04)
IMM GRANULOCYTES NFR BLD AUTO: 0.7 % (ref 0–0.5)
IRON SERPL-MCNC: 12 UG/DL (ref 30–160)
LYMPHOCYTES # BLD AUTO: 0.6 K/UL (ref 1–4.8)
LYMPHOCYTES NFR BLD: 5.7 % (ref 18–48)
MCH RBC QN AUTO: 27 PG (ref 27–31)
MCHC RBC AUTO-ENTMCNC: 31.2 G/DL (ref 32–36)
MCV RBC AUTO: 87 FL (ref 82–98)
MONOCYTES # BLD AUTO: 0.9 K/UL (ref 0.3–1)
MONOCYTES NFR BLD: 7.7 % (ref 4–15)
NEUTROPHILS # BLD AUTO: 9.1 K/UL (ref 1.8–7.7)
NEUTROPHILS NFR BLD: 80 % (ref 38–73)
NRBC BLD-RTO: 0 /100 WBC
PLATELET # BLD AUTO: 265 K/UL (ref 150–350)
PMV BLD AUTO: 11 FL (ref 9.2–12.9)
POTASSIUM SERPL-SCNC: 3.1 MMOL/L (ref 3.5–5.1)
PROT SERPL-MCNC: 5.4 G/DL (ref 6–8.4)
RBC # BLD AUTO: 3.19 M/UL (ref 4–5.4)
SATURATED IRON: 4 % (ref 20–50)
SODIUM SERPL-SCNC: 134 MMOL/L (ref 136–145)
TOTAL IRON BINDING CAPACITY: 300 UG/DL (ref 250–450)
TRANSFERRIN SERPL-MCNC: 203 MG/DL (ref 200–375)
WBC # BLD AUTO: 11.32 K/UL (ref 3.9–12.7)

## 2019-05-17 PROCEDURE — 83540 ASSAY OF IRON: CPT | Mod: HCNC

## 2019-05-17 PROCEDURE — 82728 ASSAY OF FERRITIN: CPT | Mod: HCNC

## 2019-05-17 PROCEDURE — 36415 COLL VENOUS BLD VENIPUNCTURE: CPT | Mod: HCNC,PO

## 2019-05-17 PROCEDURE — 82378 CARCINOEMBRYONIC ANTIGEN: CPT | Mod: HCNC

## 2019-05-17 PROCEDURE — 85025 COMPLETE CBC W/AUTO DIFF WBC: CPT | Mod: HCNC

## 2019-05-17 PROCEDURE — 82105 ALPHA-FETOPROTEIN SERUM: CPT | Mod: HCNC

## 2019-05-17 PROCEDURE — 80053 COMPREHEN METABOLIC PANEL: CPT | Mod: HCNC

## 2019-05-17 PROCEDURE — 86304 IMMUNOASSAY TUMOR CA 125: CPT | Mod: HCNC

## 2019-05-20 ENCOUNTER — PATIENT MESSAGE (OUTPATIENT)
Dept: FAMILY MEDICINE | Facility: CLINIC | Age: 84
End: 2019-05-20

## 2019-05-20 ENCOUNTER — TELEPHONE (OUTPATIENT)
Dept: FAMILY MEDICINE | Facility: CLINIC | Age: 84
End: 2019-05-20

## 2019-05-20 ENCOUNTER — HOSPITAL ENCOUNTER (OUTPATIENT)
Dept: RADIOLOGY | Facility: HOSPITAL | Age: 84
Discharge: HOME | End: 2019-05-20
Attending: INTERNAL MEDICINE
Payer: MEDICARE

## 2019-05-20 DIAGNOSIS — C78.7 LIVER METASTASES: ICD-10-CM

## 2019-05-20 PROCEDURE — 74176 CT ABD & PELVIS W/O CONTRAST: CPT | Mod: TC,HCNC

## 2019-05-20 PROCEDURE — 74176 CT ABD & PELVIS W/O CONTRAST: CPT | Mod: 26,HCNC,, | Performed by: RADIOLOGY

## 2019-05-20 PROCEDURE — 74176 CT ABDOMEN PELVIS WITHOUT CONTRAST: ICD-10-PCS | Mod: 26,HCNC,, | Performed by: RADIOLOGY

## 2019-05-20 NOTE — TELEPHONE ENCOUNTER
----- Message from Kateryna Pinedaichaux sent at 5/20/2019  9:43 AM CDT -----  Contact: Ada Dye  Type:  Patient Requesting Referral    Who Called:Ada Dye    Referral to What Specialty: Hospice    Reason for Referral: Patient would like Hospice    Does the patient want the referral with a specific physician?: no    Is the specialist an [facility]?:[facility]    Would the patient rather a call back or a response via [facility]? Call    Best Call Back Number: [phone]    Additional Information: Patient would still like to receive the CT scan and results but no further treatment before transitioning to Hospice

## 2019-05-20 NOTE — TELEPHONE ENCOUNTER
Okay to fax them a written prescription to admit to hospice and then they can drop off or fax us all of their paperwork to sign    Written prescription done

## 2019-05-21 ENCOUNTER — PATIENT MESSAGE (OUTPATIENT)
Dept: FAMILY MEDICINE | Facility: CLINIC | Age: 84
End: 2019-05-21

## 2019-05-22 ENCOUNTER — PATIENT MESSAGE (OUTPATIENT)
Dept: FAMILY MEDICINE | Facility: CLINIC | Age: 84
End: 2019-05-22

## 2019-06-04 ENCOUNTER — TELEPHONE (OUTPATIENT)
Dept: FAMILY MEDICINE | Facility: CLINIC | Age: 84
End: 2019-06-04

## 2019-06-04 NOTE — TELEPHONE ENCOUNTER
----- Message from Makayla Sosa sent at 6/4/2019  8:51 AM CDT -----  Contact: Christi/ Marnie/  [phone]  Type: Patient Call Back    Who called: Christi Dye    What is the request in detail:    Patient passed away last night.  Thank you    Would the patient rather a call back or a response via [facility]?   Call back    Best call back number:   [phone]

## 2021-04-30 NOTE — MEDICAL/APP STUDENT
Hospital Medicine  Progress Note    Patient Name: Kristin Quintanilla  MRN: [id number]  [place] [facility] Denice Morales MD   Admit Date: 1/7/2019  [facility] 1/15/2019  Code status: DNR (Do Not Resuscitate)    Principal Problem:  Hyponatremia    Interval history: Reporting back pain. Generally feeling better.    Review of Systems   Constitutional: Negative for fever.   Respiratory: Negative for shortness of breath.        Physical Exam:  Temp:  [96.8 °F (36 °C)-98.7 °F (37.1 °C)]   Pulse:  [57-69]   Resp:  [14-18]   BP: (110-171)/(57-91)   SpO2:  [89 %-97 %]      Temp: 98.7 °F (37.1 °C) (01/12/19 0805)  Pulse: 65 (01/12/19 0805)  Resp: 18 (01/12/19 0805)  BP: (!) 171/70 (01/12/19 0805)  SpO2: (!) 92 % (01/12/19 0805)    Intake/Output Summary (Last 24 hours) at 1/12/2019 0954  Last data filed at 1/12/2019 0524  Gross per 24 hour   Intake 590 ml   Output 0 ml   Net 590 ml     Weight: 53 kg (116 lb 13.5 oz)  Body mass index is 20.06 kg/m².    Physical Exam   Constitutional: No distress.   Eyes: Conjunctivae and lids are normal.   Cardiovascular: S1 normal and S2 normal.   Pulmonary/Chest: Effort normal and breath sounds normal.   Abdominal: Soft. Bowel sounds are normal. There is no tenderness.   Musculoskeletal: She exhibits no edema.       Significant Labs:  Recent Labs   Lab 01/09/19  0403 01/10/19  0444 01/11/19  0516 01/12/19  0511   WBC 8.67 9.54 7.65 7.16   HGB 8.6* 8.8* 8.5* 8.1*   HCT 24.9* 26.3* 24.7* 24.8*    219 227 224     Recent Labs   Lab 01/07/19  1201  01/10/19  0444 01/10/19  1558 01/11/19  0516 01/12/19  0511   *   < > 122* 120* 123* 126*   K 4.0   < > 4.4 4.5 4.6 4.6   CL 87*   < > 91* 89* 91* 94*   CO2 24   < > 27 27 27 27   BUN 17   < > 24* 23 21 27*   CREATININE 0.8   < > 0.9 1.0 0.9 0.9   GLU 97   < > 75 93 65* 70   CALCIUM 9.5   < > 9.1 9.3 9.0 8.8   MG  --    < > 1.6  --  1.6 1.6   PHOS  --    < > 2.8  --  2.9 3.2   ALKPHOS 82  --   --   --   --   --    ALT 38  --   --   --   --   --   Impression: Regular astigmatism, bilateral: H52.223. Bilateral. Plan: New contact lens RX dispensed today.   AST 33  --   --   --   --   --    ALBUMIN 2.6*  --  2.3*  --  2.2* 2.1*   PROT 5.2*  --   --   --   --   --    BILITOT 0.8  --   --   --   --   --     < > = values in this interval not displayed.       Recent Labs   Lab 01/07/19  1201   TROPONINI 0.006     TSH:   Recent Labs   Lab 01/08/19  0957   TSH 1.942       Inpatient Medications prescribed for management of current Problems:   Scheduled Meds:    amLODIPine  10 mg Oral Daily    aspirin  81 mg Oral Every other day    atorvastatin  20 mg Oral Daily    calcium carbonate-vitamin D3 250-125 mg  1 tablet Oral BID    enoxaparin  30 mg Subcutaneous Daily    gabapentin  200 mg Oral BID    guaiFENesin  600 mg Oral BID    labetalol  300 mg Oral BID    levothyroxine  75 mcg Oral Before breakfast    losartan  100 mg Oral Daily     Continuous Infusions:   As Needed: acetaminophen, benzonatate, hydrALAZINE, sodium chloride 0.9%, traMADol    Active Hospital Problems    Diagnosis  POA    *Hyponatremia [E87.1]  Yes    Anemia [D64.9]  Yes    Essential hypertension [I10]  Yes    Acute bronchitis [J20.9]  Yes    Hypothyroidism (acquired) [E03.9]  Yes    Pericardial effusion [I31.3]  Yes     Chronic    Chronic diastolic heart failure [I50.32]  Yes    ASCVD (arteriosclerotic cardiovascular disease) [I25.10]  Yes     Chronic      Resolved Hospital Problems   No resolved problems to display.       Overview:    87 yo female on a background significant for CAD, Diastolic HF, HTN, HLD and hypothyroidism presented from PCP for dyspnea, LE edema, and fatigue and was admitted to MICU for hyponatremia (117) with evidence of hypervolemia. Prior to admission, patient was being treated with Levaquin for acute bronchitis and completed course. CXR on admission indicated pulmonary edema and she was managed with IV Lasix x1. For her hyponatremia, she was fluid restricted and home HCTZ was discontinued; CTH/CXR did not reveal any obvious malignancy. Cardiology was consulted for  finding of large pericardial fluid on Echo; per discussion with family, no pericardiocentesis planned in conjunction with patient's goals of care.      Assessment and Plan for Problems addressed today:    Chronic diastolic heart failure  Dyspnea  · Echo (4/2018): EF 60-65%, Grade 2 diastolic dysfunction. Biatrial enlargment, pericardial effusion.   · BNP elevated on admission. CXR (1/8): increasing bilateral effusions. BLE U/S: negative for DVT.  · Received IV Lasix 80 mg x1 with appropriate UOP. Held additional doses as patient clinically appeared hypovolemic.  · Strict I/O, Daily weights, fluid restriction.  · Repeat Echo: Large pericardial effusion; severe left atrial enlargement; EF 60%; Grade II diastolic dysfunction consistent with pseudonormalization. Called by Echo MD with concern for early tamponade per Echo findings. VSS. Cardiology consulted. (1/10)  · Per Cardiology discussion with patient and family, patient wishes to avoid invasive procedures such as pericardiocentesis. Patient not clinically in tamponade; plan for OP follow up. Plan to avoid aggressive BP treatment and diuresis. Orthostatic BPs revealed mild postural changes with MAP maintained > 80. (1/11)  · Appears compensated    Hyponatremia  · Possibly due to hypervolemia from CHF. 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   · Since admission, Na 117 -> 122. TSH & cortisol WNL. Serum osm <270. Continuing fluid restriction and monitoring BMP for slow Na correction. (1/9)  · Monitoring Na+; slowly improving (1/12)    Acute bronchitis  · She is afebrile with no leukocytosis. Completed course of Levaquin. PRN benzonatate ordered. Ordered Mucinex.     Essential hypertension  ASCVD (arteriosclerotic cardiovascular disease)  · EKG (1/7): NSR. Continue ASA, atorvastatin and antihypertensives: amlodipine, labetalol (dose increased) and losartan. Home HCTZ discontinued due to hyponatremia.     Normocytic  anemia  · Hgb ~9 on admit, slowly down-trending.  · Iron studies consistent with mild iron deficiency anemia. Retic % WNL. Ordered FOBT. (1/11)    Hypothyroidism  · Continued home levothyroxine 75mcg.     Hypokalemia  Hyomagnesemia  · Replete as needed    Diet: Diet Adult Regular (IDDSI Level 7) Fluid - 1500mL    DVT Prophylaxis:   Anticoagulants   Medication Route Frequency    enoxaparin injection 40 mg Subcutaneous Daily       L/D/A:  PIV x2  External urinary catheter    Discharge plan and follow up  [facility] [place] [facility] [place]      Provider  Denice Morales MD  [facility] [place]   Department of Hospital Medicine    Scribcarlos Attestation: I personally scribed for Denice Morales MD on 01/12/2019 at 11:38 AM. Electronically signed by nick Kern on 01/12/2019 at 11:38 AM.